# Patient Record
Sex: MALE | Race: BLACK OR AFRICAN AMERICAN
[De-identification: names, ages, dates, MRNs, and addresses within clinical notes are randomized per-mention and may not be internally consistent; named-entity substitution may affect disease eponyms.]

---

## 2017-02-27 ENCOUNTER — HOSPITAL ENCOUNTER (EMERGENCY)
Dept: HOSPITAL 72 - EMR | Age: 67
Discharge: LEFT BEFORE DISCHARGE | End: 2017-02-27
Payer: SELF-PAY

## 2017-02-27 DIAGNOSIS — Z53.21: Primary | ICD-10-CM

## 2020-05-12 ENCOUNTER — HOSPITAL ENCOUNTER (INPATIENT)
Dept: HOSPITAL 72 - EMR | Age: 70
LOS: 17 days | Discharge: HOME | DRG: 853 | End: 2020-05-29
Payer: MEDICAID

## 2020-05-12 VITALS — SYSTOLIC BLOOD PRESSURE: 99 MMHG | DIASTOLIC BLOOD PRESSURE: 56 MMHG

## 2020-05-12 VITALS — SYSTOLIC BLOOD PRESSURE: 112 MMHG | DIASTOLIC BLOOD PRESSURE: 67 MMHG

## 2020-05-12 VITALS — DIASTOLIC BLOOD PRESSURE: 63 MMHG | SYSTOLIC BLOOD PRESSURE: 112 MMHG

## 2020-05-12 VITALS — HEIGHT: 67 IN | BODY MASS INDEX: 31.08 KG/M2 | WEIGHT: 198 LBS

## 2020-05-12 VITALS — SYSTOLIC BLOOD PRESSURE: 112 MMHG | DIASTOLIC BLOOD PRESSURE: 50 MMHG

## 2020-05-12 VITALS — DIASTOLIC BLOOD PRESSURE: 72 MMHG | SYSTOLIC BLOOD PRESSURE: 108 MMHG

## 2020-05-12 VITALS — SYSTOLIC BLOOD PRESSURE: 113 MMHG | DIASTOLIC BLOOD PRESSURE: 68 MMHG

## 2020-05-12 DIAGNOSIS — E86.0: ICD-10-CM

## 2020-05-12 DIAGNOSIS — R55: ICD-10-CM

## 2020-05-12 DIAGNOSIS — N17.9: ICD-10-CM

## 2020-05-12 DIAGNOSIS — I95.9: ICD-10-CM

## 2020-05-12 DIAGNOSIS — R65.20: ICD-10-CM

## 2020-05-12 DIAGNOSIS — E87.6: ICD-10-CM

## 2020-05-12 DIAGNOSIS — I10: ICD-10-CM

## 2020-05-12 DIAGNOSIS — A41.59: Primary | ICD-10-CM

## 2020-05-12 DIAGNOSIS — E11.9: ICD-10-CM

## 2020-05-12 DIAGNOSIS — C18.5: ICD-10-CM

## 2020-05-12 DIAGNOSIS — D68.59: ICD-10-CM

## 2020-05-12 DIAGNOSIS — D50.0: ICD-10-CM

## 2020-05-12 DIAGNOSIS — K56.7: ICD-10-CM

## 2020-05-12 DIAGNOSIS — Z20.828: ICD-10-CM

## 2020-05-12 DIAGNOSIS — E46: ICD-10-CM

## 2020-05-12 DIAGNOSIS — R19.7: ICD-10-CM

## 2020-05-12 DIAGNOSIS — C78.89: ICD-10-CM

## 2020-05-12 DIAGNOSIS — K92.2: ICD-10-CM

## 2020-05-12 DIAGNOSIS — I26.99: ICD-10-CM

## 2020-05-12 DIAGNOSIS — D69.6: ICD-10-CM

## 2020-05-12 LAB
ADD MANUAL DIFF: YES
ALBUMIN SERPL-MCNC: 1.6 G/DL (ref 3.4–5)
ALBUMIN/GLOB SERPL: 0.4 {RATIO} (ref 1–2.7)
ALP SERPL-CCNC: 90 U/L (ref 46–116)
ALT SERPL-CCNC: 15 U/L (ref 12–78)
ANION GAP SERPL CALC-SCNC: 18 MMOL/L (ref 5–15)
APPEARANCE UR: (no result)
APTT BLD: 25 SEC (ref 23–33)
APTT PPP: YELLOW S
AST SERPL-CCNC: 29 U/L (ref 15–37)
BILIRUB SERPL-MCNC: 0.3 MG/DL (ref 0.2–1)
BUN SERPL-MCNC: 26 MG/DL (ref 7–18)
CALCIUM SERPL-MCNC: 7.3 MG/DL (ref 8.5–10.1)
CHLORIDE SERPL-SCNC: 104 MMOL/L (ref 98–107)
CK MB SERPL-MCNC: < 0.5 NG/ML (ref 0–3.6)
CK SERPL-CCNC: 65 U/L (ref 26–308)
CO2 SERPL-SCNC: 16 MMOL/L (ref 21–32)
CREAT SERPL-MCNC: 1.2 MG/DL (ref 0.55–1.3)
ERYTHROCYTE [DISTWIDTH] IN BLOOD BY AUTOMATED COUNT: 22 % (ref 11.6–14.8)
GLOBULIN SER-MCNC: 4.3 G/DL
GLUCOSE UR STRIP-MCNC: NEGATIVE MG/DL
HCT VFR BLD CALC: 12.9 % (ref 42–52)
HGB BLD-MCNC: 3.5 G/DL (ref 14.2–18)
INR PPP: 1.4 (ref 0.9–1.1)
KETONES UR QL STRIP: NEGATIVE
LEUKOCYTE ESTERASE UR QL STRIP: NEGATIVE
MCV RBC AUTO: 90 FL (ref 80–99)
NITRITE UR QL STRIP: NEGATIVE
PH UR STRIP: 5 [PH] (ref 4.5–8)
PHOSPHATE SERPL-MCNC: 4.5 MG/DL (ref 2.5–4.9)
PLATELET # BLD: 204 K/UL (ref 150–450)
POTASSIUM SERPL-SCNC: 3.8 MMOL/L (ref 3.5–5.1)
PROT UR QL STRIP: (no result)
RBC # BLD AUTO: 1.44 M/UL (ref 4.7–6.1)
SODIUM SERPL-SCNC: 138 MMOL/L (ref 136–145)
SP GR UR STRIP: 1.02 (ref 1–1.03)
UROBILINOGEN UR-MCNC: 4 MG/DL (ref 0–1)
WBC # BLD AUTO: 27.4 K/UL (ref 4.8–10.8)

## 2020-05-12 PROCEDURE — 96365 THER/PROPH/DIAG IV INF INIT: CPT

## 2020-05-12 PROCEDURE — 94150 VITAL CAPACITY TEST: CPT

## 2020-05-12 PROCEDURE — 87045 FECES CULTURE AEROBIC BACT: CPT

## 2020-05-12 PROCEDURE — 74018 RADEX ABDOMEN 1 VIEW: CPT

## 2020-05-12 PROCEDURE — 36430 TRANSFUSION BLD/BLD COMPNT: CPT

## 2020-05-12 PROCEDURE — 87086 URINE CULTURE/COLONY COUNT: CPT

## 2020-05-12 PROCEDURE — 96361 HYDRATE IV INFUSION ADD-ON: CPT

## 2020-05-12 PROCEDURE — 81003 URINALYSIS AUTO W/O SCOPE: CPT

## 2020-05-12 PROCEDURE — 83550 IRON BINDING TEST: CPT

## 2020-05-12 PROCEDURE — 80048 BASIC METABOLIC PNL TOTAL CA: CPT

## 2020-05-12 PROCEDURE — 83690 ASSAY OF LIPASE: CPT

## 2020-05-12 PROCEDURE — 94002 VENT MGMT INPAT INIT DAY: CPT

## 2020-05-12 PROCEDURE — 82378 CARCINOEMBRYONIC ANTIGEN: CPT

## 2020-05-12 PROCEDURE — 71045 X-RAY EXAM CHEST 1 VIEW: CPT

## 2020-05-12 PROCEDURE — 93005 ELECTROCARDIOGRAM TRACING: CPT

## 2020-05-12 PROCEDURE — 82550 ASSAY OF CK (CPK): CPT

## 2020-05-12 PROCEDURE — 86900 BLOOD TYPING SEROLOGIC ABO: CPT

## 2020-05-12 PROCEDURE — 94664 DEMO&/EVAL PT USE INHALER: CPT

## 2020-05-12 PROCEDURE — 96375 TX/PRO/DX INJ NEW DRUG ADDON: CPT

## 2020-05-12 PROCEDURE — 74177 CT ABD & PELVIS W/CONTRAST: CPT

## 2020-05-12 PROCEDURE — 85060 BLOOD SMEAR INTERPRETATION: CPT

## 2020-05-12 PROCEDURE — 83880 ASSAY OF NATRIURETIC PEPTIDE: CPT

## 2020-05-12 PROCEDURE — 87040 BLOOD CULTURE FOR BACTERIA: CPT

## 2020-05-12 PROCEDURE — 93306 TTE W/DOPPLER COMPLETE: CPT

## 2020-05-12 PROCEDURE — 82553 CREATINE MB FRACTION: CPT

## 2020-05-12 PROCEDURE — 83735 ASSAY OF MAGNESIUM: CPT

## 2020-05-12 PROCEDURE — 85651 RBC SED RATE NONAUTOMATED: CPT

## 2020-05-12 PROCEDURE — 76937 US GUIDE VASCULAR ACCESS: CPT

## 2020-05-12 PROCEDURE — 82746 ASSAY OF FOLIC ACID SERUM: CPT

## 2020-05-12 PROCEDURE — 85610 PROTHROMBIN TIME: CPT

## 2020-05-12 PROCEDURE — 86927 PLASMA FRESH FROZEN: CPT

## 2020-05-12 PROCEDURE — 85044 MANUAL RETICULOCYTE COUNT: CPT

## 2020-05-12 PROCEDURE — 36415 COLL VENOUS BLD VENIPUNCTURE: CPT

## 2020-05-12 PROCEDURE — 71275 CT ANGIOGRAPHY CHEST: CPT

## 2020-05-12 PROCEDURE — 80202 ASSAY OF VANCOMYCIN: CPT

## 2020-05-12 PROCEDURE — 83540 ASSAY OF IRON: CPT

## 2020-05-12 PROCEDURE — 86920 COMPATIBILITY TEST SPIN: CPT

## 2020-05-12 PROCEDURE — 85007 BL SMEAR W/DIFF WBC COUNT: CPT

## 2020-05-12 PROCEDURE — 83615 LACTATE (LD) (LDH) ENZYME: CPT

## 2020-05-12 PROCEDURE — 86850 RBC ANTIBODY SCREEN: CPT

## 2020-05-12 PROCEDURE — 96366 THER/PROPH/DIAG IV INF ADDON: CPT

## 2020-05-12 PROCEDURE — 86140 C-REACTIVE PROTEIN: CPT

## 2020-05-12 PROCEDURE — 84550 ASSAY OF BLOOD/URIC ACID: CPT

## 2020-05-12 PROCEDURE — 85025 COMPLETE CBC W/AUTO DIFF WBC: CPT

## 2020-05-12 PROCEDURE — 84443 ASSAY THYROID STIM HORMONE: CPT

## 2020-05-12 PROCEDURE — 87635 SARS-COV-2 COVID-19 AMP PRB: CPT

## 2020-05-12 PROCEDURE — 84484 ASSAY OF TROPONIN QUANT: CPT

## 2020-05-12 PROCEDURE — 96368 THER/DIAG CONCURRENT INF: CPT

## 2020-05-12 PROCEDURE — 80053 COMPREHEN METABOLIC PANEL: CPT

## 2020-05-12 PROCEDURE — 87181 SC STD AGAR DILUTION PER AGT: CPT

## 2020-05-12 PROCEDURE — 82150 ASSAY OF AMYLASE: CPT

## 2020-05-12 PROCEDURE — 86901 BLOOD TYPING SEROLOGIC RH(D): CPT

## 2020-05-12 PROCEDURE — 99291 CRITICAL CARE FIRST HOUR: CPT

## 2020-05-12 PROCEDURE — 83605 ASSAY OF LACTIC ACID: CPT

## 2020-05-12 PROCEDURE — 80076 HEPATIC FUNCTION PANEL: CPT

## 2020-05-12 PROCEDURE — 84100 ASSAY OF PHOSPHORUS: CPT

## 2020-05-12 PROCEDURE — 87324 CLOSTRIDIUM AG IA: CPT

## 2020-05-12 PROCEDURE — 82728 ASSAY OF FERRITIN: CPT

## 2020-05-12 PROCEDURE — 97803 MED NUTRITION INDIV SUBSEQ: CPT

## 2020-05-12 PROCEDURE — 70450 CT HEAD/BRAIN W/O DYE: CPT

## 2020-05-12 PROCEDURE — 82607 VITAMIN B-12: CPT

## 2020-05-12 PROCEDURE — 87081 CULTURE SCREEN ONLY: CPT

## 2020-05-12 PROCEDURE — 94003 VENT MGMT INPAT SUBQ DAY: CPT

## 2020-05-12 PROCEDURE — 93970 EXTREMITY STUDY: CPT

## 2020-05-12 PROCEDURE — 85730 THROMBOPLASTIN TIME PARTIAL: CPT

## 2020-05-12 NOTE — NUR
ED Nurse Note:

RECEIVED ORDER FOR HEPARIN DRIP. CONFRIMED WITH MARY GRACE ELLISON REGARDING 
PATIENT ACTIVELY BLEEDING AND LOW HGB 3.5. PER ERMD, PATIENT PRESENTS WITH PE 
AND HEPARIN DRIP TO BE INFUSED.

## 2020-05-12 NOTE — NUR
ED Nurse Note:

placed patient in cardiac monitor, IV started on right AC 20 gauge, patient 
presents with a 18 gauge IV on left AC prior to arrival, patient is alert and 
oriented x4 however states that he feels weak. patient reports of having a last 
known well time 2 weeks ago, reports of unknown amount of diarrhea. bed at 
lowest position. will continue to monitor

## 2020-05-12 NOTE — EMERGENCY ROOM REPORT
History of Present Illness


General


Chief Complaint:  Diarrhea


Source:  Patient





Present Illness


HPI


70-year-old male, past medical history of hypertension presents with multiple 

episodes of diarrhea per day up to 4, watery nature, patient today passed out, 

unwitnessed, patient was found down, patient presents for evaluation denies any 

preceding symptoms aggravated by dehydration alleviated with rehydration, 

severity was moderate, lasting few minutes, patient denies any chest pain or 

shortness of breath he feels weak no fevers no chills


Allergies:  


Coded Allergies:  


     No Known Allergies (Unverified , 5/12/20)





Patient History


Past Medical History:  see triage record


Reviewed Nursing Documentation:  PMH: Agreed; PSxH: Agreed





Review of Systems


All Other Systems:  negative except mentioned in HPI





Physical Exam


Sp02 EP Interpretation:  reviewed, normal


General Appearance:  alert, mild distress


Head:  normocephalic, atraumatic


Eyes:  bilateral eye PERRL, bilateral eye EOMI


ENT:  uvula midline, dry mucus membranes


Neck:  supple, thyroid normal, supple/symm/no masses


Respiratory:  lungs clear, no respiratory distress, no retraction, no accessory 

muscle use


Cardiovascular #1:  normal peripheral pulses, no edema, no gallop, no murmur, 

tachycardia


Gastrointestinal:  non tender, soft, no guarding, no rebound


Musculoskeletal:  normal inspection


Neurologic:  alert, oriented x3


Psychiatric:  mood/affect normal


Skin:  no rash, warm/dry





Procedures


Critical Care Time


Critical Care Time


Given the critical condition in which the patient arrived, the patient was 

immediately assessed by myself and the nurse, and cardiac monitoring initiated 

due to the potential for rapid decompensation of the patient's clinical 

condition. During the course of the patient's stay, I spent a considerable 

amount of time at the bedside performing serial re-evaluations of the patient's 

hemodynamic and clinical status because of the recognized potential threat to 

life or limb in this condition. I then had a chance to review not only all of 

the available current laboratory and radiographic studies obtained today, but I 

also reviewed old records available to me at the time. Additionally, any 

ancillary information available including paramedic records were reviewed. 

Sequential vital signs were obtained. 





Critical Care time of 38 minutes was performed exclusive of billable procedures.





Medical Decision Making


Diagnostic Impression:  


 Primary Impression:  


 Gastric mass


 Additional Impressions:  


 GI bleed


 Qualified Codes:  K92.2 - Gastrointestinal hemorrhage, unspecified


 Pulmonary embolism


 Qualified Codes:  I26.99 - Other pulmonary embolism without acute cor pulmonale


 Syncope


 Qualified Codes:  R55 - Syncope and collapse


ER Course


70-year-old male presents with syncope prior to arrival differential diagnosis 

includes ACS, anemia, dehydration


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 

findings of pulmonary embolism with pulmonary infarcts.


[facility] General surgery was consulted. 


Patient was resuscitated with fluids, blood transfusion


Patient was also started on antibiotics and Protonix for possible GI bleed


Patient was also started on heparin for pulmonary embolism.  Simultaneously 

patient was also being transfused blood in order to treat his anemia.  Low 

suspicion for acute GI bleed at this time however Protonix was also started


Patient admitted to Dr. Pierce ICU





Laboratory Tests








Test


  5/12/20


19:00 5/12/20


20:00 5/12/20


21:00


 


White Blood Count


  27.4 K/UL


(4.8-10.8)  *H 


  


 


 


Red Blood Count


  1.44 M/UL


(4.70-6.10)  L 


  


 


 


Hemoglobin


  3.5 G/DL


(14.2-18.0)  *L 


  


 


 


Hematocrit


  12.9 %


(42.0-52.0)  L 


  


 


 


Mean Corpuscular Volume 90 FL (80-99)    


 


Mean Corpuscular Hemoglobin


  24.3 PG


(27.0-31.0)  L 


  


 


 


Mean Corpuscular Hemoglobin


Concent 27.1 G/DL


(32.0-36.0)  L 


  


 


 


Red Cell Distribution Width


  22.0 %


(11.6-14.8)  H 


  


 


 


Platelet Count


  204 K/UL


(150-450) 


  


 


 


Mean Platelet Volume


  6.0 FL


(6.5-10.1)  L 


  


 


 


Neutrophils (%) (Auto)


  % (45.0-75.0)


  


  


 


 


Lymphocytes (%) (Auto)


  % (20.0-45.0)


  


  


 


 


Monocytes (%) (Auto)  % (1.0-10.0)    


 


Eosinophils (%) (Auto)  % (0.0-3.0)    


 


Basophils (%) (Auto)  % (0.0-2.0)    


 


Differential Total Cells


Counted 100  


  


  


 


 


Neutrophils % (Manual) 91 % (45-75)  H  


 


Lymphocytes % (Manual) 5 % (20-45)  L  


 


Monocytes % (Manual) 3 % (1-10)    


 


Eosinophils % (Manual) 0 % (0-3)    


 


Basophils % (Manual) 0 % (0-2)    


 


Band Neutrophils 1 % (0-8)    


 


Nucleated Red Blood Cells 4 /100 WBC    


 


Platelet Estimate Adequate    


 


Platelet Morphology Normal    


 


Polychromasia 2+    


 


Hypochromasia 3+    


 


Anisocytosis 3+    


 


Sodium Level


  138 MMOL/L


(136-145) 


  


 


 


Potassium Level


  3.8 MMOL/L


(3.5-5.1) 


  


 


 


Chloride Level


  104 MMOL/L


() 


  


 


 


Carbon Dioxide Level


  16 MMOL/L


(21-32)  L 


  


 


 


Anion Gap


  18 mmol/L


(5-15)  H 


  


 


 


Blood Urea Nitrogen


  26 mg/dL


(7-18)  H 


  


 


 


Creatinine


  1.2 MG/DL


(0.55-1.30) 


  


 


 


Estimated Glomerular


Filtration Rate > 60 mL/min


(>60) 


  


 


 


Glucose Level


  180 MG/DL


()  H 


  


 


 


Lactic Acid Level


  6.80 mmol/L


(0.4-2.0)  H 


  7.70 mmol/L


(0.66-2.22)  H


 


Calcium Level


  7.3 MG/DL


(8.5-10.1)  L 


  


 


 


Phosphorus Level


  4.5 MG/DL


(2.5-4.9) 


  


 


 


Magnesium Level


  2.6 MG/DL


(1.8-2.4)  H 


  


 


 


Total Bilirubin


  0.3 MG/DL


(0.2-1.0) 


  


 


 


Aspartate Amino Transferase


(AST) 29 U/L (15-37)


  


  


 


 


Alanine Aminotransferase (ALT)


  15 U/L (12-78)


  


  


 


 


Alkaline Phosphatase


  90 U/L


() 


  


 


 


Total Creatine Kinase


  65 U/L


() 


  


 


 


Creatine Kinase MB


  < 0.5 NG/ML


(0.0-3.6) 


  


 


 


Creatine Kinase MB Relative


Index 0.7  


  


  


 


 


Troponin I


  0.010 ng/mL


(0.000-0.056) 


  


 


 


Pro-B-Type Natriuretic Peptide


  288 pg/mL


(0-125)  H 


  


 


 


Total Protein


  5.9 G/DL


(6.4-8.2)  L 


  


 


 


Albumin


  1.6 G/DL


(3.4-5.0)  L 


  


 


 


Globulin 4.3 g/dL    


 


Albumin/Globulin Ratio


  0.4 (1.0-2.7)


L 


  


 


 


Lipase


  209 U/L


() 


  


 


 


Urine Color  Yellow   


 


Urine Appearance


  


  Slightly


cloudy 


 


 


Urine pH  5 (4.5-8.0)   


 


Urine Specific Gravity


  


  1.020


(1.005-1.035) 


 


 


Urine Protein


  


  2+ (NEGATIVE)


H 


 


 


Urine Glucose (UA)


  


  Negative


(NEGATIVE) 


 


 


Urine Ketones


  


  Negative


(NEGATIVE) 


 


 


Urine Blood


  


  4+ (NEGATIVE)


H 


 


 


Urine Nitrite


  


  Negative


(NEGATIVE) 


 


 


Urine Bilirubin


  


  1+ (NEGATIVE)


H 


 


 


Urine Ictotest


  


  Negative


(NEGATIVE) 


 


 


Urine Urobilinogen


  


  4 MG/DL


(0.0-1.0)  H 


 


 


Urine Leukocyte Esterase


  


  Negative


(NEGATIVE) 


 


 


Urine RBC


  


  30-40 /HPF (0


- 0)  H 


 


 


Urine WBC


  


  0-2 /HPF (0 -


0) 


 


 


Urine Squamous Epithelial


Cells 


  Few /LPF


(NONE/OCC) 


 


 


Urine Bacteria


  


  Moderate /HPF


(NONE)  H 


 








EKG Diagnostic Results


EKG Time:  19:23


EP Interpretation:  Sinus tachycardia, rate 121, QTc 471, no acute ST 

elevations normal axis





Rhythm Strip Diag. Results


Rhythm Strip Time:  19:00


EP Interpretation:  yes


Rate:  120


Rhythm:  other - Sinus tachycardia





Chest X-Ray Diagnostic Results


Chest X-Ray Diagnostic Results :  


   Chest X-Ray Ordered:  Yes


   # of Views/Limited/Complete:  1 View


   Indication:  Chest Pain


   EP Interpretation:  Yes


   Interpretation:  no consolidation, no effusion, no pneumothorax, no acute 

cardiopulmonary disease


   Impression:  No acute disease


   Electronically Signed by:  Antwan Mckinney MD





CT/MRI/US Diagnostic Results


CT/MRI/US Diagnostic Results :  


   Impression


Procedure: CT Head no Contrast





EXAM:


  CT Head Without Intravenous Contrast


 


CLINICAL HISTORY:


  SYNCOPE


 


TECHNIQUE:


  Axial computed tomography images of the head/brain without intravenous 


contrast.  CTDI is 53.4 mGy and DLP is 1018.8 mGy-cm.  One or more of the 


following dose reduction techniques were used: automated exposure control,


 adjustment of the mA and/or kV according to patient size, use of 


iterative reconstruction technique.


 


COMPARISON:


  No relevant prior studies available.


 


FINDINGS:


  Brain:  No intracranial hemorrhage, mass-effect, or edema.  No acute 


cortical infarct.  Age-related volume loss.


  Bones/joints:  Unremarkable.  No acute fracture.


  Soft tissues:  Unremarkable.


  Sinuses:  Unremarkable as visualized.


  Mastoid air cells:  Unremarkable as visualized.  No mastoid effusion.


 


IMPRESSION:     


  No acute findings in the head/brain.


 














Dictated By: Haider Obrien M.D.   


Electronically Signed By: Haider Obrien M.D.   


Signed Date/Time 05/12/20 2019   








CC: Antwan Mckinney MD





Procedure: CT Abdomen Pelvis w/Contrast





EXAM:


  CT Abdomen and Pelvis With Intravenous Contrast


 


CLINICAL HISTORY:


  DIARRHEA


 


TECHNIQUE:


  Axial computed tomography images of the abdomen and pelvis with 


intravenous contrast.  CTDI is 4 mGy and DLP is 202 mGy-cm.  One or more 


of the following dose reduction techniques were used: automated exposure 


control, adjustment of the mA and/or kV according to patient size, use of 


iterative reconstruction technique.


 


COMPARISON:


  No relevant prior studies available.


 


FINDINGS:


  Lung bases:  Small focus of groundglass at the lung bases bilaterally.  


A few low-attenuation lesions within the liver the largest measuring 1.2 


cm within segment 4A.  These are statistically favored to represent cysts.


 


 ABDOMEN:


  Liver:  See above.


  Gallbladder and bile ducts:  Unremarkable.  No calcified stones.  No 


ductal dilation.


  Pancreas:  Unremarkable.  No mass.  No ductal dilation.


  Spleen:  Unremarkable.  No splenomegaly.


  Adrenals:  Unremarkable.  No mass.


  Kidneys and ureters:  Unremarkable.  No solid mass.  No hydronephrosis.


  Stomach and bowel:  Centrally necrotic mass arising from the gastric 


fundus measuring 12.6 x 11.1 x 14 cm.  Liquid stool within the rectum.


 


 PELVIS:


  Appendix:  No findings to suggest acute appendicitis.


  Bladder:  Unremarkable.  No mass.


  Reproductive:  Unremarkable as visualized.


 


 ABDOMEN and PELVIS:


  Intraperitoneal space:  Unremarkable.  No free air.  No significant 


fluid collection.


  Bones/joints:  No acute fracture.  No dislocation.


  Soft tissues:  Unremarkable.


  Vasculature:  Filling defects within pulmonary vessels at the lung 


bases with small peripheral airspace opacities concerning for pulmonary 


emboli and small pulmonary infarcts.  No abdominal aortic aneurysm.


  Lymph nodes:  Unremarkable.  No enlarged lymph nodes.


 


IMPRESSION:     


1.  Centrally necrotic mass arising from the gastric fundus measuring 12.


6 x 11.1 x 14 cm.  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  Gastric 


adenocarcinoma, lymphoma, and other neoplastic etiologies are also in the 


differential.


2.  Filling defects within pulmonary vessels at the lung bases with small 


peripheral airspace opacities concerning for pulmonary emboli and small 


pulmonary infarcts.


 


<MYCVCSECTION>


 


Communications:


 


05/12/20 20:30 Call Doctor Regarding Above results, called  Dr. Mckinney 


on 05/12 20:31 (-07:00)














Dictated By: Haider Obrien M.D.   


Electronically Signed By: Haider Obrien M.D.   


Signed Date/Time 05/12/20 2026   








CC: Antwan Mckinney MD


Disposition:  ADMITTED AS INPATIENT


Condition:  Critical


Scripts


No Active Prescriptions or Reported Meds











Antwan Mckinney MD May 12, 2020 19:05

## 2020-05-12 NOTE — NUR
ED Nurse Note:

VALENZUELA INSERTED PER ERMD ORDER. PLACED PATIENT ON NASAL CANNULA 2L SPOT 96%. 
PATIENT RESPONDING WELL TO IV FLUIDS; /63. PATIENT ALERT AWAKE AND 
ORIENTED X4, VERBALLY MAKES NEEDS KNOWN; PRESENTS WITH PURPOSEFUL MOVEMENTS 4/5 
SLIGHT WEAKNESS. NON PITTING EDEMA NOTED ON BILATERAL LOWER EXTREMITIES. 
PATIENT CONTINUES TO PRESENT  WITH BLOODY DIARRHEA.

## 2020-05-12 NOTE — DIAGNOSTIC IMAGING REPORT
EXAM:

  CT Abdomen and Pelvis With Intravenous Contrast

 

CLINICAL HISTORY:

  DIARRHEA

 

TECHNIQUE:

  Axial computed tomography images of the abdomen and pelvis with 

intravenous contrast.  CTDI is 4 mGy and DLP is 202 mGy-cm.  One or more 

of the following dose reduction techniques were used: automated exposure 

control, adjustment of the mA and/or kV according to patient size, use of 

iterative reconstruction technique.

 

COMPARISON:

  No relevant prior studies available.

 

FINDINGS:

  Lung bases:  Small focus of groundglass at the lung bases bilaterally.  

A few low-attenuation lesions within the liver the largest measuring 1.2 

cm within segment 4A.  These are statistically favored to represent cysts.

 

 ABDOMEN:

  Liver:  See above.

  Gallbladder and bile ducts:  Unremarkable.  No calcified stones.  No 

ductal dilation.

  Pancreas:  Unremarkable.  No mass.  No ductal dilation.

  Spleen:  Unremarkable.  No splenomegaly.

  Adrenals:  Unremarkable.  No mass.

  Kidneys and ureters:  Unremarkable.  No solid mass.  No hydronephrosis.

  Stomach and bowel:  Centrally necrotic mass arising from the gastric 

fundus measuring 12.6 x 11.1 x 14 cm.  Liquid stool within the rectum.

 

 PELVIS:

  Appendix:  No findings to suggest acute appendicitis.

  Bladder:  Unremarkable.  No mass.

  Reproductive:  Unremarkable as visualized.

 

 ABDOMEN and PELVIS:

  Intraperitoneal space:  Unremarkable.  No free air.  No significant 

fluid collection.

  Bones/joints:  No acute fracture.  No dislocation.

  Soft tissues:  Unremarkable.

  Vasculature:  Filling defects within pulmonary vessels at the lung 

bases with small peripheral airspace opacities concerning for pulmonary 

emboli and small pulmonary infarcts.  No abdominal aortic aneurysm.

  Lymph nodes:  Unremarkable.  No enlarged lymph nodes.

 

IMPRESSION:     

1.  Centrally necrotic mass arising from the gastric fundus measuring 12.

6 x 11.1 x 14 cm.  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  Gastric 

adenocarcinoma, lymphoma, and other neoplastic etiologies are also in the 

differential.

2.  Filling defects within pulmonary vessels at the lung bases with small 

peripheral airspace opacities concerning for pulmonary emboli and small 

pulmonary infarcts.

 

<MYCVCSECTION>

 

Communications:

 

05/12/20 20:30 Call Doctor Regarding Above results, called  Dr. Mckinney 

on 05/12 20:31 (-07:00)

## 2020-05-12 NOTE — NUR
ED Nurse Note:



pt brought in to ER from home by ambulance due to sudden weakness and diarrhea. 
pt had diarrhea x3 times today and 45 minutes prior to arrival he had diarrhea 
again in the bathroom and fainted. pt aao x4 and unable to ambulate due to 
weakness at this time. calm and cooperative but weakness noted. denied coughing 
or SOB. B/L lower molar implants/yes(specify)

## 2020-05-12 NOTE — CONSULTATION
History of Present Illness


General


Date patient seen:  May 12, 2020


Reason for Hospitalization:  Syncope





Present Illness


HPI


This is a very pleasant 70-year-old male with past medical history of 

hypertension who [place] [facility] [place] of multiple episodes of 

diarrhea per day up to 4, watery nature and eventually patient passed out today

, unwitnessed, patient was found down.  patient presents for evaluation denies 

any preceding symptoms aggravated by dehydration alleviated with rehydration, 

severity was moderate, lasting few minutes.  Patient states that he has been 

feeling unwell for approximately 1 to 2 weeks with fatigue tired weak and 

uncomfortable but over the course of the past few days has been having 

worsening diarrhea dehydration and anorexia.  States he is not hungry and has 

not eaten anything in approximately 3 days.  Seemingly short of breath in the 

emergency department.  CT scan performed identified a mass as well as labs with 

significant severe anemia.  Surgery was called urgently to evaluate and assist 

with care.  Patient denies any bloody emesis or bleeding or blood noted in his 

diarrhea.  He is unaware of any prior history of similar events and states that 

he is scared and feels that he may not live much longer.  Patient seen in 

emergency permit, patient evaluated, chart reviewed.  When asked patient denies 

any pain.  States that have abdominal discomfort. CT with PE.  states LE edema 

for 3 days


Allergies:  


Coded Allergies:  


     No Known Allergies (Unverified , 5/12/20)





COVID-19 Screening


Contact w/high risk pt:  No


Recent Travel to affected area:  No


Experienced COVID-19 symptoms?:  Yes


COVID-19 symptoms experienced:  Flu-Like Symptoms





Medication History


No Active Prescriptions or Reported Meds





Patient History


History Provided By:  Patient, Medical Record


Healthcare decision maker





Resuscitation status





Advanced Directive on File








Past Medical/Surgical History


Past Medical/Surgical History:  


(1) Gastric mass


(2) GI bleed





Review of Systems


Review of Symptoms


General ROS: no weight loss or fever


Psychological ROS: no depression or mood changes, no memory loss


Ophthalmic ROS: no visual changes or eye irritation


ENT ROS: no nasal congestion, hearing loss, dizziness


Allergy and Immunology ROS: no allergic symptoms or urticaria


Hematological and Lymphatic ROS: no swollen glands, unusual bleeding or bruising


Endocrine ROS: no polyuria, polydipsia, weight changes, temperature intolerance


Respiratory ROS: no cough, shortness of breath, or wheezing


Cardiovascular ROS: no chest pain or dyspnea on exertion


Gastrointestinal ROS: denies abdominal pain, bright red blood in stool.


Musculoskeletal ROS: no myalgias or arthralgias


Neurological ROS: no TIA or stroke symptoms


Dermatological ROS: no new or changing skin lesions, rashes or pruritis





Physical Exam


Physical Exam


General appearance:  alert, cooperative, ill appearing, in distress 


Head:  Normocephalic, without obvious abnormality, atraumatic


Eyes:  conjunctivae/corneas clear. PERRL, EOM's intact. Fundi benign


Throat:  Lips, mucosa, and tongue normal. Teeth and gums normal


Neck:  supple, symmetrical, trachea midline, no adenopathy, thyroid: not 

enlarged, symmetric, no tenderness/mass/nodules, no carotid bruit and no JVD


Lungs:  clear to auscultation bilaterally sob 


Heart:  tachy


Abdomen:  soft, non-tender. Bowel sounds decreased. ? masses,  no organomegaly


Extremities:  extremities normal, atraumatic, +++ edema


Pulses:  1 and symmetric


Skin:  Skin color, texture, turgor normal. No rashes or lesions


Neurologic:  Grossly normal





Last 24 Hour Vital Signs








  Date Time  Temp Pulse Resp B/P (MAP) Pulse Ox O2 Delivery O2 Flow Rate FiO2


 


5/12/20 20:32 97.7 118 14 112/67 95 Room Air  


 


5/12/20 19:50 97.7 118 14 108/72 95 Room Air  


 


5/12/20 19:10 97.7 120 14 113/68 95 Room Air  


 


5/12/20 19:05 97.7 122 14 113/68 (83) 95 Room Air  











Laboratory Tests








Test


  5/12/20


19:00 5/12/20


20:00


 


White Blood Count


  27.4 K/UL


(4.8-10.8)  *H 


 


 


Red Blood Count


  1.44 M/UL


(4.70-6.10)  L 


 


 


Hemoglobin


  3.5 G/DL


(14.2-18.0)  *L 


 


 


Hematocrit


  12.9 %


(42.0-52.0)  L 


 


 


Mean Corpuscular Volume 90 FL (80-99)   


 


Mean Corpuscular Hemoglobin


  24.3 PG


(27.0-31.0)  L 


 


 


Mean Corpuscular Hemoglobin


Concent 27.1 G/DL


(32.0-36.0)  L 


 


 


Red Cell Distribution Width


  22.0 %


(11.6-14.8)  H 


 


 


Platelet Count


  204 K/UL


(150-450) 


 


 


Mean Platelet Volume


  6.0 FL


(6.5-10.1)  L 


 


 


Neutrophils (%) (Auto)


  % (45.0-75.0)


  


 


 


Lymphocytes (%) (Auto)


  % (20.0-45.0)


  


 


 


Monocytes (%) (Auto)  % (1.0-10.0)   


 


Eosinophils (%) (Auto)  % (0.0-3.0)   


 


Basophils (%) (Auto)  % (0.0-2.0)   


 


Differential Total Cells


Counted 100  


  


 


 


Neutrophils % (Manual) 91 % (45-75)  H 


 


Lymphocytes % (Manual) 5 % (20-45)  L 


 


Monocytes % (Manual) 3 % (1-10)   


 


Eosinophils % (Manual) 0 % (0-3)   


 


Basophils % (Manual) 0 % (0-2)   


 


Band Neutrophils 1 % (0-8)   


 


Nucleated Red Blood Cells 4 /100 WBC   


 


Platelet Estimate Adequate   


 


Platelet Morphology Normal   


 


Polychromasia 2+   


 


Hypochromasia 3+   


 


Anisocytosis 3+   


 


Sodium Level


  138 MMOL/L


(136-145) 


 


 


Potassium Level


  3.8 MMOL/L


(3.5-5.1) 


 


 


Chloride Level


  104 MMOL/L


() 


 


 


Carbon Dioxide Level


  16 MMOL/L


(21-32)  L 


 


 


Anion Gap


  18 mmol/L


(5-15)  H 


 


 


Blood Urea Nitrogen


  26 mg/dL


(7-18)  H 


 


 


Creatinine


  1.2 MG/DL


(0.55-1.30) 


 


 


Estimat Glomerular Filtration


Rate > 60 mL/min


(>60) 


 


 


Glucose Level


  180 MG/DL


()  H 


 


 


Lactic Acid Level


  6.80 mmol/L


(0.4-2.0)  H 


 


 


Calcium Level


  7.3 MG/DL


(8.5-10.1)  L 


 


 


Phosphorus Level


  4.5 MG/DL


(2.5-4.9) 


 


 


Magnesium Level


  2.6 MG/DL


(1.8-2.4)  H 


 


 


Total Bilirubin


  0.3 MG/DL


(0.2-1.0) 


 


 


Aspartate Amino Transf


(AST/SGOT) 29 U/L (15-37)


  


 


 


Alanine Aminotransferase


(ALT/SGPT) 15 U/L (12-78)


  


 


 


Alkaline Phosphatase


  90 U/L


() 


 


 


Total Creatine Kinase


  65 U/L


() 


 


 


Creatine Kinase MB


  < 0.5 NG/ML


(0.0-3.6) 


 


 


Creatine Kinase MB Relative


Index 0.7  


  


 


 


Troponin I


  0.010 ng/mL


(0.000-0.056) 


 


 


Pro-B-Type Natriuretic Peptide


  288 pg/mL


(0-125)  H 


 


 


Total Protein


  5.9 G/DL


(6.4-8.2)  L 


 


 


Albumin


  1.6 G/DL


(3.4-5.0)  L 


 


 


Globulin 4.3 g/dL   


 


Albumin/Globulin Ratio


  0.4 (1.0-2.7)


L 


 


 


Lipase


  209 U/L


() 


 


 


Urine Color  Yellow  


 


Urine Appearance


  


  Slightly


cloudy


 


Urine pH  5 (4.5-8.0)  


 


Urine Specific Gravity


  


  1.020


(1.005-1.035)


 


Urine Protein


  


  2+ (NEGATIVE)


H


 


Urine Glucose (UA)


  


  Negative


(NEGATIVE)


 


Urine Ketones


  


  Negative


(NEGATIVE)


 


Urine Blood


  


  4+ (NEGATIVE)


H


 


Urine Nitrite


  


  Negative


(NEGATIVE)


 


Urine Bilirubin


  


  1+ (NEGATIVE)


H


 


Urine Ictotest


  


  Negative


(NEGATIVE)


 


Urine Urobilinogen


  


  4 MG/DL


(0.0-1.0)  H


 


Urine Leukocyte Esterase


  


  Negative


(NEGATIVE)


 


Urine RBC


  


  30-40 /HPF (0


- 0)  H


 


Urine WBC


  


  0-2 /HPF (0 -


0)


 


Urine Squamous Epithelial


Cells 


  Few /LPF


(NONE/OCC)


 


Urine Bacteria


  


  Moderate /HPF


(NONE)  H








Height (Feet):  5


Height (Inches):  9.00


Weight (Pounds):  150


Medications





Current Medications








 Medications


  (Trade)  Dose


 Ordered  Sig/Marisa


 Route


 PRN Reason  Start Time


 Stop Time Status Last Admin


Dose Admin


 


 Barium Sulfate


  (Readi-Cat 2)  450 ml  NOW  PRN


 ORAL


 Radiology Procedure  5/12/20 19:15


 5/14/20 19:03   


 


 


 Heparin Sodium/


 Dextrose  500 ml @ 


 24.494 mls/


 hr  ADJUST PER  PROTOCOL


 IV


   5/12/20 21:00


 6/11/20 20:59   


 


 


 Vancomycin HCl


 1.5 gm/Sodium


 Chloride  275 ml @ 


 137.5 mls/


 hr  ONCE  ONCE


 IVPB


   5/12/20 20:15


 5/12/20 22:14  5/12/20 20:16


 











Assessment/Plan


Problem List:  


(1) Lower extremity edema


Assessment & Plan:  Venous Duplex pending 


DVT r/o


ICD Codes:  R60.0 - Localized edema


SNOMED:  112361915


(2) Pulmonary embolism


Assessment & Plan:  Filling defects within pulmonary vessels at the lung bases 

with small 


peripheral airspace opacities concerning for pulmonary emboli and small 


pulmonary infarcts.


ICD Codes:  I26.99 - Other pulmonary embolism without acute cor pulmonale


SNOMED:  81860336


(3) GI bleed


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  Patient 

denies any knowledge of this mass.  Likely etiology of severe anemia hemoglobin 

3.5 being transfused emergently.  Patient states he feels very ill and 

potentially feels as if he is going to die.  High probability of GI bleed 

etiology is this large necrotic mass.


I had a long discussion with the patient in the emergency department regards 

above findings and care plan.  Discussed care plan with primary care physician 

GI and emergency permit physician.


Patient needs urgent blood transfusion which he is receiving in the ED as 

ordered.  Patient needs acute resuscitation followed by


Potential EGD.


Given severe anemia.  Gastric mass causing GI bleed.  Acute hemorrhagic GI 

bleed secondary to likely mass.  


 tumor needs biopsy and pathology evaluation


Despite above if continues to hemorrhage or does not respond appropriately will 

likely need acute life-saving emergency surgery for resection of acutely 

bleeding gastric tumor


If surgical intervention propose it would not be for curative intent but rather 

life-saving hemorrhage control


Discussed with patient in detail at bedside.


For now continue with resuscitation and will continue with further work-up





Npo


IV fluids


PRBC 


q6h h/h


trend labs


GI eval for EGD consideration


Heme Onc Eval for evaluation of large gastric tumor identification


surgery available and will follow with recs.


Thank you for allowing me to participate in patients care


ICD Codes:  K92.2 - Gastrointestinal hemorrhage, unspecified


SNOMED:  08704717


(4) Gastric mass


Assessment & Plan:   ABDOMEN:


  Liver:  See above.


  Gallbladder and bile ducts:  Unremarkable.  No calcified stones.  No 


ductal dilation.


  Pancreas:  Unremarkable.  No mass.  No ductal dilation.


  Spleen:  Unremarkable.  No splenomegaly.


  Adrenals:  Unremarkable.  No mass.


  Kidneys and ureters:  Unremarkable.  No solid mass.  No hydronephrosis.


  Stomach and bowel:  Centrally necrotic mass arising from the gastric 


fundus measuring 12.6 x 11.1 x 14 cm.  Liquid stool within the rectum.


 


 PELVIS:


  Appendix:  No findings to suggest acute appendicitis.


  Bladder:  Unremarkable.  No mass.


  Reproductive:  Unremarkable as visualized.


 


 ABDOMEN and PELVIS:


  Intraperitoneal space:  Unremarkable.  No free air.  No significant 


fluid collection.


  Bones/joints:  No acute fracture.  No dislocation.


  Soft tissues:  Unremarkable.


  Vasculature:  Filling defects within pulmonary vessels at the lung 


bases with small peripheral airspace opacities concerning for pulmonary 


emboli and small pulmonary infarcts.  No abdominal aortic aneurysm.


  Lymph nodes:  Unremarkable.  No enlarged lymph nodes.


 


IMPRESSION:     


1.  Centrally necrotic mass arising from the gastric fundus measuring 12.


6 x 11.1 x 14 cm.  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  Gastric 


adenocarcinoma, lymphoma, and other neoplastic etiologies are also in the 


differential.


2.  Filling defects within pulmonary vessels at the lung bases with small 


peripheral airspace opacities concerning for pulmonary emboli and small 


pulmonary infarcts.


ICD Codes:  K31.89 - Other diseases of stomach and duodenum


SNOMED:  747325185


(5) COVID-19 ruled out


Assessment & Plan:  COVID 19 r/o


sob


respiratory symptoms


pending testing 


Defer to ID


ICD Codes:  Z03.818 - Encounter for observation for suspected exposure to other 

biological agents ruled out


SNOMED:  410450643, [id number]











Erik Fischer May 12, 2020 21:32

## 2020-05-12 NOTE — NUR
TRANSFER TO FLOOR:

Patient transferred to ICU 246F as ordered, per [facility] REPORT GIVEN TO SHLOMO RAHMAN. 
ENDORSED REMAINING BLOOD TRANSFUSION; TOTAL OF 5 UNITS. PATIENT TRANSFERRED TO 
UNIT VIA GURNEY WITH RN AND LYNN. DROPLET TRANSPORT PRECAUTIONS OBSERVED. 
BELONGINGS AND ADMISSION PACKET SENT WITH PATIENT.

## 2020-05-12 NOTE — NUR
NURSE NOTES:

Second unit PRBC was transfused, monitor vital signs and any signs of untoward reactions. RT 
AC IV site atraumatic. Heparin drip been off per Dr barragan.

## 2020-05-12 NOTE — NUR
ED Nurse Note:

1ST UNIT OF BLOOD COMPLETED. PATIENT VOID OF TRANSFUSION REACTION. 2ND UNIT OF 
BLOOD INITIATED; MONITORED FOR 15 MINUTES. PATIENT FREE FROM SIGNS AND SYMPTOMS 
OF TRANSFUSION REACTION; VITALS STABLE TO BASELINE. SEE BLOOD TRANSFUSION FORM.

## 2020-05-12 NOTE — NUR
ED Nurse Note:

RECEIVED PATIENT FROM [facility]. PATIENT AO2 LETHARGIC. RESPIRATIONS LABORED AND 
TACHYPNEIC 30. CHANGED INTO GOWN; ATTACHED TO MONITOR. HYPOTENSIVE 70/43. IV 
ACCESS ESTABLISHED PTA LEFT AC AND NEW IV ESTABLISHED RIGHT AC 20 G. BLOOD, 
URINE, COVID MRSA VRE CRE SWAB COLLECTED; SENT DOWN TO LAB.

## 2020-05-12 NOTE — DIAGNOSTIC IMAGING REPORT
EXAM:

  CT Head Without Intravenous Contrast

 

CLINICAL HISTORY:

  SYNCOPE

 

TECHNIQUE:

  Axial computed tomography images of the head/brain without intravenous 

contrast.  CTDI is 53.4 mGy and DLP is 1018.8 mGy-cm.  One or more of the 

following dose reduction techniques were used: automated exposure control,

 adjustment of the mA and/or kV according to patient size, use of 

iterative reconstruction technique.

 

COMPARISON:

  No relevant prior studies available.

 

FINDINGS:

  Brain:  No intracranial hemorrhage, mass-effect, or edema.  No acute 

cortical infarct.  Age-related volume loss.

  Bones/joints:  Unremarkable.  No acute fracture.

  Soft tissues:  Unremarkable.

  Sinuses:  Unremarkable as visualized.

  Mastoid air cells:  Unremarkable as visualized.  No mastoid effusion.

 

IMPRESSION:     

  No acute findings in the head/brain.

## 2020-05-12 NOTE — NUR
ED Nurse Note:

1ST UNIT OF BLOOD INITIATED; MONITORED FOR 15 MINUTES. PATIENT FREE FROM SIGNS 
OF TRANSFUSION REACTION; VITALS STABLE TO BASELINE. SEE BLOOD TRANSFUSION FORM.

## 2020-05-13 VITALS — SYSTOLIC BLOOD PRESSURE: 100 MMHG | DIASTOLIC BLOOD PRESSURE: 65 MMHG

## 2020-05-13 VITALS — SYSTOLIC BLOOD PRESSURE: 107 MMHG | DIASTOLIC BLOOD PRESSURE: 58 MMHG

## 2020-05-13 VITALS — SYSTOLIC BLOOD PRESSURE: 101 MMHG | DIASTOLIC BLOOD PRESSURE: 61 MMHG

## 2020-05-13 VITALS — SYSTOLIC BLOOD PRESSURE: 105 MMHG | DIASTOLIC BLOOD PRESSURE: 58 MMHG

## 2020-05-13 VITALS — DIASTOLIC BLOOD PRESSURE: 62 MMHG | SYSTOLIC BLOOD PRESSURE: 103 MMHG

## 2020-05-13 VITALS — SYSTOLIC BLOOD PRESSURE: 98 MMHG | DIASTOLIC BLOOD PRESSURE: 60 MMHG

## 2020-05-13 VITALS — SYSTOLIC BLOOD PRESSURE: 106 MMHG | DIASTOLIC BLOOD PRESSURE: 68 MMHG

## 2020-05-13 VITALS — SYSTOLIC BLOOD PRESSURE: 115 MMHG | DIASTOLIC BLOOD PRESSURE: 66 MMHG

## 2020-05-13 VITALS — DIASTOLIC BLOOD PRESSURE: 52 MMHG | SYSTOLIC BLOOD PRESSURE: 102 MMHG

## 2020-05-13 VITALS — DIASTOLIC BLOOD PRESSURE: 62 MMHG | SYSTOLIC BLOOD PRESSURE: 99 MMHG

## 2020-05-13 VITALS — DIASTOLIC BLOOD PRESSURE: 59 MMHG | SYSTOLIC BLOOD PRESSURE: 100 MMHG

## 2020-05-13 VITALS — DIASTOLIC BLOOD PRESSURE: 60 MMHG | SYSTOLIC BLOOD PRESSURE: 98 MMHG

## 2020-05-13 VITALS — DIASTOLIC BLOOD PRESSURE: 61 MMHG | SYSTOLIC BLOOD PRESSURE: 97 MMHG

## 2020-05-13 VITALS — SYSTOLIC BLOOD PRESSURE: 103 MMHG | DIASTOLIC BLOOD PRESSURE: 60 MMHG

## 2020-05-13 VITALS — DIASTOLIC BLOOD PRESSURE: 63 MMHG | SYSTOLIC BLOOD PRESSURE: 103 MMHG

## 2020-05-13 VITALS — SYSTOLIC BLOOD PRESSURE: 101 MMHG | DIASTOLIC BLOOD PRESSURE: 58 MMHG

## 2020-05-13 VITALS — SYSTOLIC BLOOD PRESSURE: 104 MMHG | DIASTOLIC BLOOD PRESSURE: 68 MMHG

## 2020-05-13 VITALS — DIASTOLIC BLOOD PRESSURE: 61 MMHG | SYSTOLIC BLOOD PRESSURE: 100 MMHG

## 2020-05-13 VITALS — SYSTOLIC BLOOD PRESSURE: 100 MMHG | DIASTOLIC BLOOD PRESSURE: 58 MMHG

## 2020-05-13 VITALS — SYSTOLIC BLOOD PRESSURE: 108 MMHG | DIASTOLIC BLOOD PRESSURE: 64 MMHG

## 2020-05-13 VITALS — SYSTOLIC BLOOD PRESSURE: 107 MMHG | DIASTOLIC BLOOD PRESSURE: 64 MMHG

## 2020-05-13 VITALS — SYSTOLIC BLOOD PRESSURE: 101 MMHG | DIASTOLIC BLOOD PRESSURE: 60 MMHG

## 2020-05-13 VITALS — SYSTOLIC BLOOD PRESSURE: 100 MMHG | DIASTOLIC BLOOD PRESSURE: 63 MMHG

## 2020-05-13 VITALS — SYSTOLIC BLOOD PRESSURE: 99 MMHG | DIASTOLIC BLOOD PRESSURE: 60 MMHG

## 2020-05-13 LAB
% IRON SATURATION: 10 % (ref 15–50)
ADD MANUAL DIFF: YES
ALBUMIN SERPL-MCNC: 1.6 G/DL (ref 3.4–5)
ALBUMIN/GLOB SERPL: 0.4 {RATIO} (ref 1–2.7)
ALP SERPL-CCNC: 78 U/L (ref 46–116)
ALT SERPL-CCNC: 15 U/L (ref 12–78)
AMYLASE SERPL-CCNC: 68 U/L (ref 25–115)
ANION GAP SERPL CALC-SCNC: 13 MMOL/L (ref 5–15)
APTT BLD: 28 SEC (ref 23–33)
AST SERPL-CCNC: 39 U/L (ref 15–37)
BILIRUB SERPL-MCNC: 0.8 MG/DL (ref 0.2–1)
BUN SERPL-MCNC: 19 MG/DL (ref 7–18)
CALCIUM SERPL-MCNC: 6.8 MG/DL (ref 8.5–10.1)
CHLORIDE SERPL-SCNC: 110 MMOL/L (ref 98–107)
CO2 SERPL-SCNC: 19 MMOL/L (ref 21–32)
CREAT SERPL-MCNC: 0.9 MG/DL (ref 0.55–1.3)
ERYTHROCYTE [DISTWIDTH] IN BLOOD BY AUTOMATED COUNT: 14.2 % (ref 11.6–14.8)
FERRITIN SERPL-MCNC: 123 NG/ML (ref 8–388)
GLOBULIN SER-MCNC: 3.8 G/DL
HCT VFR BLD CALC: 24 % (ref 42–52)
HGB BLD-MCNC: 8.3 G/DL (ref 14.2–18)
INR PPP: 1.3 (ref 0.9–1.1)
IRON SERPL-MCNC: 20 UG/DL (ref 50–175)
LDH SERPL L TO P-CCNC: 358 U/L (ref 81–234)
MCV RBC AUTO: 84 FL (ref 80–99)
PLATELET # BLD: 127 K/UL (ref 150–450)
POTASSIUM SERPL-SCNC: 3.5 MMOL/L (ref 3.5–5.1)
RBC # BLD AUTO: 2.84 M/UL (ref 4.7–6.1)
SODIUM SERPL-SCNC: 142 MMOL/L (ref 136–145)
TIBC SERPL-MCNC: 202 UG/DL (ref 250–450)
UNSATURATED IRON BINDING: 182 UG/DL (ref 112–346)
WBC # BLD AUTO: 28.3 K/UL (ref 4.8–10.8)

## 2020-05-13 PROCEDURE — 06H03DZ INSERTION OF INTRALUMINAL DEVICE INTO INFERIOR VENA CAVA, PERCUTANEOUS APPROACH: ICD-10-PCS

## 2020-05-13 RX ADMIN — ZOLPIDEM TARTRATE PRN MG: 5 TABLET ORAL at 20:33

## 2020-05-13 RX ADMIN — PANTOPRAZOLE SODIUM SCH MLS/HR: 40 INJECTION, POWDER, FOR SOLUTION INTRAVENOUS at 08:48

## 2020-05-13 RX ADMIN — SODIUM CHLORIDE SCH MLS/HR: 9 INJECTION, SOLUTION INTRAVENOUS at 18:23

## 2020-05-13 RX ADMIN — DOCUSATE SODIUM SCH MG: 100 CAPSULE, LIQUID FILLED ORAL at 20:33

## 2020-05-13 RX ADMIN — PANTOPRAZOLE SODIUM SCH MLS/HR: 40 INJECTION, POWDER, FOR SOLUTION INTRAVENOUS at 18:23

## 2020-05-13 RX ADMIN — DEXTROSE MONOHYDRATE SCH MLS/HR: 50 INJECTION, SOLUTION INTRAVENOUS at 14:00

## 2020-05-13 RX ADMIN — DOCUSATE SODIUM SCH MG: 100 CAPSULE, LIQUID FILLED ORAL at 08:46

## 2020-05-13 RX ADMIN — DEXTROSE MONOHYDRATE SCH MLS/HR: 50 INJECTION, SOLUTION INTRAVENOUS at 20:32

## 2020-05-13 NOTE — NUR
NURSE NOTES:

Patient is sleeping at this time. Vitals remains stable, afebrile, NAD at this time. NC now 
at 2L.

## 2020-05-13 NOTE — NUR
NURSE NOTES:

5th unit pRBC transfusion completed per order. The patient hemoglobin elevated upto 8.3 now. 
Will start 1st unit of FFP transfusion per order. Will continue plan of care.

## 2020-05-13 NOTE — HISTORY AND PHYSICAL REPORT
DATE OF ADMISSION:  05/12/2020

DATE AND TIME SEEN:  On 05/13/2020 at approximately 9 a.m.



CONSULTANTS:

1. Phillip Torre MD.

2. Aaron Antunez MD.

3. Erik Fischer MD.

4. Marc Nielsen MD.

5. Dr. Ross.

6. Chago Cerna MD.



CHIEF COMPLAINT:  Syncope, dehydration, weakness.



BRIEF HISTORY:  This is a 70-year-old male who presented to [facility] last

night with the above-mentioned diagnoses.  He was found to be very weak,

hemoglobin severely low, and PE was noted and a gastric mass as well.  The

patient was admitted to ICU.  Currently, O2 NC, slightly anxious, weak in

bed, not talking much.



REVIEW OF SYSTEMS:  Unavailable.



PAST MEDICAL HISTORY:  Includes syncope, weakness, gastric cancer, and

anemia.



PAST SURGICAL HISTORY:  Unknown.



ALLERGIES:  Denies.



MEDICATIONS:  Include pantoprazole, albuterol, hydromorphone, Zofran,

lorazepam, zolpidem, Zosyn, and vancomycin.



SOCIAL HISTORY:  Unable to obtain.  The patient is very lethargic.



PHYSICAL EXAMINATION:

GENERAL:  Lethargic in bed, not talking much.

VITAL SIGNS:  Temperature is 97, pulse 101, respiration 23, blood pressure

102/52.

GENERAL:  Lethargic in bed.

HEENT:  Normocephalic and atraumatic.

NECK:  Trachea midline.

CARDIOVASCULAR:  No peripheral edema.

PULMONARY:  Slight shortness of breath, on O2 NC.

ABDOMEN:  Slightly distended.

EXTREMITIES:  No cyanosis or clubbing.



LABORATORY AND DIAGNOSTIC DATA:  Labs at this time show white count 27,

hemoglobin and hematocrit 3.5/12, platelet 204,000.  BMP shows CO2 of 16,

BUN 26, creatinine 1.2, glucose 180.  Troponin 0.01.  .  Albumin

1.6.  Urinalysis, 1+ bilirubin, 4+ ketone, 2+ protein.  INR is 1.4.



ASSESSMENT:  Syncope, dehydration, weakness, gastric cancer, severe anemia,

diabetes, malnutrition, and PE.



PLAN:  O2 and pulmonary treatment.  Antibiotics per Infectious Disease.

Blood pressure and blood sugar control.  Transfuse.  GI and Hematology to

follow.  We will continue to follow this patient.  CBC and BMP in the

morning.









  ______________________________________________

  Tom Pierce D.O.





DR:  Yola

D:  05/13/2020 09:25

T:  05/13/2020 18:51

JOB#:  [id number]

CC:

## 2020-05-13 NOTE — NUR
NURSE NOTES:

Per Dr. Pierce, following physicians were consulted: Dr. Ross for severe anemia and PE, 
Dr. Fischer for necrotic gastric mass and IVC filter for PE, Dr. Torre for pulmonary 
embolus (PE), Dr. Antunez, Dr. Nielsen for GI bleed and necrotic gastric mass, and Dr. Cerna for infection. 

Paged Dr. Fischer regarding change in code status. Dr. Fischer discussed with the 
patient and changed code status from Full code to DNR but okay with intubation, chem code, 
and surgery. Dr. Fischer ordered 2 units FFP. Dr. Fischer ordered to continue protonix 
drip. 

Per Dr. Torre, the patient is contraindicated for Heparin drip due to GI bleed and needs 
to do IVC filter. Notified Dr. Torre that the patient will have IVC filter as soon as 
clearance obtained from Dr. Cerna.

Paged Dr. Nielsen regarding new consult and hemoglobin level. Dr. Nielsen ordered the 
patient to be NPO and will have EGD as soon as cleared by Dr. Cerna. 

Paged Dr. Cerna regarding clearance of IVC filter due to elevated WBC and Lactic Acid 
level. Awaiting for response. Will closely monitor the patient. Will continue plan of care.

## 2020-05-13 NOTE — NUR
NURSE NOTES:

The patient completed IVC filter placement in a safe manner. The primary nurse with the 
patient before, during, and after the procedure. Will continue plan of care at the floor.

## 2020-05-13 NOTE — NUR
NURSE NOTES:

Paged Dr. Cerna and Dr. Gonzalez regarding critical lab of WBC 28.3 and clearance of IVC 
filter for PE. Awaiting for call back. Will continue plan of care.

## 2020-05-13 NOTE — NUR
NURSE NOTES:

Clarification made with Dr. Gonzalez and Dr. Fischer regarding IVC filter for PE. Per scarlet Akhtar to do IVC filter if the condition is life threatening but preferably wait 
until 24 hours from blood culture. Notified Dr. Fischer, the ordering physician for IVC 
filter. Per Dr. Fischer, since the condition is life threatening, stat IVC filter needs to 
be done for PE. Informed Interventional Radiology (IR) regarding the patient's condition and 
stat IVC filter order per Dr. Fischer and scarlet to do IVC filter if life threatening by Dr. Gonzalez. Also notified the charge nurse regarding Dr. Fischer and Dr. Gonzalez's order. 
Will wait for IR to call back. Will closely monitor the patient. Will continue plan of care.

## 2020-05-13 NOTE — NUR
NURSE NOTES:

Tolerating second unit PRBC , no reactions noted. Pt has sacral stage 2, picture taken was 
done. Initial TX was initiated. Will continue to monitor.

## 2020-05-13 NOTE — NUR
NURSE NOTES:

Notified Dr. Gonzalez regarding positive blood culture with gram variable jose g 1/4bottle. Dr. Gonzalez ordered vanco per pharmacy and blood culture x2. Will administer medication per 
order. Will closely monitor the patient. Will continue plan of care.

## 2020-05-13 NOTE — SURGERY PROGRESS NOTE
Surgery Progress Note


Subjective


Additional Comments


a little improved since transfused 


when saw this AM he remembered who I was


states he feels a bit better


does not want to diet


no n/v


transfused prbc


ffp ordered


IVC filter ordered and placed





Objective





Last 24 Hour Vital Signs








  Date Time  Temp Pulse Resp B/P (MAP) Pulse Ox O2 Delivery O2 Flow Rate FiO2


 


5/13/20 19:00  81 25 103/63 (76) 100   


 


5/13/20 18:07  85 24 99/60 (73) 100   


 


5/13/20 17:18  98 20    4.0 


 


5/13/20 17:00  98 20 101/60 (74) 100   


 


5/13/20 16:00      Nasal Cannula 2.0 


 


5/13/20 16:00 97.8 90 20 103/62 (76) 100   


 


5/13/20 16:00  89      


 


5/13/20 15:00  93 19 108/64 (79) 100   


 


5/13/20 14:00  93 19 107/64 (78) 100   


 


5/13/20 13:00  92 21 98/60 (73) 100   


 


5/13/20 12:00      Nasal Cannula 2.0 


 


5/13/20 12:00  94      


 


5/13/20 12:00 98.4 93 24 100/58 (72) 100   


 


5/13/20 11:00  103 17 107/58 (74) 100   


 


5/13/20 10:00  102 24 105/58 (74) 100   


 


5/13/20 09:50     98 Nasal Cannula 2.0 28


 


5/13/20 09:00  101 24 103/60 (74) 100   


 


5/13/20 08:00      Nasal Cannula 2.0 


 


5/13/20 08:00 98.5 99 21 100/63 (75) 100   


 


5/13/20 08:00  98      


 


5/13/20 07:00 98.3 100 22 97/61 (73) 100   


 


5/13/20 06:00  101 23 100/61 (74) 100   


 


5/13/20 05:00  102 21 102/52 (69) 100   


 


5/13/20 04:00      Nasal Cannula 2.0 


 


5/13/20 04:00 97.8 105 20 101/58 (72) 100   


 


5/13/20 04:00  104      


 


5/13/20 03:00  104 17 98/60 (73) 99   


 


5/13/20 02:00  108 21 104/68 (80) 98   


 


5/13/20 01:00  106 21 99/62 (74) 84   


 


5/13/20 00:00  117      


 


5/13/20 00:00 97.6 108 22 100/65 (77) 95   


 


5/13/20 00:00      Nasal Cannula 2.0 


 


5/13/20 00:00      Nasal Cannula 2.0 


 


5/12/20 23:40 98.0 110 20 99/56 100 Nasal Cannula 2.0 


 


5/12/20 23:00 98.0 110 20 99/56 100 Nasal Cannula 2.0 


 


5/12/20 22:00 97.9 116 20 112/50 96 Nasal Cannula 2.0 


 


5/12/20 21:00 97.7 120 25 112/63 91 Nasal Cannula 2.0 








I&O











Intake and Output  


 


 5/12/20 5/13/20





 19:00 07:00


 


Intake Total  930.715 ml


 


Output Total  850 ml


 


Balance  80.715 ml


 


  


 


Intake Oral  0 ml


 


IV Total  5.715 ml


 


Blood Product  925 ml


 


Output Urine Total  850 ml


 


# Bowel Movements  3








Cardiovascular:  RSR


Respiratory:  clear


Abdomen:  soft, non-tender, present bowel sounds


Extremities:  no edema, no tenderness, no cyanosis





Laboratory Tests








Test


  5/12/20


21:00 5/13/20


10:55 5/13/20


19:55


 


Lactic Acid Level


  7.70 mmol/L


(0.66-2.22)  H 1.20 mmol/L


(0.4-2.0) 


 


 


White Blood Count


  


  28.3 K/UL


(4.8-10.8)  *H 


 


 


Red Blood Count


  


  2.84 M/UL


(4.70-6.10)  L 


 


 


Hemoglobin


  


  8.3 G/DL


(14.2-18.0)  #L 


 


 


Hematocrit


  


  24.0 %


(42.0-52.0)  #L 


 


 


Mean Corpuscular Volume  84 FL (80-99)   


 


Mean Corpuscular Hemoglobin


  


  29.3 PG


(27.0-31.0) 


 


 


Mean Corpuscular Hemoglobin


Concent 


  34.7 G/DL


(32.0-36.0) 


 


 


Red Cell Distribution Width


  


  14.2 %


(11.6-14.8) 


 


 


Platelet Count


  


  127 K/UL


(150-450)  L 


 


 


Mean Platelet Volume


  


  5.1 FL


(6.5-10.1)  L 


 


 


Neutrophils (%) (Auto)


  


  % (45.0-75.0)


  


 


 


Lymphocytes (%) (Auto)


  


  % (20.0-45.0)


  


 


 


Monocytes (%) (Auto)   % (1.0-10.0)   


 


Eosinophils (%) (Auto)   % (0.0-3.0)   


 


Basophils (%) (Auto)   % (0.0-2.0)   


 


Differential Total Cells


Counted 


  100  


  


 


 


Neutrophils % (Manual)  94 % (45-75)  H 


 


Lymphocytes % (Manual)  4 % (20-45)  L 


 


Monocytes % (Manual)  2 % (1-10)   


 


Eosinophils % (Manual)  0 % (0-3)   


 


Basophils % (Manual)  0 % (0-2)   


 


Band Neutrophils  0 % (0-8)   


 


Nucleated Red Blood Cells  1 /100 WBC   


 


Platelet Estimate  Decreased  L 


 


Platelet Morphology  Normal   


 


Polychromasia  2+   


 


Hypochromasia  2+   


 


Anisocytosis  1+   


 


Erythrocyte Sedimentation Rate


  


  32 MM/HR


(0-20)  H 


 


 


Reticulocyte Count


  


  5.8 %


(0.5-2.0)  H 


 


 


Prothrombin Time


  


  14.0 SEC


(9.30-11.50)  H 


 


 


Prothromb Time International


Ratio 


  1.3 (0.9-1.1)


H 


 


 


Activated Partial


Thromboplast Time 


  28 SEC (23-33)


  


 


 


Sodium Level


  


  142 MMOL/L


(136-145) 


 


 


Potassium Level


  


  3.5 MMOL/L


(3.5-5.1) 


 


 


Chloride Level


  


  110 MMOL/L


()  H 


 


 


Carbon Dioxide Level


  


  19 MMOL/L


(21-32)  L 


 


 


Anion Gap


  


  13 mmol/L


(5-15) 


 


 


Blood Urea Nitrogen


  


  19 mg/dL


(7-18)  H 


 


 


Creatinine


  


  0.9 MG/DL


(0.55-1.30) 


 


 


Estimat Glomerular Filtration


Rate 


  > 60 mL/min


(>60) 


 


 


Glucose Level


  


  103 MG/DL


() 


 


 


Calcium Level


  


  6.8 MG/DL


(8.5-10.1)  L 


 


 


Total Bilirubin


  


  0.8 MG/DL


(0.2-1.0) 


 


 


Aspartate Amino Transf


(AST/SGOT) 


  39 U/L (15-37)


H 


 


 


Alanine Aminotransferase


(ALT/SGPT) 


  15 U/L (12-78)


  


 


 


Alkaline Phosphatase


  


  78 U/L


() 


 


 


C-Reactive Protein,


Quantitative 


  17.5 mg/dL


(0.00-0.90)  H 


 


 


Total Protein


  


  5.4 G/DL


(6.4-8.2)  L 


 


 


Albumin


  


  1.6 G/DL


(3.4-5.0)  L 


 


 


Globulin  3.8 g/dL   


 


Albumin/Globulin Ratio


  


  0.4 (1.0-2.7)


L 


 


 


Amylase Level


  


  68 U/L


() 


 


 


Lipase


  


  367 U/L


() 


 


 


Carcinoembryonic Antigen  Pending   


 


CA 19-9 Antigen  Pending   


 


Iron Level   Pending  


 


Unsaturated Iron Binding   Pending  


 


Ferritin   Pending  


 


Lactate Dehydrogenase   Pending  


 


Vitamin B12 Level   Pending  


 


Folate   Pending  











Plan


Problems:  


(1) Lower extremity edema


Assessment & Plan:  Venous Duplex pending 


DVT noted on CT 


IV filter


cannot anticoagulate 





(2) Pulmonary embolism


Assessment & Plan:  Filling defects within pulmonary vessels at the lung bases 

with small 


peripheral airspace opacities concerning for pulmonary emboli and small 


pulmonary infarcts.





(3) GI bleed


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  Patient 

denies any knowledge of this mass.  Likely etiology of severe anemia hemoglobin 

3.5 being transfused emergently.  Patient states he feels very ill and 

potentially feels as if he is going to die.  High probability of GI bleed 

etiology is this large necrotic mass.


I had a long discussion with the patient in the emergency department regards 

above findings and care plan.  Discussed care plan with primary care physician 

GI and emergency permit physician.


Patient needs urgent blood transfusion which he is receiving in the ED as 

ordered.  Patient needs acute resuscitation followed by


Potential EGD.


Given severe anemia.  Gastric mass causing GI bleed.  Acute hemorrhagic GI 

bleed secondary to likely mass.  


 tumor needs biopsy and pathology evaluation


Despite above if continues to hemorrhage or does not respond appropriately will 

likely need acute life-saving emergency surgery for resection of acutely 

bleeding gastric tumor


If surgical intervention propose it would not be for curative intent but rather 

life-saving hemorrhage control


Discussed with patient in detail at bedside.


For now continue with resuscitation and will continue with further work-up





Npo


IV fluids


PRBC 


q6h h/h


trend labs


GI eval for EGD consideration


Heme Onc Eval for evaluation of large gastric tumor identification


surgery available and will follow with recs.


Thank you for allowing me to participate in patients care 





(4) Gastric mass


Assessment & Plan:   ABDOMEN:


  Liver:  See above.


  Gallbladder and bile ducts:  Unremarkable.  No calcified stones.  No 


ductal dilation.


  Pancreas:  Unremarkable.  No mass.  No ductal dilation.


  Spleen:  Unremarkable.  No splenomegaly.


  Adrenals:  Unremarkable.  No mass.


  Kidneys and ureters:  Unremarkable.  No solid mass.  No hydronephrosis.


  Stomach and bowel:  Centrally necrotic mass arising from the gastric 


fundus measuring 12.6 x 11.1 x 14 cm.  Liquid stool within the rectum.


 


 PELVIS:


  Appendix:  No findings to suggest acute appendicitis.


  Bladder:  Unremarkable.  No mass.


  Reproductive:  Unremarkable as visualized.


 


 ABDOMEN and PELVIS:


  Intraperitoneal space:  Unremarkable.  No free air.  No significant 


fluid collection.


  Bones/joints:  No acute fracture.  No dislocation.


  Soft tissues:  Unremarkable.


  Vasculature:  Filling defects within pulmonary vessels at the lung 


bases with small peripheral airspace opacities concerning for pulmonary 


emboli and small pulmonary infarcts.  No abdominal aortic aneurysm.


  Lymph nodes:  Unremarkable.  No enlarged lymph nodes.


 


IMPRESSION:     


1.  Centrally necrotic mass arising from the gastric fundus measuring 12.


6 x 11.1 x 14 cm.  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  Gastric 


adenocarcinoma, lymphoma, and other neoplastic etiologies are also in the 


differential.


2.  Filling defects within pulmonary vessels at the lung bases with small 


peripheral airspace opacities concerning for pulmonary emboli and small 


pulmonary infarcts.





(5) COVID-19 ruled out


Assessment & Plan:  COVID 19 r/o


sob


respiratory symptoms


pending testing 


Defer to CEZAR BethkatarinaErik May 13, 2020 20:52

## 2020-05-13 NOTE — NUR
NURSE NOTES:

Started 5th pRBC transfusion per order. Vital signs stable. Will closely monitor the 
patient. Will continue plan of care. Awake

## 2020-05-13 NOTE — CONSULTATION
DATE OF CONSULTATION:

CHIEF COMPLAINT:  GI bleeding.



HISTORY OF PRESENT ILLNESS:  This is a 70-year-old male with past medical

history of hypertension, came to the hospital mainly with complaint of

significant diarrhea for 3 days and syncope.  The patient was found to

have a hemoglobin in 3 ranges on admission and also CT showed evidence of

gastric mass, possibly the source of bleeding and also pulmonary embolism.

 



PAST MEDICAL HISTORY:  Hypertension.



ALLERGIES:  No known allergies.



MEDICATIONS:  Please see medication reconciliation list.



FAMILY HISTORY:  Noncontributory.



SOCIAL HISTORY:  The patient denies any recent tobacco, alcohol, or drug

abuse.



REVIEW OF SYSTEMS:  Positive for syncope, weakness and diarrhea.



PHYSICAL EXAMINATION:

VITAL SIGNS:  Temperature is 97.8, pulse is 101, respirations 23, blood

pressure 100/61.

HEENT:  Normocephalic, atraumatic.  Pale conjunctivae.

NECK:  Supple.  No evidence of obvious lymphadenopathy.

CARDIOVASCULAR:  Tachy.  Regular rate.  Plus S1 and S2.

LUNGS:  Decreased breath sounds bilaterally based on the supine exam.

ABDOMEN:  Soft.  Bowel sounds are present.  Minimum tenderness to palpation

in the epigastric area.  No rebound.  No guarding.  No peritoneal sign.

EXTREMITIES:  No cyanosis.  No clubbing or edema.



LABORATORY DATA:  White count is 27,000, hemoglobin 3.5, hematocrit 12,

platelet count is 204.  Chem-7, sodium is 138, potassium _____, BUN is 26,

and creatinine is 1.2.



ASSESSMENT AND PLAN:  This is a 70-year-old male with significant diarrhea,

leukocytosis, GI bleeding, possibly gastric mass, possible PE.  The

patient is pending blood transfusions.  I order also fresh frozen plasma,

given INR 1.4 and GI bleeding.  We will keep the patient NPO.  We will

continue on Protonix drip.  The patient is pending possible IVC filter

placement.  The patient is on isolation for COVID.  We then order stool

for C. difficile given leukocytosis of 27,000 and diarrhea.  We are

considering EGD tomorrow if the patient is stable, otherwise, when the

patient is more stable.



I want to thank, Dr. Tom Pierce, for this kind referral.









  ______________________________________________

  Marc Nielsen M.D.





DR:  [place]

D:  05/13/2020 09:53

T:  05/13/2020 19:37

JOB#:  [id number]

CC:  Tom Pierce M.D.

## 2020-05-13 NOTE — CONSULTATION
DATE OF CONSULTATION:  05/13/2020

CARDIOLOGY CONSULTATION



CONSULTING PHYSICIAN:  Aaron Antunez MD.



REFERRING PHYSICIAN:  Tom Pierce DO.



REASON FOR CONSULTATION:  Syncope and hypotension.



HISTORY OF PRESENT ILLNESS:  Patient is a 70-year-old gentleman with

history of hypertension, presented to the emergency room on 05/12/2020

with multiple episodes of watery diarrhea as well as syncopal episodes,

not witnessed.  Patient was found down.  Patient was found to be

profoundly anemic with hemoglobin of 3.5.  The CT showed gastric mass and

pulmonary embolism.  Patient however denies any hematochezia, melena,

hematemesis, fever, or chills.  Patient was admitted to intensive care

unit and receiving his fifth unit of packed red blood cells.  Currently,

he is on nasal cannula 4 liters.



REVIEW OF SYSTEMS:  Negative other than what is mentioned in the history of

present illness.



PAST MEDICAL HISTORY:  As mentioned above.



FAMILY HISTORY:  Noncontributory.



SOCIAL HISTORY:  No history of IV drug use.



PHYSICAL EXAMINATION:

VITAL SIGNS:  Show blood pressure of 100/58, pulse is 93, respirations 18,

temperature 98.4.

HEAD AND NECK:  Showed no JVD.

LUNGS:  Decreased breath sounds.

CARDIOVASCULAR:  Regular S1 and S2.  Tachycardic.

ABDOMEN:  Soft.

EXTREMITIES:  No pitting edema.



LABORATORY AND DIAGNOSTIC DATA:  Labs show white count of 28.3, hemoglobin

of 3.5, but after 5 units is 8.3, hematocrit 24, and platelet count is

127.  Sodium 142, potassium 3.5, BUN of _______, creatinine of 0.9.

Lactic acid is 7.7.  Troponin is negative.



ASSESSMENT AND PLAN:

1. Hypotension due to profound anemia.  Patient is off pressors,

received 5 units of blood transfusion.  We will get an echocardiogram for

evaluation of ejection fraction and wall motion abnormality.

2. Profound anemia.  Hemoglobin of 3.5.  CT of abdomen and pelvis shows

central necrotic mass in the gastric fundus 12 x 14 x 11 cm.  Further

evaluation by GI.

3. Pulmonary embolus.  Patient will get an IVC filter.

4. Sepsis with elevated white count.  _________.



Thank you very much for allowing me to participate in the care of this

patient.  Please do not hesitate to contact me for any questions regarding

my evaluation.









  ______________________________________________

  Aaron Antunez M.D.





DR:  ROBERTA

D:  05/13/2020 12:48

T:  05/13/2020 21:32

JOB#:  [id number]

CC:

## 2020-05-13 NOTE — NUR
NURSE NOTES:

The patient is stable without acute distress or shortness of breath. Completed 1st unit of 
FFP per order. The patient is still on Zosyn and Protonix drip. Will continue plan of care.

## 2020-05-13 NOTE — CONSULTATION
History of Present Illness


General


Chief Complaint:  Syncope





Present Illness


Allergies:  


Coded Allergies:  


     No Known Allergies (Unverified , 5/12/20)





Medication History


No Active Prescriptions or Reported Meds





Patient History


Healthcare decision maker





Resuscitation status





Advanced Directive on File








Physical Exam





Last 24 Hour Vital Signs








  Date Time  Temp Pulse Resp B/P (MAP) Pulse Ox O2 Delivery O2 Flow Rate FiO2


 


5/13/20 12:00      Nasal Cannula 2.0 


 


5/13/20 12:00 98.4 93 24 100/58 (72) 100   


 


5/13/20 11:00  103 17 107/58 (74) 100   


 


5/13/20 10:00  102 24 105/58 (74) 100   


 


5/13/20 09:00  101 24 103/60 (74) 100   


 


5/13/20 08:00      Nasal Cannula 2.0 


 


5/13/20 08:00 98.5 99 21 100/63 (75) 100   


 


5/13/20 07:00 98.3 100 22 97/61 (73) 100   


 


5/13/20 06:00  101 23 100/61 (74) 100   


 


5/13/20 05:00  102 21 102/52 (69) 100   


 


5/13/20 04:00      Nasal Cannula 2.0 


 


5/13/20 04:00 97.8 105 20 101/58 (72) 100   


 


5/13/20 04:00  104      


 


5/13/20 03:00  104 17 98/60 (73) 99   


 


5/13/20 02:00  108 21 104/68 (80) 98   


 


5/13/20 01:00  106 21 99/62 (74) 84   


 


5/13/20 00:00  117      


 


5/13/20 00:00 97.6 108 22 100/65 (77) 95   


 


5/13/20 00:00      Nasal Cannula 2.0 


 


5/13/20 00:00      Nasal Cannula 2.0 


 


5/12/20 23:40 98.0 110 20 99/56 100 Nasal Cannula 2.0 


 


5/12/20 23:00 98.0 110 20 99/56 100 Nasal Cannula 2.0 


 


5/12/20 22:00 97.9 116 20 112/50 96 Nasal Cannula 2.0 


 


5/12/20 21:00 97.7 120 25 112/63 91 Nasal Cannula 2.0 


 


5/12/20 20:32 97.7 118 14 112/67 95 Room Air  


 


5/12/20 19:50 97.7 118 14 108/72 95 Room Air  


 


5/12/20 19:10 97.7 120 14 113/68 95 Room Air  


 


5/12/20 19:05 97.7 122 14 113/68 (83) 95 Room Air  

















Intake and Output  


 


 5/12/20 5/13/20





 19:00 07:00


 


Intake Total  930.715 ml


 


Output Total  850 ml


 


Balance  80.715 ml


 


  


 


Intake Oral  0 ml


 


IV Total  5.715 ml


 


Blood Product  925 ml


 


Output Urine Total  850 ml


 


# Bowel Movements  3











Laboratory Tests








Test


  5/12/20


19:00 5/12/20


19:10 5/12/20


20:00 5/12/20


21:00


 


White Blood Count


  27.4 K/UL


(4.8-10.8)  *H 


  


  


 


 


Red Blood Count


  1.44 M/UL


(4.70-6.10)  L 


  


  


 


 


Hemoglobin


  3.5 G/DL


(14.2-18.0)  *L 


  


  


 


 


Hematocrit


  12.9 %


(42.0-52.0)  L 


  


  


 


 


Mean Corpuscular Volume 90 FL (80-99)     


 


Mean Corpuscular Hemoglobin


  24.3 PG


(27.0-31.0)  L 


  


  


 


 


Mean Corpuscular Hemoglobin


Concent 27.1 G/DL


(32.0-36.0)  L 


  


  


 


 


Red Cell Distribution Width


  22.0 %


(11.6-14.8)  H 


  


  


 


 


Platelet Count


  204 K/UL


(150-450) 


  


  


 


 


Mean Platelet Volume


  6.0 FL


(6.5-10.1)  L 


  


  


 


 


Neutrophils (%) (Auto)


  % (45.0-75.0)


  


  


  


 


 


Lymphocytes (%) (Auto)


  % (20.0-45.0)


  


  


  


 


 


Monocytes (%) (Auto)  % (1.0-10.0)     


 


Eosinophils (%) (Auto)  % (0.0-3.0)     


 


Basophils (%) (Auto)  % (0.0-2.0)     


 


Differential Total Cells


Counted 100  


  


  


  


 


 


Neutrophils % (Manual) 91 % (45-75)  H   


 


Lymphocytes % (Manual) 5 % (20-45)  L   


 


Monocytes % (Manual) 3 % (1-10)     


 


Eosinophils % (Manual) 0 % (0-3)     


 


Basophils % (Manual) 0 % (0-2)     


 


Band Neutrophils 1 % (0-8)     


 


Nucleated Red Blood Cells 4 /100 WBC     


 


Platelet Estimate Adequate     


 


Platelet Morphology Normal     


 


Polychromasia 2+     


 


Hypochromasia 3+     


 


Anisocytosis 3+     


 


Sodium Level


  138 MMOL/L


(136-145) 


  


  


 


 


Potassium Level


  3.8 MMOL/L


(3.5-5.1) 


  


  


 


 


Chloride Level


  104 MMOL/L


() 


  


  


 


 


Carbon Dioxide Level


  16 MMOL/L


(21-32)  L 


  


  


 


 


Anion Gap


  18 mmol/L


(5-15)  H 


  


  


 


 


Blood Urea Nitrogen


  26 mg/dL


(7-18)  H 


  


  


 


 


Creatinine


  1.2 MG/DL


(0.55-1.30) 


  


  


 


 


Estimat Glomerular Filtration


Rate > 60 mL/min


(>60) 


  


  


 


 


Glucose Level


  180 MG/DL


()  H 


  


  


 


 


Lactic Acid Level


  6.80 mmol/L


(0.4-2.0)  H 


  


  7.70 mmol/L


(0.66-2.22)  H


 


Calcium Level


  7.3 MG/DL


(8.5-10.1)  L 


  


  


 


 


Phosphorus Level


  4.5 MG/DL


(2.5-4.9) 


  


  


 


 


Magnesium Level


  2.6 MG/DL


(1.8-2.4)  H 


  


  


 


 


Total Bilirubin


  0.3 MG/DL


(0.2-1.0) 


  


  


 


 


Aspartate Amino Transf


(AST/SGOT) 29 U/L (15-37)


  


  


  


 


 


Alanine Aminotransferase


(ALT/SGPT) 15 U/L (12-78)


  


  


  


 


 


Alkaline Phosphatase


  90 U/L


() 


  


  


 


 


Total Creatine Kinase


  65 U/L


() 


  


  


 


 


Creatine Kinase MB


  < 0.5 NG/ML


(0.0-3.6) 


  


  


 


 


Creatine Kinase MB Relative


Index 0.7  


  


  


  


 


 


Troponin I


  0.010 ng/mL


(0.000-0.056) 


  


  


 


 


Pro-B-Type Natriuretic Peptide


  288 pg/mL


(0-125)  H 


  


  


 


 


Total Protein


  5.9 G/DL


(6.4-8.2)  L 


  


  


 


 


Albumin


  1.6 G/DL


(3.4-5.0)  L 


  


  


 


 


Globulin 4.3 g/dL     


 


Albumin/Globulin Ratio


  0.4 (1.0-2.7)


L 


  


  


 


 


Lipase


  209 U/L


() 


  


  


 


 


Prothrombin Time


  


  14.9 SEC


(9.30-11.50)  H 


  


 


 


Prothromb Time International


Ratio 


  1.4 (0.9-1.1)


H 


  


 


 


Activated Partial


Thromboplast Time 


  25 SEC (23-33)


  


  


 


 


Urine Color   Yellow   


 


Urine Appearance


  


  


  Slightly


cloudy 


 


 


Urine pH   5 (4.5-8.0)   


 


Urine Specific Gravity


  


  


  1.020


(1.005-1.035) 


 


 


Urine Protein


  


  


  2+ (NEGATIVE)


H 


 


 


Urine Glucose (UA)


  


  


  Negative


(NEGATIVE) 


 


 


Urine Ketones


  


  


  Negative


(NEGATIVE) 


 


 


Urine Blood


  


  


  4+ (NEGATIVE)


H 


 


 


Urine Nitrite


  


  


  Negative


(NEGATIVE) 


 


 


Urine Bilirubin


  


  


  1+ (NEGATIVE)


H 


 


 


Urine Ictotest


  


  


  Negative


(NEGATIVE) 


 


 


Urine Urobilinogen


  


  


  4 MG/DL


(0.0-1.0)  H 


 


 


Urine Leukocyte Esterase


  


  


  Negative


(NEGATIVE) 


 


 


Urine RBC


  


  


  30-40 /HPF (0


- 0)  H 


 


 


Urine WBC


  


  


  0-2 /HPF (0 -


0) 


 


 


Urine Squamous Epithelial


Cells 


  


  Few /LPF


(NONE/OCC) 


 


 


Urine Bacteria


  


  


  Moderate /HPF


(NONE)  H 


 


 


Test


  5/13/20


10:55 


  


  


 


 


White Blood Count


  28.3 K/UL


(4.8-10.8)  *H 


  


  


 


 


Red Blood Count


  2.84 M/UL


(4.70-6.10)  L 


  


  


 


 


Hemoglobin


  8.3 G/DL


(14.2-18.0)  #L 


  


  


 


 


Hematocrit


  24.0 %


(42.0-52.0)  #L 


  


  


 


 


Mean Corpuscular Volume 84 FL (80-99)     


 


Mean Corpuscular Hemoglobin


  29.3 PG


(27.0-31.0) 


  


  


 


 


Mean Corpuscular Hemoglobin


Concent 34.7 G/DL


(32.0-36.0) 


  


  


 


 


Red Cell Distribution Width


  14.2 %


(11.6-14.8) 


  


  


 


 


Platelet Count


  127 K/UL


(150-450)  L 


  


  


 


 


Mean Platelet Volume


  5.1 FL


(6.5-10.1)  L 


  


  


 


 


Neutrophils (%) (Auto)


  % (45.0-75.0)


  


  


  


 


 


Lymphocytes (%) (Auto)


  % (20.0-45.0)


  


  


  


 


 


Monocytes (%) (Auto)  % (1.0-10.0)     


 


Eosinophils (%) (Auto)  % (0.0-3.0)     


 


Basophils (%) (Auto)  % (0.0-2.0)     


 


Neutrophils % (Manual) Pending     


 


Lymphocytes % (Manual) Pending     


 


Platelet Estimate Pending     


 


Platelet Morphology Pending     


 


Erythrocyte Sedimentation Rate


  32 MM/HR


(0-20)  H 


  


  


 


 


Prothrombin Time


  14.0 SEC


(9.30-11.50)  H 


  


  


 


 


Prothromb Time International


Ratio 1.3 (0.9-1.1)


H 


  


  


 


 


Activated Partial


Thromboplast Time 28 SEC (23-33)


  


  


  


 


 


Sodium Level


  142 MMOL/L


(136-145) 


  


  


 


 


Potassium Level


  3.5 MMOL/L


(3.5-5.1) 


  


  


 


 


Chloride Level


  110 MMOL/L


()  H 


  


  


 


 


Carbon Dioxide Level


  19 MMOL/L


(21-32)  L 


  


  


 


 


Anion Gap


  13 mmol/L


(5-15) 


  


  


 


 


Blood Urea Nitrogen


  19 mg/dL


(7-18)  H 


  


  


 


 


Creatinine


  0.9 MG/DL


(0.55-1.30) 


  


  


 


 


Estimat Glomerular Filtration


Rate > 60 mL/min


(>60) 


  


  


 


 


Glucose Level


  103 MG/DL


() 


  


  


 


 


Lactic Acid Level


  1.20 mmol/L


(0.4-2.0) 


  


  


 


 


Calcium Level


  6.8 MG/DL


(8.5-10.1)  L 


  


  


 


 


Total Bilirubin


  0.8 MG/DL


(0.2-1.0) 


  


  


 


 


Aspartate Amino Transf


(AST/SGOT) 39 U/L (15-37)


H 


  


  


 


 


Alanine Aminotransferase


(ALT/SGPT) 15 U/L (12-78)


  


  


  


 


 


Alkaline Phosphatase


  78 U/L


() 


  


  


 


 


C-Reactive Protein,


Quantitative 17.5 mg/dL


(0.00-0.90)  H 


  


  


 


 


Total Protein


  5.4 G/DL


(6.4-8.2)  L 


  


  


 


 


Albumin


  1.6 G/DL


(3.4-5.0)  L 


  


  


 


 


Globulin 3.8 g/dL     


 


Albumin/Globulin Ratio


  0.4 (1.0-2.7)


L 


  


  


 


 


Amylase Level


  68 U/L


() 


  


  


 


 


Lipase


  367 U/L


() 


  


  


 


 


Carcinoembryonic Antigen Pending     


 


CA 19-9 Antigen Pending     











Microbiology








 Date/Time


Source Procedure


Growth Status


 


 


 5/12/20 19:00


Blood Blood Culture - Preliminary Resulted


 


 5/12/20 20:00


Urine,Clean Catch Urine Culture - Preliminary


NO GROWTH Resulted








Height (Feet):  5


Height (Inches):  7.00


Weight (Pounds):  138


Medications





Current Medications








 Medications


  (Trade)  Dose


 Ordered  Sig/Marisa


 Route


 PRN Reason  Start Time


 Stop Time Status Last Admin


Dose Admin


 


 Acetaminophen


  (Tylenol)  650 mg  Q4H  PRN


 ORAL


 Temp >100.5  5/13/20 06:15


 6/12/20 06:14   


 


 


 Albuterol/


 Ipratropium


  (Albuterol/


 Ipratropium)  3 ml  Q4H  PRN


 HHN


 Shortness of Breath  5/13/20 06:15


 5/18/20 06:14   


 


 


 Barium Sulfate


  (Readi-Cat 2)  450 ml  NOW  PRN


 ORAL


 Radiology Procedure  5/12/20 19:15


 5/14/20 19:03   


 


 


 Dextrose


  (Dextrose 50%)  25 ml  Q30M  PRN


 IV


 Hypoglycemia  5/13/20 06:15


 8/11/20 06:14   


 


 


 Dextrose


  (Dextrose 50%)  50 ml  Q30M  PRN


 IV


 Hypoglycemia  5/13/20 06:15


 8/11/20 06:14   


 


 


 Docusate Sodium


  (Colace)  100 mg  EVERY 12  HOURS


 ORAL


   5/13/20 09:00


 6/12/20 08:59   


 


 


 Hydromorphone HCl


  (Dilaudid)  1 mg  Q6H  PRN


 IVP


 Moderate Pain (Pain Scale 4-6)  5/13/20 06:15


 5/20/20 06:14   


 


 


 Lidocaine HCl


  (Xylocaine 1%


 30ml)  30 ml  NOW  PRN


 INJ


 Radiology Procedure  5/12/20 21:45


 5/15/20 21:35   


 


 


 Lorazepam


  (Ativan)  1 mg  Q4H  PRN


 ORAL


 For Anxiety  5/13/20 06:15


 5/20/20 06:14   


 


 


 Ondansetron HCl


  (Zofran)  4 mg  Q6H  PRN


 IVP


 Nausea & Vomiting  5/13/20 06:15


 6/12/20 06:14   


 


 


 Pantoprazole 80


 mg/Sodium Chloride  250 ml @ 


 25 mls/hr  Q10H


 IV


   5/13/20 09:00


 6/12/20 08:59  5/13/20 08:48


 


 


 Piperacillin Sod/


 Tazobactam Sod


 3.375 gm/Sodium


 Chloride  110 ml @ 


 27.5 mls/hr  EVERY 8  HOURS


 IVPB


   5/13/20 14:00


 5/18/20 13:59   


 


 


 Zolpidem Tartrate


  (Ambien)  5 mg  HSPRN  PRN


 ORAL


 Insomnia  5/13/20 06:15


 5/20/20 06:14   


 











Assessment/Plan


Assessment/Plan:


Hematology Consultation 





REQ MD Tom Pierce


RFC: Gastric tumor and PE


DOS 5/13/2020





ID


71 y/o M with hx of HTN presented to ED on 5/12 with multiple episodes of 

watery diarrhea (up to 4 per day) and syncopal episode day of admission. 

Episode was unwitnessed, patient was found down. Endorses that has been feeling 

unwell for the the last 1-2 weeks feeling fatigue, tired and weak. +decreased 

PO. Upon admission, severe anemia with Hgb down to 3.5. CT showed gastric mass 

and PE. Have reviewed imaging as well as surgery and id recs currently, have 

ordered for ivcf have gotten gi on board, pending eval





Denied hematochezia, melena, hematemesis, fever/chills.


Allergies:  


Coded Allergies:  


     No Known Allergies (Unverified , 5/12/20)





Medication History


No Active Prescriptions or Reported Meds





Patient History


Healthcare decision maker





Resuscitation status





Advanced Directive on File





Pmhx: as above





SHx: reviewed





Fhx: non contributory





PE


General Appearance:  alert, mild distress


Head:  normocephalic, atraumatic


Eyes:  bilateral eye PERRL, bilateral eye EOMI


ENT:  uvula midline, dry mucus membranes


Neck:  supple, thyroid normal, supple/symm/no masses


Respiratory:  lungs clear, no respiratory distress, no retraction


Cardiovascular:  normal peripheral pulses, no edema


Gastrointestinal:  non tender, soft, no guarding, no rebound


Musculoskeletal:  normal inspection


Neurologic:  alert, oriented x3


Psychiatric:  mood/affect normal


Skin:  no rash, warm/dry





Last 24 Hour Vital Signs





Labs reviewed





Imaging Noted





Assessment and Recs


# Pulmonary embolism -- likely related to hypercoagulable disorder from 

malignancy


--> case dw pcp, surg, and pulm


--> is not a candidate for anticoagulation given severe anemia and low platelets


--> have ordered for ivc filter


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


--> CT shows    Centrally necrotic mass arising from the gastric fundus 

measuring 12. 6 x 11.1 x 14 cm.  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  

Gastric adenocarcinoma, lymphoma, and other neoplastic etiologies are also in 

the differential. Filling defects within pulmonary vessels at the lung bases 

with small peripheral airspace opacities concerning for pulmonary emboli and 

small pulmonary infarcts.


--> per gi and surgery to obtain a biopsy


--> ideally requires resection if possible with as much as possible negative 

margins


--> adjuvant chemotherapy v pill (imatinib) based on type of cancer


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


--> have ordered for repeat prbc transfusions


--> hgb goal is >7


--> have ordered for anemia panel


--> gi to access in re to biopsy/endoscopy


# Leukocytosis is likely reactive process from cancer


--> on abx as needed


--> smear is noted


--> meds are reviewed


--> vanc and zosyn


# Diarrhea


# PABLO, improving


# HTN





The timing of this note does not necessarily reflect the time of the patient 

was seen.





Greatly appreciate consultation.











Jersey Ross MD May 13, 2020 13:03

## 2020-05-13 NOTE — NUR
NURSE NOTES:

Third unit PRBC was transfused, monitor vital signs, watch for any untoward reactions.

## 2020-05-13 NOTE — PRE-PROCEDURE NOTE/ATTESTATION
Pre-Procedure Note/Attestation


Complete Prior to Procedure


Planned Procedure:  not applicable


Procedure Narrative:


IVC filter placement





Indications for Procedure


Pre-Operative Diagnosis:


DVT/PE inability to anticoagulate due to impending surgery and anemia





Attestation


I attest that I discussed the nature of the procedure; its benefits; risks and 

complications; and alternatives (and the risks and benefits of such alternatives

), prior to the procedure, with the patient (or the patient's legal 

representative).





I attest that I re-evaluated the patient just prior to the surgery and that 

there has been no change in the patient's H&P, except as documented below:











Brayan Victor M.D. May 13, 2020 17:23

## 2020-05-13 NOTE — NUR
NURSE NOTES:

Received report from JOSIAH Timmons. The patient is resting on the bed without acute distress or 
shortness of breath. The patient is alert and oriented x4 and able to makes needs known. The 
patient's bed in the lowest position, call light in reach, and fall and aspiration 
precaution reinforced. L AC 20G and R AC 20G peripheral IV sites are intact and patent and 
4th pRBC infusing per order. Will follow up orders. Will follow up with physicians. Will 
continue plan of care.

## 2020-05-13 NOTE — NUR
NOTE



Pt is currently PUI. This SW will meet w/ pt for initial assessment when pt is cleared from 
[facility]. Per chart review, pt is from home and discussed the code status w/ MD earlier today. 



Emergency contacts: Eriberto Corbett (nephew) [phone] and Jeovany Buckley (unknown 
relationship) [phone].

## 2020-05-13 NOTE — NUR
NURSE NOTES:

Paged Dr. Pierce regarding further order and physician consults. 

Paged Dr. Fischer regarding continuation of Protonix drip. Per Dr. Fischer, continue on 
Protonix drip as ordered. Will carry out the order as soon as possible. Will continue plan 
of care.

## 2020-05-13 NOTE — NUR
NURSE NOTES:

Patient received from Marie RAHMAN. Patient is AAOX4. HR is 86 NSR, BP is normotensive. S/P IVC 
filter today. Patient is calm and collective. Patient noted to have PIV lines. Protonic gtt 
at 25ml/hr infusing. Afebrile at this time. Patient able to make needs known. NC at 4L, SpO2 
is 99%. No respiratory distress at this time. Clarified code status with patient. Patient 
verbally discussed back DNR wishes. NAD at this time. Safety measures in place, call light 
within reach, bed in lowest position and locked, educated patient about using call light and 
about fall precautions. Patient verbally discussed back about fall precautions. Will 
continue to monitor.

## 2020-05-13 NOTE — NUR
NURSE NOTES:WOUND CARE NOTES: Pt presented on admission with irregular shaped maroon 
discoloration with surrounding non-blanching erythema at sacrum. No other skin concerns 
noted.



Tx.Plan:Apply Moisture Barrier Paste to Sacrum. Cover with Optifoam drsg. Change every 3 
days and prn.

           Apply Cavilon Skin Barrier to R and L trochanterics. Cover each area with 
Optifoam drsg. Change every 7 days and

           prn

           Apply Cavilon Skin Barrier to Both heels. Cover each heel with Optifoam drsg. 
Change every 7 days and prn.

           Reposition at least every 2 hours or as tolerated.

           Off-load heels with Pillow.

## 2020-05-13 NOTE — NUR
NURSE NOTES:

The patient went down for IVC filter placement with the primary nurse. Will closely monitor 
the patient during the procedure. Will continue plan of care.

## 2020-05-13 NOTE — NUR
NURSE NOTES:

Started 2nd FFP per order. The patient's vital signs are stable. Per charge nurse, IVC 
filter will be done today as soon as the technician comes in. Will follow up on the case. 
Will continue plan of care.

## 2020-05-13 NOTE — NUR
NURSE NOTES:

Blood was finishing up. Np reactions noted. Pt remained AO x3. Will continue to monitor.

## 2020-05-13 NOTE — CARDIAC ELECTROPHYSIOLOGY PN
Subjective


Subjective


[id number]





Objective





Last 24 Hour Vital Signs








  Date Time  Temp Pulse Resp B/P (MAP) Pulse Ox O2 Delivery O2 Flow Rate FiO2


 


5/13/20 12:00      Nasal Cannula 2.0 


 


5/13/20 12:00 98.4 93 24 100/58 (72) 100   


 


5/13/20 11:00  103 17 107/58 (74) 100   


 


5/13/20 10:00  102 24 105/58 (74) 100   


 


5/13/20 09:00  101 24 103/60 (74) 100   


 


5/13/20 08:00      Nasal Cannula 2.0 


 


5/13/20 08:00 98.5 99 21 100/63 (75) 100   


 


5/13/20 07:00 98.3 100 22 97/61 (73) 100   


 


5/13/20 06:00  101 23 100/61 (74) 100   


 


5/13/20 05:00  102 21 102/52 (69) 100   


 


5/13/20 04:00      Nasal Cannula 2.0 


 


5/13/20 04:00 97.8 105 20 101/58 (72) 100   


 


5/13/20 04:00  104      


 


5/13/20 03:00  104 17 98/60 (73) 99   


 


5/13/20 02:00  108 21 104/68 (80) 98   


 


5/13/20 01:00  106 21 99/62 (74) 84   


 


5/13/20 00:00  117      


 


5/13/20 00:00 97.6 108 22 100/65 (77) 95   


 


5/13/20 00:00      Nasal Cannula 2.0 


 


5/13/20 00:00      Nasal Cannula 2.0 


 


5/12/20 23:40 98.0 110 20 99/56 100 Nasal Cannula 2.0 


 


5/12/20 23:00 98.0 110 20 99/56 100 Nasal Cannula 2.0 


 


5/12/20 22:00 97.9 116 20 112/50 96 Nasal Cannula 2.0 


 


5/12/20 21:00 97.7 120 25 112/63 91 Nasal Cannula 2.0 


 


5/12/20 20:32 97.7 118 14 112/67 95 Room Air  


 


5/12/20 19:50 97.7 118 14 108/72 95 Room Air  


 


5/12/20 19:10 97.7 120 14 113/68 95 Room Air  


 


5/12/20 19:05 97.7 122 14 113/68 (83) 95 Room Air  

















Intake and Output  


 


 5/12/20 5/13/20





 19:00 07:00


 


Intake Total  930.715 ml


 


Output Total  850 ml


 


Balance  80.715 ml


 


  


 


Intake Oral  0 ml


 


IV Total  5.715 ml


 


Blood Product  925 ml


 


Output Urine Total  850 ml


 


# Bowel Movements  3











Laboratory Tests








Test


  5/12/20


19:00 5/12/20


19:10 5/12/20


20:00 5/12/20


21:00


 


White Blood Count


  27.4 K/UL


(4.8-10.8)  *H 


  


  


 


 


Red Blood Count


  1.44 M/UL


(4.70-6.10)  L 


  


  


 


 


Hemoglobin


  3.5 G/DL


(14.2-18.0)  *L 


  


  


 


 


Hematocrit


  12.9 %


(42.0-52.0)  L 


  


  


 


 


Mean Corpuscular Volume 90 FL (80-99)     


 


Mean Corpuscular Hemoglobin


  24.3 PG


(27.0-31.0)  L 


  


  


 


 


Mean Corpuscular Hemoglobin


Concent 27.1 G/DL


(32.0-36.0)  L 


  


  


 


 


Red Cell Distribution Width


  22.0 %


(11.6-14.8)  H 


  


  


 


 


Platelet Count


  204 K/UL


(150-450) 


  


  


 


 


Mean Platelet Volume


  6.0 FL


(6.5-10.1)  L 


  


  


 


 


Neutrophils (%) (Auto)


  % (45.0-75.0)


  


  


  


 


 


Lymphocytes (%) (Auto)


  % (20.0-45.0)


  


  


  


 


 


Monocytes (%) (Auto)  % (1.0-10.0)     


 


Eosinophils (%) (Auto)  % (0.0-3.0)     


 


Basophils (%) (Auto)  % (0.0-2.0)     


 


Differential Total Cells


Counted 100  


  


  


  


 


 


Neutrophils % (Manual) 91 % (45-75)  H   


 


Lymphocytes % (Manual) 5 % (20-45)  L   


 


Monocytes % (Manual) 3 % (1-10)     


 


Eosinophils % (Manual) 0 % (0-3)     


 


Basophils % (Manual) 0 % (0-2)     


 


Band Neutrophils 1 % (0-8)     


 


Nucleated Red Blood Cells 4 /100 WBC     


 


Platelet Estimate Adequate     


 


Platelet Morphology Normal     


 


Polychromasia 2+     


 


Hypochromasia 3+     


 


Anisocytosis 3+     


 


Sodium Level


  138 MMOL/L


(136-145) 


  


  


 


 


Potassium Level


  3.8 MMOL/L


(3.5-5.1) 


  


  


 


 


Chloride Level


  104 MMOL/L


() 


  


  


 


 


Carbon Dioxide Level


  16 MMOL/L


(21-32)  L 


  


  


 


 


Anion Gap


  18 mmol/L


(5-15)  H 


  


  


 


 


Blood Urea Nitrogen


  26 mg/dL


(7-18)  H 


  


  


 


 


Creatinine


  1.2 MG/DL


(0.55-1.30) 


  


  


 


 


Estimat Glomerular Filtration


Rate > 60 mL/min


(>60) 


  


  


 


 


Glucose Level


  180 MG/DL


()  H 


  


  


 


 


Lactic Acid Level


  6.80 mmol/L


(0.4-2.0)  H 


  


  7.70 mmol/L


(0.66-2.22)  H


 


Calcium Level


  7.3 MG/DL


(8.5-10.1)  L 


  


  


 


 


Phosphorus Level


  4.5 MG/DL


(2.5-4.9) 


  


  


 


 


Magnesium Level


  2.6 MG/DL


(1.8-2.4)  H 


  


  


 


 


Total Bilirubin


  0.3 MG/DL


(0.2-1.0) 


  


  


 


 


Aspartate Amino Transf


(AST/SGOT) 29 U/L (15-37)


  


  


  


 


 


Alanine Aminotransferase


(ALT/SGPT) 15 U/L (12-78)


  


  


  


 


 


Alkaline Phosphatase


  90 U/L


() 


  


  


 


 


Total Creatine Kinase


  65 U/L


() 


  


  


 


 


Creatine Kinase MB


  < 0.5 NG/ML


(0.0-3.6) 


  


  


 


 


Creatine Kinase MB Relative


Index 0.7  


  


  


  


 


 


Troponin I


  0.010 ng/mL


(0.000-0.056) 


  


  


 


 


Pro-B-Type Natriuretic Peptide


  288 pg/mL


(0-125)  H 


  


  


 


 


Total Protein


  5.9 G/DL


(6.4-8.2)  L 


  


  


 


 


Albumin


  1.6 G/DL


(3.4-5.0)  L 


  


  


 


 


Globulin 4.3 g/dL     


 


Albumin/Globulin Ratio


  0.4 (1.0-2.7)


L 


  


  


 


 


Lipase


  209 U/L


() 


  


  


 


 


Prothrombin Time


  


  14.9 SEC


(9.30-11.50)  H 


  


 


 


Prothromb Time International


Ratio 


  1.4 (0.9-1.1)


H 


  


 


 


Activated Partial


Thromboplast Time 


  25 SEC (23-33)


  


  


 


 


Urine Color   Yellow   


 


Urine Appearance


  


  


  Slightly


cloudy 


 


 


Urine pH   5 (4.5-8.0)   


 


Urine Specific Gravity


  


  


  1.020


(1.005-1.035) 


 


 


Urine Protein


  


  


  2+ (NEGATIVE)


H 


 


 


Urine Glucose (UA)


  


  


  Negative


(NEGATIVE) 


 


 


Urine Ketones


  


  


  Negative


(NEGATIVE) 


 


 


Urine Blood


  


  


  4+ (NEGATIVE)


H 


 


 


Urine Nitrite


  


  


  Negative


(NEGATIVE) 


 


 


Urine Bilirubin


  


  


  1+ (NEGATIVE)


H 


 


 


Urine Ictotest


  


  


  Negative


(NEGATIVE) 


 


 


Urine Urobilinogen


  


  


  4 MG/DL


(0.0-1.0)  H 


 


 


Urine Leukocyte Esterase


  


  


  Negative


(NEGATIVE) 


 


 


Urine RBC


  


  


  30-40 /HPF (0


- 0)  H 


 


 


Urine WBC


  


  


  0-2 /HPF (0 -


0) 


 


 


Urine Squamous Epithelial


Cells 


  


  Few /LPF


(NONE/OCC) 


 


 


Urine Bacteria


  


  


  Moderate /HPF


(NONE)  H 


 


 


Test


  5/13/20


10:55 


  


  


 


 


White Blood Count


  28.3 K/UL


(4.8-10.8)  *H 


  


  


 


 


Red Blood Count


  2.84 M/UL


(4.70-6.10)  L 


  


  


 


 


Hemoglobin


  8.3 G/DL


(14.2-18.0)  #L 


  


  


 


 


Hematocrit


  24.0 %


(42.0-52.0)  #L 


  


  


 


 


Mean Corpuscular Volume 84 FL (80-99)     


 


Mean Corpuscular Hemoglobin


  29.3 PG


(27.0-31.0) 


  


  


 


 


Mean Corpuscular Hemoglobin


Concent 34.7 G/DL


(32.0-36.0) 


  


  


 


 


Red Cell Distribution Width


  14.2 %


(11.6-14.8) 


  


  


 


 


Platelet Count


  127 K/UL


(150-450)  L 


  


  


 


 


Mean Platelet Volume


  5.1 FL


(6.5-10.1)  L 


  


  


 


 


Neutrophils (%) (Auto)


  % (45.0-75.0)


  


  


  


 


 


Lymphocytes (%) (Auto)


  % (20.0-45.0)


  


  


  


 


 


Monocytes (%) (Auto)  % (1.0-10.0)     


 


Eosinophils (%) (Auto)  % (0.0-3.0)     


 


Basophils (%) (Auto)  % (0.0-2.0)     


 


Neutrophils % (Manual) Pending     


 


Lymphocytes % (Manual) Pending     


 


Platelet Estimate Pending     


 


Platelet Morphology Pending     


 


Erythrocyte Sedimentation Rate


  32 MM/HR


(0-20)  H 


  


  


 


 


Prothrombin Time


  14.0 SEC


(9.30-11.50)  H 


  


  


 


 


Prothromb Time International


Ratio 1.3 (0.9-1.1)


H 


  


  


 


 


Activated Partial


Thromboplast Time 28 SEC (23-33)


  


  


  


 


 


Sodium Level


  142 MMOL/L


(136-145) 


  


  


 


 


Potassium Level


  3.5 MMOL/L


(3.5-5.1) 


  


  


 


 


Chloride Level


  110 MMOL/L


()  H 


  


  


 


 


Carbon Dioxide Level


  19 MMOL/L


(21-32)  L 


  


  


 


 


Anion Gap


  13 mmol/L


(5-15) 


  


  


 


 


Blood Urea Nitrogen


  19 mg/dL


(7-18)  H 


  


  


 


 


Creatinine


  0.9 MG/DL


(0.55-1.30) 


  


  


 


 


Estimat Glomerular Filtration


Rate > 60 mL/min


(>60) 


  


  


 


 


Glucose Level


  103 MG/DL


() 


  


  


 


 


Lactic Acid Level


  1.20 mmol/L


(0.4-2.0) 


  


  


 


 


Calcium Level


  6.8 MG/DL


(8.5-10.1)  L 


  


  


 


 


Total Bilirubin


  0.8 MG/DL


(0.2-1.0) 


  


  


 


 


Aspartate Amino Transf


(AST/SGOT) 39 U/L (15-37)


H 


  


  


 


 


Alanine Aminotransferase


(ALT/SGPT) 15 U/L (12-78)


  


  


  


 


 


Alkaline Phosphatase


  78 U/L


() 


  


  


 


 


C-Reactive Protein,


Quantitative 17.5 mg/dL


(0.00-0.90)  H 


  


  


 


 


Total Protein


  5.4 G/DL


(6.4-8.2)  L 


  


  


 


 


Albumin


  1.6 G/DL


(3.4-5.0)  L 


  


  


 


 


Globulin 3.8 g/dL     


 


Albumin/Globulin Ratio


  0.4 (1.0-2.7)


L 


  


  


 


 


Amylase Level


  68 U/L


() 


  


  


 


 


Lipase


  367 U/L


() 


  


  


 


 


Carcinoembryonic Antigen Pending     


 


CA 19-9 Antigen Pending     











Microbiology








 Date/Time


Source Procedure


Growth Status


 


 


 5/12/20 20:00


Urine,Clean Catch Urine Culture - Preliminary


NO GROWTH Resulted

















Aaron Antunez MD May 13, 2020 12:45

## 2020-05-13 NOTE — DIAGNOSTIC IMAGING REPORT
Indications: Deep venous thrombosis, contraindication to anticoagulation. DVT and PE.

Patient with large gastric mass and significant bleeding, unable to anticoagulate.

Case discussed with the ordering surgeon. Patient at very high risk of

life-threatening additional PE. It was made for IVC filter placement.

 

Consent: Chest discussed at length with the patient prior to the procedure including

risks, benefits and alternative. Risks discussed included, but not limited to,

bleeding, infection, caval thrombosis, filter migration, filter strut penetration.

Also discussed permanent versus retrievable filters. Retrievable filter was

recommended in this case. Patient understood, asking proper questions. He signed

informed consent.

 

Technique: Prior CT scan reviewed, and demonstrates no caval anomalies . Total

sterile technique, including sterile probe cover and sterile gel,  sterile gloves,

hand hygiene, hat, mask,, sterile gown, large sterile drape, and preparation with 2%

chlorhexidine utilized. Local anesthesia with 1% lidocaine. Ultrasound reveals patent

compressible Right internal jugular vein. Under real-time ultrasound guidance,

puncture right internal jugular vein, passage of a guidewire, into the inferior vena

cava, and inferior venacavogram performed, using machine injection of contrast. The

images were reviewed. The position of the renal veins was determined. The catheter

was then exchanged for the introducer assembly of the [employer] retrievable IVC filter.

The introducer assembly was advanced further into the inferior vena cava over a

guidewire, and the guidewire and dilator were removed. The filter was passed into the

into the sheath. It was then positioned into the appropriate position. The filter was

then deployed by unsheathing it. The filter introducer was removed, and a followup

inferior venacavogram was performed using hand injection of contrast through the

sheath. The filter position was deemed acceptable, below the level of the renal vein

inflow and above the level of the leg venous confluence. The sheath was removed.

Pressure held on the right neck until hemostasis was achieved. The patient tolerated

procedure well, without immediate complication. Patient monitored throughout the

procedure by dedicated nurse. Total procedure time 20 minutes. Total fluoroscopy time

101.6 seconds.  Total flouroscopy dose 13.91 mGy.

 

Findings: Patent compressible right internal jugular vein. Needle noted within this

renal ultrasound for venous access. Inferior venacavogram demonstrates normal caliber

inferior vena cava. Single renal veins. No intracaval thrombus. Completion images

demonstrate satisfactory position of the filter.

 

IMPRESSION: 

Successful placement of infrarenal inferior vena cava filter, as above.

 

Please note that this is a potentially retrievable filter. Patient should be referred

back to IR for filter removal when clinically indicated.

## 2020-05-13 NOTE — NUR
NURSE NOTES:

The patient is on Protonix drip, 1st unit of FFP, and Zosyn via three different peripheral 
line per order. Will closely monitor the patient. Will continue plan of care.

## 2020-05-13 NOTE — CONSULTATION
History of Present Illness


General


Date patient seen:  May 13, 2020


Chief Complaint:  Syncope





Present Illness


HPI








69 y/o M with hx of HTN presented to ED on 5/12 with multiple episodes of 

watery diarrhea (up to 4 per day) and syncopal episode day of admission. 

Episode was unwitnessed, patient was found down. Endorses that has been feeling 

unwell for the the last 1-2 weeks feeling fatigue, tired and weak. +decreased 

PO. Upon admission, severe anemia with Hgb down to 3.5. CT showed gastric mass 

and PE.





Denied hematochezia, melena, hematemesis, fever/chills.


Allergies:  


Coded Allergies:  


     No Known Allergies (Unverified , 5/12/20)





Medication History


No Active Prescriptions or Reported Meds





Patient History


Healthcare decision maker





Resuscitation status





Advanced Directive on File





Pmhx: as above





SHx: reviewed





Fhx: non contributory








Physical Exam


Physical Exam Narrative





General Appearance:  alert, mild distress


Head:  normocephalic, atraumatic


Eyes:  bilateral eye PERRL, bilateral eye EOMI


ENT:  uvula midline, dry mucus membranes


Neck:  supple, thyroid normal, supple/symm/no masses


Respiratory:  lungs clear, no respiratory distress, no retraction, no accessory 

muscle use


Cardiovascular #1:  normal peripheral pulses, no edema, no gallop, no murmur, 

tachycardia


Gastrointestinal:  non tender, soft, no guarding, no rebound


Musculoskeletal:  normal inspection


Neurologic:  alert, oriented x3


Psychiatric:  mood/affect normal


Skin:  no rash, warm/dry





Last 24 Hour Vital Signs








  Date Time  Temp Pulse Resp B/P (MAP) Pulse Ox O2 Delivery O2 Flow Rate FiO2


 


5/13/20 11:00  103 17 107/58 (74) 100   


 


5/13/20 10:00  102 24 105/58 (74) 100   


 


5/13/20 09:00  101 24 103/60 (74) 100   


 


5/13/20 08:00 98.5 99 21 100/63 (75) 100   


 


5/13/20 07:00 98.3 100 22 97/61 (73) 100   


 


5/13/20 06:00  101 23 100/61 (74) 100   


 


5/13/20 05:00  102 21 102/52 (69) 100   


 


5/13/20 04:00      Nasal Cannula 2.0 


 


5/13/20 04:00 97.8 105 20 101/58 (72) 100   


 


5/13/20 04:00  104      


 


5/13/20 03:00  104 17 98/60 (73) 99   


 


5/13/20 02:00  108 21 104/68 (80) 98   


 


5/13/20 01:00  106 21 99/62 (74) 84   


 


5/13/20 00:00  117      


 


5/13/20 00:00 97.6 108 22 100/65 (77) 95   


 


5/13/20 00:00      Nasal Cannula 2.0 


 


5/13/20 00:00      Nasal Cannula 2.0 


 


5/12/20 23:40 98.0 110 20 99/56 100 Nasal Cannula 2.0 


 


5/12/20 23:00 98.0 110 20 99/56 100 Nasal Cannula 2.0 


 


5/12/20 22:00 97.9 116 20 112/50 96 Nasal Cannula 2.0 


 


5/12/20 21:00 97.7 120 25 112/63 91 Nasal Cannula 2.0 


 


5/12/20 20:32 97.7 118 14 112/67 95 Room Air  


 


5/12/20 19:50 97.7 118 14 108/72 95 Room Air  


 


5/12/20 19:10 97.7 120 14 113/68 95 Room Air  


 


5/12/20 19:05 97.7 122 14 113/68 (83) 95 Room Air  

















Intake and Output  


 


 5/12/20 5/13/20





 19:00 07:00


 


Intake Total  930.715 ml


 


Output Total  850 ml


 


Balance  80.715 ml


 


  


 


Intake Oral  0 ml


 


IV Total  5.715 ml


 


Blood Product  925 ml


 


Output Urine Total  850 ml


 


# Bowel Movements  3











Laboratory Tests








Test


  5/12/20


19:00 5/12/20


19:10 5/12/20


20:00 5/12/20


21:00


 


White Blood Count


  27.4 K/UL


(4.8-10.8)  *H 


  


  


 


 


Red Blood Count


  1.44 M/UL


(4.70-6.10)  L 


  


  


 


 


Hemoglobin


  3.5 G/DL


(14.2-18.0)  *L 


  


  


 


 


Hematocrit


  12.9 %


(42.0-52.0)  L 


  


  


 


 


Mean Corpuscular Volume 90 FL (80-99)     


 


Mean Corpuscular Hemoglobin


  24.3 PG


(27.0-31.0)  L 


  


  


 


 


Mean Corpuscular Hemoglobin


Concent 27.1 G/DL


(32.0-36.0)  L 


  


  


 


 


Red Cell Distribution Width


  22.0 %


(11.6-14.8)  H 


  


  


 


 


Platelet Count


  204 K/UL


(150-450) 


  


  


 


 


Mean Platelet Volume


  6.0 FL


(6.5-10.1)  L 


  


  


 


 


Neutrophils (%) (Auto)


  % (45.0-75.0)


  


  


  


 


 


Lymphocytes (%) (Auto)


  % (20.0-45.0)


  


  


  


 


 


Monocytes (%) (Auto)  % (1.0-10.0)     


 


Eosinophils (%) (Auto)  % (0.0-3.0)     


 


Basophils (%) (Auto)  % (0.0-2.0)     


 


Differential Total Cells


Counted 100  


  


  


  


 


 


Neutrophils % (Manual) 91 % (45-75)  H   


 


Lymphocytes % (Manual) 5 % (20-45)  L   


 


Monocytes % (Manual) 3 % (1-10)     


 


Eosinophils % (Manual) 0 % (0-3)     


 


Basophils % (Manual) 0 % (0-2)     


 


Band Neutrophils 1 % (0-8)     


 


Nucleated Red Blood Cells 4 /100 WBC     


 


Platelet Estimate Adequate     


 


Platelet Morphology Normal     


 


Polychromasia 2+     


 


Hypochromasia 3+     


 


Anisocytosis 3+     


 


Sodium Level


  138 MMOL/L


(136-145) 


  


  


 


 


Potassium Level


  3.8 MMOL/L


(3.5-5.1) 


  


  


 


 


Chloride Level


  104 MMOL/L


() 


  


  


 


 


Carbon Dioxide Level


  16 MMOL/L


(21-32)  L 


  


  


 


 


Anion Gap


  18 mmol/L


(5-15)  H 


  


  


 


 


Blood Urea Nitrogen


  26 mg/dL


(7-18)  H 


  


  


 


 


Creatinine


  1.2 MG/DL


(0.55-1.30) 


  


  


 


 


Estimat Glomerular Filtration


Rate > 60 mL/min


(>60) 


  


  


 


 


Glucose Level


  180 MG/DL


()  H 


  


  


 


 


Lactic Acid Level


  6.80 mmol/L


(0.4-2.0)  H 


  


  7.70 mmol/L


(0.66-2.22)  H


 


Calcium Level


  7.3 MG/DL


(8.5-10.1)  L 


  


  


 


 


Phosphorus Level


  4.5 MG/DL


(2.5-4.9) 


  


  


 


 


Magnesium Level


  2.6 MG/DL


(1.8-2.4)  H 


  


  


 


 


Total Bilirubin


  0.3 MG/DL


(0.2-1.0) 


  


  


 


 


Aspartate Amino Transf


(AST/SGOT) 29 U/L (15-37)


  


  


  


 


 


Alanine Aminotransferase


(ALT/SGPT) 15 U/L (12-78)


  


  


  


 


 


Alkaline Phosphatase


  90 U/L


() 


  


  


 


 


Total Creatine Kinase


  65 U/L


() 


  


  


 


 


Creatine Kinase MB


  < 0.5 NG/ML


(0.0-3.6) 


  


  


 


 


Creatine Kinase MB Relative


Index 0.7  


  


  


  


 


 


Troponin I


  0.010 ng/mL


(0.000-0.056) 


  


  


 


 


Pro-B-Type Natriuretic Peptide


  288 pg/mL


(0-125)  H 


  


  


 


 


Total Protein


  5.9 G/DL


(6.4-8.2)  L 


  


  


 


 


Albumin


  1.6 G/DL


(3.4-5.0)  L 


  


  


 


 


Globulin 4.3 g/dL     


 


Albumin/Globulin Ratio


  0.4 (1.0-2.7)


L 


  


  


 


 


Lipase


  209 U/L


() 


  


  


 


 


Prothrombin Time


  


  14.9 SEC


(9.30-11.50)  H 


  


 


 


Prothromb Time International


Ratio 


  1.4 (0.9-1.1)


H 


  


 


 


Activated Partial


Thromboplast Time 


  25 SEC (23-33)


  


  


 


 


Urine Color   Yellow   


 


Urine Appearance


  


  


  Slightly


cloudy 


 


 


Urine pH   5 (4.5-8.0)   


 


Urine Specific Gravity


  


  


  1.020


(1.005-1.035) 


 


 


Urine Protein


  


  


  2+ (NEGATIVE)


H 


 


 


Urine Glucose (UA)


  


  


  Negative


(NEGATIVE) 


 


 


Urine Ketones


  


  


  Negative


(NEGATIVE) 


 


 


Urine Blood


  


  


  4+ (NEGATIVE)


H 


 


 


Urine Nitrite


  


  


  Negative


(NEGATIVE) 


 


 


Urine Bilirubin


  


  


  1+ (NEGATIVE)


H 


 


 


Urine Ictotest


  


  


  Negative


(NEGATIVE) 


 


 


Urine Urobilinogen


  


  


  4 MG/DL


(0.0-1.0)  H 


 


 


Urine Leukocyte Esterase


  


  


  Negative


(NEGATIVE) 


 


 


Urine RBC


  


  


  30-40 /HPF (0


- 0)  H 


 


 


Urine WBC


  


  


  0-2 /HPF (0 -


0) 


 


 


Urine Squamous Epithelial


Cells 


  


  Few /LPF


(NONE/OCC) 


 


 


Urine Bacteria


  


  


  Moderate /HPF


(NONE)  H 


 


 


Test


  5/13/20


10:55 


  


  


 


 


White Blood Count


  28.3 K/UL


(4.8-10.8)  *H 


  


  


 


 


Red Blood Count


  2.84 M/UL


(4.70-6.10)  L 


  


  


 


 


Hemoglobin


  8.3 G/DL


(14.2-18.0)  #L 


  


  


 


 


Hematocrit


  24.0 %


(42.0-52.0)  #L 


  


  


 


 


Mean Corpuscular Volume 84 FL (80-99)     


 


Mean Corpuscular Hemoglobin


  29.3 PG


(27.0-31.0) 


  


  


 


 


Mean Corpuscular Hemoglobin


Concent 34.7 G/DL


(32.0-36.0) 


  


  


 


 


Red Cell Distribution Width


  14.2 %


(11.6-14.8) 


  


  


 


 


Platelet Count


  127 K/UL


(150-450)  L 


  


  


 


 


Mean Platelet Volume


  5.1 FL


(6.5-10.1)  L 


  


  


 


 


Neutrophils (%) (Auto)


  % (45.0-75.0)


  


  


  


 


 


Lymphocytes (%) (Auto)


  % (20.0-45.0)


  


  


  


 


 


Monocytes (%) (Auto)  % (1.0-10.0)     


 


Eosinophils (%) (Auto)  % (0.0-3.0)     


 


Basophils (%) (Auto)  % (0.0-2.0)     


 


Neutrophils % (Manual) Pending     


 


Lymphocytes % (Manual) Pending     


 


Platelet Estimate Pending     


 


Platelet Morphology Pending     


 


Erythrocyte Sedimentation Rate Pending     


 


Prothrombin Time


  14.0 SEC


(9.30-11.50)  H 


  


  


 


 


Prothromb Time International


Ratio 1.3 (0.9-1.1)


H 


  


  


 


 


Activated Partial


Thromboplast Time 28 SEC (23-33)


  


  


  


 


 


Sodium Level


  142 MMOL/L


(136-145) 


  


  


 


 


Potassium Level


  3.5 MMOL/L


(3.5-5.1) 


  


  


 


 


Chloride Level


  110 MMOL/L


()  H 


  


  


 


 


Carbon Dioxide Level


  19 MMOL/L


(21-32)  L 


  


  


 


 


Anion Gap


  13 mmol/L


(5-15) 


  


  


 


 


Blood Urea Nitrogen


  19 mg/dL


(7-18)  H 


  


  


 


 


Creatinine


  0.9 MG/DL


(0.55-1.30) 


  


  


 


 


Estimat Glomerular Filtration


Rate > 60 mL/min


(>60) 


  


  


 


 


Glucose Level


  103 MG/DL


() 


  


  


 


 


Lactic Acid Level


  1.20 mmol/L


(0.4-2.0) 


  


  


 


 


Calcium Level


  6.8 MG/DL


(8.5-10.1)  L 


  


  


 


 


Total Bilirubin


  0.8 MG/DL


(0.2-1.0) 


  


  


 


 


Aspartate Amino Transf


(AST/SGOT) 39 U/L (15-37)


H 


  


  


 


 


Alanine Aminotransferase


(ALT/SGPT) 15 U/L (12-78)


  


  


  


 


 


Alkaline Phosphatase


  78 U/L


() 


  


  


 


 


C-Reactive Protein,


Quantitative 17.5 mg/dL


(0.00-0.90)  H 


  


  


 


 


Total Protein


  5.4 G/DL


(6.4-8.2)  L 


  


  


 


 


Albumin


  1.6 G/DL


(3.4-5.0)  L 


  


  


 


 


Globulin 3.8 g/dL     


 


Albumin/Globulin Ratio


  0.4 (1.0-2.7)


L 


  


  


 


 


Amylase Level


  68 U/L


() 


  


  


 


 


Lipase


  367 U/L


() 


  


  


 


 


Carcinoembryonic Antigen Pending     


 


CA 19-9 Antigen Pending     











Microbiology








 Date/Time


Source Procedure


Growth Status


 


 


 5/12/20 20:00


Urine,Clean Catch Urine Culture - Preliminary


NO GROWTH Resulted








Height (Feet):  5


Height (Inches):  7.00


Weight (Pounds):  138


Medications





Current Medications








 Medications


  (Trade)  Dose


 Ordered  Sig/Marisa


 Route


 PRN Reason  Start Time


 Stop Time Status Last Admin


Dose Admin


 


 Acetaminophen


  (Tylenol)  650 mg  Q4H  PRN


 ORAL


 Temp >100.5  5/13/20 06:15


 6/12/20 06:14   


 


 


 Albuterol/


 Ipratropium


  (Albuterol/


 Ipratropium)  3 ml  Q4H  PRN


 HHN


 Shortness of Breath  5/13/20 06:15


 5/18/20 06:14   


 


 


 Barium Sulfate


  (Readi-Cat 2)  450 ml  NOW  PRN


 ORAL


 Radiology Procedure  5/12/20 19:15


 5/14/20 19:03   


 


 


 Dextrose


  (Dextrose 50%)  25 ml  Q30M  PRN


 IV


 Hypoglycemia  5/13/20 06:15


 8/11/20 06:14   


 


 


 Dextrose


  (Dextrose 50%)  50 ml  Q30M  PRN


 IV


 Hypoglycemia  5/13/20 06:15


 8/11/20 06:14   


 


 


 Docusate Sodium


  (Colace)  100 mg  EVERY 12  HOURS


 ORAL


   5/13/20 09:00


 6/12/20 08:59   


 


 


 Hydromorphone HCl


  (Dilaudid)  1 mg  Q6H  PRN


 IVP


 Moderate Pain (Pain Scale 4-6)  5/13/20 06:15


 5/20/20 06:14   


 


 


 Lidocaine HCl


  (Xylocaine 1%


 30ml)  30 ml  NOW  PRN


 INJ


 Radiology Procedure  5/12/20 21:45


 5/15/20 21:35   


 


 


 Lorazepam


  (Ativan)  1 mg  Q4H  PRN


 ORAL


 For Anxiety  5/13/20 06:15


 5/20/20 06:14   


 


 


 Ondansetron HCl


  (Zofran)  4 mg  Q6H  PRN


 IVP


 Nausea & Vomiting  5/13/20 06:15


 6/12/20 06:14   


 


 


 Pantoprazole 80


 mg/Sodium Chloride  250 ml @ 


 25 mls/hr  Q10H


 IV


   5/13/20 09:00


 6/12/20 08:59  5/13/20 08:48


 


 


 Zolpidem Tartrate


  (Ambien)  5 mg  HSPRN  PRN


 ORAL


 Insomnia  5/13/20 06:15


 5/20/20 06:14   


 











Assessment/Plan


Assessment/Plan:


Abx:


IV Vancomycin x1 5/12


Zosyn x1 5/12





Assessment:


Severe Sepsis


R/o probable bacteremia


GGO on lung bases- r/o COVID19


  -5/12 Bcx p





Afebrile


Leukocytosis


Lactic acidosis, SP


   -5/12 u/a neg; ucx NTD





Diarrhea


Severe anemia (blood loss)/GIB likely 2ry to gastric mass


  -CT abd/p:   Centrally necrotic mass arising from the gastric fundus 

measuring 12. 6 x 11.1 x 14 cm.  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  

Gastric adenocarcinoma, lymphoma, and other neoplastic etiologies are also in 

the differential. Filling defects within pulmonary vessels at the lung bases 

with small peripheral airspace opacities concerning for pulmonary emboli and 

small pulmonary infarcts.





Syncopal episode- likely due to severe dehydration and anemia


  -CT head: No acute findings in the head/brain.





Probable acute PE -(per CT abd/p findings)





PABLO, improving





HTN





Plan:


-Continue empiric Zosyn #2


-f/u cx


-Monitor CBC/CMP, temperaturse


-ICU care/aspiration precautions


-COVID19 isolation and precautions


-If emergent IVC filter placement, proceed with lifesaving procedure. If 

procedure can wait, ideally would like to see blood cultures negative for at 

least 24 hours.


-f/u CXR





Thank you for consulting [facility]. Will continue to follow along wiht 

you.





Discussed with Alexa Cloud M.D. May 13, 2020 12:12

## 2020-05-13 NOTE — NUR
NURSE NOTES:

Completed 2nd unit FFP transfusion in a safe manner. Will closely monitor the patient. Will 
continue plan of care.

## 2020-05-13 NOTE — NUR
NURSE NOTES:

Per Pretty, the nursing supervisor, the family including multiple nephews would like to 
have family conference with Dr. Pierce for update of the patient's status. Notified Dr. Pierce 
regarding the family member's opinion. Social service consulted. Will continue plan of care.

## 2020-05-13 NOTE — NUR
CASE MANAGEMENT: REVIEW





70 YEAR OLD MALE PRESENTED TO ED FROM HOME 



CC: SYNCOPE . DIARRHEA X3





SI: DEHYDRATION . SYNCOPE . GI BLEED 

EGD w/ BIOPSY 

T 97.7  RR 14 BP 98/60 SAT 84% NC/2L

WBC 27.4 H/H 3.5/12.9 LACTIC ACID 7.70

PT/ INR 14.9/1.4 





IS: VANCOMYCIN IV X1

ZOSYN IV X1

PROTONIX IV X1 

HEPARIN IV X1

NS IVF BOLUS X1 

TRANSFUSION PRBC 5 UNITS 

TRANSFUSION FFP 2 UNITS 







***PATIENT ADMITTED TO ICU 05/12/2020***

DCP: PATIENT IS FROM HOME

## 2020-05-14 VITALS — DIASTOLIC BLOOD PRESSURE: 64 MMHG | SYSTOLIC BLOOD PRESSURE: 104 MMHG

## 2020-05-14 VITALS — DIASTOLIC BLOOD PRESSURE: 56 MMHG | SYSTOLIC BLOOD PRESSURE: 99 MMHG

## 2020-05-14 VITALS — SYSTOLIC BLOOD PRESSURE: 102 MMHG | DIASTOLIC BLOOD PRESSURE: 69 MMHG

## 2020-05-14 VITALS — SYSTOLIC BLOOD PRESSURE: 106 MMHG | DIASTOLIC BLOOD PRESSURE: 69 MMHG

## 2020-05-14 VITALS — DIASTOLIC BLOOD PRESSURE: 67 MMHG | SYSTOLIC BLOOD PRESSURE: 116 MMHG

## 2020-05-14 VITALS — DIASTOLIC BLOOD PRESSURE: 62 MMHG | SYSTOLIC BLOOD PRESSURE: 100 MMHG

## 2020-05-14 VITALS — DIASTOLIC BLOOD PRESSURE: 75 MMHG | SYSTOLIC BLOOD PRESSURE: 120 MMHG

## 2020-05-14 VITALS — SYSTOLIC BLOOD PRESSURE: 120 MMHG | DIASTOLIC BLOOD PRESSURE: 70 MMHG

## 2020-05-14 VITALS — DIASTOLIC BLOOD PRESSURE: 72 MMHG | SYSTOLIC BLOOD PRESSURE: 116 MMHG

## 2020-05-14 VITALS — SYSTOLIC BLOOD PRESSURE: 97 MMHG | DIASTOLIC BLOOD PRESSURE: 63 MMHG

## 2020-05-14 VITALS — DIASTOLIC BLOOD PRESSURE: 78 MMHG | SYSTOLIC BLOOD PRESSURE: 100 MMHG

## 2020-05-14 VITALS — DIASTOLIC BLOOD PRESSURE: 68 MMHG | SYSTOLIC BLOOD PRESSURE: 105 MMHG

## 2020-05-14 VITALS — DIASTOLIC BLOOD PRESSURE: 64 MMHG | SYSTOLIC BLOOD PRESSURE: 102 MMHG

## 2020-05-14 VITALS — SYSTOLIC BLOOD PRESSURE: 124 MMHG | DIASTOLIC BLOOD PRESSURE: 78 MMHG

## 2020-05-14 VITALS — DIASTOLIC BLOOD PRESSURE: 68 MMHG | SYSTOLIC BLOOD PRESSURE: 115 MMHG

## 2020-05-14 VITALS — SYSTOLIC BLOOD PRESSURE: 104 MMHG | DIASTOLIC BLOOD PRESSURE: 65 MMHG

## 2020-05-14 VITALS — DIASTOLIC BLOOD PRESSURE: 64 MMHG | SYSTOLIC BLOOD PRESSURE: 107 MMHG

## 2020-05-14 VITALS — DIASTOLIC BLOOD PRESSURE: 60 MMHG | SYSTOLIC BLOOD PRESSURE: 91 MMHG

## 2020-05-14 VITALS — DIASTOLIC BLOOD PRESSURE: 69 MMHG | SYSTOLIC BLOOD PRESSURE: 111 MMHG

## 2020-05-14 VITALS — SYSTOLIC BLOOD PRESSURE: 97 MMHG | DIASTOLIC BLOOD PRESSURE: 58 MMHG

## 2020-05-14 VITALS — SYSTOLIC BLOOD PRESSURE: 122 MMHG | DIASTOLIC BLOOD PRESSURE: 71 MMHG

## 2020-05-14 VITALS — DIASTOLIC BLOOD PRESSURE: 66 MMHG | SYSTOLIC BLOOD PRESSURE: 107 MMHG

## 2020-05-14 VITALS — SYSTOLIC BLOOD PRESSURE: 110 MMHG | DIASTOLIC BLOOD PRESSURE: 61 MMHG

## 2020-05-14 VITALS — SYSTOLIC BLOOD PRESSURE: 103 MMHG | DIASTOLIC BLOOD PRESSURE: 66 MMHG

## 2020-05-14 LAB
ADD MANUAL DIFF: YES
ANION GAP SERPL CALC-SCNC: 11 MMOL/L (ref 5–15)
BUN SERPL-MCNC: 14 MG/DL (ref 7–18)
CALCIUM SERPL-MCNC: 6.9 MG/DL (ref 8.5–10.1)
CHLORIDE SERPL-SCNC: 111 MMOL/L (ref 98–107)
CO2 SERPL-SCNC: 19 MMOL/L (ref 21–32)
CREAT SERPL-MCNC: 0.8 MG/DL (ref 0.55–1.3)
ERYTHROCYTE [DISTWIDTH] IN BLOOD BY AUTOMATED COUNT: 14.5 % (ref 11.6–14.8)
HCT VFR BLD CALC: 23.9 % (ref 42–52)
HGB BLD-MCNC: 8.5 G/DL (ref 14.2–18)
INR PPP: 1.4 (ref 0.9–1.1)
MCV RBC AUTO: 84 FL (ref 80–99)
PLATELET # BLD: 91 K/UL (ref 150–450)
POTASSIUM SERPL-SCNC: 3.3 MMOL/L (ref 3.5–5.1)
RBC # BLD AUTO: 2.86 M/UL (ref 4.7–6.1)
SODIUM SERPL-SCNC: 141 MMOL/L (ref 136–145)
WBC # BLD AUTO: 23.2 K/UL (ref 4.8–10.8)

## 2020-05-14 RX ADMIN — SODIUM CHLORIDE SCH MLS/HR: 9 INJECTION, SOLUTION INTRAVENOUS at 17:28

## 2020-05-14 RX ADMIN — DOCUSATE SODIUM SCH MG: 100 CAPSULE, LIQUID FILLED ORAL at 20:45

## 2020-05-14 RX ADMIN — SODIUM CHLORIDE SCH MLS/HR: 9 INJECTION, SOLUTION INTRAVENOUS at 06:09

## 2020-05-14 RX ADMIN — DOCUSATE SODIUM SCH MG: 100 CAPSULE, LIQUID FILLED ORAL at 09:34

## 2020-05-14 RX ADMIN — DEXTROSE MONOHYDRATE SCH MLS/HR: 50 INJECTION, SOLUTION INTRAVENOUS at 12:56

## 2020-05-14 RX ADMIN — ZOLPIDEM TARTRATE PRN MG: 5 TABLET ORAL at 20:45

## 2020-05-14 RX ADMIN — PANTOPRAZOLE SODIUM SCH MLS/HR: 40 INJECTION, POWDER, FOR SOLUTION INTRAVENOUS at 04:06

## 2020-05-14 RX ADMIN — PANTOPRAZOLE SODIUM SCH MLS/HR: 40 INJECTION, POWDER, FOR SOLUTION INTRAVENOUS at 12:56

## 2020-05-14 RX ADMIN — SODIUM CHLORIDE SCH MLS/HR: 0.9 INJECTION INTRAVENOUS at 20:45

## 2020-05-14 RX ADMIN — DEXTROSE MONOHYDRATE SCH MLS/HR: 50 INJECTION, SOLUTION INTRAVENOUS at 20:45

## 2020-05-14 RX ADMIN — DEXTROSE MONOHYDRATE SCH MLS/HR: 50 INJECTION, SOLUTION INTRAVENOUS at 04:06

## 2020-05-14 NOTE — NUR
NURSE NOTES:

Patient repositioned. Patient had soft/liquid BM dark brown in nature. Optifoam applied to 
sacral area. Vitals are stable at this time. PIV lines patent and in place. Afebrile at this 
time.

## 2020-05-14 NOTE — NUR
NURSE NOTES:



Dr. Antunez updated at the bedside on patient remaining stable with HR at 

remains able to tolerate diet while on clear liquid.

## 2020-05-14 NOTE — INFECTIOUS DISEASES PROG NOTE
Assessment/Plan


Assessment/Plan








Assessment:


Severe Sepsis


Gram positive and Gram neg bacteremia


GGO on lung bases- r/o COVID19


  -5/12 Bcx 1/4 GPC, 2/4 GNR; 5/13 Bcx


            CXR: no acute disease





Afebrile


Leukocytosis; improving


Lactic acidosis, SP


   -5/12 u/a neg; ucx 30-40K mixed urogenital contaminants





Diarrhea


Severe anemia (blood loss)/GIB likely 2ry to gastric mass


Thrombocytopenia


  -CT abd/p:   Centrally necrotic mass arising from the gastric fundus 

measuring 12. 6 x 11.1 x 14 cm.  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  

Gastric adenocarcinoma, lymphoma, and other neoplastic etiologies are also in 

the differential. Filling defects within pulmonary vessels at the lung bases 

with small peripheral airspace opacities concerning for pulmonary emboli and 

small pulmonary infarcts.





Syncopal episode- likely due to severe dehydration and anemia


  -CT head: No acute findings in the head/brain.





Acute PE/DVT -(per CT abd/p findings)


  -sp IVC filter placement 5/13 (retrievable- given presence of bacteremia)





PABLO, improving





HTN





Plan:


-Continue empiric Zosyn #3 and IV Vancomycin #3 for bacteremia pending ID


-f/u cx


-Monitor CBC/CMP, temperaturse


-ICU care/aspiration precautions


-COVID19 isolation and precautions


-f/u repeat Bcx


-Sx,pulm, Heme/onc, GI f/u





Thank you for consulting [facility]. Will continue to follow along wiht 

you.





Discussed with RN.





Subjective


Allergies:  


Coded Allergies:  


     No Known Allergies (Unverified , 5/12/20)


Subjective


afebrile


bacteremia


wbc improving


sp IVC filter yesterday





Objective


Vital Signs





Last 24 Hour Vital Signs








  Date Time  Temp Pulse Resp B/P (MAP) Pulse Ox O2 Delivery O2 Flow Rate FiO2


 


5/14/20 11:00  89 22 99/56 (70) 98   


 


5/14/20 10:00  90 24 105/68 (80) 100   


 


5/14/20 09:00  94 26 104/64 (77) 100   


 


5/14/20 08:00 98.1 89 20 100/62 (75) 100   


 


5/14/20 08:00  86      


 


5/14/20 08:00      Nasal Cannula 2.0 


 


5/14/20 07:00  90 21 97/63 (74) 100   


 


5/14/20 06:00  96 20 107/64 (78) 100   


 


5/14/20 05:00  98 16 110/61 (77) 100   


 


5/14/20 04:00 98.3 94 25 102/64 (77) 100   


 


5/14/20 04:00  98      


 


5/14/20 04:00      Nasal Cannula 2.0 


 


5/14/20 03:00  92 21 104/65 (78) 100   


 


5/14/20 02:00  87 18 91/60 (70) 100   


 


5/14/20 01:00  95 24 120/70 (87) 100   


 


5/14/20 00:00  89      


 


5/14/20 00:00 98.7 89 25 102/69 (80) 100   


 


5/14/20 00:00      Nasal Cannula 2.0 


 


5/13/20 23:00  84 16 100/59 (73) 100   


 


5/13/20 22:00  81 25 103/63 (76) 100   


 


5/13/20 21:30  87 26 106/68 (81) 99   


 


5/13/20 21:00  94 19 115/66 (82) 100   


 


5/13/20 20:02     100 Nasal Cannula 2.0 28


 


5/13/20 20:00 99.1 84 22 101/61 (74) 100   


 


5/13/20 20:00      Nasal Cannula 2.0 


 


5/13/20 20:00  85      


 


5/13/20 19:00  81 25 103/63 (76) 100   


 


5/13/20 18:07  85 24 99/60 (73) 100   


 


5/13/20 17:18  98 20    4.0 


 


5/13/20 17:00  98 20 101/60 (74) 100   


 


5/13/20 16:00      Nasal Cannula 2.0 


 


5/13/20 16:00 97.8 90 20 103/62 (76) 100   


 


5/13/20 16:00  89      


 


5/13/20 15:00  93 19 108/64 (79) 100   


 


5/13/20 14:00  93 19 107/64 (78) 100   


 


5/13/20 13:00  92 21 98/60 (73) 100   


 


5/13/20 12:00      Nasal Cannula 2.0 


 


5/13/20 12:00  94      


 


5/13/20 12:00 98.4 93 24 100/58 (72) 100   








Height (Feet):  5


Height (Inches):  7.00


Weight (Pounds):  137


Objective


not examined to limit COVID19 exposure





Microbiology








 Date/Time


Source Procedure


Growth Status


 


 


 5/12/20 19:00


Blood Blood Culture - Preliminary Resulted


 


 5/12/20 19:00


Blood Blood Culture - Preliminary


Gram Negative Bacillus 1


Gram Positive Cocci Resulted


 


 5/12/20 20:00


Urine,Clean Catch Urine Culture - Preliminary


Mixed Urogenital Contaminants Resulted











Laboratory Tests








Test


  5/13/20


19:55 5/14/20


04:25


 


Iron Level


  20 ug/dL


()  L 


 


 


Total Iron Binding Capacity


  202 ug/dL


(250-450)  L 


 


 


Percent Iron Saturation 10 % (15-50)  L 


 


Unsaturated Iron Binding


  182 ug/dL


(112-346) 


 


 


Ferritin


  123 NG/ML


(8-388) 


 


 


Lactate Dehydrogenase


  358 U/L


()  H 


 


 


Vitamin B12 Level


  369 PG/ML


(193-986) 


 


 


Folate


  3.9 NG/ML


(8.6-58.9)  L 


 


 


White Blood Count


  


  23.2 K/UL


(4.8-10.8)  *H


 


Red Blood Count


  


  2.86 M/UL


(4.70-6.10)  L


 


Hemoglobin


  


  8.5 G/DL


(14.2-18.0)  L


 


Hematocrit


  


  23.9 %


(42.0-52.0)  L


 


Mean Corpuscular Volume  84 FL (80-99)  


 


Mean Corpuscular Hemoglobin


  


  29.6 PG


(27.0-31.0)


 


Mean Corpuscular Hemoglobin


Concent 


  35.5 G/DL


(32.0-36.0)


 


Red Cell Distribution Width


  


  14.5 %


(11.6-14.8)


 


Platelet Count


  


  91 K/UL


(150-450)  L


 


Mean Platelet Volume


  


  4.7 FL


(6.5-10.1)  L


 


Neutrophils (%) (Auto)


  


  % (45.0-75.0)


 


 


Lymphocytes (%) (Auto)


  


  % (20.0-45.0)


 


 


Monocytes (%) (Auto)   % (1.0-10.0)  


 


Eosinophils (%) (Auto)   % (0.0-3.0)  


 


Basophils (%) (Auto)   % (0.0-2.0)  


 


Differential Total Cells


Counted 


  100  


 


 


Neutrophils % (Manual)  92 % (45-75)  H


 


Lymphocytes % (Manual)  4 % (20-45)  L


 


Monocytes % (Manual)  4 % (1-10)  


 


Eosinophils % (Manual)  0 % (0-3)  


 


Basophils % (Manual)  0 % (0-2)  


 


Band Neutrophils  0 % (0-8)  


 


Platelet Estimate  Decreased  L


 


Platelet Morphology  Normal  


 


Hypochromasia  2+  


 


Anisocytosis  1+  


 


Spherocytes  2+  


 


Prothrombin Time


  


  14.4 SEC


(9.30-11.50)  H


 


Prothromb Time International


Ratio 


  1.4 (0.9-1.1)


H


 


Sodium Level


  


  141 MMOL/L


(136-145)


 


Potassium Level


  


  3.3 MMOL/L


(3.5-5.1)  L


 


Chloride Level


  


  111 MMOL/L


()  H


 


Carbon Dioxide Level


  


  19 MMOL/L


(21-32)  L


 


Anion Gap


  


  11 mmol/L


(5-15)


 


Blood Urea Nitrogen


  


  14 mg/dL


(7-18)


 


Creatinine


  


  0.8 MG/DL


(0.55-1.30)


 


Estimat Glomerular Filtration


Rate 


  > 60 mL/min


(>60)


 


Glucose Level


  


  93 MG/DL


()


 


Calcium Level


  


  6.9 MG/DL


(8.5-10.1)  L


 


Troponin I


  


  0.000 ng/mL


(0.000-0.056)


 


Thyroid Stimulating Hormone


(TSH) 


  1.552 uiU/mL


(0.358-3.740)











Current Medications








 Medications


  (Trade)  Dose


 Ordered  Sig/Marisa


 Route


 PRN Reason  Start Time


 Stop Time Status Last Admin


Dose Admin


 


 Acetaminophen


  (Tylenol)  650 mg  Q4H  PRN


 ORAL


 Temp >100.5  5/13/20 06:15


 6/12/20 06:14   


 


 


 Albuterol/


 Ipratropium


  (Albuterol/


 Ipratropium)  3 ml  Q4H  PRN


 HHN


 Shortness of Breath  5/13/20 06:15


 5/18/20 06:14   


 


 


 Barium Sulfate


  (Readi-Cat 2)  450 ml  NOW  PRN


 ORAL


 Radiology Procedure  5/12/20 19:15


 5/14/20 19:03   


 


 


 Dextrose


  (Dextrose 50%)  25 ml  Q30M  PRN


 IV


 Hypoglycemia  5/13/20 06:15


 8/11/20 06:14   


 


 


 Dextrose


  (Dextrose 50%)  50 ml  Q30M  PRN


 IV


 Hypoglycemia  5/13/20 06:15


 8/11/20 06:14   


 


 


 Docusate Sodium


  (Colace)  100 mg  EVERY 12  HOURS


 ORAL


   5/13/20 09:00


 6/12/20 08:59  5/14/20 09:34


 


 


 Heparin Sodium/


 Sodium Chloride


  (Heparin 1000


 units/500ml


 Premix)  1,000 unit  ONCE  PRN


 INJ


 radiology procedure  5/13/20 16:30


 5/15/20 16:29   


 


 


 Hydromorphone HCl


  (Dilaudid)  1 mg  Q6H  PRN


 IVP


 Moderate Pain (Pain Scale 4-6)  5/13/20 06:15


 5/20/20 06:14  5/13/20 20:33


 


 


 Iohexol


  (OMNIPAQUE-300


 100ml)  100 ml  ONCE  PRN


 INJ


 radiology procedure  5/13/20 16:30


 5/15/20 16:29   


 


 


 Lidocaine HCl


  (Xylocaine 1%


 30ml)  30 ml  NOW  PRN


 INJ


 Radiology Procedure  5/12/20 21:45


 5/15/20 21:35   


 


 


 Lorazepam


  (Ativan)  1 mg  Q4H  PRN


 ORAL


 For Anxiety  5/13/20 06:15


 5/20/20 06:14   


 


 


 Ondansetron HCl


  (Zofran)  4 mg  Q6H  PRN


 IVP


 Nausea & Vomiting  5/13/20 06:15


 6/12/20 06:14   


 


 


 Pantoprazole 80


 mg/Sodium Chloride  250 ml @ 


 25 mls/hr  Q10H


 IV


   5/13/20 09:00


 6/12/20 08:59  5/14/20 04:06


 


 


 Phytonadione 1 mg/


 Dextrose  55.5 ml @ 


 222 mls/hr  ONCE


 IVPB


   5/14/20 12:00


 5/14/20 14:00   


 


 


 Piperacillin Sod/


 Tazobactam Sod


 3.375 gm/Sodium


 Chloride  110 ml @ 


 27.5 mls/hr  EVERY 8  HOURS


 IVPB


   5/13/20 14:00


 5/18/20 13:59  5/14/20 04:06


 


 


 Vancomycin HCl


  (Vanco rx to


 dose)  1 ea  DAILY  PRN


 MISC


 Per rx protocol  5/13/20 13:15


 6/12/20 13:14   


 


 


 Vancomycin HCl


 750 mg/Sodium


 Chloride  275 ml @ 


 183.333


 mls/hr  Q12H


 IVPB


   5/13/20 18:00


 5/18/20 17:59  5/14/20 06:09


 


 


 Zolpidem Tartrate


  (Ambien)  5 mg  HSPRN  PRN


 ORAL


 Insomnia  5/13/20 06:15


 5/20/20 06:14  5/13/20 20:33


 

















Alexa Gonzalez M.D. May 14, 2020 12:01

## 2020-05-14 NOTE — NUR
NURSE NOTES:



Bowel movement clean with stool consisting of brown liquid stool. 

color is brown with no melena noted. 

stool sample collected and sent for C-diff and culture stool.

patient is able to assist 1/5 and has poor activity level.

## 2020-05-14 NOTE — GENERAL PROGRESS NOTE
Assessment/Plan


Problem List:  


(1) Weak


ICD Codes:  R53.1 - Weakness


SNOMED:  29075766


(2) Malnutrition


ICD Codes:  E46 - Unspecified protein-calorie malnutrition


SNOMED:  87116384


(3) COVID-19 ruled out


ICD Codes:  Z03.818 - Encounter for observation for suspected exposure to other 

biological agents ruled out


SNOMED:  708867404, [id number]


(4) Pulmonary embolism


ICD Codes:  I26.99 - Other pulmonary embolism without acute cor pulmonale


SNOMED:  34181835


Qualifiers:  


   Qualified Codes:  I26.99 - Other pulmonary embolism without acute cor 

pulmonale


(5) GI bleed


ICD Codes:  K92.2 - Gastrointestinal hemorrhage, unspecified


SNOMED:  39766779


Qualifiers:  


   Qualified Codes:  K92.2 - Gastrointestinal hemorrhage, unspecified


(6) Gastric mass


ICD Codes:  K31.89 - Other diseases of stomach and duodenum


SNOMED:  363600683


(7) Syncope


ICD Codes:  R55 - Syncope and collapse


SNOMED:  433218649


Qualifiers:  


   Qualified Codes:  R55 - Syncope and collapse


Status:  unchanged


Assessment/Plan:


o2 pulm tx transfuse prn cbc bmp am





Subjective


Constitutional:  Reports: weakness


Allergies:  


Coded Allergies:  


     No Known Allergies (Unverified , 5/12/20)


All Systems:  reviewed and negative except above


Subjective


calm in bed in icu





Objective





Last 24 Hour Vital Signs








  Date Time  Temp Pulse Resp B/P (MAP) Pulse Ox O2 Delivery O2 Flow Rate FiO2


 


5/14/20 12:00 98.7 100 25 97/58 (71) 100   


 


5/14/20 12:00      Nasal Cannula 2.0 


 


5/14/20 11:00  89 22 99/56 (70) 98   


 


5/14/20 10:00  90 24 105/68 (80) 100   


 


5/14/20 09:00  94 26 104/64 (77) 100   


 


5/14/20 08:00 98.1 89 20 100/62 (75) 100   


 


5/14/20 08:00  86      


 


5/14/20 08:00      Nasal Cannula 2.0 


 


5/14/20 07:00  90 21 97/63 (74) 100   


 


5/14/20 06:00  96 20 107/64 (78) 100   


 


5/14/20 05:00  98 16 110/61 (77) 100   


 


5/14/20 04:00 98.3 94 25 102/64 (77) 100   


 


5/14/20 04:00  98      


 


5/14/20 04:00      Nasal Cannula 2.0 


 


5/14/20 03:00  92 21 104/65 (78) 100   


 


5/14/20 02:00  87 18 91/60 (70) 100   


 


5/14/20 01:00  95 24 120/70 (87) 100   


 


5/14/20 00:00  89      


 


5/14/20 00:00 98.7 89 25 102/69 (80) 100   


 


5/14/20 00:00      Nasal Cannula 2.0 


 


5/13/20 23:00  84 16 100/59 (73) 100   


 


5/13/20 22:00  81 25 103/63 (76) 100   


 


5/13/20 21:30  87 26 106/68 (81) 99   


 


5/13/20 21:00  94 19 115/66 (82) 100   


 


5/13/20 20:02     100 Nasal Cannula 2.0 28


 


5/13/20 20:00 99.1 84 22 101/61 (74) 100   


 


5/13/20 20:00      Nasal Cannula 2.0 


 


5/13/20 20:00  85      


 


5/13/20 19:00  81 25 103/63 (76) 100   


 


5/13/20 18:07  85 24 99/60 (73) 100   


 


5/13/20 17:18  98 20    4.0 


 


5/13/20 17:00  98 20 101/60 (74) 100   


 


5/13/20 16:00      Nasal Cannula 2.0 


 


5/13/20 16:00 97.8 90 20 103/62 (76) 100   


 


5/13/20 16:00  89      


 


5/13/20 15:00  93 19 108/64 (79) 100   


 


5/13/20 14:00  93 19 107/64 (78) 100   

















Intake and Output  


 


 5/13/20 5/14/20





 19:00 07:00


 


Intake Total 335.0 ml 975.833 ml


 


Output Total 370 ml 660 ml


 


Balance -35.0 ml 315.833 ml


 


  


 


IV Total 335.0 ml 975.833 ml


 


Output Urine Total 370 ml 660 ml


 


# Bowel Movements  1








Laboratory Tests


5/13/20 19:55: 


Iron Level 20L, Total Iron Binding Capacity 202L, Percent Iron Saturation 10L, 

Unsaturated Iron Binding 182, Ferritin 123, Lactate Dehydrogenase 358H, Vitamin 

B12 Level 369, Folate 3.9L


5/14/20 04:25: 


White Blood Count 23.2*H, Red Blood Count 2.86L, Hemoglobin 8.5L, Hematocrit 

23.9L, Mean Corpuscular Volume 84, Mean Corpuscular Hemoglobin 29.6, Mean 

Corpuscular Hemoglobin Concent 35.5, Red Cell Distribution Width 14.5, Platelet 

Count 91L, Mean Platelet Volume 4.7L, Neutrophils (%) (Auto) , Lymphocytes (%) (

Auto) , Monocytes (%) (Auto) , Eosinophils (%) (Auto) , Basophils (%) (Auto) , 

Differential Total Cells Counted 100, Neutrophils % (Manual) 92H, Lymphocytes % 

(Manual) 4L, Monocytes % (Manual) 4, Eosinophils % (Manual) 0, Basophils % (

Manual) 0, Band Neutrophils 0, Platelet Estimate DecreasedL, Platelet 

Morphology Normal, Hypochromasia 2+, Anisocytosis 1+, Spherocytes 2+, 

Prothrombin Time 14.4H, Prothromb Time International Ratio 1.4H, Sodium Level 

141, Potassium Level 3.3L, Chloride Level 111H, Carbon Dioxide Level 19L, Anion 

Gap 11, Blood Urea Nitrogen 14, Creatinine 0.8, Estimat Glomerular Filtration 

Rate > 60, Glucose Level 93, Calcium Level 6.9L, Troponin I 0.000, Thyroid 

Stimulating Hormone (TSH) 1.552


Height (Feet):  5


Height (Inches):  7.00


Weight (Pounds):  137


General Appearance:  lethargic


EENT:  normal ENT inspection


Neck:  normal alignment


Cardiovascular:  normal rate, regular rhythm


Respiratory/Chest:  no respiratory distress, no accessory muscle use


Extremities:  normal inspection


Skin:  normal pigmentation











Tom Pierce DO May 14, 2020 13:26

## 2020-05-14 NOTE — NUR
NURSE NOTES:

Patient received from Laura RAHMAN. Patient is AAOX4. HR is 76 NSR, BP is normotensive. S/P 
IVC filter yesterday 5/14. Patient is calm and collective. Patient noted to have PIV lines. 
Protonix gtt at 25ml/hr infusing. Afebrile at this time. Patient able to make needs known. 
NC at 2L, SpO2 is 100%. No respiratory distress at this time. Clarified code status with 
patient. Patient verbally discussed back DNR wishes. NAD at this time. Safety measures in 
place, call light within reach, bed in lowest position and locked, educated patient about 
using call light and about fall precautions. Patient verbally discussed back about fall 
precautions. Will continue to monitor.

## 2020-05-14 NOTE — GENERAL PROGRESS NOTE
Assessment/Plan


Problem List:  


(1) Syncope


ICD Codes:  R55 - Syncope and collapse


SNOMED:  267207420


Qualifiers:  


   Qualified Codes:  R55 - Syncope and collapse


(2) Gastric mass


ICD Codes:  K31.89 - Other diseases of stomach and duodenum


SNOMED:  333739317


(3) GI bleed


ICD Codes:  K92.2 - Gastrointestinal hemorrhage, unspecified


SNOMED:  87902542


Qualifiers:  


   Qualified Codes:  K92.2 - Gastrointestinal hemorrhage, unspecified


(4) Pulmonary embolism


ICD Codes:  I26.99 - Other pulmonary embolism without acute cor pulmonale


SNOMED:  11463299


Qualifiers:  


   Qualified Codes:  I26.99 - Other pulmonary embolism without acute cor 

pulmonale


Assessment/Plan:


s/p 5 units prbc


2 units FFP


stable H&H now


fu stool c.diff


add vit k


pending EGD when off isolation


clears


fu stool for c.diff





Subjective


ROS Limited/Unobtainable:  Yes


Allergies:  


Coded Allergies:  


     No Known Allergies (Unverified , 5/12/20)





Objective





Last 24 Hour Vital Signs








  Date Time  Temp Pulse Resp B/P (MAP) Pulse Ox O2 Delivery O2 Flow Rate FiO2


 


5/14/20 07:00  90 21 97/63 (74) 100   


 


5/14/20 06:00  96 20 107/64 (78) 100   


 


5/14/20 05:00  98 16 110/61 (77) 100   


 


5/14/20 04:00 98.3 94 25 102/64 (77) 100   


 


5/14/20 04:00  98      


 


5/14/20 04:00      Nasal Cannula 2.0 


 


5/14/20 03:00  92 21 104/65 (78) 100   


 


5/14/20 02:00  87 18 91/60 (70) 100   


 


5/14/20 01:00  95 24 120/70 (87) 100   


 


5/14/20 00:00  89      


 


5/14/20 00:00 98.7 89 25 102/69 (80) 100   


 


5/14/20 00:00      Nasal Cannula 2.0 


 


5/13/20 23:00  84 16 100/59 (73) 100   


 


5/13/20 22:00  81 25 103/63 (76) 100   


 


5/13/20 21:30  87 26 106/68 (81) 99   


 


5/13/20 21:00  94 19 115/66 (82) 100   


 


5/13/20 20:02     100 Nasal Cannula 2.0 28


 


5/13/20 20:00 99.1 84 22 101/61 (74) 100   


 


5/13/20 20:00      Nasal Cannula 2.0 


 


5/13/20 20:00  85      


 


5/13/20 19:00  81 25 103/63 (76) 100   


 


5/13/20 18:07  85 24 99/60 (73) 100   


 


5/13/20 17:18  98 20    4.0 


 


5/13/20 17:00  98 20 101/60 (74) 100   


 


5/13/20 16:00      Nasal Cannula 2.0 


 


5/13/20 16:00 97.8 90 20 103/62 (76) 100   


 


5/13/20 16:00  89      


 


5/13/20 15:00  93 19 108/64 (79) 100   


 


5/13/20 14:00  93 19 107/64 (78) 100   


 


5/13/20 13:00  92 21 98/60 (73) 100   


 


5/13/20 12:00      Nasal Cannula 2.0 


 


5/13/20 12:00  94      


 


5/13/20 12:00 98.4 93 24 100/58 (72) 100   


 


5/13/20 11:00  103 17 107/58 (74) 100   

















Intake and Output  


 


 5/13/20 5/14/20





 19:00 07:00


 


Intake Total 335.0 ml 975.833 ml


 


Output Total 370 ml 660 ml


 


Balance -35.0 ml 315.833 ml


 


  


 


IV Total 335.0 ml 975.833 ml


 


Output Urine Total 370 ml 660 ml


 


# Bowel Movements  1








Laboratory Tests


5/13/20 10:55: 


White Blood Count 28.3*H, Red Blood Count 2.84L, Hemoglobin 8.3#L, Hematocrit 

24.0#L, Mean Corpuscular Volume 84, Mean Corpuscular Hemoglobin 29.3, Mean 

Corpuscular Hemoglobin Concent 34.7, Red Cell Distribution Width 14.2, Platelet 

Count 127L, Mean Platelet Volume 5.1L, Neutrophils (%) (Auto) , Lymphocytes (%) 

(Auto) , Monocytes (%) (Auto) , Eosinophils (%) (Auto) , Basophils (%) (Auto) , 

Differential Total Cells Counted 100, Neutrophils % (Manual) 94H, Lymphocytes % 

(Manual) 4L, Monocytes % (Manual) 2, Eosinophils % (Manual) 0, Basophils % (

Manual) 0, Band Neutrophils 0, Nucleated Red Blood Cells 1, Platelet Estimate 

DecreasedL, Platelet Morphology Normal, Polychromasia 2+, Hypochromasia 2+, 

Anisocytosis 1+, Erythrocyte Sedimentation Rate 32H, Reticulocyte Count 5.8H, 

Prothrombin Time 14.0H, Prothromb Time International Ratio 1.3H, Activated 

Partial Thromboplast Time 28, Sodium Level 142, Potassium Level 3.5, Chloride 

Level 110H, Carbon Dioxide Level 19L, Anion Gap 13, Blood Urea Nitrogen 19H, 

Creatinine 0.9, Estimat Glomerular Filtration Rate > 60, Glucose Level 103, 

Lactic Acid Level 1.20, Calcium Level 6.8L, Total Bilirubin 0.8, Aspartate 

Amino Transf (AST/SGOT) 39H, Alanine Aminotransferase (ALT/SGPT) 15, Alkaline 

Phosphatase 78, C-Reactive Protein, Quantitative 17.5H, Total Protein 5.4L, 

Albumin 1.6L, Globulin 3.8, Albumin/Globulin Ratio 0.4L, Amylase Level 68, 

Lipase 367, Carcinoembryonic Antigen 67.1H, CA 19-9 Antigen [Pending]


5/13/20 19:55: 


Iron Level 20L, Total Iron Binding Capacity 202L, Percent Iron Saturation 10L, 

Unsaturated Iron Binding 182, Ferritin 123, Lactate Dehydrogenase 358H, Vitamin 

B12 Level 369, Folate 3.9L


5/14/20 04:25: 


White Blood Count 23.2*H, Red Blood Count 2.86L, Hemoglobin 8.5L, Hematocrit 

23.9L, Mean Corpuscular Volume 84, Mean Corpuscular Hemoglobin 29.6, Mean 

Corpuscular Hemoglobin Concent 35.5, Red Cell Distribution Width 14.5, Platelet 

Count 91L, Mean Platelet Volume 4.7L, Neutrophils (%) (Auto) , Lymphocytes (%) (

Auto) , Monocytes (%) (Auto) , Eosinophils (%) (Auto) , Basophils (%) (Auto) , 

Differential Total Cells Counted 100, Neutrophils % (Manual) 92H, Lymphocytes % 

(Manual) 4L, Monocytes % (Manual) 4, Eosinophils % (Manual) 0, Basophils % (

Manual) 0, Band Neutrophils 0, Platelet Estimate DecreasedL, Platelet 

Morphology Normal, Hypochromasia 2+, Anisocytosis 1+, Prothrombin Time 14.4H, 

Prothromb Time International Ratio 1.4H, Sodium Level 141, Potassium Level 3.3L

, Chloride Level 111H, Carbon Dioxide Level 19L, Anion Gap 11, Blood Urea 

Nitrogen 14, Creatinine 0.8, Estimat Glomerular Filtration Rate > 60, Glucose 

Level 93, Calcium Level 6.9L, Spherocytes 2+, Troponin I 0.000, Thyroid 

Stimulating Hormone (TSH) 1.552


Height (Feet):  5


Height (Inches):  7.00


Weight (Pounds):  137


General Appearance:  no apparent distress


EENT:  normal ENT inspection


Neck:  supple


Cardiovascular:  normal rate


Respiratory/Chest:  decreased breath sounds


Abdomen:  normal bowel sounds, non tender, soft


Extremities:  non-tender











Marc Nielsen MD May 14, 2020 10:33

## 2020-05-14 NOTE — CARDIAC ELECTROPHYSIOLOGY PN
Assessment/Plan


Assessment/Plan


1. Hypotension due to profound anemia.  Patient is off pressors,


received 5 units of blood transfusion. Echocardiogram pending





2. Profound anemia.  Hemoglobin of 3.5.  CT of abdomen and pelvis shows


central necrotic mass in the gastric fundus 12 x 14 x 11 cm.  Further


evaluation by GI.


3. Pulmonary embolus.  Patient will get an IVC filter.


4. Sepsis with elevated white count.





DW RN





Subjective


Subjective


Off pressors and off vent alert and eating.





Objective





Last 24 Hour Vital Signs








  Date Time  Temp Pulse Resp B/P (MAP) Pulse Ox O2 Delivery O2 Flow Rate FiO2


 


5/14/20 13:00  97 24 100/78 (85) 98   


 


5/14/20 12:00  97      


 


5/14/20 12:00 98.7 100 25 97/58 (71) 100   


 


5/14/20 12:00      Nasal Cannula 2.0 


 


5/14/20 11:00  89 22 99/56 (70) 98   


 


5/14/20 10:00  90 24 105/68 (80) 100   


 


5/14/20 09:12     100 Nasal Cannula 2.0 28


 


5/14/20 09:00  94 26 104/64 (77) 100   


 


5/14/20 08:00 98.1 89 20 100/62 (75) 100   


 


5/14/20 08:00  86      


 


5/14/20 08:00      Nasal Cannula 2.0 


 


5/14/20 07:00  90 21 97/63 (74) 100   


 


5/14/20 06:00  96 20 107/64 (78) 100   


 


5/14/20 05:00  98 16 110/61 (77) 100   


 


5/14/20 04:00 98.3 94 25 102/64 (77) 100   


 


5/14/20 04:00  98      


 


5/14/20 04:00      Nasal Cannula 2.0 


 


5/14/20 03:00  92 21 104/65 (78) 100   


 


5/14/20 02:00  87 18 91/60 (70) 100   


 


5/14/20 01:00  95 24 120/70 (87) 100   


 


5/14/20 00:00  89      


 


5/14/20 00:00 98.7 89 25 102/69 (80) 100   


 


5/14/20 00:00      Nasal Cannula 2.0 


 


5/13/20 23:00  84 16 100/59 (73) 100   


 


5/13/20 22:00  81 25 103/63 (76) 100   


 


5/13/20 21:30  87 26 106/68 (81) 99   


 


5/13/20 21:00  94 19 115/66 (82) 100   


 


5/13/20 20:02     100 Nasal Cannula 2.0 28


 


5/13/20 20:00 99.1 84 22 101/61 (74) 100   


 


5/13/20 20:00      Nasal Cannula 2.0 


 


5/13/20 20:00  85      


 


5/13/20 19:00  81 25 103/63 (76) 100   


 


5/13/20 18:07  85 24 99/60 (73) 100   


 


5/13/20 17:18  98 20    4.0 


 


5/13/20 17:00  98 20 101/60 (74) 100   


 


5/13/20 16:00      Nasal Cannula 2.0 


 


5/13/20 16:00 97.8 90 20 103/62 (76) 100   


 


5/13/20 16:00  89      


 


5/13/20 15:00  93 19 108/64 (79) 100   

















Intake and Output  


 


 5/13/20 5/14/20





 19:00 07:00


 


Intake Total 335.0 ml 975.833 ml


 


Output Total 370 ml 660 ml


 


Balance -35.0 ml 315.833 ml


 


  


 


IV Total 335.0 ml 975.833 ml


 


Output Urine Total 370 ml 660 ml


 


# Bowel Movements  1











Laboratory Tests








Test


  5/13/20


19:55 5/14/20


04:25


 


Iron Level


  20 ug/dL


()  L 


 


 


Total Iron Binding Capacity


  202 ug/dL


(250-450)  L 


 


 


Percent Iron Saturation 10 % (15-50)  L 


 


Unsaturated Iron Binding


  182 ug/dL


(112-346) 


 


 


Ferritin


  123 NG/ML


(8-388) 


 


 


Lactate Dehydrogenase


  358 U/L


()  H 


 


 


Vitamin B12 Level


  369 PG/ML


(193-986) 


 


 


Folate


  3.9 NG/ML


(8.6-58.9)  L 


 


 


White Blood Count


  


  23.2 K/UL


(4.8-10.8)  *H


 


Red Blood Count


  


  2.86 M/UL


(4.70-6.10)  L


 


Hemoglobin


  


  8.5 G/DL


(14.2-18.0)  L


 


Hematocrit


  


  23.9 %


(42.0-52.0)  L


 


Mean Corpuscular Volume  84 FL (80-99)  


 


Mean Corpuscular Hemoglobin


  


  29.6 PG


(27.0-31.0)


 


Mean Corpuscular Hemoglobin


Concent 


  35.5 G/DL


(32.0-36.0)


 


Red Cell Distribution Width


  


  14.5 %


(11.6-14.8)


 


Platelet Count


  


  91 K/UL


(150-450)  L


 


Mean Platelet Volume


  


  4.7 FL


(6.5-10.1)  L


 


Neutrophils (%) (Auto)


  


  % (45.0-75.0)


 


 


Lymphocytes (%) (Auto)


  


  % (20.0-45.0)


 


 


Monocytes (%) (Auto)   % (1.0-10.0)  


 


Eosinophils (%) (Auto)   % (0.0-3.0)  


 


Basophils (%) (Auto)   % (0.0-2.0)  


 


Differential Total Cells


Counted 


  100  


 


 


Neutrophils % (Manual)  92 % (45-75)  H


 


Lymphocytes % (Manual)  4 % (20-45)  L


 


Monocytes % (Manual)  4 % (1-10)  


 


Eosinophils % (Manual)  0 % (0-3)  


 


Basophils % (Manual)  0 % (0-2)  


 


Band Neutrophils  0 % (0-8)  


 


Platelet Estimate  Decreased  L


 


Platelet Morphology  Normal  


 


Hypochromasia  2+  


 


Anisocytosis  1+  


 


Spherocytes  2+  


 


Prothrombin Time


  


  14.4 SEC


(9.30-11.50)  H


 


Prothromb Time International


Ratio 


  1.4 (0.9-1.1)


H


 


Sodium Level


  


  141 MMOL/L


(136-145)


 


Potassium Level


  


  3.3 MMOL/L


(3.5-5.1)  L


 


Chloride Level


  


  111 MMOL/L


()  H


 


Carbon Dioxide Level


  


  19 MMOL/L


(21-32)  L


 


Anion Gap


  


  11 mmol/L


(5-15)


 


Blood Urea Nitrogen


  


  14 mg/dL


(7-18)


 


Creatinine


  


  0.8 MG/DL


(0.55-1.30)


 


Estimat Glomerular Filtration


Rate 


  > 60 mL/min


(>60)


 


Glucose Level


  


  93 MG/DL


()


 


Calcium Level


  


  6.9 MG/DL


(8.5-10.1)  L


 


Troponin I


  


  0.000 ng/mL


(0.000-0.056)


 


Thyroid Stimulating Hormone


(TSH) 


  1.552 uiU/mL


(0.358-3.740)











Microbiology








 Date/Time


Source Procedure


Growth Status


 


 


 5/12/20 19:00


Blood Blood Culture - Preliminary Resulted


 


 5/12/20 19:00


Blood Blood Culture - Preliminary


Gram Negative Bacillus 1


Gram Positive Cocci Resulted


 


 5/12/20 20:00


Urine,Clean Catch Urine Culture - Preliminary


Mixed Urogenital Contaminants Resulted








Objective


HEAD AND NECK:  No JVD.


LUNGS:  Decreased breath sounds.


CARDIOVASCULAR:  Regular S1 and S2.  Tachycardic.


ABDOMEN:  Soft.


EXTREMITIES:  No pitting edema.











Aaron Antunez MD May 14, 2020 14:25

## 2020-05-14 NOTE — SURGERY PROGRESS NOTE
Surgery Progress Note


Subjective


Symptoms:  improved, pain absent, passing flatus, BM





Objective





Last 24 Hour Vital Signs








  Date Time  Temp Pulse Resp B/P (MAP) Pulse Ox O2 Delivery O2 Flow Rate FiO2


 


5/14/20 19:00  100 26 124/78 (93) 100   


 


5/14/20 18:00  93 26 111/69 (83) 100   


 


5/14/20 17:00  96 24 115/68 (84) 100   


 


5/14/20 16:00  98      


 


5/14/20 16:00  97 27 116/67 (83) 100   


 


5/14/20 16:00      Nasal Cannula 2.0 


 


5/14/20 16:00 98.3 97 27 116/67 (83) 100   


 


5/14/20 15:00  92 23 106/69 (81) 100   


 


5/14/20 14:00  94 25 103/66 (78) 100   


 


5/14/20 13:00  97 24 100/78 (85) 98   


 


5/14/20 12:00  97      


 


5/14/20 12:00 98.7 100 25 97/58 (71) 100   


 


5/14/20 12:00      Nasal Cannula 2.0 


 


5/14/20 11:00  89 22 99/56 (70) 98   


 


5/14/20 10:00  90 24 105/68 (80) 100   


 


5/14/20 09:12     100 Nasal Cannula 2.0 28


 


5/14/20 09:00  94 26 104/64 (77) 100   


 


5/14/20 08:00 98.1 89 20 100/62 (75) 100   


 


5/14/20 08:00  86      


 


5/14/20 08:00      Nasal Cannula 2.0 


 


5/14/20 07:00  90 21 97/63 (74) 100   


 


5/14/20 06:00  96 20 107/64 (78) 100   


 


5/14/20 05:00  98 16 110/61 (77) 100   


 


5/14/20 04:00 98.3 94 25 102/64 (77) 100   


 


5/14/20 04:00  98      


 


5/14/20 04:00      Nasal Cannula 2.0 


 


5/14/20 03:00  92 21 104/65 (78) 100   


 


5/14/20 02:00  87 18 91/60 (70) 100   


 


5/14/20 01:00  95 24 120/70 (87) 100   


 


5/14/20 00:00  89      


 


5/14/20 00:00 98.7 89 25 102/69 (80) 100   


 


5/14/20 00:00      Nasal Cannula 2.0 


 


5/13/20 23:00  84 16 100/59 (73) 100   


 


5/13/20 22:00  81 25 103/63 (76) 100   


 


5/13/20 21:30  87 26 106/68 (81) 99   


 


5/13/20 21:00  94 19 115/66 (82) 100   


 


5/13/20 20:02     100 Nasal Cannula 2.0 28


 


5/13/20 20:00 99.1 84 22 101/61 (74) 100   


 


5/13/20 20:00      Nasal Cannula 2.0 


 


5/13/20 20:00  85      








I&O











Intake and Output  


 


 5/13/20 5/14/20





 19:00 07:00


 


Intake Total 335.0 ml 975.833 ml


 


Output Total 370 ml 660 ml


 


Balance -35.0 ml 315.833 ml


 


  


 


IV Total 335.0 ml 975.833 ml


 


Output Urine Total 370 ml 660 ml


 


# Bowel Movements  1








Cardiovascular:  RSR


Respiratory:  clear, decreased breath sounds


Abdomen:  soft, present bowel sounds


Extremities:  no cyanosis





Laboratory Tests








Test


  5/13/20


19:55 5/14/20


04:25


 


Iron Level


  20 ug/dL


()  L 


 


 


Total Iron Binding Capacity


  202 ug/dL


(250-450)  L 


 


 


Percent Iron Saturation 10 % (15-50)  L 


 


Unsaturated Iron Binding


  182 ug/dL


(112-346) 


 


 


Ferritin


  123 NG/ML


(8-388) 


 


 


Lactate Dehydrogenase


  358 U/L


()  H 


 


 


Vitamin B12 Level


  369 PG/ML


(193-986) 


 


 


Folate


  3.9 NG/ML


(8.6-58.9)  L 


 


 


White Blood Count


  


  23.2 K/UL


(4.8-10.8)  *H


 


Red Blood Count


  


  2.86 M/UL


(4.70-6.10)  L


 


Hemoglobin


  


  8.5 G/DL


(14.2-18.0)  L


 


Hematocrit


  


  23.9 %


(42.0-52.0)  L


 


Mean Corpuscular Volume  84 FL (80-99)  


 


Mean Corpuscular Hemoglobin


  


  29.6 PG


(27.0-31.0)


 


Mean Corpuscular Hemoglobin


Concent 


  35.5 G/DL


(32.0-36.0)


 


Red Cell Distribution Width


  


  14.5 %


(11.6-14.8)


 


Platelet Count


  


  91 K/UL


(150-450)  L


 


Mean Platelet Volume


  


  4.7 FL


(6.5-10.1)  L


 


Neutrophils (%) (Auto)


  


  % (45.0-75.0)


 


 


Lymphocytes (%) (Auto)


  


  % (20.0-45.0)


 


 


Monocytes (%) (Auto)   % (1.0-10.0)  


 


Eosinophils (%) (Auto)   % (0.0-3.0)  


 


Basophils (%) (Auto)   % (0.0-2.0)  


 


Differential Total Cells


Counted 


  100  


 


 


Neutrophils % (Manual)  92 % (45-75)  H


 


Lymphocytes % (Manual)  4 % (20-45)  L


 


Monocytes % (Manual)  4 % (1-10)  


 


Eosinophils % (Manual)  0 % (0-3)  


 


Basophils % (Manual)  0 % (0-2)  


 


Band Neutrophils  0 % (0-8)  


 


Platelet Estimate  Decreased  L


 


Platelet Morphology  Normal  


 


Hypochromasia  2+  


 


Anisocytosis  1+  


 


Spherocytes  2+  


 


Prothrombin Time


  


  14.4 SEC


(9.30-11.50)  H


 


Prothromb Time International


Ratio 


  1.4 (0.9-1.1)


H


 


Sodium Level


  


  141 MMOL/L


(136-145)


 


Potassium Level


  


  3.3 MMOL/L


(3.5-5.1)  L


 


Chloride Level


  


  111 MMOL/L


()  H


 


Carbon Dioxide Level


  


  19 MMOL/L


(21-32)  L


 


Anion Gap


  


  11 mmol/L


(5-15)


 


Blood Urea Nitrogen


  


  14 mg/dL


(7-18)


 


Creatinine


  


  0.8 MG/DL


(0.55-1.30)


 


Estimat Glomerular Filtration


Rate 


  > 60 mL/min


(>60)


 


Glucose Level


  


  93 MG/DL


()


 


Calcium Level


  


  6.9 MG/DL


(8.5-10.1)  L


 


Troponin I


  


  0.000 ng/mL


(0.000-0.056)


 


Thyroid Stimulating Hormone


(TSH) 


  1.552 uiU/mL


(0.358-3.740)











Plan


Problems:  


(1) Lower extremity edema


Assessment & Plan:  Venous Duplex pending 


DVT noted on CT 


IV filter


cannot anticoagulate 





(2) Pulmonary embolism


Assessment & Plan:  Filling defects within pulmonary vessels at the lung bases 

with small 


peripheral airspace opacities concerning for pulmonary emboli and small 


pulmonary infarcts.





(3) GI bleed


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  Patient 

denies any knowledge of this mass.  Likely etiology of severe anemia hemoglobin 

3.5 being transfused emergently.  Patient states he feels very ill and 

potentially feels as if he is going to die.  High probability of GI bleed 

etiology is this large necrotic mass.


I had a long discussion with the patient in the emergency department regards 

above findings and care plan.  Discussed care plan with primary care physician 

GI and emergency permit physician.


Patient needs urgent blood transfusion which he is receiving in the ED as 

ordered.  Patient needs acute resuscitation followed by


Potential EGD.


Given severe anemia.  Gastric mass causing GI bleed.  Acute hemorrhagic GI 

bleed secondary to likely mass.  


 tumor needs biopsy and pathology evaluation


Despite above if continues to hemorrhage or does not respond appropriately will 

likely need acute life-saving emergency surgery for resection of acutely 

bleeding gastric tumor


If surgical intervention propose it would not be for curative intent but rather 

life-saving hemorrhage control


Discussed with patient in detail at bedside.


For now continue with resuscitation and will continue with further work-up





Npo


IV fluids


PRBC 


q6h h/h


trend labs


GI eval for EGD consideration


Heme Onc Eval for evaluation of large gastric tumor identification


surgery available and will follow with recs.


Thank you for allowing me to participate in patients care 





(4) Gastric mass


Assessment & Plan:   ABDOMEN:


  Liver:  See above.


  Gallbladder and bile ducts:  Unremarkable.  No calcified stones.  No 


ductal dilation.


  Pancreas:  Unremarkable.  No mass.  No ductal dilation.


  Spleen:  Unremarkable.  No splenomegaly.


  Adrenals:  Unremarkable.  No mass.


  Kidneys and ureters:  Unremarkable.  No solid mass.  No hydronephrosis.


  Stomach and bowel:  Centrally necrotic mass arising from the gastric 


fundus measuring 12.6 x 11.1 x 14 cm.  Liquid stool within the rectum.


 


 PELVIS:


  Appendix:  No findings to suggest acute appendicitis.


  Bladder:  Unremarkable.  No mass.


  Reproductive:  Unremarkable as visualized.


 


 ABDOMEN and PELVIS:


  Intraperitoneal space:  Unremarkable.  No free air.  No significant 


fluid collection.


  Bones/joints:  No acute fracture.  No dislocation.


  Soft tissues:  Unremarkable.


  Vasculature:  Filling defects within pulmonary vessels at the lung 


bases with small peripheral airspace opacities concerning for pulmonary 


emboli and small pulmonary infarcts.  No abdominal aortic aneurysm.


  Lymph nodes:  Unremarkable.  No enlarged lymph nodes.


 


IMPRESSION:     


1.  Centrally necrotic mass arising from the gastric fundus measuring 12.


6 x 11.1 x 14 cm.  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  Gastric 


adenocarcinoma, lymphoma, and other neoplastic etiologies are also in the 


differential.


2.  Filling defects within pulmonary vessels at the lung bases with small 


peripheral airspace opacities concerning for pulmonary emboli and small 


pulmonary infarcts.





(5) COVID-19 ruled out


Assessment & Plan:  COVID 19 r/o


sob


respiratory symptoms


pending testing 


Defer to Erik Kelly May 14, 2020 19:36

## 2020-05-14 NOTE — NUR
NURSE NOTES:



Dr. Nielsen updated that there has been dark tarry stools with no active bleeding noted. 

patient remains on Protonix drip at 25ml/hr. 

no verbal orders given at this time.

## 2020-05-14 NOTE — NUR
NOTE



SW received a note that pt's family wants the meeting. ALBERTINA spoke w/ pt's nephew, Eriberto Corbett [phone] stating that he wants in-person meeting w/ MD to discuss blood clot, 
cancer prognosis and dc date. ALBERTINA explained non-emergency meeting will not occur per hospital 
protocol at this time. Mr. Corbett verbalized understanding but informed this SW that he will 
still attempt to come to the hospital. 



This ALBERTINA left a message to Dr. Pierce that the family is requesting to speak to him. 


-------------------------------------------------------------------------------

Addendum: 05/14/20 at 1058 by SCHUYLER ENG

-------------------------------------------------------------------------------

ALBERTINA met w/ pt's nephew Eriberto Corbett in person. ALBERTINA informed Mr. Corbett that this ALBERTINA left a 
message to MD and encouraged the family members to be patient with MD's F/U.

## 2020-05-14 NOTE — NUR
NURSE NOTES:



Dr. Ferris updated at the bedside of no pupil response and spoke with coral, family 
member, regarding patient status. 

no verbal orders given at this time.  

-------------------------------------------------------------------------------

Addendum: 05/14/20 at 1923 by Donato Vasquez RN

-------------------------------------------------------------------------------

disregard noted above, placed on incorrect chart.

## 2020-05-14 NOTE — NUR
NURSE NOTES:



Patient remains on cooling/warming blanket with temperature ranging from 97.7-98.5. 

remains tolerating tube feeding at of Glucerna 1.5 at 50ml/hr. 

remains running dopamine at 18mcg/kg/min.

## 2020-05-14 NOTE — PULMONOLOGY PROGRESS NOTE
Subjective


ROS Limited/Unobtainable:  Yes


Interval Events:  S/p IVC filter


Constitutional:  Reports: no symptoms


HEENT:  Repors: no symptoms


Respiratory:  Reports: no symptoms


Cardiovascular:  Reports: no symptoms


Gastrointestinal/Abdominal:  Reports: no symptoms


Genitourinary:  Reports: no symptoms


Neurologic:  Reports: no symptoms


Allergies:  


Coded Allergies:  


     No Known Allergies (Unverified , 5/12/20)





Objective





Last 24 Hour Vital Signs








  Date Time  Temp Pulse Resp B/P (MAP) Pulse Ox O2 Delivery O2 Flow Rate FiO2


 


5/14/20 08:00  86      


 


5/14/20 07:00  90 21 97/63 (74) 100   


 


5/14/20 06:00  96 20 107/64 (78) 100   


 


5/14/20 05:00  98 16 110/61 (77) 100   


 


5/14/20 04:00 98.3 94 25 102/64 (77) 100   


 


5/14/20 04:00  98      


 


5/14/20 04:00      Nasal Cannula 2.0 


 


5/14/20 03:00  92 21 104/65 (78) 100   


 


5/14/20 02:00  87 18 91/60 (70) 100   


 


5/14/20 01:00  95 24 120/70 (87) 100   


 


5/14/20 00:00  89      


 


5/14/20 00:00 98.7 89 25 102/69 (80) 100   


 


5/14/20 00:00      Nasal Cannula 2.0 


 


5/13/20 23:00  84 16 100/59 (73) 100   


 


5/13/20 22:00  81 25 103/63 (76) 100   


 


5/13/20 21:30  87 26 106/68 (81) 99   


 


5/13/20 21:00  94 19 115/66 (82) 100   


 


5/13/20 20:02     100 Nasal Cannula 2.0 28


 


5/13/20 20:00 99.1 84 22 101/61 (74) 100   


 


5/13/20 20:00      Nasal Cannula 2.0 


 


5/13/20 20:00  85      


 


5/13/20 19:00  81 25 103/63 (76) 100   


 


5/13/20 18:07  85 24 99/60 (73) 100   


 


5/13/20 17:18  98 20    4.0 


 


5/13/20 17:00  98 20 101/60 (74) 100   


 


5/13/20 16:00      Nasal Cannula 2.0 


 


5/13/20 16:00 97.8 90 20 103/62 (76) 100   


 


5/13/20 16:00  89      


 


5/13/20 15:00  93 19 108/64 (79) 100   


 


5/13/20 14:00  93 19 107/64 (78) 100   


 


5/13/20 13:00  92 21 98/60 (73) 100   


 


5/13/20 12:00      Nasal Cannula 2.0 


 


5/13/20 12:00  94      


 


5/13/20 12:00 98.4 93 24 100/58 (72) 100   


 


5/13/20 11:00  103 17 107/58 (74) 100   

















Intake and Output  


 


 5/13/20 5/14/20





 19:00 07:00


 


Intake Total 335.0 ml 975.833 ml


 


Output Total 370 ml 660 ml


 


Balance -35.0 ml 315.833 ml


 


  


 


IV Total 335.0 ml 975.833 ml


 


Output Urine Total 370 ml 660 ml


 


# Bowel Movements  1








General Appearance:  no acute distress


HEENT:  normocephalic


Respiratory/Chest:  chest wall non-tender


Cardiovascular:  normal peripheral pulses


Abdomen:  distended


Extremities:  no cyanosis


Skin:  no rash





Microbiology








 Date/Time


Source Procedure


Growth Status


 


 


 5/12/20 19:00


Blood Blood Culture - Preliminary


NO GROWTH AFTER 24 HOURS Resulted


 


 5/12/20 19:00


Blood Blood Culture - Preliminary


Gram Negative Bacillus 1


Gram Positive Cocci Resulted


 


 5/12/20 20:00


Urine,Clean Catch Urine Culture - Preliminary


Mixed Urogenital Contaminants Resulted








Laboratory Tests


5/13/20 19:55: 


Iron Level 20L, Total Iron Binding Capacity 202L, Percent Iron Saturation 10L, 

Unsaturated Iron Binding 182, Ferritin 123, Lactate Dehydrogenase 358H, Vitamin 

B12 Level 369, Folate 3.9L


5/14/20 04:25: 


White Blood Count 23.2*H, Red Blood Count 2.86L, Hemoglobin 8.5L, Hematocrit 

23.9L, Mean Corpuscular Volume 84, Mean Corpuscular Hemoglobin 29.6, Mean 

Corpuscular Hemoglobin Concent 35.5, Red Cell Distribution Width 14.5, Platelet 

Count 91L, Mean Platelet Volume 4.7L, Neutrophils (%) (Auto) , Lymphocytes (%) (

Auto) , Monocytes (%) (Auto) , Eosinophils (%) (Auto) , Basophils (%) (Auto) , 

Differential Total Cells Counted 100, Neutrophils % (Manual) 92H, Lymphocytes % 

(Manual) 4L, Monocytes % (Manual) 4, Eosinophils % (Manual) 0, Basophils % (

Manual) 0, Band Neutrophils 0, Platelet Estimate DecreasedL, Platelet 

Morphology Normal, Hypochromasia 2+, Anisocytosis 1+, Spherocytes 2+, 

Prothrombin Time 14.4H, Prothromb Time International Ratio 1.4H, Sodium Level 

141, Potassium Level 3.3L, Chloride Level 111H, Carbon Dioxide Level 19L, Anion 

Gap 11, Blood Urea Nitrogen 14, Creatinine 0.8, Estimat Glomerular Filtration 

Rate > 60, Glucose Level 93, Calcium Level 6.9L, Troponin I 0.000, Thyroid 

Stimulating Hormone (TSH) 1.552





Current Medications








 Medications


  (Trade)  Dose


 Ordered  Sig/Marisa


 Route


 PRN Reason  Start Time


 Stop Time Status Last Admin


Dose Admin


 


 Acetaminophen


  (Tylenol)  650 mg  Q4H  PRN


 ORAL


 Temp >100.5  5/13/20 06:15


 6/12/20 06:14   


 


 


 Albuterol/


 Ipratropium


  (Albuterol/


 Ipratropium)  3 ml  Q4H  PRN


 HHN


 Shortness of Breath  5/13/20 06:15


 5/18/20 06:14   


 


 


 Barium Sulfate


  (Readi-Cat 2)  450 ml  NOW  PRN


 ORAL


 Radiology Procedure  5/12/20 19:15


 5/14/20 19:03   


 


 


 Dextrose


  (Dextrose 50%)  25 ml  Q30M  PRN


 IV


 Hypoglycemia  5/13/20 06:15


 8/11/20 06:14   


 


 


 Dextrose


  (Dextrose 50%)  50 ml  Q30M  PRN


 IV


 Hypoglycemia  5/13/20 06:15


 8/11/20 06:14   


 


 


 Docusate Sodium


  (Colace)  100 mg  EVERY 12  HOURS


 ORAL


   5/13/20 09:00


 6/12/20 08:59  5/14/20 09:34


 


 


 Heparin Sodium/


 Sodium Chloride


  (Heparin 1000


 units/500ml


 Premix)  1,000 unit  ONCE  PRN


 INJ


 radiology procedure  5/13/20 16:30


 5/15/20 16:29   


 


 


 Hydromorphone HCl


  (Dilaudid)  1 mg  Q6H  PRN


 IVP


 Moderate Pain (Pain Scale 4-6)  5/13/20 06:15


 5/20/20 06:14  5/13/20 20:33


 


 


 Iohexol


  (OMNIPAQUE-300


 100ml)  100 ml  ONCE  PRN


 INJ


 radiology procedure  5/13/20 16:30


 5/15/20 16:29   


 


 


 Lidocaine HCl


  (Xylocaine 1%


 30ml)  30 ml  NOW  PRN


 INJ


 Radiology Procedure  5/12/20 21:45


 5/15/20 21:35   


 


 


 Lorazepam


  (Ativan)  1 mg  Q4H  PRN


 ORAL


 For Anxiety  5/13/20 06:15


 5/20/20 06:14   


 


 


 Ondansetron HCl


  (Zofran)  4 mg  Q6H  PRN


 IVP


 Nausea & Vomiting  5/13/20 06:15


 6/12/20 06:14   


 


 


 Pantoprazole 80


 mg/Sodium Chloride  250 ml @ 


 25 mls/hr  Q10H


 IV


   5/13/20 09:00


 6/12/20 08:59  5/14/20 04:06


 


 


 Phytonadione 1 mg/


 Dextrose  55.5 ml @ 


 222 mls/hr  ONCE


 IVPB


   5/14/20 12:00


 5/14/20 14:00   


 


 


 Piperacillin Sod/


 Tazobactam Sod


 3.375 gm/Sodium


 Chloride  110 ml @ 


 27.5 mls/hr  EVERY 8  HOURS


 IVPB


   5/13/20 14:00


 5/18/20 13:59  5/14/20 04:06


 


 


 Vancomycin HCl


  (Vanco rx to


 dose)  1 ea  DAILY  PRN


 MISC


 Per rx protocol  5/13/20 13:15


 6/12/20 13:14   


 


 


 Vancomycin HCl


 750 mg/Sodium


 Chloride  275 ml @ 


 183.333


 mls/hr  Q12H


 IVPB


   5/13/20 18:00


 5/18/20 17:59  5/14/20 06:09


 


 


 Zolpidem Tartrate


  (Ambien)  5 mg  HSPRN  PRN


 ORAL


 Insomnia  5/13/20 06:15


 5/20/20 06:14  5/13/20 20:33


 











Assessment/Plan


Assessment/Plan


IMPRESSION:


1. Gastric mass.


2. Severe anemia.


3. Pulmonary embolism.





DISCUSSION:  Admit to the hospital.  Patient has undergone IVC filter.  S/p


blood transfusion, IV fluids.  He will need surgical intervention with


endoscopy and gastric resection.  I will follow carefully.














  ______________________________________________


  MARIANELA Downs Omar Syed MD May 14, 2020 10:58

## 2020-05-14 NOTE — HEMATOLOGY/ONC PROGRESS NOTE
Assessment/Plan


Assessment/Plan








Assessment and Recs


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


--> CT shows    Centrally necrotic mass arising from the gastric fundus 

measuring 12. 6 x 11.1 x 14 cm.  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  

Gastric adenocarcinoma, lymphoma, and other neoplastic etiologies are also in 

the differential. Filling defects within pulmonary vessels at the lung bases 

with small peripheral airspace opacities concerning for pulmonary emboli and 

small pulmonary infarcts.


--> per gi and surgery to obtain a biopsy


--> ideally requires resection if possible with as much as possible negative 

margins


--> adjuvant chemotherapy v pill (imatinib) based on type of cancer


# Pulmonary embolism -- likely related to hypercoagulable disorder from 

malignancys/p ivc filter


--> case dw pcp, surg, and pulm


--> is not a candidate for anticoagulation given severe anemia and low platelets


--> 5/13 s/p ivc filter placement


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


--> have ordered for repeat prbc transfusions


--> hgb goal is >7


--> have ordered for anemia panel


--> gi to access in re to biopsy/endoscopy


--> IV IRON STARTED


# Leukocytosis is likely reactive process from cancer


--> on abx as needed


--> smear is noted


--> meds are reviewed


--> vanc and zosyn


# Diarrhea


# PABLO, improving


# HTN





The timing of this note does not necessarily reflect the time of the patient 

was seen.





Greatly appreciate consultation.





Subjective


Constitutional:  Denies: no symptoms, chills, fever, malaise, weakness, other


HEENT:  Denies: no symptoms, eye pain, blurred vision, tearing, double vision, 

ear pain, ear discharge, nose pain, nose congestion, throat pain, throat 

swelling, mouth pain, mouth swelling, other


Cardiovascular:  Denies: no symptoms, chest pain, edema, irregular heart rate, 

lightheadedness, palpitations, syncope, other


Respiratory:  Denies: no symptoms, cough, shortness of breath, SOB with 

excertion, SOB at rest, sputum, wheezing, other


Neurologic/Psychiatric:  Denies: no symptoms, anxiety, depressed, emotional 

problems, headache, numbness, paresthesia, pre-existing deficit, seizure, 

tingling, tremors, weakness, other


Endocrine:  Denies: no symptoms, excessive sweating, flushing, intolerance to 

cold, intolerance to heat, increased hunger, increased thirst, increased urine, 

unexplained weight gain, unexplained weight loss, other


Hematologic/Lymphatic:  Denies: no symptoms, anemia, easy bleeding, easy 

bruising, adenopathy, other


Allergies:  


Coded Allergies:  


     No Known Allergies (Unverified , 5/12/20)


Subjective


5/14 labs are noted, no bleeding, meds reviewed, for egd when iso off





Objective


Objective





Current Medications








 Medications


  (Trade)  Dose


 Ordered  Sig/Marisa


 Route


 PRN Reason  Start Time


 Stop Time Status Last Admin


Dose Admin


 


 Acetaminophen


  (Tylenol)  650 mg  Q4H  PRN


 ORAL


 Temp >100.5  5/13/20 06:15


 6/12/20 06:14   


 


 


 Albuterol/


 Ipratropium


  (Albuterol/


 Ipratropium)  3 ml  Q4H  PRN


 HHN


 Shortness of Breath  5/13/20 06:15


 5/18/20 06:14   


 


 


 Barium Sulfate


  (Readi-Cat 2)  450 ml  NOW  PRN


 ORAL


 Radiology Procedure  5/12/20 19:15


 5/14/20 19:03   


 


 


 Dextrose


  (Dextrose 50%)  25 ml  Q30M  PRN


 IV


 Hypoglycemia  5/13/20 06:15


 8/11/20 06:14   


 


 


 Dextrose


  (Dextrose 50%)  50 ml  Q30M  PRN


 IV


 Hypoglycemia  5/13/20 06:15


 8/11/20 06:14   


 


 


 Docusate Sodium


  (Colace)  100 mg  EVERY 12  HOURS


 ORAL


   5/13/20 09:00


 6/12/20 08:59  5/14/20 09:34


 


 


 Heparin Sodium/


 Sodium Chloride


  (Heparin 1000


 units/500ml


 Premix)  1,000 unit  ONCE  PRN


 INJ


 radiology procedure  5/13/20 16:30


 5/15/20 16:29   


 


 


 Hydromorphone HCl


  (Dilaudid)  1 mg  Q6H  PRN


 IVP


 Moderate Pain (Pain Scale 4-6)  5/13/20 06:15


 5/20/20 06:14  5/13/20 20:33


 


 


 Iohexol


  (OMNIPAQUE-300


 100ml)  100 ml  ONCE  PRN


 INJ


 radiology procedure  5/13/20 16:30


 5/15/20 16:29   


 


 


 Lidocaine HCl


  (Xylocaine 1%


 30ml)  30 ml  NOW  PRN


 INJ


 Radiology Procedure  5/12/20 21:45


 5/15/20 21:35   


 


 


 Lorazepam


  (Ativan)  1 mg  Q4H  PRN


 ORAL


 For Anxiety  5/13/20 06:15


 5/20/20 06:14   


 


 


 Ondansetron HCl


  (Zofran)  4 mg  Q6H  PRN


 IVP


 Nausea & Vomiting  5/13/20 06:15


 6/12/20 06:14   


 


 


 Pantoprazole 80


 mg/Sodium Chloride  250 ml @ 


 25 mls/hr  Q10H


 IV


   5/13/20 09:00


 6/12/20 08:59  5/14/20 12:56


 


 


 Piperacillin Sod/


 Tazobactam Sod


 3.375 gm/Sodium


 Chloride  110 ml @ 


 27.5 mls/hr  EVERY 8  HOURS


 IVPB


   5/13/20 14:00


 5/18/20 13:59  5/14/20 12:56


 


 


 Vancomycin HCl


  (Vanco rx to


 dose)  1 ea  DAILY  PRN


 MISC


 Per rx protocol  5/13/20 13:15


 6/12/20 13:14   


 


 


 Vancomycin HCl


 750 mg/Sodium


 Chloride  275 ml @ 


 183.333


 mls/hr  Q12H


 IVPB


   5/13/20 18:00


 5/18/20 17:59  5/14/20 06:09


 


 


 Zolpidem Tartrate


  (Ambien)  5 mg  HSPRN  PRN


 ORAL


 Insomnia  5/13/20 06:15


 5/20/20 06:14  5/13/20 20:33


 











Last 24 Hour Vital Signs








  Date Time  Temp Pulse Resp B/P (MAP) Pulse Ox O2 Delivery O2 Flow Rate FiO2


 


5/14/20 14:00  94 25 103/66 (78) 100   


 


5/14/20 13:00  97 24 100/78 (85) 98   


 


5/14/20 12:00  97      


 


5/14/20 12:00 98.7 100 25 97/58 (71) 100   


 


5/14/20 12:00      Nasal Cannula 2.0 


 


5/14/20 11:00  89 22 99/56 (70) 98   


 


5/14/20 10:00  90 24 105/68 (80) 100   


 


5/14/20 09:12     100 Nasal Cannula 2.0 28


 


5/14/20 09:00  94 26 104/64 (77) 100   


 


5/14/20 08:00 98.1 89 20 100/62 (75) 100   


 


5/14/20 08:00  86      


 


5/14/20 08:00      Nasal Cannula 2.0 


 


5/14/20 07:00  90 21 97/63 (74) 100   


 


5/14/20 06:00  96 20 107/64 (78) 100   


 


5/14/20 05:00  98 16 110/61 (77) 100   


 


5/14/20 04:00 98.3 94 25 102/64 (77) 100   


 


5/14/20 04:00  98      


 


5/14/20 04:00      Nasal Cannula 2.0 


 


5/14/20 03:00  92 21 104/65 (78) 100   


 


5/14/20 02:00  87 18 91/60 (70) 100   


 


5/14/20 01:00  95 24 120/70 (87) 100   


 


5/14/20 00:00  89      


 


5/14/20 00:00 98.7 89 25 102/69 (80) 100   


 


5/14/20 00:00      Nasal Cannula 2.0 


 


5/13/20 23:00  84 16 100/59 (73) 100   


 


5/13/20 22:00  81 25 103/63 (76) 100   


 


5/13/20 21:30  87 26 106/68 (81) 99   


 


5/13/20 21:00  94 19 115/66 (82) 100   


 


5/13/20 20:02     100 Nasal Cannula 2.0 28


 


5/13/20 20:00 99.1 84 22 101/61 (74) 100   


 


5/13/20 20:00      Nasal Cannula 2.0 


 


5/13/20 20:00  85      


 


5/13/20 19:00  81 25 103/63 (76) 100   


 


5/13/20 18:07  85 24 99/60 (73) 100   


 


5/13/20 17:18  98 20    4.0 


 


5/13/20 17:00  98 20 101/60 (74) 100   


 


5/13/20 16:00      Nasal Cannula 2.0 


 


5/13/20 16:00 97.8 90 20 103/62 (76) 100   


 


5/13/20 16:00  89      


 


5/13/20 15:00  93 19 108/64 (79) 100   


 


5/13/20 14:00  93 19 107/64 (78) 100   


 


5/13/20 13:00  92 21 98/60 (73) 100   


 


5/13/20 12:00      Nasal Cannula 2.0 


 


5/13/20 12:00  94      


 


5/13/20 12:00 98.4 93 24 100/58 (72) 100   


 


5/13/20 11:00  103 17 107/58 (74) 100   


 


5/13/20 10:00  102 24 105/58 (74) 100   


 


5/13/20 09:50     98 Nasal Cannula 2.0 28


 


5/13/20 09:00  101 24 103/60 (74) 100   


 


5/13/20 08:00      Nasal Cannula 2.0 


 


5/13/20 08:00 98.5 99 21 100/63 (75) 100   


 


5/13/20 08:00  98      


 


5/13/20 07:00 98.3 100 22 97/61 (73) 100   


 


5/13/20 06:00  101 23 100/61 (74) 100   


 


5/13/20 05:00  102 21 102/52 (69) 100   


 


5/13/20 04:00      Nasal Cannula 2.0 


 


5/13/20 04:00 97.8 105 20 101/58 (72) 100   


 


5/13/20 04:00  104      


 


5/13/20 03:00  104 17 98/60 (73) 99   


 


5/13/20 02:00  108 21 104/68 (80) 98   


 


5/13/20 01:00  106 21 99/62 (74) 84   


 


5/13/20 00:00  117      


 


5/13/20 00:00 97.6 108 22 100/65 (77) 95   


 


5/13/20 00:00      Nasal Cannula 2.0 


 


5/13/20 00:00      Nasal Cannula 2.0 


 


5/12/20 23:40 98.0 110 20 99/56 100 Nasal Cannula 2.0 


 


5/12/20 23:00 98.0 110 20 99/56 100 Nasal Cannula 2.0 


 


5/12/20 22:00 97.9 116 20 112/50 96 Nasal Cannula 2.0 


 


5/12/20 21:00 97.7 120 25 112/63 91 Nasal Cannula 2.0 


 


5/12/20 20:32 97.7 118 14 112/67 95 Room Air  


 


5/12/20 19:50 97.7 118 14 108/72 95 Room Air  


 


5/12/20 19:10 97.7 120 14 113/68 95 Room Air  


 


5/12/20 19:05 97.7 122 14 113/68 (83) 95 Room Air  

















Intake and Output  


 


 5/13/20 5/14/20





 19:00 07:00


 


Intake Total 335.0 ml 975.833 ml


 


Output Total 370 ml 660 ml


 


Balance -35.0 ml 315.833 ml


 


  


 


IV Total 335.0 ml 975.833 ml


 


Output Urine Total 370 ml 660 ml


 


# Bowel Movements  1











Labs








Test


  5/12/20


19:00 5/12/20


19:10 5/12/20


20:00 5/12/20


21:00


 


White Blood Count


  27.4 K/UL


(4.8-10.8) 


  


  


 


 


Red Blood Count


  1.44 M/UL


(4.70-6.10) 


  


  


 


 


Hemoglobin


  3.5 G/DL


(14.2-18.0) 


  


  


 


 


Hematocrit


  12.9 %


(42.0-52.0) 


  


  


 


 


Mean Corpuscular Volume 90 FL (80-99)    


 


Mean Corpuscular Hemoglobin


  24.3 PG


(27.0-31.0) 


  


  


 


 


Mean Corpuscular Hemoglobin


Concent 27.1 G/DL


(32.0-36.0) 


  


  


 


 


Red Cell Distribution Width


  22.0 %


(11.6-14.8) 


  


  


 


 


Platelet Count


  204 K/UL


(150-450) 


  


  


 


 


Mean Platelet Volume


  6.0 FL


(6.5-10.1) 


  


  


 


 


Neutrophils (%) (Auto)  % (45.0-75.0)    


 


Lymphocytes (%) (Auto)  % (20.0-45.0)    


 


Monocytes (%) (Auto)  % (1.0-10.0)    


 


Eosinophils (%) (Auto)  % (0.0-3.0)    


 


Basophils (%) (Auto)  % (0.0-2.0)    


 


Differential Total Cells


Counted 100 


  


  


  


 


 


Neutrophils % (Manual) 91 % (45-75)    


 


Lymphocytes % (Manual) 5 % (20-45)    


 


Monocytes % (Manual) 3 % (1-10)    


 


Eosinophils % (Manual) 0 % (0-3)    


 


Basophils % (Manual) 0 % (0-2)    


 


Band Neutrophils 1 % (0-8)    


 


Nucleated Red Blood Cells 4 /100 WBC    


 


Other Cell Type See comments    


 


Platelet Estimate Adequate    


 


Platelet Morphology Normal    


 


Polychromasia 2+    


 


Hypochromasia 3+    


 


Anisocytosis 3+    


 


Sodium Level


  138 MMOL/L


(136-145) 


  


  


 


 


Potassium Level


  3.8 MMOL/L


(3.5-5.1) 


  


  


 


 


Chloride Level


  104 MMOL/L


() 


  


  


 


 


Carbon Dioxide Level


  16 MMOL/L


(21-32) 


  


  


 


 


Anion Gap


  18 mmol/L


(5-15) 


  


  


 


 


Blood Urea Nitrogen


  26 mg/dL


(7-18) 


  


  


 


 


Creatinine


  1.2 MG/DL


(0.55-1.30) 


  


  


 


 


Estimat Glomerular Filtration


Rate > 60 mL/min


(>60) 


  


  


 


 


Glucose Level


  180 MG/DL


() 


  


  


 


 


Lactic Acid Level


  6.80 mmol/L


(0.4-2.0) 


  


  7.70 mmol/L


(0.66-2.22)


 


Calcium Level


  7.3 MG/DL


(8.5-10.1) 


  


  


 


 


Phosphorus Level


  4.5 MG/DL


(2.5-4.9) 


  


  


 


 


Magnesium Level


  2.6 MG/DL


(1.8-2.4) 


  


  


 


 


Total Bilirubin


  0.3 MG/DL


(0.2-1.0) 


  


  


 


 


Aspartate Amino Transf


(AST/SGOT) 29 U/L (15-37) 


  


  


  


 


 


Alanine Aminotransferase


(ALT/SGPT) 15 U/L (12-78) 


  


  


  


 


 


Alkaline Phosphatase


  90 U/L


() 


  


  


 


 


Total Creatine Kinase


  65 U/L


() 


  


  


 


 


Creatine Kinase MB


  < 0.5 NG/ML


(0.0-3.6) 


  


  


 


 


Creatine Kinase MB Relative


Index 0.7 


  


  


  


 


 


Troponin I


  0.010 ng/mL


(0.000-0.056) 


  


  


 


 


Pro-B-Type Natriuretic Peptide


  288 pg/mL


(0-125) 


  


  


 


 


Total Protein


  5.9 G/DL


(6.4-8.2) 


  


  


 


 


Albumin


  1.6 G/DL


(3.4-5.0) 


  


  


 


 


Globulin 4.3 g/dL    


 


Albumin/Globulin Ratio 0.4 (1.0-2.7)    


 


Lipase


  209 U/L


() 


  


  


 


 


Prothrombin Time


  


  14.9 SEC


(9.30-11.50) 


  


 


 


Prothromb Time International


Ratio 


  1.4 (0.9-1.1) 


  


  


 


 


Activated Partial


Thromboplast Time 


  25 SEC (23-33) 


  


  


 


 


Urine Color   Yellow  


 


Urine Appearance


  


  


  Slightly


cloudy 


 


 


Urine pH   5 (4.5-8.0)  


 


Urine Specific Gravity


  


  


  1.020


(1.005-1.035) 


 


 


Urine Protein   2+ (NEGATIVE)  


 


Urine Glucose (UA)


  


  


  Negative


(NEGATIVE) 


 


 


Urine Ketones


  


  


  Negative


(NEGATIVE) 


 


 


Urine Blood   4+ (NEGATIVE)  


 


Urine Nitrite


  


  


  Negative


(NEGATIVE) 


 


 


Urine Bilirubin   1+ (NEGATIVE)  


 


Urine Ictotest


  


  


  Negative


(NEGATIVE) 


 


 


Urine Urobilinogen


  


  


  4 MG/DL


(0.0-1.0) 


 


 


Urine Leukocyte Esterase


  


  


  Negative


(NEGATIVE) 


 


 


Urine RBC


  


  


  30-40 /HPF (0


- 0) 


 


 


Urine WBC


  


  


  0-2 /HPF (0 -


0) 


 


 


Urine Squamous Epithelial


Cells 


  


  Few /LPF


(NONE/OCC) 


 


 


Urine Bacteria


  


  


  Moderate /HPF


(NONE) 


 


 


Test


  5/13/20


10:55 5/13/20


19:55 5/14/20


04:25 


 


 


White Blood Count


  28.3 K/UL


(4.8-10.8) 


  23.2 K/UL


(4.8-10.8) 


 


 


Red Blood Count


  2.84 M/UL


(4.70-6.10) 


  2.86 M/UL


(4.70-6.10) 


 


 


Hemoglobin


  8.3 G/DL


(14.2-18.0) 


  8.5 G/DL


(14.2-18.0) 


 


 


Hematocrit


  24.0 %


(42.0-52.0) 


  23.9 %


(42.0-52.0) 


 


 


Mean Corpuscular Volume 84 FL (80-99)   84 FL (80-99)  


 


Mean Corpuscular Hemoglobin


  29.3 PG


(27.0-31.0) 


  29.6 PG


(27.0-31.0) 


 


 


Mean Corpuscular Hemoglobin


Concent 34.7 G/DL


(32.0-36.0) 


  35.5 G/DL


(32.0-36.0) 


 


 


Red Cell Distribution Width


  14.2 %


(11.6-14.8) 


  14.5 %


(11.6-14.8) 


 


 


Platelet Count


  127 K/UL


(150-450) 


  91 K/UL


(150-450) 


 


 


Mean Platelet Volume


  5.1 FL


(6.5-10.1) 


  4.7 FL


(6.5-10.1) 


 


 


Neutrophils (%) (Auto)  % (45.0-75.0)    % (45.0-75.0)  


 


Lymphocytes (%) (Auto)  % (20.0-45.0)    % (20.0-45.0)  


 


Monocytes (%) (Auto)  % (1.0-10.0)    % (1.0-10.0)  


 


Eosinophils (%) (Auto)  % (0.0-3.0)    % (0.0-3.0)  


 


Basophils (%) (Auto)  % (0.0-2.0)    % (0.0-2.0)  


 


Differential Total Cells


Counted 100 


  


  100 


  


 


 


Neutrophils % (Manual) 94 % (45-75)   92 % (45-75)  


 


Lymphocytes % (Manual) 4 % (20-45)   4 % (20-45)  


 


Monocytes % (Manual) 2 % (1-10)   4 % (1-10)  


 


Eosinophils % (Manual) 0 % (0-3)   0 % (0-3)  


 


Basophils % (Manual) 0 % (0-2)   0 % (0-2)  


 


Band Neutrophils 0 % (0-8)   0 % (0-8)  


 


Nucleated Red Blood Cells 1 /100 WBC    


 


Other Cell Type See comments    


 


Platelet Estimate Decreased   Decreased  


 


Platelet Morphology Normal   Normal  


 


Polychromasia 2+    


 


Hypochromasia 2+   2+  


 


Anisocytosis 1+   1+  


 


Erythrocyte Sedimentation Rate


  32 MM/HR


(0-20) 


  


  


 


 


Reticulocyte Count


  5.8 %


(0.5-2.0) 


  


  


 


 


Prothrombin Time


  14.0 SEC


(9.30-11.50) 


  14.4 SEC


(9.30-11.50) 


 


 


Prothromb Time International


Ratio 1.3 (0.9-1.1) 


  


  1.4 (0.9-1.1) 


  


 


 


Activated Partial


Thromboplast Time 28 SEC (23-33) 


  


  


  


 


 


Sodium Level


  142 MMOL/L


(136-145) 


  141 MMOL/L


(136-145) 


 


 


Potassium Level


  3.5 MMOL/L


(3.5-5.1) 


  3.3 MMOL/L


(3.5-5.1) 


 


 


Chloride Level


  110 MMOL/L


() 


  111 MMOL/L


() 


 


 


Carbon Dioxide Level


  19 MMOL/L


(21-32) 


  19 MMOL/L


(21-32) 


 


 


Anion Gap


  13 mmol/L


(5-15) 


  11 mmol/L


(5-15) 


 


 


Blood Urea Nitrogen


  19 mg/dL


(7-18) 


  14 mg/dL


(7-18) 


 


 


Creatinine


  0.9 MG/DL


(0.55-1.30) 


  0.8 MG/DL


(0.55-1.30) 


 


 


Estimat Glomerular Filtration


Rate > 60 mL/min


(>60) 


  > 60 mL/min


(>60) 


 


 


Glucose Level


  103 MG/DL


() 


  93 MG/DL


() 


 


 


Lactic Acid Level


  1.20 mmol/L


(0.4-2.0) 


  


  


 


 


Calcium Level


  6.8 MG/DL


(8.5-10.1) 


  6.9 MG/DL


(8.5-10.1) 


 


 


Total Bilirubin


  0.8 MG/DL


(0.2-1.0) 


  


  


 


 


Aspartate Amino Transf


(AST/SGOT) 39 U/L (15-37) 


  


  


  


 


 


Alanine Aminotransferase


(ALT/SGPT) 15 U/L (12-78) 


  


  


  


 


 


Alkaline Phosphatase


  78 U/L


() 


  


  


 


 


C-Reactive Protein,


Quantitative 17.5 mg/dL


(0.00-0.90) 


  


  


 


 


Total Protein


  5.4 G/DL


(6.4-8.2) 


  


  


 


 


Albumin


  1.6 G/DL


(3.4-5.0) 


  


  


 


 


Globulin 3.8 g/dL    


 


Albumin/Globulin Ratio 0.4 (1.0-2.7)    


 


Amylase Level


  68 U/L


() 


  


  


 


 


Lipase


  367 U/L


() 


  


  


 


 


Carcinoembryonic Antigen


  67.1 ng/mL


(0.0-4.7) 


  


  


 


 


Iron Level


  


  20 ug/dL


() 


  


 


 


Total Iron Binding Capacity


  


  202 ug/dL


(250-450) 


  


 


 


Percent Iron Saturation  10 % (15-50)   


 


Unsaturated Iron Binding


  


  182 ug/dL


(112-346) 


  


 


 


Ferritin


  


  123 NG/ML


(8-388) 


  


 


 


Lactate Dehydrogenase


  


  358 U/L


() 


  


 


 


Vitamin B12 Level


  


  369 PG/ML


(193-986) 


  


 


 


Folate


  


  3.9 NG/ML


(8.6-58.9) 


  


 


 


Spherocytes   2+  


 


Troponin I


  


  


  0.000 ng/mL


(0.000-0.056) 


 


 


Thyroid Stimulating Hormone


(TSH) 


  


  1.552 uiU/mL


(0.358-3.740) 


 








Height (Feet):  5


Height (Inches):  7.00


Weight (Pounds):  137


Objective








PE


General Appearance:  alert, mild distress


Head:  normocephalic, atraumatic


Eyes:  bilateral eye PERRL, bilateral eye EOMI


ENT:  uvula midline, dry mucus membranes


Neck:  supple, thyroid normal, supple/symm/no masses


Respiratory:  lungs clear, no respiratory distress, no retraction


Cardiovascular:  normal peripheral pulses, no edema


Gastrointestinal:  non tender, soft, no guarding, no rebound


Musculoskeletal:  normal inspection


Neurologic:  alert, oriented x3


Psychiatric:  mood/affect normal


Skin:  no rash, warm/dry











Jersey Ross MD May 14, 2020 15:03

## 2020-05-14 NOTE — NUR
NURSE NOTES:

Patient remains asleep at this time. Chest rise and fall noted. Pulses present. IV lines 
remains intact. NAD. Call light within reach. Bed in lowest position.

## 2020-05-14 NOTE — NUR
NURSE NOTES:



Dopamine decreased to 18mcg/kg/min running with BP of 125/60. 

patient remains on tube feeding of Glucerna 1.2 at 50ml/hr.

-------------------------------------------------------------------------------

Addendum: 05/14/20 at 1923 by Donato Vasquez RN

-------------------------------------------------------------------------------

disregard noted above, placed on incorrect chart.

## 2020-05-14 NOTE — NUR
NURSE NOTES:



Dr. Gonzalez updated on patient WBC count and no fevers noted this morning and afternoon. 

made aware patient has gram positive cocci in clusters, no verbal orders given at this time.

## 2020-05-14 NOTE — NUR
NURSE NOTES:



spoke with patient regarding diagnosis and on plan of care.

Dr. Fischer updated on patient condition this morning, 

no verbal orders given.

## 2020-05-14 NOTE — NUR
NURSE NOTES:

Blood drawn, patient had 1 black/tarry stool. Vitals remains stable. NSR on the monitor. 
Patient talkative and remains AAOX4.

## 2020-05-14 NOTE — NUR
NURSE NOTES:

Patient sleeping at this time. NAD, vitals are stable. Afebrile at this time. Breathing 
pattern normal.

## 2020-05-15 VITALS — DIASTOLIC BLOOD PRESSURE: 64 MMHG | SYSTOLIC BLOOD PRESSURE: 93 MMHG

## 2020-05-15 VITALS — SYSTOLIC BLOOD PRESSURE: 96 MMHG | DIASTOLIC BLOOD PRESSURE: 75 MMHG

## 2020-05-15 VITALS — SYSTOLIC BLOOD PRESSURE: 104 MMHG | DIASTOLIC BLOOD PRESSURE: 68 MMHG

## 2020-05-15 VITALS — SYSTOLIC BLOOD PRESSURE: 107 MMHG | DIASTOLIC BLOOD PRESSURE: 67 MMHG

## 2020-05-15 VITALS — SYSTOLIC BLOOD PRESSURE: 100 MMHG | DIASTOLIC BLOOD PRESSURE: 72 MMHG

## 2020-05-15 VITALS — SYSTOLIC BLOOD PRESSURE: 99 MMHG | DIASTOLIC BLOOD PRESSURE: 67 MMHG

## 2020-05-15 VITALS — DIASTOLIC BLOOD PRESSURE: 66 MMHG | SYSTOLIC BLOOD PRESSURE: 108 MMHG

## 2020-05-15 VITALS — DIASTOLIC BLOOD PRESSURE: 62 MMHG | SYSTOLIC BLOOD PRESSURE: 96 MMHG

## 2020-05-15 VITALS — DIASTOLIC BLOOD PRESSURE: 59 MMHG | SYSTOLIC BLOOD PRESSURE: 104 MMHG

## 2020-05-15 VITALS — DIASTOLIC BLOOD PRESSURE: 72 MMHG | SYSTOLIC BLOOD PRESSURE: 101 MMHG

## 2020-05-15 VITALS — SYSTOLIC BLOOD PRESSURE: 106 MMHG | DIASTOLIC BLOOD PRESSURE: 68 MMHG

## 2020-05-15 VITALS — SYSTOLIC BLOOD PRESSURE: 106 MMHG | DIASTOLIC BLOOD PRESSURE: 61 MMHG

## 2020-05-15 VITALS — SYSTOLIC BLOOD PRESSURE: 104 MMHG | DIASTOLIC BLOOD PRESSURE: 67 MMHG

## 2020-05-15 VITALS — SYSTOLIC BLOOD PRESSURE: 102 MMHG | DIASTOLIC BLOOD PRESSURE: 72 MMHG

## 2020-05-15 VITALS — SYSTOLIC BLOOD PRESSURE: 105 MMHG | DIASTOLIC BLOOD PRESSURE: 68 MMHG

## 2020-05-15 VITALS — DIASTOLIC BLOOD PRESSURE: 50 MMHG | SYSTOLIC BLOOD PRESSURE: 97 MMHG

## 2020-05-15 VITALS — SYSTOLIC BLOOD PRESSURE: 107 MMHG | DIASTOLIC BLOOD PRESSURE: 74 MMHG

## 2020-05-15 VITALS — DIASTOLIC BLOOD PRESSURE: 60 MMHG | SYSTOLIC BLOOD PRESSURE: 109 MMHG

## 2020-05-15 VITALS — SYSTOLIC BLOOD PRESSURE: 107 MMHG | DIASTOLIC BLOOD PRESSURE: 61 MMHG

## 2020-05-15 VITALS — DIASTOLIC BLOOD PRESSURE: 59 MMHG | SYSTOLIC BLOOD PRESSURE: 98 MMHG

## 2020-05-15 VITALS — DIASTOLIC BLOOD PRESSURE: 65 MMHG | SYSTOLIC BLOOD PRESSURE: 104 MMHG

## 2020-05-15 VITALS — DIASTOLIC BLOOD PRESSURE: 64 MMHG | SYSTOLIC BLOOD PRESSURE: 112 MMHG

## 2020-05-15 VITALS — SYSTOLIC BLOOD PRESSURE: 103 MMHG | DIASTOLIC BLOOD PRESSURE: 70 MMHG

## 2020-05-15 LAB
ADD MANUAL DIFF: YES
ALBUMIN SERPL-MCNC: 1.4 G/DL (ref 3.4–5)
ALBUMIN/GLOB SERPL: 0.4 {RATIO} (ref 1–2.7)
ALP SERPL-CCNC: 84 U/L (ref 46–116)
ALT SERPL-CCNC: 11 U/L (ref 12–78)
ANION GAP SERPL CALC-SCNC: 12 MMOL/L (ref 5–15)
AST SERPL-CCNC: 41 U/L (ref 15–37)
BILIRUB SERPL-MCNC: 0.7 MG/DL (ref 0.2–1)
BUN SERPL-MCNC: 10 MG/DL (ref 7–18)
CALCIUM SERPL-MCNC: 7 MG/DL (ref 8.5–10.1)
CHLORIDE SERPL-SCNC: 110 MMOL/L (ref 98–107)
CO2 SERPL-SCNC: 20 MMOL/L (ref 21–32)
CREAT SERPL-MCNC: 0.7 MG/DL (ref 0.55–1.3)
ERYTHROCYTE [DISTWIDTH] IN BLOOD BY AUTOMATED COUNT: 15.1 % (ref 11.6–14.8)
GLOBULIN SER-MCNC: 3.7 G/DL
HCT VFR BLD CALC: 25.4 % (ref 42–52)
HGB BLD-MCNC: 8.6 G/DL (ref 14.2–18)
INR PPP: 1.4 (ref 0.9–1.1)
MCV RBC AUTO: 84 FL (ref 80–99)
PLATELET # BLD: 79 K/UL (ref 150–450)
POTASSIUM SERPL-SCNC: 3.4 MMOL/L (ref 3.5–5.1)
RBC # BLD AUTO: 3.01 M/UL (ref 4.7–6.1)
SODIUM SERPL-SCNC: 142 MMOL/L (ref 136–145)
WBC # BLD AUTO: 21.6 K/UL (ref 4.8–10.8)

## 2020-05-15 RX ADMIN — PANTOPRAZOLE SODIUM SCH MLS/HR: 40 INJECTION, POWDER, FOR SOLUTION INTRAVENOUS at 21:21

## 2020-05-15 RX ADMIN — SODIUM CHLORIDE SCH MLS/HR: 0.9 INJECTION INTRAVENOUS at 21:21

## 2020-05-15 RX ADMIN — DOCUSATE SODIUM SCH MG: 100 CAPSULE, LIQUID FILLED ORAL at 09:00

## 2020-05-15 RX ADMIN — PANTOPRAZOLE SODIUM SCH MLS/HR: 40 INJECTION, POWDER, FOR SOLUTION INTRAVENOUS at 01:07

## 2020-05-15 RX ADMIN — PANTOPRAZOLE SODIUM SCH MLS/HR: 40 INJECTION, POWDER, FOR SOLUTION INTRAVENOUS at 12:16

## 2020-05-15 RX ADMIN — SODIUM CHLORIDE SCH MLS/HR: 0.9 INJECTION INTRAVENOUS at 21:25

## 2020-05-15 RX ADMIN — ZOLPIDEM TARTRATE PRN MG: 5 TABLET ORAL at 21:34

## 2020-05-15 RX ADMIN — DEXTROSE MONOHYDRATE SCH MLS/HR: 50 INJECTION, SOLUTION INTRAVENOUS at 05:18

## 2020-05-15 RX ADMIN — SODIUM CHLORIDE SCH MLS/HR: 0.9 INJECTION INTRAVENOUS at 12:17

## 2020-05-15 RX ADMIN — DOCUSATE SODIUM SCH MG: 100 CAPSULE, LIQUID FILLED ORAL at 21:00

## 2020-05-15 NOTE — NUR
CASE MANAGEMENT: REVIEW



05/15/2020



SI: SEVERE ANEMIA  . PULMONARY EMBOLISM . GASTRIC MASS CANCER 

S/P IVC FILTER

S/P EGD w/ BIOPSY 

S/P MULTIPLE BLOOD TRANSFUSION 

98.2   94   24   96/75   100% NC/2L

WBC 21.6    H/H 8.6/25.4  PLT 79  K+ 3.4  CL-110  CO2 20   LACTIC ACID 7.0  ALBUMIN 1.4   

PT/ INR 15.1/1.4  





IS: IV FLAGYL TID

IV CEFEPIME TID 

IV VENOFER QHS

IV PROTONIX 

CTA



\**: INTENSIVE CARE UNIT 

DCP: PATIENT IS FROM HOME 

PLAN: 

OB STOOL 

START ON FULL LIQ DIET

## 2020-05-15 NOTE — CARDIAC ELECTROPHYSIOLOGY PN
Assessment/Plan


Assessment/Plan


1. Hypotension due to profound anemia due to gastric mass.  Patient is off 

pressors,


received 5 units of blood transfusion. Echocardiogram pending





2. Profound anemia.  Hemoglobin of 3.5.  CT of abdomen and pelvis shows


central necrotic mass in the gastric fundus 12 x 14 x 11 cm.  Further 

evaluation by GI and Dr Fischer


3. Pulmonary embolus.  S/P IVC filter.


4. Sepsis with elevated white count.





QUINCY RN and Dr Fischer





Subjective


Subjective


Off pressors and off vent alert and eating. Continues with black tarry 

stool.Now is Covid negative





Objective





Last 24 Hour Vital Signs








  Date Time  Temp Pulse Resp B/P (MAP) Pulse Ox O2 Delivery O2 Flow Rate FiO2


 


5/15/20 12:00 98.0 98 23 105/68 (80) 100   


 


5/15/20 12:00      Nasal Cannula 2.0 


 


5/15/20 11:00  92 19 101/72 (82) 100   


 


5/15/20 10:00  87 18 96/62 (73) 100   


 


5/15/20 09:00  87 17 102/72 (82) 100   


 


5/15/20 08:00  94      


 


5/15/20 08:00 98.2 94 24 96/75 (82) 100   


 


5/15/20 08:00      Nasal Cannula 2.0 


 


5/15/20 07:40     100 Nasal Cannula 2.0 28


 


5/15/20 07:00  86 17 104/59 (74) 100   


 


5/15/20 06:00  92 18 106/61 (76) 100   


 


5/15/20 05:00  91 17 109/60 (76) 100   


 


5/15/20 04:00  89      


 


5/15/20 04:00      Nasal Cannula 2.0 


 


5/15/20 04:00 98.5 88 18 104/59 (74) 100   


 


5/15/20 03:00  89 18 112/64 (80) 100   


 


5/15/20 02:00  95 19 104/67 (79) 100   


 


5/15/20 01:00  91 17 104/65 (78) 100   


 


5/15/20 00:00  94      


 


5/15/20 00:00 99.3 92 16 108/66 (80) 100   


 


5/15/20 00:00      Nasal Cannula 2.0 


 


5/14/20 23:00  93 18 107/66 (80) 100   


 


5/14/20 22:00  100 26 122/71 (88) 96   


 


5/14/20 21:00  98 25 120/75 (90) 100   


 


5/14/20 20:00      Nasal Cannula 2.0 


 


5/14/20 20:00 98.9 100 27 116/72 (87) 100   


 


5/14/20 19:43     100 Nasal Cannula 2.0 28


 


5/14/20 19:00  100 26 124/78 (93) 100   


 


5/14/20 18:00  93 26 111/69 (83) 100   


 


5/14/20 17:00  96 24 115/68 (84) 100   


 


5/14/20 16:00  98      


 


5/14/20 16:00  97 27 116/67 (83) 100   


 


5/14/20 16:00      Nasal Cannula 2.0 


 


5/14/20 16:00 98.3 97 27 116/67 (83) 100   


 


5/14/20 15:00  92 23 106/69 (81) 100   


 


5/14/20 14:00  94 25 103/66 (78) 100   

















Intake and Output  


 


 5/14/20 5/15/20





 19:00 07:00


 


Intake Total 1026.22770 ml 1256.875 ml


 


Output Total 765 ml 750 ml


 


Balance 261.53079 ml 506.875 ml


 


  


 


Intake Oral  500 ml


 


IV Total 1026.88376 ml 756.875 ml


 


Output Urine Total 765 ml 750 ml


 


# Bowel Movements 1 2











Laboratory Tests








Test


  5/15/20


04:00


 


White Blood Count


  21.6 K/UL


(4.8-10.8)  H


 


Red Blood Count


  3.01 M/UL


(4.70-6.10)  L


 


Hemoglobin


  8.6 G/DL


(14.2-18.0)  L


 


Hematocrit


  25.4 %


(42.0-52.0)  L


 


Mean Corpuscular Volume 84 FL (80-99)  


 


Mean Corpuscular Hemoglobin


  28.8 PG


(27.0-31.0)


 


Mean Corpuscular Hemoglobin


Concent 34.1 G/DL


(32.0-36.0)


 


Red Cell Distribution Width


  15.1 %


(11.6-14.8)  H


 


Platelet Count


  79 K/UL


(150-450)  L


 


Mean Platelet Volume


  5.2 FL


(6.5-10.1)  L


 


Neutrophils (%) (Auto)


  % (45.0-75.0)


 


 


Lymphocytes (%) (Auto)


  % (20.0-45.0)


 


 


Monocytes (%) (Auto)  % (1.0-10.0)  


 


Eosinophils (%) (Auto)  % (0.0-3.0)  


 


Basophils (%) (Auto)  % (0.0-2.0)  


 


Differential Total Cells


Counted 100  


 


 


Neutrophils % (Manual) 88 % (45-75)  H


 


Lymphocytes % (Manual) 9 % (20-45)  L


 


Monocytes % (Manual) 2 % (1-10)  


 


Eosinophils % (Manual) 1 % (0-3)  


 


Basophils % (Manual) 0 % (0-2)  


 


Band Neutrophils 0 % (0-8)  


 


Platelet Estimate Decreased  L


 


Platelet Morphology Normal  


 


Polychromasia 1+  


 


Hypochromasia 1+  


 


Anisocytosis 1+  


 


Prothrombin Time


  15.1 SEC


(9.30-11.50)  H


 


Prothromb Time International


Ratio 1.4 (0.9-1.1)


H


 


Sodium Level


  142 MMOL/L


(136-145)


 


Potassium Level


  3.4 MMOL/L


(3.5-5.1)  L


 


Chloride Level


  110 MMOL/L


()  H


 


Carbon Dioxide Level


  20 MMOL/L


(21-32)  L


 


Anion Gap


  12 mmol/L


(5-15)


 


Blood Urea Nitrogen


  10 mg/dL


(7-18)


 


Creatinine


  0.7 MG/DL


(0.55-1.30)


 


Estimat Glomerular Filtration


Rate > 60 mL/min


(>60)


 


Glucose Level


  84 MG/DL


()


 


Calcium Level


  7.0 MG/DL


(8.5-10.1)  L


 


Total Bilirubin


  0.7 MG/DL


(0.2-1.0)


 


Aspartate Amino Transf


(AST/SGOT) 41 U/L (15-37)


H


 


Alanine Aminotransferase


(ALT/SGPT) 11 U/L (12-78)


L


 


Alkaline Phosphatase


  84 U/L


()


 


Total Protein


  5.1 G/DL


(6.4-8.2)  L


 


Albumin


  1.4 G/DL


(3.4-5.0)  L


 


Globulin 3.7 g/dL  


 


Albumin/Globulin Ratio


  0.4 (1.0-2.7)


L


 


Vancomycin Level Trough


  8.0 ug/mL


(5.0-12.0)











Microbiology








 Date/Time


Source Procedure


Growth Status


 


 


 5/13/20 20:00


Blood Blood Culture - Preliminary


NO GROWTH AFTER 24 HOURS Resulted


 


 5/13/20 19:55


Blood Blood Culture - Preliminary


NO GROWTH AFTER 24 HOURS Resulted


 


 5/12/20 19:00


Blood Blood Culture - Preliminary Resulted


 


 5/12/20 19:00


Blood Blood Culture - Final


Enterobacter Aerogenes


Staphylococcus Epidermidis Complete


 


 5/12/20 21:00


Nasal Nares MRSA Culture - Final


NO METHICILLIN RESISTANT STAPH AUREUS... Complete


 


 5/12/20 19:10


Nasopharynx Coronavirus COVID-19 PCR (APARNA) - Final Complete


 


 5/14/20 16:59


Stool Clostridium difficile Toxin Assay - Final Complete


 


 5/12/20 20:00


Urine,Clean Catch Urine Culture - Final


Mixed Urogenital Contaminants Complete


 


 5/12/20 21:00


Rectum - Final


NO CARBAPENEM-RESISTANT ENTEROBACTERI... Complete


 


 5/12/20 21:00


Rectum VRE Culture - Final


NO VANCOMYCIN RESISTANT ENTEROCOCCUS ... Complete








Objective


HEAD AND NECK:  No JVD.


LUNGS:  Decreased breath sounds.


CARDIOVASCULAR:  Regular S1 and S2.  Tachycardic.


ABDOMEN:  Soft.


EXTREMITIES:  No pitting edema.











Aaron Antunez MD May 15, 2020 14:05

## 2020-05-15 NOTE — NUR
RD ASSESSMENT & RECOMMENDATIONS

SEE CARE ACTIVITY FOR COMPLETE ASSESSMENT



DAILY ESTIMATED NEEDS:

Needs based on Sepsis/ 61kg 

25-30  kcals/kg 

5742-3872  total kcals

1-2  g protein/kg

  g total protein 

25-30  mL/kg

0594-0969  total fluid mLs



NUTRITION DIAGNOSIS:

Altered nutrition related lab values R/T sepsis, GIB, clinical conditions

as evidenced by critically elev wbc (27.4-> 21.6), low hgb (3.5*-> 8.6),

low K (3.4).







CURRENT DIET:CLEAR LIQUID DIET     



 



PO DIET RECOMMENDATIONS:

ADVANCE DIET PER MD -> SOFT 



 





ADDITIONAL RECOMMENDATIONS:

* Calibrated bedscale wt for accurate CBW 

* Ensure Clear TID w/ clear liquid diet 

  -> monitor for diet advancement: NPO/CLD day 3 today.  

* Add folic acid supplement (folate: 3.9) 

* DC Colace BID: +diarrhea

## 2020-05-15 NOTE — SURGERY PROGRESS NOTE
Surgery Progress Note


Subjective


Additional Comments


improved


respiratory improved


no sob


filter okay


labs improved


lots os liquid black stool





Objective





Last 24 Hour Vital Signs








  Date Time  Temp Pulse Resp B/P (MAP) Pulse Ox O2 Delivery O2 Flow Rate FiO2


 


5/15/20 12:00 98.0 98 23 105/68 (80) 100   


 


5/15/20 12:00      Nasal Cannula 2.0 


 


5/15/20 11:00  92 19 101/72 (82) 100   


 


5/15/20 10:00  87 18 96/62 (73) 100   


 


5/15/20 09:00  87 17 102/72 (82) 100   


 


5/15/20 08:00  94      


 


5/15/20 08:00 98.2 94 24 96/75 (82) 100   


 


5/15/20 08:00      Nasal Cannula 2.0 


 


5/15/20 07:40     100 Nasal Cannula 2.0 28


 


5/15/20 07:00  86 17 104/59 (74) 100   


 


5/15/20 06:00  92 18 106/61 (76) 100   


 


5/15/20 05:00  91 17 109/60 (76) 100   


 


5/15/20 04:00  89      


 


5/15/20 04:00      Nasal Cannula 2.0 


 


5/15/20 04:00 98.5 88 18 104/59 (74) 100   


 


5/15/20 03:00  89 18 112/64 (80) 100   


 


5/15/20 02:00  95 19 104/67 (79) 100   


 


5/15/20 01:00  91 17 104/65 (78) 100   


 


5/15/20 00:00  94      


 


5/15/20 00:00 99.3 92 16 108/66 (80) 100   


 


5/15/20 00:00      Nasal Cannula 2.0 


 


5/14/20 23:00  93 18 107/66 (80) 100   


 


5/14/20 22:00  100 26 122/71 (88) 96   


 


5/14/20 21:00  98 25 120/75 (90) 100   


 


5/14/20 20:00      Nasal Cannula 2.0 


 


5/14/20 20:00 98.9 100 27 116/72 (87) 100   


 


5/14/20 19:43     100 Nasal Cannula 2.0 28


 


5/14/20 19:00  100 26 124/78 (93) 100   


 


5/14/20 18:00  93 26 111/69 (83) 100   


 


5/14/20 17:00  96 24 115/68 (84) 100   


 


5/14/20 16:00  98      


 


5/14/20 16:00  97 27 116/67 (83) 100   


 


5/14/20 16:00      Nasal Cannula 2.0 


 


5/14/20 16:00 98.3 97 27 116/67 (83) 100   


 


5/14/20 15:00  92 23 106/69 (81) 100   








I&O











Intake and Output  


 


 5/14/20 5/15/20





 19:00 07:00


 


Intake Total 1026.48267 ml 1256.875 ml


 


Output Total 765 ml 750 ml


 


Balance 261.16489 ml 506.875 ml


 


  


 


Intake Oral  500 ml


 


IV Total 1026.33246 ml 756.875 ml


 


Output Urine Total 765 ml 750 ml


 


# Bowel Movements 1 2








Cardiovascular:  RSR


Respiratory:  decreased breath sounds


Abdomen:  soft, non-tender, present bowel sounds


Extremities:  no cyanosis





Laboratory Tests








Test


  5/15/20


04:00


 


White Blood Count


  21.6 K/UL


(4.8-10.8)  H


 


Red Blood Count


  3.01 M/UL


(4.70-6.10)  L


 


Hemoglobin


  8.6 G/DL


(14.2-18.0)  L


 


Hematocrit


  25.4 %


(42.0-52.0)  L


 


Mean Corpuscular Volume 84 FL (80-99)  


 


Mean Corpuscular Hemoglobin


  28.8 PG


(27.0-31.0)


 


Mean Corpuscular Hemoglobin


Concent 34.1 G/DL


(32.0-36.0)


 


Red Cell Distribution Width


  15.1 %


(11.6-14.8)  H


 


Platelet Count


  79 K/UL


(150-450)  L


 


Mean Platelet Volume


  5.2 FL


(6.5-10.1)  L


 


Neutrophils (%) (Auto)


  % (45.0-75.0)


 


 


Lymphocytes (%) (Auto)


  % (20.0-45.0)


 


 


Monocytes (%) (Auto)  % (1.0-10.0)  


 


Eosinophils (%) (Auto)  % (0.0-3.0)  


 


Basophils (%) (Auto)  % (0.0-2.0)  


 


Differential Total Cells


Counted 100  


 


 


Neutrophils % (Manual) 88 % (45-75)  H


 


Lymphocytes % (Manual) 9 % (20-45)  L


 


Monocytes % (Manual) 2 % (1-10)  


 


Eosinophils % (Manual) 1 % (0-3)  


 


Basophils % (Manual) 0 % (0-2)  


 


Band Neutrophils 0 % (0-8)  


 


Platelet Estimate Decreased  L


 


Platelet Morphology Normal  


 


Polychromasia 1+  


 


Hypochromasia 1+  


 


Anisocytosis 1+  


 


Prothrombin Time


  15.1 SEC


(9.30-11.50)  H


 


Prothromb Time International


Ratio 1.4 (0.9-1.1)


H


 


Sodium Level


  142 MMOL/L


(136-145)


 


Potassium Level


  3.4 MMOL/L


(3.5-5.1)  L


 


Chloride Level


  110 MMOL/L


()  H


 


Carbon Dioxide Level


  20 MMOL/L


(21-32)  L


 


Anion Gap


  12 mmol/L


(5-15)


 


Blood Urea Nitrogen


  10 mg/dL


(7-18)


 


Creatinine


  0.7 MG/DL


(0.55-1.30)


 


Estimat Glomerular Filtration


Rate > 60 mL/min


(>60)


 


Glucose Level


  84 MG/DL


()


 


Calcium Level


  7.0 MG/DL


(8.5-10.1)  L


 


Total Bilirubin


  0.7 MG/DL


(0.2-1.0)


 


Aspartate Amino Transf


(AST/SGOT) 41 U/L (15-37)


H


 


Alanine Aminotransferase


(ALT/SGPT) 11 U/L (12-78)


L


 


Alkaline Phosphatase


  84 U/L


()


 


Total Protein


  5.1 G/DL


(6.4-8.2)  L


 


Albumin


  1.4 G/DL


(3.4-5.0)  L


 


Globulin 3.7 g/dL  


 


Albumin/Globulin Ratio


  0.4 (1.0-2.7)


L


 


Vancomycin Level Trough


  8.0 ug/mL


(5.0-12.0)











Plan


Problems:  


(1) Lower extremity edema


Assessment & Plan:  Venous Duplex pending 


DVT noted on CT 


IV filter


cannot anticoagulate 





(2) Pulmonary embolism


Assessment & Plan:  Filling defects within pulmonary vessels at the lung bases 

with small 


peripheral airspace opacities concerning for pulmonary emboli and small 


pulmonary infarcts.





(3) GI bleed


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  Patient 

denies any knowledge of this mass.  Likely etiology of severe anemia hemoglobin 

3.5 being transfused emergently.  Patient states he feels very ill and 

potentially feels as if he is going to die.  High probability of GI bleed 

etiology is this large necrotic mass.


I had a long discussion with the patient in the emergency department regards 

above findings and care plan.  Discussed care plan with primary care physician 

GI and emergency permit physician.


Patient needs urgent blood transfusion which he is receiving in the ED as 

ordered.  Patient needs acute resuscitation followed by


Potential EGD.


Given severe anemia.  Gastric mass causing GI bleed.  Acute hemorrhagic GI 

bleed secondary to likely mass.  


 tumor needs biopsy and pathology evaluation


Despite above if continues to hemorrhage or does not respond appropriately will 

likely need acute life-saving emergency surgery for resection of acutely 

bleeding gastric tumor


If surgical intervention propose it would not be for curative intent but rather 

life-saving hemorrhage control


Discussed with patient in detail at bedside.


For now continue with resuscitation and will continue with further work-up





Npo


IV fluids


PRBC 


q6h h/h


trend labs


GI eval for EGD consideration


Heme Onc Eval for evaluation of large gastric tumor identification


surgery available and will follow with recs.


Thank you for allowing me to participate in patients care 


improving


resuscitation going well and safely


will plan resection soon 





(4) Gastric mass


Assessment & Plan:   ABDOMEN:


  Liver:  See above.


  Gallbladder and bile ducts:  Unremarkable.  No calcified stones.  No 


ductal dilation.


  Pancreas:  Unremarkable.  No mass.  No ductal dilation.


  Spleen:  Unremarkable.  No splenomegaly.


  Adrenals:  Unremarkable.  No mass.


  Kidneys and ureters:  Unremarkable.  No solid mass.  No hydronephrosis.


  Stomach and bowel:  Centrally necrotic mass arising from the gastric 


fundus measuring 12.6 x 11.1 x 14 cm.  Liquid stool within the rectum.


 


 PELVIS:


  Appendix:  No findings to suggest acute appendicitis.


  Bladder:  Unremarkable.  No mass.


  Reproductive:  Unremarkable as visualized.


 


 ABDOMEN and PELVIS:


  Intraperitoneal space:  Unremarkable.  No free air.  No significant 


fluid collection.


  Bones/joints:  No acute fracture.  No dislocation.


  Soft tissues:  Unremarkable.


  Vasculature:  Filling defects within pulmonary vessels at the lung 


bases with small peripheral airspace opacities concerning for pulmonary 


emboli and small pulmonary infarcts.  No abdominal aortic aneurysm.


  Lymph nodes:  Unremarkable.  No enlarged lymph nodes.


 


IMPRESSION:     


1.  Centrally necrotic mass arising from the gastric fundus measuring 12.


6 x 11.1 x 14 cm.  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  Gastric 


adenocarcinoma, lymphoma, and other neoplastic etiologies are also in the 


differential.


2.  Filling defects within pulmonary vessels at the lung bases with small 


peripheral airspace opacities concerning for pulmonary emboli and small 


pulmonary infarcts.





(5) COVID-19 ruled out


Assessment & Plan:  COVID 19 r/o


sob


respiratory symptoms


pending testing 


Defer to ID


COVID neg 5/12














Erik Fischer May 15, 2020 14:08

## 2020-05-15 NOTE — GENERAL PROGRESS NOTE
Assessment/Plan


Problem List:  


(1) Weak


ICD Codes:  R53.1 - Weakness


SNOMED:  58598543


(2) Malnutrition


ICD Codes:  E46 - Unspecified protein-calorie malnutrition


SNOMED:  51090281


(3) COVID-19 ruled out


ICD Codes:  Z03.818 - Encounter for observation for suspected exposure to other 

biological agents ruled out


SNOMED:  192070329, 893871958


(4) Pulmonary embolism


ICD Codes:  I26.99 - Other pulmonary embolism without acute cor pulmonale


SNOMED:  13229043


Qualifiers:  


   Qualified Codes:  I26.99 - Other pulmonary embolism without acute cor 

pulmonale


(5) GI bleed


ICD Codes:  K92.2 - Gastrointestinal hemorrhage, unspecified


SNOMED:  78734741


Qualifiers:  


   Qualified Codes:  K92.2 - Gastrointestinal hemorrhage, unspecified


(6) Gastric mass


ICD Codes:  K31.89 - Other diseases of stomach and duodenum


SNOMED:  312349045


(7) Syncope


ICD Codes:  R55 - Syncope and collapse


SNOMED:  354329055


Qualifiers:  


   Qualified Codes:  R55 - Syncope and collapse


Status:  unchanged


Assessment/Plan:


o2 pulm tx transfuse prn cbc bmp am





Subjective


Constitutional:  Reports: weakness


Allergies:  


Coded Allergies:  


     No Known Allergies (Unverified , 5/12/20)


All Systems:  reviewed and negative except above


Subjective


o2nc sleepy in icu





Objective





Last 24 Hour Vital Signs








  Date Time  Temp Pulse Resp B/P (MAP) Pulse Ox O2 Delivery O2 Flow Rate FiO2


 


5/15/20 08:00  94      


 


5/15/20 08:00 98.2 94 24 96/75 (82) 100   


 


5/15/20 08:00      Nasal Cannula 2.0 


 


5/15/20 07:40     100 Nasal Cannula 2.0 28


 


5/15/20 07:00  86 17 104/59 (74) 100   


 


5/15/20 06:00  92 18 106/61 (76) 100   


 


5/15/20 05:00  91 17 109/60 (76) 100   


 


5/15/20 04:00  89      


 


5/15/20 04:00      Nasal Cannula 2.0 


 


5/15/20 04:00 98.5 88 18 104/59 (74) 100   


 


5/15/20 03:00  89 18 112/64 (80) 100   


 


5/15/20 02:00  95 19 104/67 (79) 100   


 


5/15/20 01:00  91 17 104/65 (78) 100   


 


5/15/20 00:00  94      


 


5/15/20 00:00 99.3 92 16 108/66 (80) 100   


 


5/15/20 00:00      Nasal Cannula 2.0 


 


5/14/20 23:00  93 18 107/66 (80) 100   


 


5/14/20 22:00  100 26 122/71 (88) 96   


 


5/14/20 21:00  98 25 120/75 (90) 100   


 


5/14/20 20:00      Nasal Cannula 2.0 


 


5/14/20 20:00 98.9 100 27 116/72 (87) 100   


 


5/14/20 19:43     100 Nasal Cannula 2.0 28


 


5/14/20 19:00  100 26 124/78 (93) 100   


 


5/14/20 18:00  93 26 111/69 (83) 100   


 


5/14/20 17:00  96 24 115/68 (84) 100   


 


5/14/20 16:00  98      


 


5/14/20 16:00  97 27 116/67 (83) 100   


 


5/14/20 16:00      Nasal Cannula 2.0 


 


5/14/20 16:00 98.3 97 27 116/67 (83) 100   


 


5/14/20 15:00  92 23 106/69 (81) 100   


 


5/14/20 14:00  94 25 103/66 (78) 100   


 


5/14/20 13:00  97 24 100/78 (85) 98   


 


5/14/20 12:00  97      


 


5/14/20 12:00 98.7 100 25 97/58 (71) 100   


 


5/14/20 12:00      Nasal Cannula 2.0 


 


5/14/20 11:00  89 22 99/56 (70) 98   


 


5/14/20 10:00  90 24 105/68 (80) 100   

















Intake and Output  


 


 5/14/20 5/15/20





 19:00 07:00


 


Intake Total 1026.65471 ml 1256.875 ml


 


Output Total 765 ml 750 ml


 


Balance 261.94457 ml 506.875 ml


 


  


 


Intake Oral  500 ml


 


IV Total 1026.52110 ml 756.875 ml


 


Output Urine Total 765 ml 750 ml


 


# Bowel Movements 1 2








Laboratory Tests


5/15/20 04:00: 


White Blood Count 21.6H, Red Blood Count 3.01L, Hemoglobin 8.6L, Hematocrit 

25.4L, Mean Corpuscular Volume 84, Mean Corpuscular Hemoglobin 28.8, Mean 

Corpuscular Hemoglobin Concent 34.1, Red Cell Distribution Width 15.1H, 

Platelet Count 79L, Mean Platelet Volume 5.2L, Neutrophils (%) (Auto) , 

Lymphocytes (%) (Auto) , Monocytes (%) (Auto) , Eosinophils (%) (Auto) , 

Basophils (%) (Auto) , Differential Total Cells Counted 100, Neutrophils % (

Manual) 88H, Lymphocytes % (Manual) 9L, Monocytes % (Manual) 2, Eosinophils % (

Manual) 1, Basophils % (Manual) 0, Band Neutrophils 0, Platelet Estimate 

DecreasedL, Platelet Morphology Normal, Polychromasia 1+, Hypochromasia 1+, 

Anisocytosis 1+, Prothrombin Time 15.1H, Prothromb Time International Ratio 1.4H

, Sodium Level 142, Potassium Level 3.4L, Chloride Level 110H, Carbon Dioxide 

Level 20L, Anion Gap 12, Blood Urea Nitrogen 10, Creatinine 0.7, Estimat 

Glomerular Filtration Rate > 60, Glucose Level 84, Calcium Level 7.0L, Total 

Bilirubin 0.7, Aspartate Amino Transf (AST/SGOT) 41H, Alanine Aminotransferase (

ALT/SGPT) 11L, Alkaline Phosphatase 84, Total Protein 5.1L, Albumin 1.4L, 

Globulin 3.7, Albumin/Globulin Ratio 0.4L, Vancomycin Level Trough 8.0


Height (Feet):  5


Height (Inches):  7.00


Weight (Pounds):  135


General Appearance:  lethargic


EENT:  normal ENT inspection


Neck:  normal alignment


Cardiovascular:  normal rate, regular rhythm


Respiratory/Chest:  no respiratory distress, no accessory muscle use


Extremities:  normal inspection


Skin:  normal pigmentation











Tom Pierce DO May 15, 2020 09:48

## 2020-05-15 NOTE — NUR
NURSE NOTES:

Repositioned patient, patient is asleep. Vitals are stable, NAD, no respiratory distress, 
breathing pattern normal, pulses present. Will continue to monitor.

## 2020-05-15 NOTE — NUR
NURSE NOTES: 

patient received from JOSIAH Sanchez. patient asleep arousable to name and orientedx4. patient 
on 2L NC with oxygen saturation 100%. BP 98/59 HR84 NSR on monitor and afebrile. left 
forearm gauge 20, right ac gauge 20, and left ac gauge 18 clean and asymptomatic running 
protonix at 25ml/hr. tarango catheter draining light josh urine. sacral stage II dressing 
clean and intact. will continue to monitor.

## 2020-05-15 NOTE — NUR
NURSE NOTES:



Patient assessed at the bedside, 

he remains awake and alert to name, time, place and situation, 

remains awake and able to use cellular phone and watch television, 

he is currently on nasal cannula at 2l/min saturating at 100%. 

patient remains tolerating clear liquid diet.

## 2020-05-15 NOTE — NUR
NURSE NOTES:



Patient remains tolerating full liquid diet, 

patient remains with diarrhea with brown colored stool.

## 2020-05-15 NOTE — NUR
NURSE NOTES:

Patient Requests sleeping pill at this time. Please see eMAR. Offered to reposition patient 
at this time but patient refused for now. Will continue to monitor.

## 2020-05-15 NOTE — NUR
NURSE NOTES:



Dr. Antunez updated that patient remains on Protonix drip at 25ml/hr, 

Heart rate remains in sinus rhythm with bp above 100 sbp. 

no verbal orders given at this time.

## 2020-05-15 NOTE — NUR
NOTE





SW met w/ pt and conducted initial assessment. Pt resides alone at [address] [place] [address]. Pt presents as A&O4x. Pt is single, never  and has no children. 
Emergency contacts are Eriberto Corbett (nephew) [phone] and Jeovany Buckley (nephew) 
[phone]. SW confirmed pt's wishes for DNR. Pt is the self decision maker. Pt was 
ambulatory and independent w/ ADLs prior to admission. However, pt reports he possibly needs 
DMEs pending on evaluation/his condition after he recovers. Pt does not receive caregiving 
service. PT denies substance abuse. Pt did not share any concerns/needs at this time. SW to 
F/U as needed.

## 2020-05-15 NOTE — GENERAL PROGRESS NOTE
Assessment/Plan


Problem List:  


(1) Syncope


ICD Codes:  R55 - Syncope and collapse


SNOMED:  138603772


Qualifiers:  


   Qualified Codes:  R55 - Syncope and collapse


(2) Gastric mass


ICD Codes:  K31.89 - Other diseases of stomach and duodenum


SNOMED:  299153052


(3) GI bleed


ICD Codes:  K92.2 - Gastrointestinal hemorrhage, unspecified


SNOMED:  03385656


Qualifiers:  


   Qualified Codes:  K92.2 - Gastrointestinal hemorrhage, unspecified


(4) Pulmonary embolism


ICD Codes:  I26.99 - Other pulmonary embolism without acute cor pulmonale


SNOMED:  13126019


Qualifiers:  


   Qualified Codes:  I26.99 - Other pulmonary embolism without acute cor 

pulmonale


Status:  unchanged


Assessment/Plan:


s/p 5 units prbc


2 units FFP


stable H&H now


fu stool c.diff>>>neg


s/p  vit k


pending EGD when off isolation (first COVID is neg)


advance to full liquid





Subjective


ROS Limited/Unobtainable:  Yes


Allergies:  


Coded Allergies:  


     No Known Allergies (Unverified , 5/12/20)





Objective





Last 24 Hour Vital Signs








  Date Time  Temp Pulse Resp B/P (MAP) Pulse Ox O2 Delivery O2 Flow Rate FiO2


 


5/15/20 08:00  94      


 


5/15/20 08:00 98.2 94 24 96/75 (82) 100   


 


5/15/20 08:00      Nasal Cannula 2.0 


 


5/15/20 07:40     100 Nasal Cannula 2.0 28


 


5/15/20 07:00  86 17 104/59 (74) 100   


 


5/15/20 06:00  92 18 106/61 (76) 100   


 


5/15/20 05:00  91 17 109/60 (76) 100   


 


5/15/20 04:00  89      


 


5/15/20 04:00      Nasal Cannula 2.0 


 


5/15/20 04:00 98.5 88 18 104/59 (74) 100   


 


5/15/20 03:00  89 18 112/64 (80) 100   


 


5/15/20 02:00  95 19 104/67 (79) 100   


 


5/15/20 01:00  91 17 104/65 (78) 100   


 


5/15/20 00:00  94      


 


5/15/20 00:00 99.3 92 16 108/66 (80) 100   


 


5/15/20 00:00      Nasal Cannula 2.0 


 


5/14/20 23:00  93 18 107/66 (80) 100   


 


5/14/20 22:00  100 26 122/71 (88) 96   


 


5/14/20 21:00  98 25 120/75 (90) 100   


 


5/14/20 20:00      Nasal Cannula 2.0 


 


5/14/20 20:00 98.9 100 27 116/72 (87) 100   


 


5/14/20 19:43     100 Nasal Cannula 2.0 28


 


5/14/20 19:00  100 26 124/78 (93) 100   


 


5/14/20 18:00  93 26 111/69 (83) 100   


 


5/14/20 17:00  96 24 115/68 (84) 100   


 


5/14/20 16:00  98      


 


5/14/20 16:00  97 27 116/67 (83) 100   


 


5/14/20 16:00      Nasal Cannula 2.0 


 


5/14/20 16:00 98.3 97 27 116/67 (83) 100   


 


5/14/20 15:00  92 23 106/69 (81) 100   


 


5/14/20 14:00  94 25 103/66 (78) 100   


 


5/14/20 13:00  97 24 100/78 (85) 98   


 


5/14/20 12:00  97      


 


5/14/20 12:00 98.7 100 25 97/58 (71) 100   


 


5/14/20 12:00      Nasal Cannula 2.0 


 


5/14/20 11:00  89 22 99/56 (70) 98   

















Intake and Output  


 


 5/14/20 5/15/20





 19:00 07:00


 


Intake Total 1026.62239 ml 1256.875 ml


 


Output Total 765 ml 750 ml


 


Balance 261.21591 ml 506.875 ml


 


  


 


Intake Oral  500 ml


 


IV Total 1026.85437 ml 756.875 ml


 


Output Urine Total 765 ml 750 ml


 


# Bowel Movements 1 2








Laboratory Tests


5/15/20 04:00: 


White Blood Count 21.6H, Red Blood Count 3.01L, Hemoglobin 8.6L, Hematocrit 

25.4L, Mean Corpuscular Volume 84, Mean Corpuscular Hemoglobin 28.8, Mean 

Corpuscular Hemoglobin Concent 34.1, Red Cell Distribution Width 15.1H, 

Platelet Count 79L, Mean Platelet Volume 5.2L, Neutrophils (%) (Auto) , 

Lymphocytes (%) (Auto) , Monocytes (%) (Auto) , Eosinophils (%) (Auto) , 

Basophils (%) (Auto) , Differential Total Cells Counted 100, Neutrophils % (

Manual) 88H, Lymphocytes % (Manual) 9L, Monocytes % (Manual) 2, Eosinophils % (

Manual) 1, Basophils % (Manual) 0, Band Neutrophils 0, Platelet Estimate 

DecreasedL, Platelet Morphology Normal, Polychromasia 1+, Hypochromasia 1+, 

Anisocytosis 1+, Prothrombin Time 15.1H, Prothromb Time International Ratio 1.4H

, Sodium Level 142, Potassium Level 3.4L, Chloride Level 110H, Carbon Dioxide 

Level 20L, Anion Gap 12, Blood Urea Nitrogen 10, Creatinine 0.7, Estimat 

Glomerular Filtration Rate > 60, Glucose Level 84, Calcium Level 7.0L, Total 

Bilirubin 0.7, Aspartate Amino Transf (AST/SGOT) 41H, Alanine Aminotransferase (

ALT/SGPT) 11L, Alkaline Phosphatase 84, Total Protein 5.1L, Albumin 1.4L, 

Globulin 3.7, Albumin/Globulin Ratio 0.4L, Vancomycin Level Trough 8.0


Height (Feet):  5


Height (Inches):  7.00


Weight (Pounds):  135


General Appearance:  no apparent distress


EENT:  normal ENT inspection


Neck:  supple


Cardiovascular:  normal rate


Respiratory/Chest:  decreased breath sounds


Abdomen:  normal bowel sounds, non tender, soft


Extremities:  non-tender











Marc Nielsen MD May 15, 2020 10:28

## 2020-05-15 NOTE — NUR
NURSE NOTES:

Received Patient from Donato RAHMAN. Patient in bed with eyes open, AOx4. Sinus rhythm on the 
monitor. On 2L NC sats 100%.  LFA, RAC, and LAC IV sites patent and intact. Protonix running 
@ 25 ml/hr. Mata cath draining by gravity. Call Light with in reach. Bed in lowest 
position, side rails upx3. No signs of distress noted. Will continue to monitor.

## 2020-05-15 NOTE — DIAGNOSTIC IMAGING REPORT
EXAM: XRAY Abdomen 1v

 

HISTORY: Reason For Exam: ABD PAIN

 

COMPARISON: None.

 

TECHNIQUE:  Frontal view of the abdomen obtained.

 

FINDINGS:

 

There is a nonspecific bowel gas pattern.  There is a confluent density in the left

upper quadrant containing some air lucencies not clearly bowel. It appears to exert

some mass effect on the stomach. The splenic shadow is not clearly defined. No

definite pathologic calcifications identified.  There is no sign of free air. No

acute abnormality noted of the visualized osseous structures.

 

IMPRESSION:

 

CONFLUENT SOFT TISSUE DENSITY LEFT UPPER QUADRANT CONTAINING SOME AIR LUCENCIES BUT

NOT DEFINITELY BOWEL AND SPLENIC SHADOW IS NOT CLEARLY DEFINED. RECOMMEND CT

FOLLOW-UP.

## 2020-05-15 NOTE — INFECTIOUS DISEASES PROG NOTE
Assessment/Plan


Assessment/Plan








Assessment:


Severe Sepsis


Enterobacter bacteremia- likely from GI source


S. epi bacteremia, contaminant


GGO on lung bases- COVID19 neg x1


  -5/12 Bcx 1/4 S.epi, 2/4 E. aerogenes (pan S); 5/13 Bcx NTD


            CXR: no acute disease


            SARS-COV2 PCR





Afebrile


Leukocytosis; improving





Mild AST elevation





Lactic acidosis, SP


   -5/12 u/a neg; ucx 30-40K mixed urogenital contaminants





Diarrhea


    -Cdiff neg





Severe anemia (blood loss)/GIB likely 2ry to gastric mass


Thrombocytopenia, worsening


  -CT abd/p:   Centrally necrotic mass arising from the gastric fundus 

measuring 12. 6 x 11.1 x 14 cm.  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  

Gastric adenocarcinoma, lymphoma, and other neoplastic etiologies are also in 

the differential. Filling defects within pulmonary vessels at the lung bases 

with small peripheral airspace opacities concerning for pulmonary emboli and 

small pulmonary infarcts.





Syncopal episode- likely due to severe dehydration and anemia


  -CT head: No acute findings in the head/brain.





Acute PE/DVT -(per CT abd/p findings)


  -sp IVC filter placement 5/13 (retrievable- given presence of bacteremia)





PABLO, SP





HTN





Plan:


-Switch empiric Zosyn #4 to Cefepime and Flagyl


-Dc empiric IV Vancomycin #4


-f/u cx


-Monitor CBC/CMP, temperaturse


-ICU care/aspiration precautions


-COVID19 isolation and precautions; send 2nd test


-f/u repeat Bcx


-Sx,pulm, Heme/onc, GI f/u


-CT chest w/ to evaluate for GGO and for possible mets





Thank you for consulting [facility]. Will continue to follow along wiht 

you.





Discussed with RN.





Subjective


Allergies:  


Coded Allergies:  


     No Known Allergies (Unverified , 5/12/20)


Subjective


afebrile


wbc improving


thrombocytopenia worsening





Objective


Vital Signs





Last 24 Hour Vital Signs








  Date Time  Temp Pulse Resp B/P (MAP) Pulse Ox O2 Delivery O2 Flow Rate FiO2


 


5/15/20 08:00  94      


 


5/15/20 08:00 98.2 94 24 96/75 (82) 100   


 


5/15/20 08:00      Nasal Cannula 2.0 


 


5/15/20 07:40     100 Nasal Cannula 2.0 28


 


5/15/20 07:00  86 17 104/59 (74) 100   


 


5/15/20 06:00  92 18 106/61 (76) 100   


 


5/15/20 05:00  91 17 109/60 (76) 100   


 


5/15/20 04:00  89      


 


5/15/20 04:00      Nasal Cannula 2.0 


 


5/15/20 04:00 98.5 88 18 104/59 (74) 100   


 


5/15/20 03:00  89 18 112/64 (80) 100   


 


5/15/20 02:00  95 19 104/67 (79) 100   


 


5/15/20 01:00  91 17 104/65 (78) 100   


 


5/15/20 00:00  94      


 


5/15/20 00:00 99.3 92 16 108/66 (80) 100   


 


5/15/20 00:00      Nasal Cannula 2.0 


 


5/14/20 23:00  93 18 107/66 (80) 100   


 


5/14/20 22:00  100 26 122/71 (88) 96   


 


5/14/20 21:00  98 25 120/75 (90) 100   


 


5/14/20 20:00      Nasal Cannula 2.0 


 


5/14/20 20:00 98.9 100 27 116/72 (87) 100   


 


5/14/20 19:43     100 Nasal Cannula 2.0 28


 


5/14/20 19:00  100 26 124/78 (93) 100   


 


5/14/20 18:00  93 26 111/69 (83) 100   


 


5/14/20 17:00  96 24 115/68 (84) 100   


 


5/14/20 16:00  98      


 


5/14/20 16:00  97 27 116/67 (83) 100   


 


5/14/20 16:00      Nasal Cannula 2.0 


 


5/14/20 16:00 98.3 97 27 116/67 (83) 100   


 


5/14/20 15:00  92 23 106/69 (81) 100   


 


5/14/20 14:00  94 25 103/66 (78) 100   


 


5/14/20 13:00  97 24 100/78 (85) 98   


 


5/14/20 12:00  97      


 


5/14/20 12:00 98.7 100 25 97/58 (71) 100   


 


5/14/20 12:00      Nasal Cannula 2.0 


 


5/14/20 11:00  89 22 99/56 (70) 98   








Height (Feet):  5


Height (Inches):  7.00


Weight (Pounds):  135


Objective


not examined to limit COVID19 exposure





Microbiology








 Date/Time


Source Procedure


Growth Status


 


 


 5/13/20 20:00


Blood Blood Culture - Preliminary


NO GROWTH AFTER 24 HOURS Resulted


 


 5/13/20 19:55


Blood Blood Culture - Preliminary


NO GROWTH AFTER 24 HOURS Resulted


 


 5/12/20 19:00


Blood Blood Culture - Preliminary Resulted


 


 5/12/20 19:00


Blood Blood Culture - Final


Enterobacter Aerogenes


Staphylococcus Epidermidis Complete


 


 5/12/20 21:00


Nasal Nares MRSA Culture - Final


NO METHICILLIN RESISTANT STAPH AUREUS... Complete


 


 5/12/20 19:10


Nasopharynx Coronavirus COVID-19 PCR (APARNA) - Final Complete


 


 5/14/20 16:59


Stool Clostridium difficile Toxin Assay - Final Complete


 


 5/12/20 20:00


Urine,Clean Catch Urine Culture - Final


Mixed Urogenital Contaminants Complete


 


 5/12/20 21:00


Rectum - Final


NO CARBAPENEM-RESISTANT ENTEROBACTERI... Complete


 


 5/12/20 21:00


Rectum VRE Culture - Final


NO VANCOMYCIN RESISTANT ENTEROCOCCUS ... Complete











Laboratory Tests








Test


  5/15/20


04:00


 


White Blood Count


  21.6 K/UL


(4.8-10.8)  H


 


Red Blood Count


  3.01 M/UL


(4.70-6.10)  L


 


Hemoglobin


  8.6 G/DL


(14.2-18.0)  L


 


Hematocrit


  25.4 %


(42.0-52.0)  L


 


Mean Corpuscular Volume 84 FL (80-99)  


 


Mean Corpuscular Hemoglobin


  28.8 PG


(27.0-31.0)


 


Mean Corpuscular Hemoglobin


Concent 34.1 G/DL


(32.0-36.0)


 


Red Cell Distribution Width


  15.1 %


(11.6-14.8)  H


 


Platelet Count


  79 K/UL


(150-450)  L


 


Mean Platelet Volume


  5.2 FL


(6.5-10.1)  L


 


Neutrophils (%) (Auto)


  % (45.0-75.0)


 


 


Lymphocytes (%) (Auto)


  % (20.0-45.0)


 


 


Monocytes (%) (Auto)  % (1.0-10.0)  


 


Eosinophils (%) (Auto)  % (0.0-3.0)  


 


Basophils (%) (Auto)  % (0.0-2.0)  


 


Differential Total Cells


Counted 100  


 


 


Neutrophils % (Manual) 88 % (45-75)  H


 


Lymphocytes % (Manual) 9 % (20-45)  L


 


Monocytes % (Manual) 2 % (1-10)  


 


Eosinophils % (Manual) 1 % (0-3)  


 


Basophils % (Manual) 0 % (0-2)  


 


Band Neutrophils 0 % (0-8)  


 


Platelet Estimate Decreased  L


 


Platelet Morphology Normal  


 


Polychromasia 1+  


 


Hypochromasia 1+  


 


Anisocytosis 1+  


 


Prothrombin Time


  15.1 SEC


(9.30-11.50)  H


 


Prothromb Time International


Ratio 1.4 (0.9-1.1)


H


 


Sodium Level


  142 MMOL/L


(136-145)


 


Potassium Level


  3.4 MMOL/L


(3.5-5.1)  L


 


Chloride Level


  110 MMOL/L


()  H


 


Carbon Dioxide Level


  20 MMOL/L


(21-32)  L


 


Anion Gap


  12 mmol/L


(5-15)


 


Blood Urea Nitrogen


  10 mg/dL


(7-18)


 


Creatinine


  0.7 MG/DL


(0.55-1.30)


 


Estimat Glomerular Filtration


Rate > 60 mL/min


(>60)


 


Glucose Level


  84 MG/DL


()


 


Calcium Level


  7.0 MG/DL


(8.5-10.1)  L


 


Total Bilirubin


  0.7 MG/DL


(0.2-1.0)


 


Aspartate Amino Transf


(AST/SGOT) 41 U/L (15-37)


H


 


Alanine Aminotransferase


(ALT/SGPT) 11 U/L (12-78)


L


 


Alkaline Phosphatase


  84 U/L


()


 


Total Protein


  5.1 G/DL


(6.4-8.2)  L


 


Albumin


  1.4 G/DL


(3.4-5.0)  L


 


Globulin 3.7 g/dL  


 


Albumin/Globulin Ratio


  0.4 (1.0-2.7)


L


 


Vancomycin Level Trough


  8.0 ug/mL


(5.0-12.0)











Current Medications








 Medications


  (Trade)  Dose


 Ordered  Sig/Marisa


 Route


 PRN Reason  Start Time


 Stop Time Status Last Admin


Dose Admin


 


 Acetaminophen


  (Tylenol)  650 mg  Q4H  PRN


 ORAL


 Temp >100.5  5/13/20 06:15


 6/12/20 06:14   


 


 


 Albuterol/


 Ipratropium


  (Albuterol/


 Ipratropium)  3 ml  Q4H  PRN


 HHN


 Shortness of Breath  5/13/20 06:15


 5/18/20 06:14   


 


 


 Dextrose


  (Dextrose 50%)  25 ml  Q30M  PRN


 IV


 Hypoglycemia  5/13/20 06:15


 8/11/20 06:14   


 


 


 Dextrose


  (Dextrose 50%)  50 ml  Q30M  PRN


 IV


 Hypoglycemia  5/13/20 06:15


 8/11/20 06:14   


 


 


 Docusate Sodium


  (Colace)  100 mg  EVERY 12  HOURS


 ORAL


   5/13/20 09:00


 6/12/20 08:59  5/14/20 20:45


 


 


 Heparin Sodium/


 Sodium Chloride


  (Heparin 1000


 units/500ml


 Premix)  1,000 unit  ONCE  PRN


 INJ


 radiology procedure  5/13/20 16:30


 5/15/20 16:29   


 


 


 Hydromorphone HCl


  (Dilaudid)  1 mg  Q6H  PRN


 IVP


 Moderate Pain (Pain Scale 4-6)  5/13/20 06:15


 5/20/20 06:14  5/13/20 20:33


 


 


 Iohexol


  (OMNIPAQUE-300


 100ml)  100 ml  ONCE  PRN


 INJ


 radiology procedure  5/13/20 16:30


 5/15/20 16:29   


 


 


 Iron Sucrose 100


 mg/Sodium Chloride  60 ml @ 


 240 mls/hr  BEDTIME


 IV


   5/14/20 21:00


 5/18/20 21:14  5/14/20 20:45


 


 


 Lidocaine HCl


  (Xylocaine 1%


 30ml)  30 ml  NOW  PRN


 INJ


 Radiology Procedure  5/12/20 21:45


 5/15/20 21:35   


 


 


 Lorazepam


  (Ativan)  1 mg  Q4H  PRN


 ORAL


 For Anxiety  5/13/20 06:15


 5/20/20 06:14   


 


 


 Ondansetron HCl


  (Zofran)  4 mg  Q6H  PRN


 IVP


 Nausea & Vomiting  5/13/20 06:15


 6/12/20 06:14   


 


 


 Pantoprazole 80


 mg/Sodium Chloride  250 ml @ 


 25 mls/hr  Q10H


 IV


   5/13/20 09:00


 6/12/20 08:59  5/15/20 01:07


 


 


 Piperacillin Sod/


 Tazobactam Sod


 3.375 gm/Sodium


 Chloride  110 ml @ 


 27.5 mls/hr  EVERY 8  HOURS


 IVPB


   5/13/20 14:00


 5/18/20 13:59  5/15/20 05:18


 


 


 Vancomycin HCl


  (Vanco rx to


 dose)  1 ea  DAILY  PRN


 MISC


 Per rx protocol  5/13/20 13:15


 6/12/20 13:14   


 


 


 Vancomycin HCl


 750 mg/Sodium


 Chloride  275 ml @ 


 183.333


 mls/hr  Q8H


 IVPB


   5/15/20 05:00


 5/20/20 04:59  5/15/20 05:18


 


 


 Zolpidem Tartrate


  (Ambien)  5 mg  HSPRN  PRN


 ORAL


 Insomnia  5/13/20 06:15


 5/20/20 06:14  5/14/20 20:45


 

















Alexa Gonzalez M.D. May 15, 2020 10:49

## 2020-05-15 NOTE — PULMONOLOGY PROGRESS NOTE
Subjective


ROS Limited/Unobtainable:  Yes


Interval Events:  S/p IVC filter; doing better


Constitutional:  Reports: no symptoms


HEENT:  Repors: no symptoms


Respiratory:  Reports: no symptoms


Cardiovascular:  Reports: no symptoms


Gastrointestinal/Abdominal:  Reports: no symptoms


Genitourinary:  Reports: no symptoms


Neurologic:  Reports: no symptoms


Allergies:  


Coded Allergies:  


     No Known Allergies (Unverified , 5/12/20)


All Systems:  reviewed and negative except above





Objective





Last 24 Hour Vital Signs








  Date Time  Temp Pulse Resp B/P (MAP) Pulse Ox O2 Delivery O2 Flow Rate FiO2


 


5/15/20 11:00  92 19 101/72 (82) 100   


 


5/15/20 10:00  87 18 96/62 (73) 100   


 


5/15/20 09:00  87 17 102/72 (82) 100   


 


5/15/20 08:00  94      


 


5/15/20 08:00 98.2 94 24 96/75 (82) 100   


 


5/15/20 08:00      Nasal Cannula 2.0 


 


5/15/20 07:40     100 Nasal Cannula 2.0 28


 


5/15/20 07:00  86 17 104/59 (74) 100   


 


5/15/20 06:00  92 18 106/61 (76) 100   


 


5/15/20 05:00  91 17 109/60 (76) 100   


 


5/15/20 04:00  89      


 


5/15/20 04:00      Nasal Cannula 2.0 


 


5/15/20 04:00 98.5 88 18 104/59 (74) 100   


 


5/15/20 03:00  89 18 112/64 (80) 100   


 


5/15/20 02:00  95 19 104/67 (79) 100   


 


5/15/20 01:00  91 17 104/65 (78) 100   


 


5/15/20 00:00  94      


 


5/15/20 00:00 99.3 92 16 108/66 (80) 100   


 


5/15/20 00:00      Nasal Cannula 2.0 


 


5/14/20 23:00  93 18 107/66 (80) 100   


 


5/14/20 22:00  100 26 122/71 (88) 96   


 


5/14/20 21:00  98 25 120/75 (90) 100   


 


5/14/20 20:00      Nasal Cannula 2.0 


 


5/14/20 20:00 98.9 100 27 116/72 (87) 100   


 


5/14/20 19:43     100 Nasal Cannula 2.0 28


 


5/14/20 19:00  100 26 124/78 (93) 100   


 


5/14/20 18:00  93 26 111/69 (83) 100   


 


5/14/20 17:00  96 24 115/68 (84) 100   


 


5/14/20 16:00  98      


 


5/14/20 16:00  97 27 116/67 (83) 100   


 


5/14/20 16:00      Nasal Cannula 2.0 


 


5/14/20 16:00 98.3 97 27 116/67 (83) 100   


 


5/14/20 15:00  92 23 106/69 (81) 100   


 


5/14/20 14:00  94 25 103/66 (78) 100   


 


5/14/20 13:00  97 24 100/78 (85) 98   


 


5/14/20 12:00  97      


 


5/14/20 12:00 98.7 100 25 97/58 (71) 100   


 


5/14/20 12:00      Nasal Cannula 2.0 

















Intake and Output  


 


 5/14/20 5/15/20





 19:00 07:00


 


Intake Total 1026.23305 ml 1256.875 ml


 


Output Total 765 ml 750 ml


 


Balance 261.08357 ml 506.875 ml


 


  


 


Intake Oral  500 ml


 


IV Total 1026.52427 ml 756.875 ml


 


Output Urine Total 765 ml 750 ml


 


# Bowel Movements 1 2








General Appearance:  no acute distress


HEENT:  normocephalic


Respiratory/Chest:  chest wall non-tender


Cardiovascular:  normal peripheral pulses


Abdomen:  distended


Extremities:  no cyanosis


Skin:  no rash





Microbiology








 Date/Time


Source Procedure


Growth Status


 


 


 5/13/20 20:00


Blood Blood Culture - Preliminary


NO GROWTH AFTER 24 HOURS Resulted


 


 5/13/20 19:55


Blood Blood Culture - Preliminary


NO GROWTH AFTER 24 HOURS Resulted


 


 5/12/20 19:00


Blood Blood Culture - Preliminary Resulted


 


 5/12/20 19:00


Blood Blood Culture - Final


Enterobacter Aerogenes


Staphylococcus Epidermidis Complete


 


 5/12/20 21:00


Nasal Nares MRSA Culture - Final


NO METHICILLIN RESISTANT STAPH AUREUS... Complete


 


 5/12/20 19:10


Nasopharynx Coronavirus COVID-19 PCR (APARNA) - Final Complete


 


 5/14/20 16:59


Stool Clostridium difficile Toxin Assay - Final Complete


 


 5/12/20 20:00


Urine,Clean Catch Urine Culture - Final


Mixed Urogenital Contaminants Complete


 


 5/12/20 21:00


Rectum - Final


NO CARBAPENEM-RESISTANT ENTEROBACTERI... Complete


 


 5/12/20 21:00


Rectum VRE Culture - Final


NO VANCOMYCIN RESISTANT ENTEROCOCCUS ... Complete








Laboratory Tests


5/15/20 04:00: 


White Blood Count 21.6H, Red Blood Count 3.01L, Hemoglobin 8.6L, Hematocrit 

25.4L, Mean Corpuscular Volume 84, Mean Corpuscular Hemoglobin 28.8, Mean 

Corpuscular Hemoglobin Concent 34.1, Red Cell Distribution Width 15.1H, 

Platelet Count 79L, Mean Platelet Volume 5.2L, Neutrophils (%) (Auto) , 

Lymphocytes (%) (Auto) , Monocytes (%) (Auto) , Eosinophils (%) (Auto) , 

Basophils (%) (Auto) , Differential Total Cells Counted 100, Neutrophils % (

Manual) 88H, Lymphocytes % (Manual) 9L, Monocytes % (Manual) 2, Eosinophils % (

Manual) 1, Basophils % (Manual) 0, Band Neutrophils 0, Platelet Estimate 

DecreasedL, Platelet Morphology Normal, Polychromasia 1+, Hypochromasia 1+, 

Anisocytosis 1+, Prothrombin Time 15.1H, Prothromb Time International Ratio 1.4H

, Sodium Level 142, Potassium Level 3.4L, Chloride Level 110H, Carbon Dioxide 

Level 20L, Anion Gap 12, Blood Urea Nitrogen 10, Creatinine 0.7, Estimat 

Glomerular Filtration Rate > 60, Glucose Level 84, Calcium Level 7.0L, Total 

Bilirubin 0.7, Aspartate Amino Transf (AST/SGOT) 41H, Alanine Aminotransferase (

ALT/SGPT) 11L, Alkaline Phosphatase 84, Total Protein 5.1L, Albumin 1.4L, 

Globulin 3.7, Albumin/Globulin Ratio 0.4L, Vancomycin Level Trough 8.0





Current Medications








 Medications


  (Trade)  Dose


 Ordered  Sig/Marisa


 Route


 PRN Reason  Start Time


 Stop Time Status Last Admin


Dose Admin


 


 Acetaminophen


  (Tylenol)  650 mg  Q4H  PRN


 ORAL


 Temp >100.5  5/13/20 06:15


 6/12/20 06:14   


 


 


 Albuterol/


 Ipratropium


  (Albuterol/


 Ipratropium)  3 ml  Q4H  PRN


 HHN


 Shortness of Breath  5/13/20 06:15


 5/18/20 06:14   


 


 


 Cefepime HCl 1 gm/


 Dextrose  55 ml @ 


 110 mls/hr  Q8H


 IVPB


   5/15/20 12:00


 5/22/20 11:59   


 


 


 Dextrose


  (Dextrose 50%)  25 ml  Q30M  PRN


 IV


 Hypoglycemia  5/13/20 06:15


 8/11/20 06:14   


 


 


 Dextrose


  (Dextrose 50%)  50 ml  Q30M  PRN


 IV


 Hypoglycemia  5/13/20 06:15


 8/11/20 06:14   


 


 


 Docusate Sodium


  (Colace)  100 mg  EVERY 12  HOURS


 ORAL


   5/13/20 09:00


 6/12/20 08:59  5/14/20 20:45


 


 


 Heparin Sodium/


 Sodium Chloride


  (Heparin 1000


 units/500ml


 Premix)  1,000 unit  ONCE  PRN


 INJ


 radiology procedure  5/13/20 16:30


 5/15/20 16:29   


 


 


 Hydromorphone HCl


  (Dilaudid)  1 mg  Q6H  PRN


 IVP


 Moderate Pain (Pain Scale 4-6)  5/13/20 06:15


 5/20/20 06:14  5/13/20 20:33


 


 


 Iohexol


  (OMNIPAQUE-300


 100ml)  100 ml  ONCE  PRN


 INJ


 radiology procedure  5/13/20 16:30


 5/15/20 16:29   


 


 


 Iohexol


  (Omnipaque 350


 100ml)  100 ml  NOW  PRN


 INJ


 Radiology Procedure  5/15/20 11:00


 5/17/20 10:46   


 


 


 Iron Sucrose 100


 mg/Sodium Chloride  60 ml @ 


 240 mls/hr  BEDTIME


 IV


   5/14/20 21:00


 5/18/20 21:14  5/14/20 20:45


 


 


 Lidocaine HCl


  (Xylocaine 1%


 30ml)  30 ml  NOW  PRN


 INJ


 Radiology Procedure  5/12/20 21:45


 5/15/20 21:35   


 


 


 Lorazepam


  (Ativan)  1 mg  Q4H  PRN


 ORAL


 For Anxiety  5/13/20 06:15


 5/20/20 06:14   


 


 


 Metronidazole  100 ml @ 


 100 mls/hr  Q8HR


 IVPB


   5/15/20 14:00


 5/22/20 13:59   


 


 


 Ondansetron HCl


  (Zofran)  4 mg  Q6H  PRN


 IVP


 Nausea & Vomiting  5/13/20 06:15


 6/12/20 06:14   


 


 


 Pantoprazole 80


 mg/Sodium Chloride  250 ml @ 


 25 mls/hr  Q10H


 IV


   5/13/20 09:00


 6/12/20 08:59  5/15/20 01:07


 


 


 Zolpidem Tartrate


  (Ambien)  5 mg  HSPRN  PRN


 ORAL


 Insomnia  5/13/20 06:15


 5/20/20 06:14  5/14/20 20:45


 











Assessment/Plan


Assessment/Plan


IMPRESSION:


1. Gastric mass.


2. Severe anemia.


3. Pulmonary embolism.





DISCUSSION:  





 Patient has undergone IVC filter.  S/p blood transfusion, IV fluids.  He will 

need surgical intervention with


endoscopy and gastric resection.  I will follow carefully.














  ______________________________________________


  MARIANELA Downs Omar Syed MD May 15, 2020 11:58

## 2020-05-15 NOTE — NUR
NURSE NOTES:



Dr. Viramontes spoke with patient at the bedside regarding patient care plan, 

no verbal orders given at this time, 



patient placed on full liquid diet per dr. Nielsen.  

he is eating lunch with no assistance,

## 2020-05-15 NOTE — NUR
NURSE NOTES:



Dr. Gonzalez made aware of covid swab resulting in negative. 

patient is calm and resting on bed with nasal cannula on 2L./min saturating at 100% with RR 
of 21. 

no orders given at this time.

## 2020-05-15 NOTE — NUR
NURSE NOTES:



Dr. Gonzalez informed patient second COVID swab has been collected and sent for analysis to 
the laboratory, 

agreed to have CTA chest scan placed on hold while the second COVID swab results are 
pending. 



also stool OB collected and sent for analysis, stool color remains brown and liquid.

## 2020-05-15 NOTE — HEMATOLOGY/ONC PROGRESS NOTE
Assessment/Plan


Assessment/Plan


Assessment and Recs


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


--> CT shows    Centrally necrotic mass arising from the gastric fundus 

measuring 12. 6 x 11.1 x 14 cm.  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  

Gastric adenocarcinoma, lymphoma, and other neoplastic etiologies are also in 

the differential. Filling defects within pulmonary vessels at the lung bases 

with small peripheral airspace opacities concerning for pulmonary emboli and 

small pulmonary infarcts.


--> per gi and surgery to obtain a biopsy


--> ideally requires resection if possible with as much as possible negative 

margins


--> adjuvant chemotherapy v pill (imatinib) based on type of cancer


# Pulmonary embolism -- likely related to hypercoagulable disorder from 

malignancys/p ivc filter


--> case dw pcp, surg, and pulm


--> is not a candidate for anticoagulation given severe anemia and low platelets


--> 5/13 s/p ivc filter placement


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


--> have ordered for repeat prbc transfusions


--> hgb goal is >7


--> have ordered for anemia panel


--> gi to access in re to biopsy/endoscopy


--> IV IRON STARTED


--> hgb trend: 8.6 


# Leukocytosis is likely reactive process from cancer


--> on abx as needed


--> smear is noted


--> meds are reviewed


--> vanc/zosyn


--> wbc trend: 21.6 


# Diarrhea


--> c diff negative 


# PABLO, improving


# HTN





The timing of this note does not necessarily reflect the time of the patient 

was seen.





Greatly appreciate consultation.





Subjective


Allergies:  


Coded Allergies:  


     No Known Allergies (Unverified , 5/12/20)


Subjective


5/14 labs are noted, no bleeding, meds reviewed, for egd when iso off


5/15 icu, no overnight events, alert, iv iron, c diff negative





Objective


Objective





Current Medications








 Medications


  (Trade)  Dose


 Ordered  Sig/Marisa


 Route


 PRN Reason  Start Time


 Stop Time Status Last Admin


Dose Admin


 


 Acetaminophen


  (Tylenol)  650 mg  Q4H  PRN


 ORAL


 Temp >100.5  5/13/20 06:15


 6/12/20 06:14   


 


 


 Albuterol/


 Ipratropium


  (Albuterol/


 Ipratropium)  3 ml  Q4H  PRN


 HHN


 Shortness of Breath  5/13/20 06:15


 5/18/20 06:14   


 


 


 Dextrose


  (Dextrose 50%)  25 ml  Q30M  PRN


 IV


 Hypoglycemia  5/13/20 06:15


 8/11/20 06:14   


 


 


 Dextrose


  (Dextrose 50%)  50 ml  Q30M  PRN


 IV


 Hypoglycemia  5/13/20 06:15


 8/11/20 06:14   


 


 


 Docusate Sodium


  (Colace)  100 mg  EVERY 12  HOURS


 ORAL


   5/13/20 09:00


 6/12/20 08:59  5/14/20 20:45


 


 


 Heparin Sodium/


 Sodium Chloride


  (Heparin 1000


 units/500ml


 Premix)  1,000 unit  ONCE  PRN


 INJ


 radiology procedure  5/13/20 16:30


 5/15/20 16:29   


 


 


 Hydromorphone HCl


  (Dilaudid)  1 mg  Q6H  PRN


 IVP


 Moderate Pain (Pain Scale 4-6)  5/13/20 06:15


 5/20/20 06:14  5/13/20 20:33


 


 


 Iohexol


  (OMNIPAQUE-300


 100ml)  100 ml  ONCE  PRN


 INJ


 radiology procedure  5/13/20 16:30


 5/15/20 16:29   


 


 


 Iron Sucrose 100


 mg/Sodium Chloride  60 ml @ 


 240 mls/hr  BEDTIME


 IV


   5/14/20 21:00


 5/18/20 21:14  5/14/20 20:45


 


 


 Lidocaine HCl


  (Xylocaine 1%


 30ml)  30 ml  NOW  PRN


 INJ


 Radiology Procedure  5/12/20 21:45


 5/15/20 21:35   


 


 


 Lorazepam


  (Ativan)  1 mg  Q4H  PRN


 ORAL


 For Anxiety  5/13/20 06:15


 5/20/20 06:14   


 


 


 Ondansetron HCl


  (Zofran)  4 mg  Q6H  PRN


 IVP


 Nausea & Vomiting  5/13/20 06:15


 6/12/20 06:14   


 


 


 Pantoprazole 80


 mg/Sodium Chloride  250 ml @ 


 25 mls/hr  Q10H


 IV


   5/13/20 09:00


 6/12/20 08:59  5/15/20 01:07


 


 


 Piperacillin Sod/


 Tazobactam Sod


 3.375 gm/Sodium


 Chloride  110 ml @ 


 27.5 mls/hr  EVERY 8  HOURS


 IVPB


   5/13/20 14:00


 5/18/20 13:59  5/15/20 05:18


 


 


 Vancomycin HCl


  (Vanco rx to


 dose)  1 ea  DAILY  PRN


 MISC


 Per rx protocol  5/13/20 13:15


 6/12/20 13:14   


 


 


 Vancomycin HCl


 750 mg/Sodium


 Chloride  275 ml @ 


 183.333


 mls/hr  Q8H


 IVPB


   5/15/20 05:00


 5/20/20 04:59  5/15/20 05:18


 


 


 Zolpidem Tartrate


  (Ambien)  5 mg  HSPRN  PRN


 ORAL


 Insomnia  5/13/20 06:15


 5/20/20 06:14  5/14/20 20:45


 











Last 24 Hour Vital Signs








  Date Time  Temp Pulse Resp B/P (MAP) Pulse Ox O2 Delivery O2 Flow Rate FiO2


 


5/15/20 08:00  94      


 


5/15/20 08:00 98.2 94 24 96/75 (82) 100   


 


5/15/20 08:00      Nasal Cannula 2.0 


 


5/15/20 07:40     100 Nasal Cannula 2.0 28


 


5/15/20 07:00  86 17 104/59 (74) 100   


 


5/15/20 06:00  92 18 106/61 (76) 100   


 


5/15/20 05:00  91 17 109/60 (76) 100   


 


5/15/20 04:00  89      


 


5/15/20 04:00      Nasal Cannula 2.0 


 


5/15/20 04:00 98.5 88 18 104/59 (74) 100   


 


5/15/20 03:00  89 18 112/64 (80) 100   


 


5/15/20 02:00  95 19 104/67 (79) 100   


 


5/15/20 01:00  91 17 104/65 (78) 100   


 


5/15/20 00:00  94      


 


5/15/20 00:00 99.3 92 16 108/66 (80) 100   


 


5/15/20 00:00      Nasal Cannula 2.0 


 


5/14/20 23:00  93 18 107/66 (80) 100   


 


5/14/20 22:00  100 26 122/71 (88) 96   


 


5/14/20 21:00  98 25 120/75 (90) 100   


 


5/14/20 20:00      Nasal Cannula 2.0 


 


5/14/20 20:00 98.9 100 27 116/72 (87) 100   


 


5/14/20 19:43     100 Nasal Cannula 2.0 28


 


5/14/20 19:00  100 26 124/78 (93) 100   


 


5/14/20 18:00  93 26 111/69 (83) 100   


 


5/14/20 17:00  96 24 115/68 (84) 100   


 


5/14/20 16:00  98      


 


5/14/20 16:00  97 27 116/67 (83) 100   


 


5/14/20 16:00      Nasal Cannula 2.0 


 


5/14/20 16:00 98.3 97 27 116/67 (83) 100   


 


5/14/20 15:00  92 23 106/69 (81) 100   


 


5/14/20 14:00  94 25 103/66 (78) 100   


 


5/14/20 13:00  97 24 100/78 (85) 98   


 


5/14/20 12:00  97      


 


5/14/20 12:00 98.7 100 25 97/58 (71) 100   


 


5/14/20 12:00      Nasal Cannula 2.0 


 


5/14/20 11:00  89 22 99/56 (70) 98   


 


5/14/20 10:00  90 24 105/68 (80) 100   


 


5/14/20 09:12     100 Nasal Cannula 2.0 28


 


5/14/20 09:00  94 26 104/64 (77) 100   


 


5/14/20 08:00 98.1 89 20 100/62 (75) 100   


 


5/14/20 08:00  86      


 


5/14/20 08:00      Nasal Cannula 2.0 


 


5/14/20 07:00  90 21 97/63 (74) 100   


 


5/14/20 06:00  96 20 107/64 (78) 100   


 


5/14/20 05:00  98 16 110/61 (77) 100   


 


5/14/20 04:00 98.3 94 25 102/64 (77) 100   


 


5/14/20 04:00  98      


 


5/14/20 04:00      Nasal Cannula 2.0 


 


5/14/20 03:00  92 21 104/65 (78) 100   


 


5/14/20 02:00  87 18 91/60 (70) 100   


 


5/14/20 01:00  95 24 120/70 (87) 100   


 


5/14/20 00:00  89      


 


5/14/20 00:00 98.7 89 25 102/69 (80) 100   


 


5/14/20 00:00      Nasal Cannula 2.0 


 


5/13/20 23:00  84 16 100/59 (73) 100   


 


5/13/20 22:00  81 25 103/63 (76) 100   


 


5/13/20 21:30  87 26 106/68 (81) 99   


 


5/13/20 21:00  94 19 115/66 (82) 100   


 


5/13/20 20:02     100 Nasal Cannula 2.0 28


 


5/13/20 20:00 99.1 84 22 101/61 (74) 100   


 


5/13/20 20:00      Nasal Cannula 2.0 


 


5/13/20 20:00  85      


 


5/13/20 19:00  81 25 103/63 (76) 100   


 


5/13/20 18:07  85 24 99/60 (73) 100   


 


5/13/20 17:18  98 20    4.0 


 


5/13/20 17:00  98 20 101/60 (74) 100   


 


5/13/20 16:00      Nasal Cannula 2.0 


 


5/13/20 16:00 97.8 90 20 103/62 (76) 100   


 


5/13/20 16:00  89      


 


5/13/20 15:00  93 19 108/64 (79) 100   


 


5/13/20 14:00  93 19 107/64 (78) 100   


 


5/13/20 13:00  92 21 98/60 (73) 100   


 


5/13/20 12:00      Nasal Cannula 2.0 


 


5/13/20 12:00  94      


 


5/13/20 12:00 98.4 93 24 100/58 (72) 100   


 


5/13/20 11:00  103 17 107/58 (74) 100   

















Intake and Output  


 


 5/14/20 5/15/20





 19:00 07:00


 


Intake Total 1026.60140 ml 1256.875 ml


 


Output Total 765 ml 750 ml


 


Balance 261.43774 ml 506.875 ml


 


  


 


Intake Oral  500 ml


 


IV Total 1026.16912 ml 756.875 ml


 


Output Urine Total 765 ml 750 ml


 


# Bowel Movements 1 2











Labs








Test


  5/12/20


19:00 5/12/20


19:10 5/12/20


20:00 5/12/20


21:00


 


White Blood Count


  27.4 K/UL


(4.8-10.8) 


  


  


 


 


Red Blood Count


  1.44 M/UL


(4.70-6.10) 


  


  


 


 


Hemoglobin


  3.5 G/DL


(14.2-18.0) 


  


  


 


 


Hematocrit


  12.9 %


(42.0-52.0) 


  


  


 


 


Mean Corpuscular Volume 90 FL (80-99)    


 


Mean Corpuscular Hemoglobin


  24.3 PG


(27.0-31.0) 


  


  


 


 


Mean Corpuscular Hemoglobin


Concent 27.1 G/DL


(32.0-36.0) 


  


  


 


 


Red Cell Distribution Width


  22.0 %


(11.6-14.8) 


  


  


 


 


Platelet Count


  204 K/UL


(150-450) 


  


  


 


 


Mean Platelet Volume


  6.0 FL


(6.5-10.1) 


  


  


 


 


Neutrophils (%) (Auto)  % (45.0-75.0)    


 


Lymphocytes (%) (Auto)  % (20.0-45.0)    


 


Monocytes (%) (Auto)  % (1.0-10.0)    


 


Eosinophils (%) (Auto)  % (0.0-3.0)    


 


Basophils (%) (Auto)  % (0.0-2.0)    


 


Differential Total Cells


Counted 100 


  


  


  


 


 


Neutrophils % (Manual) 91 % (45-75)    


 


Lymphocytes % (Manual) 5 % (20-45)    


 


Monocytes % (Manual) 3 % (1-10)    


 


Eosinophils % (Manual) 0 % (0-3)    


 


Basophils % (Manual) 0 % (0-2)    


 


Band Neutrophils 1 % (0-8)    


 


Nucleated Red Blood Cells 4 /100 WBC    


 


Other Cell Type See comments    


 


Platelet Estimate Adequate    


 


Platelet Morphology Normal    


 


Polychromasia 2+    


 


Hypochromasia 3+    


 


Anisocytosis 3+    


 


Sodium Level


  138 MMOL/L


(136-145) 


  


  


 


 


Potassium Level


  3.8 MMOL/L


(3.5-5.1) 


  


  


 


 


Chloride Level


  104 MMOL/L


() 


  


  


 


 


Carbon Dioxide Level


  16 MMOL/L


(21-32) 


  


  


 


 


Anion Gap


  18 mmol/L


(5-15) 


  


  


 


 


Blood Urea Nitrogen


  26 mg/dL


(7-18) 


  


  


 


 


Creatinine


  1.2 MG/DL


(0.55-1.30) 


  


  


 


 


Estimat Glomerular Filtration


Rate > 60 mL/min


(>60) 


  


  


 


 


Glucose Level


  180 MG/DL


() 


  


  


 


 


Lactic Acid Level


  6.80 mmol/L


(0.4-2.0) 


  


  7.70 mmol/L


(0.66-2.22)


 


Calcium Level


  7.3 MG/DL


(8.5-10.1) 


  


  


 


 


Phosphorus Level


  4.5 MG/DL


(2.5-4.9) 


  


  


 


 


Magnesium Level


  2.6 MG/DL


(1.8-2.4) 


  


  


 


 


Total Bilirubin


  0.3 MG/DL


(0.2-1.0) 


  


  


 


 


Aspartate Amino Transf


(AST/SGOT) 29 U/L (15-37) 


  


  


  


 


 


Alanine Aminotransferase


(ALT/SGPT) 15 U/L (12-78) 


  


  


  


 


 


Alkaline Phosphatase


  90 U/L


() 


  


  


 


 


Total Creatine Kinase


  65 U/L


() 


  


  


 


 


Creatine Kinase MB


  < 0.5 NG/ML


(0.0-3.6) 


  


  


 


 


Creatine Kinase MB Relative


Index 0.7 


  


  


  


 


 


Troponin I


  0.010 ng/mL


(0.000-0.056) 


  


  


 


 


Pro-B-Type Natriuretic Peptide


  288 pg/mL


(0-125) 


  


  


 


 


Total Protein


  5.9 G/DL


(6.4-8.2) 


  


  


 


 


Albumin


  1.6 G/DL


(3.4-5.0) 


  


  


 


 


Globulin 4.3 g/dL    


 


Albumin/Globulin Ratio 0.4 (1.0-2.7)    


 


Lipase


  209 U/L


() 


  


  


 


 


Prothrombin Time


  


  14.9 SEC


(9.30-11.50) 


  


 


 


Prothromb Time International


Ratio 


  1.4 (0.9-1.1) 


  


  


 


 


Activated Partial


Thromboplast Time 


  25 SEC (23-33) 


  


  


 


 


Urine Color   Yellow  


 


Urine Appearance


  


  


  Slightly


cloudy 


 


 


Urine pH   5 (4.5-8.0)  


 


Urine Specific Gravity


  


  


  1.020


(1.005-1.035) 


 


 


Urine Protein   2+ (NEGATIVE)  


 


Urine Glucose (UA)


  


  


  Negative


(NEGATIVE) 


 


 


Urine Ketones


  


  


  Negative


(NEGATIVE) 


 


 


Urine Blood   4+ (NEGATIVE)  


 


Urine Nitrite


  


  


  Negative


(NEGATIVE) 


 


 


Urine Bilirubin   1+ (NEGATIVE)  


 


Urine Ictotest


  


  


  Negative


(NEGATIVE) 


 


 


Urine Urobilinogen


  


  


  4 MG/DL


(0.0-1.0) 


 


 


Urine Leukocyte Esterase


  


  


  Negative


(NEGATIVE) 


 


 


Urine RBC


  


  


  30-40 /HPF (0


- 0) 


 


 


Urine WBC


  


  


  0-2 /HPF (0 -


0) 


 


 


Urine Squamous Epithelial


Cells 


  


  Few /LPF


(NONE/OCC) 


 


 


Urine Bacteria


  


  


  Moderate /HPF


(NONE) 


 


 


Test


  5/13/20


10:55 5/13/20


19:55 5/14/20


04:25 5/15/20


04:00


 


White Blood Count


  28.3 K/UL


(4.8-10.8) 


  23.2 K/UL


(4.8-10.8) 21.6 K/UL


(4.8-10.8)


 


Red Blood Count


  2.84 M/UL


(4.70-6.10) 


  2.86 M/UL


(4.70-6.10) 3.01 M/UL


(4.70-6.10)


 


Hemoglobin


  8.3 G/DL


(14.2-18.0) 


  8.5 G/DL


(14.2-18.0) 8.6 G/DL


(14.2-18.0)


 


Hematocrit


  24.0 %


(42.0-52.0) 


  23.9 %


(42.0-52.0) 25.4 %


(42.0-52.0)


 


Mean Corpuscular Volume 84 FL (80-99)   84 FL (80-99)  84 FL (80-99) 


 


Mean Corpuscular Hemoglobin


  29.3 PG


(27.0-31.0) 


  29.6 PG


(27.0-31.0) 28.8 PG


(27.0-31.0)


 


Mean Corpuscular Hemoglobin


Concent 34.7 G/DL


(32.0-36.0) 


  35.5 G/DL


(32.0-36.0) 34.1 G/DL


(32.0-36.0)


 


Red Cell Distribution Width


  14.2 %


(11.6-14.8) 


  14.5 %


(11.6-14.8) 15.1 %


(11.6-14.8)


 


Platelet Count


  127 K/UL


(150-450) 


  91 K/UL


(150-450) 79 K/UL


(150-450)


 


Mean Platelet Volume


  5.1 FL


(6.5-10.1) 


  4.7 FL


(6.5-10.1) 5.2 FL


(6.5-10.1)


 


Neutrophils (%) (Auto)  % (45.0-75.0)    % (45.0-75.0)   % (45.0-75.0) 


 


Lymphocytes (%) (Auto)  % (20.0-45.0)    % (20.0-45.0)   % (20.0-45.0) 


 


Monocytes (%) (Auto)  % (1.0-10.0)    % (1.0-10.0)   % (1.0-10.0) 


 


Eosinophils (%) (Auto)  % (0.0-3.0)    % (0.0-3.0)   % (0.0-3.0) 


 


Basophils (%) (Auto)  % (0.0-2.0)    % (0.0-2.0)   % (0.0-2.0) 


 


Differential Total Cells


Counted 100 


  


  100 


  100 


 


 


Neutrophils % (Manual) 94 % (45-75)   92 % (45-75)  88 % (45-75) 


 


Lymphocytes % (Manual) 4 % (20-45)   4 % (20-45)  9 % (20-45) 


 


Monocytes % (Manual) 2 % (1-10)   4 % (1-10)  2 % (1-10) 


 


Eosinophils % (Manual) 0 % (0-3)   0 % (0-3)  1 % (0-3) 


 


Basophils % (Manual) 0 % (0-2)   0 % (0-2)  0 % (0-2) 


 


Band Neutrophils 0 % (0-8)   0 % (0-8)  0 % (0-8) 


 


Nucleated Red Blood Cells 1 /100 WBC    


 


Other Cell Type See comments    


 


Platelet Estimate Decreased   Decreased  Decreased 


 


Platelet Morphology Normal   Normal  Normal 


 


Polychromasia 2+    1+ 


 


Hypochromasia 2+   2+  1+ 


 


Anisocytosis 1+   1+  1+ 


 


Erythrocyte Sedimentation Rate


  32 MM/HR


(0-20) 


  


  


 


 


Reticulocyte Count


  5.8 %


(0.5-2.0) 


  


  


 


 


Prothrombin Time


  14.0 SEC


(9.30-11.50) 


  14.4 SEC


(9.30-11.50) 15.1 SEC


(9.30-11.50)


 


Prothromb Time International


Ratio 1.3 (0.9-1.1) 


  


  1.4 (0.9-1.1) 


  1.4 (0.9-1.1) 


 


 


Activated Partial


Thromboplast Time 28 SEC (23-33) 


  


  


  


 


 


Sodium Level


  142 MMOL/L


(136-145) 


  141 MMOL/L


(136-145) 142 MMOL/L


(136-145)


 


Potassium Level


  3.5 MMOL/L


(3.5-5.1) 


  3.3 MMOL/L


(3.5-5.1) 3.4 MMOL/L


(3.5-5.1)


 


Chloride Level


  110 MMOL/L


() 


  111 MMOL/L


() 110 MMOL/L


()


 


Carbon Dioxide Level


  19 MMOL/L


(21-32) 


  19 MMOL/L


(21-32) 20 MMOL/L


(21-32)


 


Anion Gap


  13 mmol/L


(5-15) 


  11 mmol/L


(5-15) 12 mmol/L


(5-15)


 


Blood Urea Nitrogen


  19 mg/dL


(7-18) 


  14 mg/dL


(7-18) 10 mg/dL


(7-18)


 


Creatinine


  0.9 MG/DL


(0.55-1.30) 


  0.8 MG/DL


(0.55-1.30) 0.7 MG/DL


(0.55-1.30)


 


Estimat Glomerular Filtration


Rate > 60 mL/min


(>60) 


  > 60 mL/min


(>60) > 60 mL/min


(>60)


 


Glucose Level


  103 MG/DL


() 


  93 MG/DL


() 84 MG/DL


()


 


Lactic Acid Level


  1.20 mmol/L


(0.4-2.0) 


  


  


 


 


Calcium Level


  6.8 MG/DL


(8.5-10.1) 


  6.9 MG/DL


(8.5-10.1) 7.0 MG/DL


(8.5-10.1)


 


Total Bilirubin


  0.8 MG/DL


(0.2-1.0) 


  


  0.7 MG/DL


(0.2-1.0)


 


Aspartate Amino Transf


(AST/SGOT) 39 U/L (15-37) 


  


  


  41 U/L (15-37) 


 


 


Alanine Aminotransferase


(ALT/SGPT) 15 U/L (12-78) 


  


  


  11 U/L (12-78) 


 


 


Alkaline Phosphatase


  78 U/L


() 


  


  84 U/L


()


 


C-Reactive Protein,


Quantitative 17.5 mg/dL


(0.00-0.90) 


  


  


 


 


Total Protein


  5.4 G/DL


(6.4-8.2) 


  


  5.1 G/DL


(6.4-8.2)


 


Albumin


  1.6 G/DL


(3.4-5.0) 


  


  1.4 G/DL


(3.4-5.0)


 


Globulin 3.8 g/dL    3.7 g/dL 


 


Albumin/Globulin Ratio 0.4 (1.0-2.7)    0.4 (1.0-2.7) 


 


Amylase Level


  68 U/L


() 


  


  


 


 


Lipase


  367 U/L


() 


  


  


 


 


Carcinoembryonic Antigen


  67.1 ng/mL


(0.0-4.7) 


  


  


 


 


CA 19-9 Antigen 8 U/mL (0-35)    


 


Iron Level


  


  20 ug/dL


() 


  


 


 


Total Iron Binding Capacity


  


  202 ug/dL


(250-450) 


  


 


 


Percent Iron Saturation  10 % (15-50)   


 


Unsaturated Iron Binding


  


  182 ug/dL


(112-346) 


  


 


 


Ferritin


  


  123 NG/ML


(8-388) 


  


 


 


Lactate Dehydrogenase


  


  358 U/L


() 


  


 


 


Vitamin B12 Level


  


  369 PG/ML


(193-986) 


  


 


 


Folate


  


  3.9 NG/ML


(8.6-58.9) 


  


 


 


Spherocytes   2+  


 


Troponin I


  


  


  0.000 ng/mL


(0.000-0.056) 


 


 


Thyroid Stimulating Hormone


(TSH) 


  


  1.552 uiU/mL


(0.358-3.740) 


 


 


Vancomycin Level Trough


  


  


  


  8.0 ug/mL


(5.0-12.0)








Micro





Microbiology








 Date/Time


Source Procedure


Growth Status


 


 


 5/14/20 16:59


Stool Clostridium difficile Toxin Assay - Final Complete








Height (Feet):  5


Height (Inches):  7.00


Weight (Pounds):  135


Objective








PE


General Appearance:  alert, mild distress


Head:  normocephalic, atraumatic


Eyes:  bilateral eye PERRL, bilateral eye EOMI


ENT:  uvula midline, dry mucus membranes


Neck:  supple, thyroid normal, supple/symm/no masses


Respiratory:  lungs clear, no respiratory distress, no retraction


Cardiovascular:  normal peripheral pulses, no edema


Gastrointestinal:  non tender, soft, no guarding, no rebound


Musculoskeletal:  normal inspection


Neurologic:  alert, oriented x3


Psychiatric:  mood/affect normal


Skin:  no rash, warm/dry











Jersey Ross MD May 15, 2020 10:35

## 2020-05-15 NOTE — NUR
NURSE NOTES:



Dr. Nielsen called to inform of liquid dark stool this morning and held of AM medication 
docusate, 

informed patient has a C-diff result of negative, 

no verbal orders given at this time.

## 2020-05-15 NOTE — NUR
August 2, 2018      [facility] [place] [address] [facility]  [address]  [address] [facility]  [address]  [facility] [address]  Phone: [phone]  Fax: [phone]          Patient: Jian Leiva   MR Number: [id number]   YOB: 2001   Date of Visit: 8/2/2018       Dear [facility]:    Thank you for referring Jian Leiva to me for evaluation. Attached you will find relevant portions of my assessment and plan of care.    If you have questions, please do not hesitate to call me. I look forward to following Jian Leiva along with you.    Sincerely,    NEFTALI Pineda,PNP-C    Enclosure  CC:  No Recipients    If you would like to receive this communication electronically, please contact [email or web address] or [phone] to request more information on [employer] access.    For providers and/or their staff who would like to refer a patient to [facility], please contact us through our one-stop-shop provider referral line, [facility], at [phone].    If you feel you have received this communication in error or would no longer like to receive these types of communications, please e-mail [email or web address]          HAND-OFF: 

Report given to Cheryle RAHMAN using SBAR.

## 2020-05-15 NOTE — DIAGNOSTIC IMAGING REPORT
Procedure: XRAY Chest 1v

Reason for study: Cough.

 

Comparison films:  None.

 

FINDINGS:

 

Radiograph slightly rotated. Vascularity is normal. The lung fields are clear

bilaterally. Cardiac and mediastinal silhouette are within normal limits.  CP angles

are sharp.  Degenerative changes of both shoulders noted.

 

IMPRESSION:  

 

NO ACUTE CARDIOPULMONARY DISEASE.

## 2020-05-16 VITALS — SYSTOLIC BLOOD PRESSURE: 108 MMHG | DIASTOLIC BLOOD PRESSURE: 70 MMHG

## 2020-05-16 VITALS — SYSTOLIC BLOOD PRESSURE: 114 MMHG | DIASTOLIC BLOOD PRESSURE: 72 MMHG

## 2020-05-16 VITALS — SYSTOLIC BLOOD PRESSURE: 96 MMHG | DIASTOLIC BLOOD PRESSURE: 63 MMHG

## 2020-05-16 VITALS — DIASTOLIC BLOOD PRESSURE: 64 MMHG | SYSTOLIC BLOOD PRESSURE: 96 MMHG

## 2020-05-16 VITALS — SYSTOLIC BLOOD PRESSURE: 99 MMHG | DIASTOLIC BLOOD PRESSURE: 65 MMHG

## 2020-05-16 VITALS — SYSTOLIC BLOOD PRESSURE: 108 MMHG | DIASTOLIC BLOOD PRESSURE: 71 MMHG

## 2020-05-16 VITALS — DIASTOLIC BLOOD PRESSURE: 66 MMHG | SYSTOLIC BLOOD PRESSURE: 99 MMHG

## 2020-05-16 VITALS — DIASTOLIC BLOOD PRESSURE: 61 MMHG | SYSTOLIC BLOOD PRESSURE: 101 MMHG

## 2020-05-16 VITALS — DIASTOLIC BLOOD PRESSURE: 73 MMHG | SYSTOLIC BLOOD PRESSURE: 110 MMHG

## 2020-05-16 VITALS — SYSTOLIC BLOOD PRESSURE: 96 MMHG | DIASTOLIC BLOOD PRESSURE: 66 MMHG

## 2020-05-16 VITALS — SYSTOLIC BLOOD PRESSURE: 106 MMHG | DIASTOLIC BLOOD PRESSURE: 66 MMHG

## 2020-05-16 VITALS — DIASTOLIC BLOOD PRESSURE: 60 MMHG | SYSTOLIC BLOOD PRESSURE: 93 MMHG

## 2020-05-16 VITALS — SYSTOLIC BLOOD PRESSURE: 98 MMHG | DIASTOLIC BLOOD PRESSURE: 60 MMHG

## 2020-05-16 VITALS — SYSTOLIC BLOOD PRESSURE: 108 MMHG | DIASTOLIC BLOOD PRESSURE: 68 MMHG

## 2020-05-16 VITALS — SYSTOLIC BLOOD PRESSURE: 101 MMHG | DIASTOLIC BLOOD PRESSURE: 68 MMHG

## 2020-05-16 VITALS — SYSTOLIC BLOOD PRESSURE: 98 MMHG | DIASTOLIC BLOOD PRESSURE: 62 MMHG

## 2020-05-16 VITALS — SYSTOLIC BLOOD PRESSURE: 94 MMHG | DIASTOLIC BLOOD PRESSURE: 61 MMHG

## 2020-05-16 LAB
ADD MANUAL DIFF: YES
ANION GAP SERPL CALC-SCNC: 10 MMOL/L (ref 5–15)
BUN SERPL-MCNC: 9 MG/DL (ref 7–18)
CALCIUM SERPL-MCNC: 7 MG/DL (ref 8.5–10.1)
CHLORIDE SERPL-SCNC: 111 MMOL/L (ref 98–107)
CO2 SERPL-SCNC: 20 MMOL/L (ref 21–32)
CREAT SERPL-MCNC: 0.8 MG/DL (ref 0.55–1.3)
ERYTHROCYTE [DISTWIDTH] IN BLOOD BY AUTOMATED COUNT: 15.8 % (ref 11.6–14.8)
HCT VFR BLD CALC: 28.4 % (ref 42–52)
HGB BLD-MCNC: 9.7 G/DL (ref 14.2–18)
MCV RBC AUTO: 86 FL (ref 80–99)
PLATELET # BLD: 88 K/UL (ref 150–450)
POTASSIUM SERPL-SCNC: 3.5 MMOL/L (ref 3.5–5.1)
RBC # BLD AUTO: 3.29 M/UL (ref 4.7–6.1)
SODIUM SERPL-SCNC: 141 MMOL/L (ref 136–145)
WBC # BLD AUTO: 18.3 K/UL (ref 4.8–10.8)

## 2020-05-16 RX ADMIN — SODIUM CHLORIDE SCH MLS/HR: 0.9 INJECTION INTRAVENOUS at 20:18

## 2020-05-16 RX ADMIN — SODIUM CHLORIDE SCH MLS/HR: 0.9 INJECTION INTRAVENOUS at 11:13

## 2020-05-16 RX ADMIN — PANTOPRAZOLE SODIUM SCH MLS/HR: 40 INJECTION, POWDER, FOR SOLUTION INTRAVENOUS at 06:38

## 2020-05-16 RX ADMIN — PANTOPRAZOLE SODIUM SCH MG: 40 INJECTION, POWDER, FOR SOLUTION INTRAVENOUS at 20:16

## 2020-05-16 RX ADMIN — SODIUM CHLORIDE SCH MLS/HR: 0.9 INJECTION INTRAVENOUS at 03:43

## 2020-05-16 RX ADMIN — SODIUM CHLORIDE SCH MLS/HR: 0.9 INJECTION INTRAVENOUS at 22:22

## 2020-05-16 RX ADMIN — SODIUM CHLORIDE SCH MLS/HR: 0.9 INJECTION INTRAVENOUS at 20:16

## 2020-05-16 RX ADMIN — DOCUSATE SODIUM SCH MG: 100 CAPSULE, LIQUID FILLED ORAL at 09:00

## 2020-05-16 NOTE — NUR
NURSE NOTES:

Received report from JOSIAH Lobato. Patient asleep and responsive to verbal, AAO X4. On O2 
2L/min via NC. and O2 sat 100% on the monitor. Right AC 20G IV, Left AC 18G IV and Left 
forearm 20G IV intact, patent and clean. Protonix 8mg/hr running at this time. kept dry, 
clean, comfortable and HOB>30. Will continue plan of care.

## 2020-05-16 NOTE — SURGERY PROGRESS NOTE
Surgery Progress Note


Subjective


Additional Comments


OR monday scheduled





Objective





Last 24 Hour Vital Signs








  Date Time  Temp Pulse Resp B/P (MAP) Pulse Ox O2 Delivery O2 Flow Rate FiO2


 


5/16/20 14:00  98 20 108/70 (83) 100   


 


5/16/20 13:00  100 20 110/73 (85) 100   


 


5/16/20 12:00  92      


 


5/16/20 12:00      Nasal Cannula 2.0 


 


5/16/20 12:00 97.8 91 22 108/68 (81) 100   


 


5/16/20 11:00  99 23 96/63 (74) 100   


 


5/16/20 10:00  99 20 106/66 (79) 100   


 


5/16/20 09:00  81 17 101/61 (74) 100   


 


5/16/20 08:10     100 Nasal Cannula 2.0 28


 


5/16/20 08:00  93      


 


5/16/20 08:00      Nasal Cannula 2.0 


 


5/16/20 08:00 97.8 79 22 98/60 (73) 100   


 


5/16/20 07:00  78 13 94/61 (72) 100   


 


5/16/20 06:00  80 18 93/60 (71) 100   


 


5/16/20 05:00  80 16 101/68 (79) 100   


 


5/16/20 04:00  81      


 


5/16/20 04:00      Nasal Cannula 2.0 


 


5/16/20 04:00 97.9 81 18 98/62 (74) 100   


 


5/16/20 03:00  80 14 96/66 (76) 100   


 


5/16/20 02:00  84 20 99/66 (77) 100   


 


5/16/20 01:00  81 15 96/64 (75) 100   


 


5/16/20 00:00      Nasal Cannula 2.0 


 


5/16/20 00:00 98.4 80 19 99/65 (76) 100   


 


5/15/20 23:00  84 13 98/59 (72) 100   


 


5/15/20 22:00  86 16 93/64 (74) 100   


 


5/15/20 21:00  90 16 103/70 (81) 100   


 


5/15/20 20:02     100 Nasal Cannula 2.0 28


 


5/15/20 20:00  92      


 


5/15/20 20:00      Nasal Cannula 2.0 


 


5/15/20 20:00 98.1 85 18 107/61 (76) 100   


 


5/15/20 19:00  88 16 97/50 (66) 100   


 


5/15/20 18:00  97 21 99/67 (78) 100   


 


5/15/20 17:00  99 14 107/67 (80) 100   


 


5/15/20 16:00  85      


 


5/15/20 16:00 97.6 85 18 100/72 (81) 100   


 


5/15/20 16:00      Nasal Cannula 2.0 


 


5/15/20 15:00  86 22 106/68 (81) 100   








I&O











Intake and Output  


 


 5/15/20 5/16/20





 19:00 07:00


 


Intake Total 2210.00 ml 1425 ml


 


Output Total 810 ml 400 ml


 


Balance 1400.00 ml 1025 ml


 


  


 


Intake Oral 1700 ml 760 ml


 


IV Total 510.00 ml 665 ml


 


Output Urine Total 810 ml 400 ml


 


# Bowel Movements 3 1








Dressing:  other


Wound:  other


Drains:  other


Cardiovascular:  RSR


Respiratory:  decreased breath sounds


Abdomen:  soft, non-tender, present bowel sounds


Extremities:  no cyanosis, other





Laboratory Tests








Test


  5/16/20


04:00


 


White Blood Count


  18.3 K/UL


(4.8-10.8)  H


 


Red Blood Count


  3.29 M/UL


(4.70-6.10)  L


 


Hemoglobin


  9.7 G/DL


(14.2-18.0)  L


 


Hematocrit


  28.4 %


(42.0-52.0)  L


 


Mean Corpuscular Volume 86 FL (80-99)  


 


Mean Corpuscular Hemoglobin


  29.5 PG


(27.0-31.0)


 


Mean Corpuscular Hemoglobin


Concent 34.2 G/DL


(32.0-36.0)


 


Red Cell Distribution Width


  15.8 %


(11.6-14.8)  H


 


Platelet Count


  88 K/UL


(150-450)  L


 


Mean Platelet Volume


  5.7 FL


(6.5-10.1)  L


 


Neutrophils (%) (Auto)


  % (45.0-75.0)


 


 


Lymphocytes (%) (Auto)


  % (20.0-45.0)


 


 


Monocytes (%) (Auto)  % (1.0-10.0)  


 


Eosinophils (%) (Auto)  % (0.0-3.0)  


 


Basophils (%) (Auto)  % (0.0-2.0)  


 


Differential Total Cells


Counted 100  


 


 


Neutrophils % (Manual) 89 % (45-75)  H


 


Lymphocytes % (Manual) 8 % (20-45)  L


 


Monocytes % (Manual) 3 % (1-10)  


 


Eosinophils % (Manual) 0 % (0-3)  


 


Basophils % (Manual) 0 % (0-2)  


 


Band Neutrophils 0 % (0-8)  


 


Platelet Estimate Decreased  L


 


Platelet Morphology Normal  


 


Polychromasia 1+  


 


Hypochromasia 1+  


 


Anisocytosis 1+  


 


Sodium Level


  141 MMOL/L


(136-145)


 


Potassium Level


  3.5 MMOL/L


(3.5-5.1)


 


Chloride Level


  111 MMOL/L


()  H


 


Carbon Dioxide Level


  20 MMOL/L


(21-32)  L


 


Anion Gap


  10 mmol/L


(5-15)


 


Blood Urea Nitrogen


  9 mg/dL (7-18)


 


 


Creatinine


  0.8 MG/DL


(0.55-1.30)


 


Estimat Glomerular Filtration


Rate > 60 mL/min


(>60)


 


Glucose Level


  83 MG/DL


()


 


Calcium Level


  7.0 MG/DL


(8.5-10.1)  L











Plan


Problems:  


(1) Lower extremity edema


Assessment & Plan:  Venous Duplex pending 


DVT noted on CT 


IV filter


cannot anticoagulate 





(2) Pulmonary embolism


Assessment & Plan:  Filling defects within pulmonary vessels at the lung bases 

with small 


peripheral airspace opacities concerning for pulmonary emboli and small 


pulmonary infarcts.





(3) GI bleed


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  Patient 

denies any knowledge of this mass.  Likely etiology of severe anemia hemoglobin 

3.5 being transfused emergently.  Patient states he feels very ill and 

potentially feels as if he is going to die.  High probability of GI bleed 

etiology is this large necrotic mass.


I had a long discussion with the patient in the emergency department regards 

above findings and care plan.  Discussed care plan with primary care physician 

GI and emergency permit physician.


Patient needs urgent blood transfusion which he is receiving in the ED as 

ordered.  Patient needs acute resuscitation followed by


Potential EGD.


Given severe anemia.  Gastric mass causing GI bleed.  Acute hemorrhagic GI 

bleed secondary to likely mass.  


 tumor needs biopsy and pathology evaluation


Despite above if continues to hemorrhage or does not respond appropriately will 

likely need acute life-saving emergency surgery for resection of acutely 

bleeding gastric tumor


If surgical intervention propose it would not be for curative intent but rather 

life-saving hemorrhage control


Discussed with patient in detail at bedside.


For now continue with resuscitation and will continue with further work-up





Npo


IV fluids


PRBC 


q6h h/h


trend labs


GI eval for EGD consideration


Heme Onc Eval for evaluation of large gastric tumor identification


surgery available and will follow with recs.


Thank you for allowing me to participate in patients care 


improving


resuscitation going well and safely


will plan resection soon 





(4) Gastric mass


Assessment & Plan:   ABDOMEN:


  Liver:  See above.


  Gallbladder and bile ducts:  Unremarkable.  No calcified stones.  No 


ductal dilation.


  Pancreas:  Unremarkable.  No mass.  No ductal dilation.


  Spleen:  Unremarkable.  No splenomegaly.


  Adrenals:  Unremarkable.  No mass.


  Kidneys and ureters:  Unremarkable.  No solid mass.  No hydronephrosis.


  Stomach and bowel:  Centrally necrotic mass arising from the gastric 


fundus measuring 12.6 x 11.1 x 14 cm.  Liquid stool within the rectum.


 


 PELVIS:


  Appendix:  No findings to suggest acute appendicitis.


  Bladder:  Unremarkable.  No mass.


  Reproductive:  Unremarkable as visualized.


 


 ABDOMEN and PELVIS:


  Intraperitoneal space:  Unremarkable.  No free air.  No significant 


fluid collection.


  Bones/joints:  No acute fracture.  No dislocation.


  Soft tissues:  Unremarkable.


  Vasculature:  Filling defects within pulmonary vessels at the lung 


bases with small peripheral airspace opacities concerning for pulmonary 


emboli and small pulmonary infarcts.  No abdominal aortic aneurysm.


  Lymph nodes:  Unremarkable.  No enlarged lymph nodes.


 


IMPRESSION:     


1.  Centrally necrotic mass arising from the gastric fundus measuring 12.


6 x 11.1 x 14 cm.  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  Gastric 


adenocarcinoma, lymphoma, and other neoplastic etiologies are also in the 


differential.


2.  Filling defects within pulmonary vessels at the lung bases with small 


peripheral airspace opacities concerning for pulmonary emboli and small 


pulmonary infarcts.





(5) COVID-19 ruled out


Assessment & Plan:  COVID 19 r/o


sob


respiratory symptoms


pending testing 


Defer to ID


COVID neg 5/12














Erik Fischer May 16, 2020 14:36

## 2020-05-16 NOTE — NUR
NURSE NOTES:

Received report from JOSIAH Kaur. Patient is awake, alert and oriented x 4. On nasal cannula @ 
2Lpm with no shortness or difficulty of breathing reported, saturating 98%. On a regular 
diet, soft easy chew-instructed and amenable. Cardiac monitor is in placed, shows sinus 
rhythm with no chest pain at this time. Patient is bedrest, on fall precaution. Mata 
catheter is in placed, F-16, draining yellow color output. IV site is on left forearm g-20 
and right forearm AC g-20 saline lock that is patent and intact. Safety measures are in 
placed, bed in lowest and lock position. Call light and bedside table within reach, 
instructed to call for any assistance needed.

## 2020-05-16 NOTE — NUR
NURSE NOTES: 

patient asleep arousable to name on 2L NC with oxygen saturation 100%. BP 99/65 HR80 NSR on 
monitor. Left AC running protonix at 25ml/hr clean and asymptomatic. tarango catheter draining 
light josh urine. patient repositioned. bed locked lowest position call light within reach. 
will continue to monitor.

## 2020-05-16 NOTE — NUR
NURSE NOTES: 

patient asleep arousable to name on 2L NC with oxygen saturation 100%. BP 98/62 HR81 NSR on 
monitor. Left AC running protonix at 25ml/hr clean and asymptomatic. tarango catheter draining 
dark josh urine. patient had episode of small brown watery stool. patient bathed and 
repositioned. bed locked lowest position call light within reach. will continue to monitor.

## 2020-05-16 NOTE — CARDIAC ELECTROPHYSIOLOGY PN
Assessment/Plan


Assessment/Plan


1. Hypotension due to profound anemia due to gastric mass.  Patient is off 

pressors,


received 5 units of blood transfusion. Echocardiogram pending Covid negativity





2. Profound anemia.  Hemoglobin of 3.5.  CT of abdomen and pelvis shows


central necrotic mass in the gastric fundus 12 x 14 x 11 cm.  Further 

evaluation by GI and Dr Fischer


3. Pulmonary embolus.  S/P IVC filter.


4. Sepsis with elevated white count.





QUINCY RN and Dr Fischer





Subjective


Subjective


Off pressors and  vent alert and eating. Continues with black tarry stool. 

Covid negative





Objective





Last 24 Hour Vital Signs








  Date Time  Temp Pulse Resp B/P (MAP) Pulse Ox O2 Delivery O2 Flow Rate FiO2


 


5/16/20 12:00      Nasal Cannula 2.0 


 


5/16/20 12:00 97.8 91 22 108/68 (81) 100   


 


5/16/20 11:00  99 23 96/63 (74) 100   


 


5/16/20 10:00  99 20 106/66 (79) 100   


 


5/16/20 09:00  81 17 101/61 (74) 100   


 


5/16/20 08:10     100 Nasal Cannula 2.0 28


 


5/16/20 08:00      Nasal Cannula 2.0 


 


5/16/20 08:00 97.8 79 22 98/60 (73) 100   


 


5/16/20 07:00  78 13 94/61 (72) 100   


 


5/16/20 06:00  80 18 93/60 (71) 100   


 


5/16/20 05:00  80 16 101/68 (79) 100   


 


5/16/20 04:00  81      


 


5/16/20 04:00      Nasal Cannula 2.0 


 


5/16/20 04:00 97.9 81 18 98/62 (74) 100   


 


5/16/20 03:00  80 14 96/66 (76) 100   


 


5/16/20 02:00  84 20 99/66 (77) 100   


 


5/16/20 01:00  81 15 96/64 (75) 100   


 


5/16/20 00:00      Nasal Cannula 2.0 


 


5/16/20 00:00 98.4 80 19 99/65 (76) 100   


 


5/15/20 23:00  84 13 98/59 (72) 100   


 


5/15/20 22:00  86 16 93/64 (74) 100   


 


5/15/20 21:00  90 16 103/70 (81) 100   


 


5/15/20 20:02     100 Nasal Cannula 2.0 28


 


5/15/20 20:00  92      


 


5/15/20 20:00      Nasal Cannula 2.0 


 


5/15/20 20:00 98.1 85 18 107/61 (76) 100   


 


5/15/20 19:00  88 16 97/50 (66) 100   


 


5/15/20 18:00  97 21 99/67 (78) 100   


 


5/15/20 17:00  99 14 107/67 (80) 100   


 


5/15/20 16:00  85      


 


5/15/20 16:00 97.6 85 18 100/72 (81) 100   


 


5/15/20 16:00      Nasal Cannula 2.0 


 


5/15/20 15:00  86 22 106/68 (81) 100   


 


5/15/20 14:00  89 21 104/68 (80) 100   

















Intake and Output  


 


 5/15/20 5/16/20





 18:59 06:59


 


Intake Total 2512.50 ml 1325 ml


 


Output Total 815 ml 405 ml


 


Balance 1697.50 ml 920 ml


 


  


 


Intake Oral 1700 ml 760 ml


 


IV Total 812.50 ml 565 ml


 


Output Urine Total 815 ml 405 ml


 


# Bowel Movements 2 2











Laboratory Tests








Test


  5/16/20


04:00


 


White Blood Count


  18.3 K/UL


(4.8-10.8)  H


 


Red Blood Count


  3.29 M/UL


(4.70-6.10)  L


 


Hemoglobin


  9.7 G/DL


(14.2-18.0)  L


 


Hematocrit


  28.4 %


(42.0-52.0)  L


 


Mean Corpuscular Volume 86 FL (80-99)  


 


Mean Corpuscular Hemoglobin


  29.5 PG


(27.0-31.0)


 


Mean Corpuscular Hemoglobin


Concent 34.2 G/DL


(32.0-36.0)


 


Red Cell Distribution Width


  15.8 %


(11.6-14.8)  H


 


Platelet Count


  88 K/UL


(150-450)  L


 


Mean Platelet Volume


  5.7 FL


(6.5-10.1)  L


 


Neutrophils (%) (Auto)


  % (45.0-75.0)


 


 


Lymphocytes (%) (Auto)


  % (20.0-45.0)


 


 


Monocytes (%) (Auto)  % (1.0-10.0)  


 


Eosinophils (%) (Auto)  % (0.0-3.0)  


 


Basophils (%) (Auto)  % (0.0-2.0)  


 


Differential Total Cells


Counted 100  


 


 


Neutrophils % (Manual) 89 % (45-75)  H


 


Lymphocytes % (Manual) 8 % (20-45)  L


 


Monocytes % (Manual) 3 % (1-10)  


 


Eosinophils % (Manual) 0 % (0-3)  


 


Basophils % (Manual) 0 % (0-2)  


 


Band Neutrophils 0 % (0-8)  


 


Platelet Estimate Decreased  L


 


Platelet Morphology Normal  


 


Polychromasia 1+  


 


Hypochromasia 1+  


 


Anisocytosis 1+  


 


Sodium Level


  141 MMOL/L


(136-145)


 


Potassium Level


  3.5 MMOL/L


(3.5-5.1)


 


Chloride Level


  111 MMOL/L


()  H


 


Carbon Dioxide Level


  20 MMOL/L


(21-32)  L


 


Anion Gap


  10 mmol/L


(5-15)


 


Blood Urea Nitrogen


  9 mg/dL (7-18)


 


 


Creatinine


  0.8 MG/DL


(0.55-1.30)


 


Estimat Glomerular Filtration


Rate > 60 mL/min


(>60)


 


Glucose Level


  83 MG/DL


()


 


Calcium Level


  7.0 MG/DL


(8.5-10.1)  L











Microbiology








 Date/Time


Source Procedure


Growth Status


 


 


 5/13/20 20:00


Blood Blood Culture - Preliminary


NO GROWTH AFTER 48 HOURS Resulted


 


 5/13/20 19:55


Blood Blood Culture - Preliminary


NO GROWTH AFTER 48 HOURS Resulted


 


 5/14/20 16:59


Stool Stool Culture - Preliminary


NORMAL FECAL BROOK. Resulted


 


 5/14/20 16:59


Stool Clostridium difficile Toxin Assay - Final Complete








Objective


HEAD AND NECK:  No JVD.


LUNGS:  Decreased breath sounds.


CARDIOVASCULAR:  Regular S1 and S2.  Tachycardic.


ABDOMEN:  Soft.


EXTREMITIES:  No pitting edema.











Aaron Antunez MD May 16, 2020 13:02

## 2020-05-16 NOTE — NUR
TRANSFER TO FLOOR:

Patient transferred to [facility]/209-2. Report given to JOSIAH Kaur. Inventory check done. Endorsed 
plan of care.

## 2020-05-16 NOTE — PULMONOLOGY PROGRESS NOTE
Subjective


ROS Limited/Unobtainable:  Yes


Interval Events:  S/p IVC filter; doing better


Constitutional:  Reports: no symptoms


HEENT:  Repors: no symptoms


Respiratory:  Reports: no symptoms


Cardiovascular:  Reports: no symptoms


Gastrointestinal/Abdominal:  Reports: no symptoms


Genitourinary:  Reports: no symptoms


Neurologic:  Reports: no symptoms


Allergies:  


Coded Allergies:  


     No Known Allergies (Unverified , 5/12/20)


All Systems:  reviewed and negative except above





Objective





Last 24 Hour Vital Signs








  Date Time  Temp Pulse Resp B/P (MAP) Pulse Ox O2 Delivery O2 Flow Rate FiO2


 


5/16/20 12:00 97.8 91 22 108/68 (81) 100   


 


5/16/20 11:00  99 23 96/63 (74) 100   


 


5/16/20 10:00  99 20 106/66 (79) 100   


 


5/16/20 09:00  81 17 101/61 (74) 100   


 


5/16/20 08:10     100 Nasal Cannula 2.0 28


 


5/16/20 08:00      Nasal Cannula 2.0 


 


5/16/20 08:00 97.8 79 22 98/60 (73) 100   


 


5/16/20 07:00  78 13 94/61 (72) 100   


 


5/16/20 06:00  80 18 93/60 (71) 100   


 


5/16/20 05:00  80 16 101/68 (79) 100   


 


5/16/20 04:00  81      


 


5/16/20 04:00      Nasal Cannula 2.0 


 


5/16/20 04:00 97.9 81 18 98/62 (74) 100   


 


5/16/20 03:00  80 14 96/66 (76) 100   


 


5/16/20 02:00  84 20 99/66 (77) 100   


 


5/16/20 01:00  81 15 96/64 (75) 100   


 


5/16/20 00:00      Nasal Cannula 2.0 


 


5/16/20 00:00 98.4 80 19 99/65 (76) 100   


 


5/15/20 23:00  84 13 98/59 (72) 100   


 


5/15/20 22:00  86 16 93/64 (74) 100   


 


5/15/20 21:00  90 16 103/70 (81) 100   


 


5/15/20 20:02     100 Nasal Cannula 2.0 28


 


5/15/20 20:00  92      


 


5/15/20 20:00      Nasal Cannula 2.0 


 


5/15/20 20:00 98.1 85 18 107/61 (76) 100   


 


5/15/20 19:00  88 16 97/50 (66) 100   


 


5/15/20 18:00  97 21 99/67 (78) 100   


 


5/15/20 17:00  99 14 107/67 (80) 100   


 


5/15/20 16:00  85      


 


5/15/20 16:00 97.6 85 18 100/72 (81) 100   


 


5/15/20 16:00      Nasal Cannula 2.0 


 


5/15/20 15:00  86 22 106/68 (81) 100   


 


5/15/20 14:00  89 21 104/68 (80) 100   


 


5/15/20 13:00  93 25 107/74 (85) 100   

















Intake and Output  


 


 5/15/20 5/16/20





 19:00 07:00


 


Intake Total 2210.00 ml 1425 ml


 


Output Total 810 ml 360 ml


 


Balance 1400.00 ml 1065 ml


 


  


 


Intake Oral 1700 ml 760 ml


 


IV Total 510.00 ml 665 ml


 


Output Urine Total 810 ml 360 ml


 


# Bowel Movements 3 1








General Appearance:  no acute distress


HEENT:  normocephalic


Respiratory:  chest wall non-tender, lungs clear


Cardiovascular:  normal peripheral pulses


Extremities:  no cyanosis





Microbiology








 Date/Time


Source Procedure


Growth Status


 


 


 5/13/20 20:00


Blood Blood Culture - Preliminary


NO GROWTH AFTER 48 HOURS Resulted


 


 5/13/20 19:55


Blood Blood Culture - Preliminary


NO GROWTH AFTER 48 HOURS Resulted


 


 5/14/20 16:59


Stool Stool Culture - Preliminary


NORMAL FECAL BROOK. Resulted


 


 5/14/20 16:59


Stool Clostridium difficile Toxin Assay - Final Complete








Laboratory Tests


5/16/20 04:00: 


White Blood Count 18.3H, Red Blood Count 3.29L, Hemoglobin 9.7L, Hematocrit 

28.4L, Mean Corpuscular Volume 86, Mean Corpuscular Hemoglobin 29.5, Mean 

Corpuscular Hemoglobin Concent 34.2, Red Cell Distribution Width 15.8H, 

Platelet Count 88L, Mean Platelet Volume 5.7L, Neutrophils (%) (Auto) , 

Lymphocytes (%) (Auto) , Monocytes (%) (Auto) , Eosinophils (%) (Auto) , 

Basophils (%) (Auto) , Differential Total Cells Counted 100, Neutrophils % (

Manual) 89H, Lymphocytes % (Manual) 8L, Monocytes % (Manual) 3, Eosinophils % (

Manual) 0, Basophils % (Manual) 0, Band Neutrophils 0, Platelet Estimate 

DecreasedL, Platelet Morphology Normal, Polychromasia 1+, Hypochromasia 1+, 

Anisocytosis 1+, Sodium Level 141, Potassium Level 3.5, Chloride Level 111H, 

Carbon Dioxide Level 20L, Anion Gap 10, Blood Urea Nitrogen 9, Creatinine 0.8, 

Estimat Glomerular Filtration Rate > 60, Glucose Level 83, Calcium Level 7.0L





Current Medications








 Medications


  (Trade)  Dose


 Ordered  Sig/Marisa


 Route


 PRN Reason  Start Time


 Stop Time Status Last Admin


Dose Admin


 


 Acetaminophen


  (Tylenol)  650 mg  Q4H  PRN


 ORAL


 Temp >100.5  5/13/20 06:15


 6/12/20 06:14   


 


 


 Albuterol/


 Ipratropium


  (Albuterol/


 Ipratropium)  3 ml  Q4H  PRN


 HHN


 Shortness of Breath  5/13/20 06:15


 5/18/20 06:14   


 


 


 Cefepime HCl 1 gm/


 Dextrose  55 ml @ 


 110 mls/hr  Q8H


 IVPB


   5/15/20 12:00


 5/22/20 11:59  5/16/20 11:13


 


 


 Dextrose


  (Dextrose 50%)  25 ml  Q30M  PRN


 IV


 Hypoglycemia  5/13/20 06:15


 8/11/20 06:14   


 


 


 Dextrose


  (Dextrose 50%)  50 ml  Q30M  PRN


 IV


 Hypoglycemia  5/13/20 06:15


 8/11/20 06:14   


 


 


 Docusate Sodium


  (Colace)  100 mg  EVERY 12  HOURS


 ORAL


   5/13/20 09:00


 6/12/20 08:59  5/14/20 20:45


 


 


 Hydromorphone HCl


  (Dilaudid)  1 mg  Q6H  PRN


 IVP


 Moderate Pain (Pain Scale 4-6)  5/13/20 06:15


 5/20/20 06:14  5/13/20 20:33


 


 


 Iohexol


  (Omnipaque 350


 100ml)  100 ml  NOW  PRN


 INJ


 Radiology Procedure  5/15/20 11:00


 5/17/20 10:46   


 


 


 Iron Sucrose 100


 mg/Sodium Chloride  60 ml @ 


 240 mls/hr  BEDTIME


 IV


   5/14/20 21:00


 5/18/20 21:14  5/15/20 21:21


 


 


 Lorazepam


  (Ativan)  1 mg  Q4H  PRN


 ORAL


 For Anxiety  5/13/20 06:15


 5/20/20 06:14   


 


 


 Metronidazole  100 ml @ 


 100 mls/hr  Q8HR


 IVPB


   5/15/20 14:00


 5/22/20 13:59  5/16/20 06:05


 


 


 Ondansetron HCl


  (Zofran)  4 mg  Q6H  PRN


 IVP


 Nausea & Vomiting  5/13/20 06:15


 6/12/20 06:14   


 


 


 Pantoprazole


  (Protonix)  40 mg  EVERY 12  HOURS


 IV


   5/16/20 09:00


 6/15/20 08:59  5/16/20 09:37


 


 


 Zolpidem Tartrate


  (Ambien)  5 mg  HSPRN  PRN


 ORAL


 Insomnia  5/13/20 06:15


 5/20/20 06:14  5/15/20 21:34


 











Assessment/Plan


Assessment/Plan


IMPRESSION:


1. Gastric mass.


2. Severe anemia.


3. Pulmonary embolism.





DISCUSSION:  





 Patient has undergone IVC filter.  S/p blood transfusion, IV fluids.  He will 

need surgical intervention with


endoscopy and gastric resection.  I will follow carefully.














  ______________________________________________


  MARIANELA Downs Omar Syed MD May 16, 2020 12:23

## 2020-05-16 NOTE — NUR
NURSE NOTES: 

patient asleep arousable to name oriented x4 on 2L NC with oxygen saturation 100%. BP 99/66 
HR84 NSR on monitor. Left AC running protonix at 25ml/hr clean and asymptomatic. tarango 
catheter draining dark josh urine. large brown liquid stool noted with no active bleeding. 
patient repositioned, bed locked lowest position call light within reach. will continue to 
monitor.

## 2020-05-16 NOTE — NUR
HAND-OFF: 

Report given to JOSIAH Eisenberg.

-------------------------------------------------------------------------------

Addendum: 05/16/20 at 0708 by Cheryle Floyd RN

-------------------------------------------------------------------------------

Leela above note. Hand-off given to JOSIAH Dai.

## 2020-05-16 NOTE — GENERAL PROGRESS NOTE
Assessment/Plan


Problem List:  


(1) Weak


ICD Codes:  R53.1 - Weakness


SNOMED:  51706384


(2) Malnutrition


ICD Codes:  E46 - Unspecified protein-calorie malnutrition


SNOMED:  32899511


(3) COVID-19 ruled out


ICD Codes:  Z03.818 - Encounter for observation for suspected exposure to other 

biological agents ruled out


SNOMED:  549118564, 658431378


(4) Pulmonary embolism


ICD Codes:  I26.99 - Other pulmonary embolism without acute cor pulmonale


SNOMED:  42516630


Qualifiers:  


   Qualified Codes:  I26.99 - Other pulmonary embolism without acute cor 

pulmonale


(5) GI bleed


ICD Codes:  K92.2 - Gastrointestinal hemorrhage, unspecified


SNOMED:  76227560


Qualifiers:  


   Qualified Codes:  K92.2 - Gastrointestinal hemorrhage, unspecified


(6) Gastric mass


ICD Codes:  K31.89 - Other diseases of stomach and duodenum


SNOMED:  545696220


(7) Syncope


ICD Codes:  R55 - Syncope and collapse


SNOMED:  446431805


Qualifiers:  


   Qualified Codes:  R55 - Syncope and collapse


Status:  unchanged


Assessment/Plan:


o2 pulm tx transfuse prn cbc bmp am





Subjective


Constitutional:  Reports: weakness


Allergies:  


Coded Allergies:  


     No Known Allergies (Unverified , 5/12/20)


All Systems:  reviewed and negative except above


Subjective


o2nc sleepy in icu





Objective





Last 24 Hour Vital Signs








  Date Time  Temp Pulse Resp B/P (MAP) Pulse Ox O2 Delivery O2 Flow Rate FiO2


 


5/16/20 07:00  78 13 94/61 (72) 100   


 


5/16/20 06:00  80 18 93/60 (71) 100   


 


5/16/20 05:00  80 16 101/68 (79) 100   


 


5/16/20 04:00  81      


 


5/16/20 04:00      Nasal Cannula 2.0 


 


5/16/20 04:00 97.9 81 18 98/62 (74) 100   


 


5/16/20 03:00  80 14 96/66 (76) 100   


 


5/16/20 02:00  84 20 99/66 (77) 100   


 


5/16/20 01:00  81 15 96/64 (75) 100   


 


5/16/20 00:00      Nasal Cannula 2.0 


 


5/16/20 00:00 98.4 80 19 99/65 (76) 100   


 


5/15/20 23:00  84 13 98/59 (72) 100   


 


5/15/20 22:00  86 16 93/64 (74) 100   


 


5/15/20 21:00  90 16 103/70 (81) 100   


 


5/15/20 20:02     100 Nasal Cannula 2.0 28


 


5/15/20 20:00  92      


 


5/15/20 20:00      Nasal Cannula 2.0 


 


5/15/20 20:00 98.1 85 18 107/61 (76) 100   


 


5/15/20 19:00  88 16 97/50 (66) 100   


 


5/15/20 18:00  97 21 99/67 (78) 100   


 


5/15/20 17:00  99 14 107/67 (80) 100   


 


5/15/20 16:00  85      


 


5/15/20 16:00 97.6 85 18 100/72 (81) 100   


 


5/15/20 16:00      Nasal Cannula 2.0 


 


5/15/20 15:00  86 22 106/68 (81) 100   


 


5/15/20 14:00  89 21 104/68 (80) 100   


 


5/15/20 13:00  93 25 107/74 (85) 100   


 


5/15/20 12:00 98.0 98 23 105/68 (80) 100   


 


5/15/20 12:00  93      


 


5/15/20 12:00      Nasal Cannula 2.0 


 


5/15/20 11:00  92 19 101/72 (82) 100   


 


5/15/20 10:00  87 18 96/62 (73) 100   

















Intake and Output  


 


 5/15/20 5/16/20





 19:00 07:00


 


Intake Total 2210.00 ml 1425 ml


 


Output Total 810 ml 360 ml


 


Balance 1400.00 ml 1065 ml


 


  


 


Intake Oral 1700 ml 760 ml


 


IV Total 510.00 ml 665 ml


 


Output Urine Total 810 ml 360 ml


 


# Bowel Movements 3 1








Laboratory Tests


5/16/20 04:00: 


White Blood Count 18.3H, Red Blood Count 3.29L, Hemoglobin 9.7L, Hematocrit 

28.4L, Mean Corpuscular Volume 86, Mean Corpuscular Hemoglobin 29.5, Mean 

Corpuscular Hemoglobin Concent 34.2, Red Cell Distribution Width 15.8H, 

Platelet Count 88L, Mean Platelet Volume 5.7L, Neutrophils (%) (Auto) , 

Lymphocytes (%) (Auto) , Monocytes (%) (Auto) , Eosinophils (%) (Auto) , 

Basophils (%) (Auto) , Differential Total Cells Counted 100, Neutrophils % (

Manual) 89H, Lymphocytes % (Manual) 8L, Monocytes % (Manual) 3, Eosinophils % (

Manual) 0, Basophils % (Manual) 0, Band Neutrophils 0, Platelet Estimate 

DecreasedL, Platelet Morphology Normal, Polychromasia 1+, Hypochromasia 1+, 

Anisocytosis 1+, Sodium Level 141, Potassium Level 3.5, Chloride Level 111H, 

Carbon Dioxide Level 20L, Anion Gap 10, Blood Urea Nitrogen 9, Creatinine 0.8, 

Estimat Glomerular Filtration Rate > 60, Glucose Level 83, Calcium Level 7.0L


Height (Feet):  5


Height (Inches):  7.00


Weight (Pounds):  134


General Appearance:  lethargic


EENT:  normal ENT inspection


Neck:  normal alignment


Cardiovascular:  normal rate, regular rhythm


Respiratory/Chest:  no respiratory distress, no accessory muscle use


Extremities:  normal inspection


Skin:  normal pigmentation











Tom Pierce DO May 16, 2020 09:54

## 2020-05-16 NOTE — INFECTIOUS DISEASES PROG NOTE
Assessment/Plan


Assessment/Plan








Assessment:


Severe Sepsis


Enterobacter bacteremia- likely from GI source


S. epi bacteremia, contaminant


GGO on lung bases- COVID19 neg x1


  -5/12 Bcx 1/4 S.epi, 2/4 E. aerogenes (pan S); 5/13 Bcx NTD


            CXR: no acute disease


            SARS-COV2 PCR





Afebrile


Leukocytosis; improving





Mild AST elevation





Lactic acidosis, SP


   -5/12 u/a neg; ucx 30-40K mixed urogenital contaminants





Diarrhea


    -Cdiff neg





Severe anemia (blood loss)/GIB likely 2ry to gastric mass


Thrombocytopenia, worsening


  -CT abd/p:   Centrally necrotic mass arising from the gastric fundus 

measuring 12. 6 x 11.1 x 14 cm.  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  

Gastric adenocarcinoma, lymphoma, and other neoplastic etiologies are also in 

the differential. Filling defects within pulmonary vessels at the lung bases 

with small peripheral airspace opacities concerning for pulmonary emboli and 

small pulmonary infarcts.





Syncopal episode- likely due to severe dehydration and anemia


  -CT head: No acute findings in the head/brain.





Acute PE/DVT -(per CT abd/p findings)


  -sp IVC filter placement 5/13 (retrievable- given presence of bacteremia)





PABLO, SP





HTN





Plan:


  


- Continue Cefepime #2 and Flagyl #2





      - 5/15/20 SP Zosyn #4 and Vancomycin #4


-f/u cx


-Monitor CBC/CMP, temperaturse


-ICU care/aspiration precautions


-COVID19 isolation and precautions; send 2nd test


-f/u repeat Bcx


-Sx,pulm, Heme/onc, GI f/u


-CT chest w/ to evaluate for GGO and for possible mets





Thank you for consulting [facility]. Will continue to follow along wiht 

you.





Discussed with RN.





Subjective


Allergies:  


Coded Allergies:  


     No Known Allergies (Unverified , 5/12/20)


Subjective


Afebrile


Leukcoytosis resolving


Satting well on 2L NC





Objective


Vital Signs





Last 24 Hour Vital Signs








  Date Time  Temp Pulse Resp B/P (MAP) Pulse Ox O2 Delivery O2 Flow Rate FiO2


 


5/16/20 07:00  78 13 94/61 (72) 100   


 


5/16/20 06:00  80 18 93/60 (71) 100   


 


5/16/20 05:00  80 16 101/68 (79) 100   


 


5/16/20 04:00  81      


 


5/16/20 04:00      Nasal Cannula 2.0 


 


5/16/20 04:00 97.9 81 18 98/62 (74) 100   


 


5/16/20 03:00  80 14 96/66 (76) 100   


 


5/16/20 02:00  84 20 99/66 (77) 100   


 


5/16/20 01:00  81 15 96/64 (75) 100   


 


5/16/20 00:00      Nasal Cannula 2.0 


 


5/16/20 00:00 98.4 80 19 99/65 (76) 100   


 


5/15/20 23:00  84 13 98/59 (72) 100   


 


5/15/20 22:00  86 16 93/64 (74) 100   


 


5/15/20 21:00  90 16 103/70 (81) 100   


 


5/15/20 20:02     100 Nasal Cannula 2.0 28


 


5/15/20 20:00  92      


 


5/15/20 20:00      Nasal Cannula 2.0 


 


5/15/20 20:00 98.1 85 18 107/61 (76) 100   


 


5/15/20 19:00  88 16 97/50 (66) 100   


 


5/15/20 18:00  97 21 99/67 (78) 100   


 


5/15/20 17:00  99 14 107/67 (80) 100   


 


5/15/20 16:00  85      


 


5/15/20 16:00 97.6 85 18 100/72 (81) 100   


 


5/15/20 16:00      Nasal Cannula 2.0 


 


5/15/20 15:00  86 22 106/68 (81) 100   


 


5/15/20 14:00  89 21 104/68 (80) 100   


 


5/15/20 13:00  93 25 107/74 (85) 100   


 


5/15/20 12:00 98.0 98 23 105/68 (80) 100   


 


5/15/20 12:00  93      


 


5/15/20 12:00      Nasal Cannula 2.0 


 


5/15/20 11:00  92 19 101/72 (82) 100   








Height (Feet):  5


Height (Inches):  7.00


Weight (Pounds):  134


Objective


Patient not examined to day in order to conserve PPE





Patient clinical status discussed with nurse and other physician notes and 

exams reviewed





Microbiology








 Date/Time


Source Procedure


Growth Status


 


 


 5/13/20 20:00


Blood Blood Culture - Preliminary


NO GROWTH AFTER 48 HOURS Resulted


 


 5/13/20 19:55


Blood Blood Culture - Preliminary


NO GROWTH AFTER 48 HOURS Resulted


 


 5/14/20 16:59


Stool Stool Culture - Preliminary


NORMAL FECAL BROOK. Resulted


 


 5/14/20 16:59


Stool Clostridium difficile Toxin Assay - Final Complete











Laboratory Tests








Test


  5/16/20


04:00


 


White Blood Count


  18.3 K/UL


(4.8-10.8)  H


 


Red Blood Count


  3.29 M/UL


(4.70-6.10)  L


 


Hemoglobin


  9.7 G/DL


(14.2-18.0)  L


 


Hematocrit


  28.4 %


(42.0-52.0)  L


 


Mean Corpuscular Volume 86 FL (80-99)  


 


Mean Corpuscular Hemoglobin


  29.5 PG


(27.0-31.0)


 


Mean Corpuscular Hemoglobin


Concent 34.2 G/DL


(32.0-36.0)


 


Red Cell Distribution Width


  15.8 %


(11.6-14.8)  H


 


Platelet Count


  88 K/UL


(150-450)  L


 


Mean Platelet Volume


  5.7 FL


(6.5-10.1)  L


 


Neutrophils (%) (Auto)


  % (45.0-75.0)


 


 


Lymphocytes (%) (Auto)


  % (20.0-45.0)


 


 


Monocytes (%) (Auto)  % (1.0-10.0)  


 


Eosinophils (%) (Auto)  % (0.0-3.0)  


 


Basophils (%) (Auto)  % (0.0-2.0)  


 


Differential Total Cells


Counted 100  


 


 


Neutrophils % (Manual) 89 % (45-75)  H


 


Lymphocytes % (Manual) 8 % (20-45)  L


 


Monocytes % (Manual) 3 % (1-10)  


 


Eosinophils % (Manual) 0 % (0-3)  


 


Basophils % (Manual) 0 % (0-2)  


 


Band Neutrophils 0 % (0-8)  


 


Platelet Estimate Decreased  L


 


Platelet Morphology Normal  


 


Polychromasia 1+  


 


Hypochromasia 1+  


 


Anisocytosis 1+  


 


Sodium Level


  141 MMOL/L


(136-145)


 


Potassium Level


  3.5 MMOL/L


(3.5-5.1)


 


Chloride Level


  111 MMOL/L


()  H


 


Carbon Dioxide Level


  20 MMOL/L


(21-32)  L


 


Anion Gap


  10 mmol/L


(5-15)


 


Blood Urea Nitrogen


  9 mg/dL (7-18)


 


 


Creatinine


  0.8 MG/DL


(0.55-1.30)


 


Estimat Glomerular Filtration


Rate > 60 mL/min


(>60)


 


Glucose Level


  83 MG/DL


()


 


Calcium Level


  7.0 MG/DL


(8.5-10.1)  L











Current Medications








 Medications


  (Trade)  Dose


 Ordered  Sig/Marisa


 Route


 PRN Reason  Start Time


 Stop Time Status Last Admin


Dose Admin


 


 Acetaminophen


  (Tylenol)  650 mg  Q4H  PRN


 ORAL


 Temp >100.5  5/13/20 06:15


 6/12/20 06:14   


 


 


 Albuterol/


 Ipratropium


  (Albuterol/


 Ipratropium)  3 ml  Q4H  PRN


 HHN


 Shortness of Breath  5/13/20 06:15


 5/18/20 06:14   


 


 


 Cefepime HCl 1 gm/


 Dextrose  55 ml @ 


 110 mls/hr  Q8H


 IVPB


   5/15/20 12:00


 5/22/20 11:59  5/16/20 03:43


 


 


 Dextrose


  (Dextrose 50%)  25 ml  Q30M  PRN


 IV


 Hypoglycemia  5/13/20 06:15


 8/11/20 06:14   


 


 


 Dextrose


  (Dextrose 50%)  50 ml  Q30M  PRN


 IV


 Hypoglycemia  5/13/20 06:15


 8/11/20 06:14   


 


 


 Docusate Sodium


  (Colace)  100 mg  EVERY 12  HOURS


 ORAL


   5/13/20 09:00


 6/12/20 08:59  5/14/20 20:45


 


 


 Hydromorphone HCl


  (Dilaudid)  1 mg  Q6H  PRN


 IVP


 Moderate Pain (Pain Scale 4-6)  5/13/20 06:15


 5/20/20 06:14  5/13/20 20:33


 


 


 Iohexol


  (Omnipaque 350


 100ml)  100 ml  NOW  PRN


 INJ


 Radiology Procedure  5/15/20 11:00


 5/17/20 10:46   


 


 


 Iron Sucrose 100


 mg/Sodium Chloride  60 ml @ 


 240 mls/hr  BEDTIME


 IV


   5/14/20 21:00


 5/18/20 21:14  5/15/20 21:21


 


 


 Lorazepam


  (Ativan)  1 mg  Q4H  PRN


 ORAL


 For Anxiety  5/13/20 06:15


 5/20/20 06:14   


 


 


 Metronidazole  100 ml @ 


 100 mls/hr  Q8HR


 IVPB


   5/15/20 14:00


 5/22/20 13:59  5/16/20 06:05


 


 


 Ondansetron HCl


  (Zofran)  4 mg  Q6H  PRN


 IVP


 Nausea & Vomiting  5/13/20 06:15


 6/12/20 06:14   


 


 


 Pantoprazole


  (Protonix)  40 mg  EVERY 12  HOURS


 IV


   5/16/20 09:00


 6/15/20 08:59  5/16/20 09:37


 


 


 Zolpidem Tartrate


  (Ambien)  5 mg  HSPRN  PRN


 ORAL


 Insomnia  5/13/20 06:15


 5/20/20 06:14  5/15/20 21:34


 

















Donato Ibarra MD May 16, 2020 10:28

## 2020-05-16 NOTE — NUR
NURSE NOTES: 

patient asleep arousable to name on 2L NC with oxygen saturation 100%. BP 99/66 HR84 NSR on 
monitor. Left AC running protonix at 25ml/hr clean and asymptomatic. tarango catheter draining 
dark josh urine. patient repositioned, bed locked lowest position call light within reach. 
will continue to monitor.

## 2020-05-16 NOTE — ANETHESIA PREOPERATIVE EVAL
Anesthesia Pre-op PMH/ROS


General


Date of Evaluation:  May 16, 2020


Time of Evaluation:  13:19


ASA Score:  ASA 4


Mallampati Score


Class I : Soft palate, uvula, fauces, pillars visible


Class II: Soft palate, uvula, fauces visible


Class III: Soft palate, base of uvula visible


Class IV: Only hard plate visible


Mallampati Classification:  Class II


Allergies:  


Coded Allergies:  


     No Known Allergies (Unverified , 5/12/20)


Patient NPO?:  Yes





Anesthesia Pre-op Phys. Exam


Physician Exam





Last Vital Signs








  Date Time  Temp Pulse Resp B/P (MAP) Pulse Ox O2 Delivery O2 Flow Rate FiO2


 


5/16/20 13:00  100 20 110/73 (85) 100   


 


5/16/20 12:00      Nasal Cannula 2.0 


 


5/16/20 12:00 97.8       


 


5/16/20 08:10        28











Airway Exam


Mallampati Score:  Class I





Anesthesia Pre-op A/P


Labs





Hematology








Test


  5/16/20


04:00


 


White Blood Count


  18.3 K/UL


(4.8-10.8)  H


 


Red Blood Count


  3.29 M/UL


(4.70-6.10)  L


 


Hemoglobin


  9.7 G/DL


(14.2-18.0)  L


 


Hematocrit


  28.4 %


(42.0-52.0)  L


 


Mean Corpuscular Volume 86 FL (80-99)  


 


Mean Corpuscular Hemoglobin


  29.5 PG


(27.0-31.0)


 


Mean Corpuscular Hemoglobin


Concent 34.2 G/DL


(32.0-36.0)


 


Red Cell Distribution Width


  15.8 %


(11.6-14.8)  H


 


Platelet Count


  88 K/UL


(150-450)  L


 


Mean Platelet Volume


  5.7 FL


(6.5-10.1)  L


 


Neutrophils (%) (Auto)


  % (45.0-75.0)


 


 


Lymphocytes (%) (Auto)


  % (20.0-45.0)


 


 


Monocytes (%) (Auto)  % (1.0-10.0)  


 


Eosinophils (%) (Auto)  % (0.0-3.0)  


 


Basophils (%) (Auto)  % (0.0-2.0)  


 


Differential Total Cells


Counted 100  


 


 


Neutrophils % (Manual) 89 % (45-75)  H


 


Lymphocytes % (Manual) 8 % (20-45)  L


 


Monocytes % (Manual) 3 % (1-10)  


 


Eosinophils % (Manual) 0 % (0-3)  


 


Basophils % (Manual) 0 % (0-2)  


 


Band Neutrophils 0 % (0-8)  


 


Platelet Estimate Decreased  L


 


Platelet Morphology Normal  


 


Polychromasia 1+  


 


Hypochromasia 1+  


 


Anisocytosis 1+  








Chemistry








Test


  5/16/20


04:00


 


Sodium Level


  141 MMOL/L


(136-145)


 


Potassium Level


  3.5 MMOL/L


(3.5-5.1)


 


Chloride Level


  111 MMOL/L


()  H


 


Carbon Dioxide Level


  20 MMOL/L


(21-32)  L


 


Anion Gap


  10 mmol/L


(5-15)


 


Blood Urea Nitrogen


  9 mg/dL (7-18)


 


 


Creatinine


  0.8 MG/DL


(0.55-1.30)


 


Estimat Glomerular Filtration


Rate > 60 mL/min


(>60)


 


Glucose Level


  83 MG/DL


()


 


Calcium Level


  7.0 MG/DL


(8.5-10.1)  L

















Demarcus Mayer MD May 16, 2020 13:20

## 2020-05-16 NOTE — GENERAL PROGRESS NOTE
Assessment/Plan


Problem List:  


(1) Syncope


ICD Codes:  R55 - Syncope and collapse


SNOMED:  894641617


Qualifiers:  


   Qualified Codes:  R55 - Syncope and collapse


(2) Gastric mass


ICD Codes:  K31.89 - Other diseases of stomach and duodenum


SNOMED:  275231237


(3) GI bleed


ICD Codes:  K92.2 - Gastrointestinal hemorrhage, unspecified


SNOMED:  24465134


Qualifiers:  


   Qualified Codes:  K92.2 - Gastrointestinal hemorrhage, unspecified


(4) Pulmonary embolism


ICD Codes:  I26.99 - Other pulmonary embolism without acute cor pulmonale


SNOMED:  85093913


Qualifiers:  


   Qualified Codes:  I26.99 - Other pulmonary embolism without acute cor 

pulmonale


Status:  unchanged


Assessment/Plan:


s/p 5 units prbc


2 units FFP


stable H&H now


fu stool c.diff>>>neg


s/p  vit k


pending EGD when off isolation (first COVID is neg)


advance to GI soft


change Protonix to Q12





Subjective


ROS Limited/Unobtainable:  Yes


Allergies:  


Coded Allergies:  


     No Known Allergies (Unverified , 5/12/20)





Objective





Last 24 Hour Vital Signs








  Date Time  Temp Pulse Resp B/P (MAP) Pulse Ox O2 Delivery O2 Flow Rate FiO2


 


5/16/20 06:00  80 18 93/60 (71) 100   


 


5/16/20 05:00  80 16 101/68 (79) 100   


 


5/16/20 04:00  81      


 


5/16/20 04:00      Nasal Cannula 2.0 


 


5/16/20 04:00 97.9 81 18 98/62 (74) 100   


 


5/16/20 03:00  80 14 96/66 (76) 100   


 


5/16/20 02:00  84 20 99/66 (77) 100   


 


5/16/20 01:00  81 15 96/64 (75) 100   


 


5/16/20 00:00      Nasal Cannula 2.0 


 


5/16/20 00:00 98.4 80 19 99/65 (76) 100   


 


5/15/20 23:00  84 13 98/59 (72) 100   


 


5/15/20 22:00  86 16 93/64 (74) 100   


 


5/15/20 21:00  90 16 103/70 (81) 100   


 


5/15/20 20:02     100 Nasal Cannula 2.0 28


 


5/15/20 20:00  92      


 


5/15/20 20:00      Nasal Cannula 2.0 


 


5/15/20 20:00 98.1 85 18 107/61 (76) 100   


 


5/15/20 19:00  88 16 97/50 (66) 100   


 


5/15/20 18:00  97 21 99/67 (78) 100   


 


5/15/20 17:00  99 14 107/67 (80) 100   


 


5/15/20 16:00  85      


 


5/15/20 16:00 97.6 85 18 100/72 (81) 100   


 


5/15/20 16:00      Nasal Cannula 2.0 


 


5/15/20 15:00  86 22 106/68 (81) 100   


 


5/15/20 14:00  89 21 104/68 (80) 100   


 


5/15/20 13:00  93 25 107/74 (85) 100   


 


5/15/20 12:00 98.0 98 23 105/68 (80) 100   


 


5/15/20 12:00  93      


 


5/15/20 12:00      Nasal Cannula 2.0 


 


5/15/20 11:00  92 19 101/72 (82) 100   


 


5/15/20 10:00  87 18 96/62 (73) 100   


 


5/15/20 09:00  87 17 102/72 (82) 100   


 


5/15/20 08:00  94      


 


5/15/20 08:00 98.2 94 24 96/75 (82) 100   


 


5/15/20 08:00      Nasal Cannula 2.0 


 


5/15/20 07:40     100 Nasal Cannula 2.0 28

















Intake and Output  


 


 5/15/20 5/16/20





 19:00 07:00


 


Intake Total 2210.00 ml 1425 ml


 


Output Total 810 ml 360 ml


 


Balance 1400.00 ml 1065 ml


 


  


 


Intake Oral 1700 ml 760 ml


 


IV Total 510.00 ml 665 ml


 


Output Urine Total 810 ml 360 ml


 


# Bowel Movements 3 1








Laboratory Tests


5/16/20 04:00: 


White Blood Count 18.3H, Red Blood Count 3.29L, Hemoglobin 9.7L, Hematocrit 

28.4L, Mean Corpuscular Volume 86, Mean Corpuscular Hemoglobin 29.5, Mean 

Corpuscular Hemoglobin Concent 34.2, Red Cell Distribution Width 15.8H, 

Platelet Count 88L, Mean Platelet Volume 5.7L, Neutrophils (%) (Auto) , 

Lymphocytes (%) (Auto) , Monocytes (%) (Auto) , Eosinophils (%) (Auto) , 

Basophils (%) (Auto) , Neutrophils % (Manual) [Pending], Lymphocytes % (Manual) 

[Pending], Platelet Estimate [Pending], Platelet Morphology [Pending], Sodium 

Level 141, Potassium Level 3.5, Chloride Level 111H, Carbon Dioxide Level 20L, 

Anion Gap 10, Blood Urea Nitrogen 9, Creatinine 0.8, Estimat Glomerular 

Filtration Rate > 60, Glucose Level 83, Calcium Level 7.0L


Height (Feet):  5


Height (Inches):  7.00


Weight (Pounds):  134


General Appearance:  no apparent distress


EENT:  normal ENT inspection


Neck:  supple


Cardiovascular:  normal rate


Respiratory/Chest:  decreased breath sounds


Abdomen:  normal bowel sounds, non tender, soft


Extremities:  non-tender











Marc Nielsen MD May 16, 2020 07:20

## 2020-05-16 NOTE — NUR
NURSE NOTES:handoff received from АНДРЕЙ. Patient received awake and alert and able to make 
needs known. No acute signs of distress noted. Patient IV site is running prescribed 
antibiotic. Patient oriented to room and call light. Bed in the low and locked position. 
will continue to monitor patient.

## 2020-05-17 VITALS — DIASTOLIC BLOOD PRESSURE: 75 MMHG | SYSTOLIC BLOOD PRESSURE: 104 MMHG

## 2020-05-17 VITALS — SYSTOLIC BLOOD PRESSURE: 111 MMHG | DIASTOLIC BLOOD PRESSURE: 68 MMHG

## 2020-05-17 VITALS — DIASTOLIC BLOOD PRESSURE: 63 MMHG | SYSTOLIC BLOOD PRESSURE: 101 MMHG

## 2020-05-17 VITALS — SYSTOLIC BLOOD PRESSURE: 110 MMHG | DIASTOLIC BLOOD PRESSURE: 70 MMHG

## 2020-05-17 VITALS — DIASTOLIC BLOOD PRESSURE: 61 MMHG | SYSTOLIC BLOOD PRESSURE: 121 MMHG

## 2020-05-17 VITALS — SYSTOLIC BLOOD PRESSURE: 104 MMHG | DIASTOLIC BLOOD PRESSURE: 64 MMHG

## 2020-05-17 LAB
ADD MANUAL DIFF: YES
ANION GAP SERPL CALC-SCNC: 10 MMOL/L (ref 5–15)
BUN SERPL-MCNC: 8 MG/DL (ref 7–18)
CALCIUM SERPL-MCNC: 7.2 MG/DL (ref 8.5–10.1)
CHLORIDE SERPL-SCNC: 111 MMOL/L (ref 98–107)
CO2 SERPL-SCNC: 20 MMOL/L (ref 21–32)
CREAT SERPL-MCNC: 0.8 MG/DL (ref 0.55–1.3)
ERYTHROCYTE [DISTWIDTH] IN BLOOD BY AUTOMATED COUNT: 15.8 % (ref 11.6–14.8)
HCT VFR BLD CALC: 28.5 % (ref 42–52)
HGB BLD-MCNC: 9.4 G/DL (ref 14.2–18)
MCV RBC AUTO: 86 FL (ref 80–99)
PLATELET # BLD: 96 K/UL (ref 150–450)
POTASSIUM SERPL-SCNC: 3.6 MMOL/L (ref 3.5–5.1)
RBC # BLD AUTO: 3.31 M/UL (ref 4.7–6.1)
SODIUM SERPL-SCNC: 141 MMOL/L (ref 136–145)
WBC # BLD AUTO: 18.9 K/UL (ref 4.8–10.8)

## 2020-05-17 RX ADMIN — SODIUM CHLORIDE SCH MLS/HR: 0.9 INJECTION INTRAVENOUS at 06:20

## 2020-05-17 RX ADMIN — SODIUM CHLORIDE SCH MLS/HR: 0.9 INJECTION INTRAVENOUS at 20:53

## 2020-05-17 RX ADMIN — SODIUM CHLORIDE SCH MLS/HR: 0.9 INJECTION INTRAVENOUS at 22:09

## 2020-05-17 RX ADMIN — PANTOPRAZOLE SODIUM SCH MG: 40 INJECTION, POWDER, FOR SOLUTION INTRAVENOUS at 20:53

## 2020-05-17 RX ADMIN — PANTOPRAZOLE SODIUM SCH MG: 40 INJECTION, POWDER, FOR SOLUTION INTRAVENOUS at 08:42

## 2020-05-17 RX ADMIN — SODIUM CHLORIDE SCH MLS/HR: 0.9 INJECTION INTRAVENOUS at 14:48

## 2020-05-17 NOTE — NUR
NURSE NOTES:

Dr. Fischer made aware patient would like to speak with MD and sign consent with MD 
present.

## 2020-05-17 NOTE — NUR
HAND-OFF: 

Report given to JOSIAH Christianson. Patient is in stable condition, no complaints made at this time. 
Plan of care endorsed.

## 2020-05-17 NOTE — NUR
NURSE NOTES:

Dr. Nielsen made aware patient loose stool last night, Per MD d/c [place]. Order noted, 
entered, carried out.

## 2020-05-17 NOTE — NUR
NURSE NOTES:

Received report from JOSIAH Gilman. Patient in bed resting, no active s/s cardiac, respiratory 
distress noticed at this time. Patient AOx3, on 2L oxygen via NC, Mata Catheter draining 
well to gravity at this time. IV on left FA 20G, right AC 20G, asymptomatic, patent, intact. 
Bed in lowest position, side rails upx3, call light within reach, bed alarm on. Will 
continue to monitor.

## 2020-05-17 NOTE — NUR
NURSE NOTES:

Paged Dr. Fischer for clarification for PRBC order. No new order received at this time. 
Will continue to follow up.

## 2020-05-17 NOTE — PULMONOLOGY PROGRESS NOTE
Subjective


ROS Limited/Unobtainable:  Yes


Interval Events:  S/p IVC filter; doing better


Constitutional:  Reports: no symptoms


HEENT:  Repors: no symptoms


Respiratory:  Reports: no symptoms


Cardiovascular:  Reports: no symptoms


Gastrointestinal/Abdominal:  Reports: no symptoms


Genitourinary:  Reports: no symptoms


Neurologic:  Reports: no symptoms


Allergies:  


Coded Allergies:  


     No Known Allergies (Unverified , 5/12/20)


All Systems:  reviewed and negative except above





Objective





Last 24 Hour Vital Signs








  Date Time  Temp Pulse Resp B/P (MAP) Pulse Ox O2 Delivery O2 Flow Rate FiO2


 


5/17/20 08:00 97.0 85 20 104/75 (85) 96   


 


5/17/20 04:00  95      


 


5/17/20 04:00 98.6 96 20 104/64 (77) 99   


 


5/17/20 00:00  98      


 


5/17/20 00:00 99.1 98 21 101/63 (76) 100   


 


5/16/20 21:00      Nasal Cannula 2.0 


 


5/16/20 20:23     98 Nasal Cannula 2.0 28


 


5/16/20 20:23  81 20  98 Nasal Cannula 2.0 28


 


5/16/20 20:00  98      


 


5/16/20 20:00 98.1 81 24 108/71 (83) 100   


 


5/16/20 16:00  89      


 


5/16/20 16:00 97.6 104 22 114/72 (86) 100   


 


5/16/20 14:00  98 20 108/70 (83) 100   


 


5/16/20 13:00  100 20 110/73 (85) 100   


 


5/16/20 12:00  92      


 


5/16/20 12:00      Nasal Cannula 2.0 


 


5/16/20 12:00 97.8 91 22 108/68 (81) 100   


 


5/16/20 11:00  99 23 96/63 (74) 100   


 


5/16/20 10:00  99 20 106/66 (79) 100   


 


5/16/20 09:00  81 17 101/61 (74) 100   

















Intake and Output  


 


 5/16/20 5/17/20





 19:00 07:00


 


Intake Total 62.5 ml 


 


Output Total 280 ml 


 


Balance -217.5 ml 


 


  


 


IV Total 62.5 ml 


 


Output Urine Total 280 ml 


 


# Bowel Movements 3 








General Appearance:  no acute distress


HEENT:  normocephalic


Respiratory:  chest wall non-tender, lungs clear


Cardiovascular:  normal peripheral pulses


Extremities:  no cyanosis





Microbiology








 Date/Time


Source Procedure


Growth Status


 


 


 5/14/20 16:59


Stool Stool Culture - Preliminary


NORMAL FECAL BROOK. Resulted


 


 5/14/20 16:59


Stool Clostridium difficile Toxin Assay - Final Complete











Current Medications








 Medications


  (Trade)  Dose


 Ordered  Sig/Marisa


 Route


 PRN Reason  Start Time


 Stop Time Status Last Admin


Dose Admin


 


 Acetaminophen


  (Tylenol)  650 mg  Q4H  PRN


 ORAL


 Temp >100.5  5/16/20 14:14


 6/15/20 14:13   


 


 


 Albuterol/


 Ipratropium


  (Albuterol/


 Ipratropium)  3 ml  Q4H  PRN


 HHN


 Shortness of Breath  5/16/20 14:15


 5/21/20 14:14   


 


 


 Cefepime HCl 1 gm/


 Dextrose  55 ml @ 


 110 mls/hr  Q8HR


 IVPB


   5/16/20 22:00


 5/23/20 21:59  5/17/20 06:20


 


 


 Dextrose


  (Dextrose 50%)  25 ml  Q30M  PRN


 IV


 Hypoglycemia  5/16/20 14:15


 8/11/20 06:14   


 


 


 Dextrose


  (Dextrose 50%)  50 ml  Q30M  PRN


 IV


 Hypoglycemia  5/16/20 14:15


 8/11/20 06:14   


 


 


 Hydromorphone HCl


  (Dilaudid)  1 mg  Q6H  PRN


 IVP


 Moderate Pain (Pain Scale 4-6)  5/16/20 14:15


 5/23/20 14:14   


 


 


 Iohexol


  (Omnipaque 350


 100ml)  100 ml  NOW  PRN


 INJ


 Radiology Procedure  5/17/20 11:00


 5/17/20 20:00   


 


 


 Iron Sucrose 100


 mg/Sodium Chloride  60 ml @ 


 240 mls/hr  BEDTIME


 IV


   5/16/20 21:00


 5/18/20 21:14  5/16/20 20:18


 


 


 Lorazepam


  (Ativan)  1 mg  Q4H  PRN


 ORAL


 For Anxiety  5/16/20 14:15


 5/23/20 14:14   


 


 


 Metronidazole  100 ml @ 


 100 mls/hr  Q8HR


 IVPB


   5/16/20 22:00


 5/22/20 13:59  5/17/20 06:20


 


 


 Ondansetron HCl


  (Zofran)  4 mg  Q6H  PRN


 IVP


 Nausea & Vomiting  5/16/20 14:15


 6/15/20 14:14   


 


 


 Pantoprazole


  (Protonix)  40 mg  EVERY 12  HOURS


 IV


   5/16/20 21:00


 6/15/20 08:59  5/16/20 20:16


 


 


 Zolpidem Tartrate


  (Ambien)  5 mg  HSPRN  PRN


 ORAL


 Insomnia  5/16/20 14:15


 5/23/20 14:14  5/16/20 22:27


 











Assessment/Plan


Assessment/Plan


IMPRESSION:


1. Gastric mass.


2. Severe anemia.


3. Pulmonary embolism.





DISCUSSION:  





S/p IVC filter.  S/p blood transfusion, IV fluids.  He will need surgical 

intervention with


endoscopy and gastric resection.  I will follow carefully.














  ______________________________________________


  Phillip Torre M.D.











Phillip Torre MD May 17, 2020 08:38

## 2020-05-17 NOTE — GENERAL PROGRESS NOTE
Assessment/Plan


Problem List:  


(1) Weak


ICD Codes:  R53.1 - Weakness


SNOMED:  49487966


(2) Malnutrition


ICD Codes:  E46 - Unspecified protein-calorie malnutrition


SNOMED:  38836082


(3) COVID-19 ruled out


ICD Codes:  Z03.818 - Encounter for observation for suspected exposure to other 

biological agents ruled out


SNOMED:  380640009, 502322431


(4) Pulmonary embolism


ICD Codes:  I26.99 - Other pulmonary embolism without acute cor pulmonale


SNOMED:  88402844


Qualifiers:  


   Qualified Codes:  I26.99 - Other pulmonary embolism without acute cor 

pulmonale


(5) GI bleed


ICD Codes:  K92.2 - Gastrointestinal hemorrhage, unspecified


SNOMED:  62031567


Qualifiers:  


   Qualified Codes:  K92.2 - Gastrointestinal hemorrhage, unspecified


(6) Gastric mass


ICD Codes:  K31.89 - Other diseases of stomach and duodenum


SNOMED:  750998452


(7) Syncope


ICD Codes:  R55 - Syncope and collapse


SNOMED:  621743271


Qualifiers:  


   Qualified Codes:  R55 - Syncope and collapse


Status:  stable, progressing


Assessment/Plan:


o2 pulm tx transfuse prn cbc bmp am





Subjective


Constitutional:  Reports: weakness


Allergies:  


Coded Allergies:  


     No Known Allergies (Unverified , 5/12/20)


All Systems:  reviewed and negative except above


Subjective


o2nc sleepy





Objective





Last 24 Hour Vital Signs








  Date Time  Temp Pulse Resp B/P (MAP) Pulse Ox O2 Delivery O2 Flow Rate FiO2


 


5/17/20 09:00      Nasal Cannula 2.0 


 


5/17/20 08:00 97.0 85 20 104/75 (85) 96   


 


5/17/20 08:00  96      


 


5/17/20 04:00  95      


 


5/17/20 04:00 98.6 96 20 104/64 (77) 99   


 


5/17/20 00:00  98      


 


5/17/20 00:00 99.1 98 21 101/63 (76) 100   


 


5/16/20 21:00      Nasal Cannula 2.0 


 


5/16/20 20:23     98 Nasal Cannula 2.0 28


 


5/16/20 20:23  81 20  98 Nasal Cannula 2.0 28


 


5/16/20 20:00  98      


 


5/16/20 20:00 98.1 81 24 108/71 (83) 100   


 


5/16/20 16:00  89      


 


5/16/20 16:00 97.6 104 22 114/72 (86) 100   


 


5/16/20 14:00  98 20 108/70 (83) 100   


 


5/16/20 13:00  100 20 110/73 (85) 100   


 


5/16/20 12:00  92      


 


5/16/20 12:00      Nasal Cannula 2.0 


 


5/16/20 12:00 97.8 91 22 108/68 (81) 100   


 


5/16/20 11:00  99 23 96/63 (74) 100   


 


5/16/20 10:00  99 20 106/66 (79) 100   

















Intake and Output  


 


 5/16/20 5/17/20





 19:00 07:00


 


Intake Total 62.5 ml 


 


Output Total 280 ml 


 


Balance -217.5 ml 


 


  


 


IV Total 62.5 ml 


 


Output Urine Total 280 ml 


 


# Bowel Movements 3 








Laboratory Tests


5/17/20 08:30: 


White Blood Count 18.9H, Red Blood Count 3.31L, Hemoglobin 9.4L, Hematocrit 

28.5L, Mean Corpuscular Volume 86, Mean Corpuscular Hemoglobin 28.2, Mean 

Corpuscular Hemoglobin Concent 32.8, Red Cell Distribution Width 15.8H, 

Platelet Count 96L, Mean Platelet Volume 5.1L, Neutrophils (%) (Auto) , 

Lymphocytes (%) (Auto) , Monocytes (%) (Auto) , Eosinophils (%) (Auto) , 

Basophils (%) (Auto) , Differential Total Cells Counted 100, Neutrophils % (

Manual) 88H, Lymphocytes % (Manual) 7L, Monocytes % (Manual) 5, Eosinophils % (

Manual) 0, Basophils % (Manual) 0, Band Neutrophils 0, Platelet Estimate 

DecreasedL, Platelet Morphology Normal, Hypochromasia 1+, Anisocytosis 1+, 

Sodium Level 141, Potassium Level 3.6, Chloride Level 111H, Carbon Dioxide 

Level 20L, Anion Gap 10, Blood Urea Nitrogen 8, Creatinine 0.8, Estimat 

Glomerular Filtration Rate > 60, Glucose Level 126H, Calcium Level 7.2L


Height (Feet):  5


Height (Inches):  7.00


Weight (Pounds):  138


General Appearance:  lethargic


EENT:  normal ENT inspection


Neck:  normal alignment


Cardiovascular:  normal rate, regular rhythm


Respiratory/Chest:  no respiratory distress, no accessory muscle use


Extremities:  normal inspection


Skin:  normal pigmentation











Tom Pierce DO May 17, 2020 09:57

## 2020-05-17 NOTE — PRE-PROCEDURE NOTE/ATTESTATION
Pre-Procedure Note/Attestation


Complete Prior to Procedure


Planned Procedure:  not applicable


Procedure Narrative:


exploratory laparotomy, gastric resection, bowel resection, line and tube 

placement





Indications for Procedure


Pre-Operative Diagnosis:


hemorrhagic large gastric mass / tumor





Attestation


I attest that I discussed the nature of the procedure; its benefits; risks and 

complications; and alternatives (and the risks and benefits of such alternatives

), prior to the procedure, with the patient (or the patient's legal 

representative).





I attest that, if there was a reasonable possibility of needing a blood 

transfusion, the patient (or the patient's legal representative) was given the 

[facility] standardized written summary, pursuant 

to the [place] Blood Safety Act ([place] Health and Safety Code # 1645, as 

amended).





I attest that I re-evaluated the patient just prior to the surgery and that 

there has been no change in the patient's H&P, except as documented below:











Erik Fischer May 17, 2020 13:27

## 2020-05-17 NOTE — GENERAL PROGRESS NOTE
Assessment/Plan


Problem List:  


(1) Syncope


ICD Codes:  R55 - Syncope and collapse


SNOMED:  066559867


Qualifiers:  


   Qualified Codes:  R55 - Syncope and collapse


(2) Gastric mass


ICD Codes:  K31.89 - Other diseases of stomach and duodenum


SNOMED:  613579372


(3) GI bleed


ICD Codes:  K92.2 - Gastrointestinal hemorrhage, unspecified


SNOMED:  74592331


Qualifiers:  


   Qualified Codes:  K92.2 - Gastrointestinal hemorrhage, unspecified


(4) Pulmonary embolism


ICD Codes:  I26.99 - Other pulmonary embolism without acute cor pulmonale


SNOMED:  31836629


Qualifiers:  


   Qualified Codes:  I26.99 - Other pulmonary embolism without acute cor 

pulmonale


Status:  unchanged


Assessment/Plan:


s/p 5 units prbc


2 units FFP


stable H&H now


fu stool c.diff>>>neg


s/p  vit k


pending EGD when off isolation (first COVID is neg). will make NPO PMN for EGd 

tomorrow if second covid is back


 GI soft


Protonix to Q12





Subjective


ROS Limited/Unobtainable:  Yes


Allergies:  


Coded Allergies:  


     No Known Allergies (Unverified , 5/12/20)





Objective





Last 24 Hour Vital Signs








  Date Time  Temp Pulse Resp B/P (MAP) Pulse Ox O2 Delivery O2 Flow Rate FiO2


 


5/17/20 04:00  95      


 


5/17/20 04:00 98.6 96 20 104/64 (77) 99   


 


5/17/20 00:00  98      


 


5/17/20 00:00 99.1 98 21 101/63 (76) 100   


 


5/16/20 21:00      Nasal Cannula 2.0 


 


5/16/20 20:23     98 Nasal Cannula 2.0 28


 


5/16/20 20:23  81 20  98 Nasal Cannula 2.0 28


 


5/16/20 20:00  98      


 


5/16/20 20:00 98.1 81 24 108/71 (83) 100   


 


5/16/20 16:00  89      


 


5/16/20 16:00 97.6 104 22 114/72 (86) 100   


 


5/16/20 14:00  98 20 108/70 (83) 100   


 


5/16/20 13:00  100 20 110/73 (85) 100   


 


5/16/20 12:00  92      


 


5/16/20 12:00      Nasal Cannula 2.0 


 


5/16/20 12:00 97.8 91 22 108/68 (81) 100   


 


5/16/20 11:00  99 23 96/63 (74) 100   


 


5/16/20 10:00  99 20 106/66 (79) 100   


 


5/16/20 09:00  81 17 101/61 (74) 100   


 


5/16/20 08:10     100 Nasal Cannula 2.0 28

















Intake and Output  


 


 5/16/20 5/17/20





 19:00 07:00


 


Intake Total 62.5 ml 


 


Output Total 280 ml 


 


Balance -217.5 ml 


 


  


 


IV Total 62.5 ml 


 


Output Urine Total 280 ml 


 


# Bowel Movements 3 








Height (Feet):  5


Height (Inches):  7.00


Weight (Pounds):  138


General Appearance:  no apparent distress


EENT:  PERRL/EOMI


Neck:  supple


Cardiovascular:  normal rate


Respiratory/Chest:  decreased breath sounds


Abdomen:  normal bowel sounds, non tender, soft


Extremities:  non-tender











Marc Nielsen MD May 17, 2020 08:10

## 2020-05-17 NOTE — SURGERY PROGRESS NOTE
Surgery Progress Note


Subjective


Additional Comments


OR tomorrow


Had a long discussion with the patient the bedside today.  We discussed his 

findings in the emergency department his hospital course and plan of care.  I 

discussed patient his potentially infected large hemorrhagic gastric tumor 

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  I discussed with him the goals of care in detail at 

the bedside.  We discussed with the potential findings could be reviewed and as 

well as the plan.  We discussed potential for chemotherapy potential for 

nonoperative management and the considerations of operative management.  We 

discussed the goals of care if operative management is proceeded with including 

potential deterioration, death, worsening condition, surgical failure, aborting 

a surgical procedure.  I discussed with him the plan for resection but not for 

curative intent rather for lifesaving measures given active bleeding large 

potentially infected tumor.  After doing so we concluded patient would like 

surgical intervention and we will plan for surgery tomorrow morning.





Objective





Last 24 Hour Vital Signs








  Date Time  Temp Pulse Resp B/P (MAP) Pulse Ox O2 Delivery O2 Flow Rate FiO2


 


5/17/20 12:00 98.2 69 22 110/70 (83) 100   


 


5/17/20 09:00      Nasal Cannula 2.0 


 


5/17/20 08:00 97.0 85 20 104/75 (85) 96   


 


5/17/20 08:00  96      


 


5/17/20 04:00  95      


 


5/17/20 04:00 98.6 96 20 104/64 (77) 99   


 


5/17/20 00:00  98      


 


5/17/20 00:00 99.1 98 21 101/63 (76) 100   


 


5/16/20 21:00      Nasal Cannula 2.0 


 


5/16/20 20:23     98 Nasal Cannula 2.0 28


 


5/16/20 20:23  81 20  98 Nasal Cannula 2.0 28


 


5/16/20 20:00  98      


 


5/16/20 20:00 98.1 81 24 108/71 (83) 100   


 


5/16/20 16:00  89      


 


5/16/20 16:00 97.6 104 22 114/72 (86) 100   


 


5/16/20 14:00  98 20 108/70 (83) 100   


 


5/16/20 13:00  100 20 110/73 (85) 100   








I&O











Intake and Output  


 


 5/16/20 5/17/20





 19:00 07:00


 


Intake Total 62.5 ml 


 


Output Total 280 ml 


 


Balance -217.5 ml 


 


  


 


IV Total 62.5 ml 


 


Output Urine Total 280 ml 


 


# Bowel Movements 3 








Cardiovascular:  RSR


Respiratory:  clear


Abdomen:  soft, non-tender, present bowel sounds


Extremities:  no cyanosis





Laboratory Tests








Test


  5/17/20


08:30


 


White Blood Count


  18.9 K/UL


(4.8-10.8)  H


 


Red Blood Count


  3.31 M/UL


(4.70-6.10)  L


 


Hemoglobin


  9.4 G/DL


(14.2-18.0)  L


 


Hematocrit


  28.5 %


(42.0-52.0)  L


 


Mean Corpuscular Volume 86 FL (80-99)  


 


Mean Corpuscular Hemoglobin


  28.2 PG


(27.0-31.0)


 


Mean Corpuscular Hemoglobin


Concent 32.8 G/DL


(32.0-36.0)


 


Red Cell Distribution Width


  15.8 %


(11.6-14.8)  H


 


Platelet Count


  96 K/UL


(150-450)  L


 


Mean Platelet Volume


  5.1 FL


(6.5-10.1)  L


 


Neutrophils (%) (Auto)


  % (45.0-75.0)


 


 


Lymphocytes (%) (Auto)


  % (20.0-45.0)


 


 


Monocytes (%) (Auto)  % (1.0-10.0)  


 


Eosinophils (%) (Auto)  % (0.0-3.0)  


 


Basophils (%) (Auto)  % (0.0-2.0)  


 


Differential Total Cells


Counted 100  


 


 


Neutrophils % (Manual) 88 % (45-75)  H


 


Lymphocytes % (Manual) 7 % (20-45)  L


 


Monocytes % (Manual) 5 % (1-10)  


 


Eosinophils % (Manual) 0 % (0-3)  


 


Basophils % (Manual) 0 % (0-2)  


 


Band Neutrophils 0 % (0-8)  


 


Platelet Estimate Decreased  L


 


Platelet Morphology Normal  


 


Hypochromasia 1+  


 


Anisocytosis 1+  


 


Sodium Level


  141 MMOL/L


(136-145)


 


Potassium Level


  3.6 MMOL/L


(3.5-5.1)


 


Chloride Level


  111 MMOL/L


()  H


 


Carbon Dioxide Level


  20 MMOL/L


(21-32)  L


 


Anion Gap


  10 mmol/L


(5-15)


 


Blood Urea Nitrogen


  8 mg/dL (7-18)


 


 


Creatinine


  0.8 MG/DL


(0.55-1.30)


 


Estimat Glomerular Filtration


Rate > 60 mL/min


(>60)


 


Glucose Level


  126 MG/DL


()  H


 


Calcium Level


  7.2 MG/DL


(8.5-10.1)  L











Plan


Problems:  


(1) Lower extremity edema


Assessment & Plan:  Venous Duplex pending 


DVT noted on CT 


IV filter


cannot anticoagulate 





(2) Pulmonary embolism


Assessment & Plan:  Filling defects within pulmonary vessels at the lung bases 

with small 


peripheral airspace opacities concerning for pulmonary emboli and small 


pulmonary infarcts.





(3) GI bleed


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  Patient 

denies any knowledge of this mass.  Likely etiology of severe anemia hemoglobin 

3.5 being transfused emergently.  Patient states he feels very ill and 

potentially feels as if he is going to die.  High probability of GI bleed 

etiology is this large necrotic mass.


I had a long discussion with the patient in the emergency department regards 

above findings and care plan.  Discussed care plan with primary care physician 

GI and emergency permit physician.


Patient needs urgent blood transfusion which he is receiving in the ED as 

ordered.  Patient needs acute resuscitation followed by


Potential EGD.


Given severe anemia.  Gastric mass causing GI bleed.  Acute hemorrhagic GI 

bleed secondary to likely mass.  


 tumor needs biopsy and pathology evaluation


Despite above if continues to hemorrhage or does not respond appropriately will 

likely need acute life-saving emergency surgery for resection of acutely 

bleeding gastric tumor


If surgical intervention propose it would not be for curative intent but rather 

life-saving hemorrhage control


Discussed with patient in detail at bedside.


For now continue with resuscitation and will continue with further work-up





Npo


IV fluids


PRBC 


q6h h/h


trend labs


GI eval for EGD consideration


Heme Onc Eval for evaluation of large gastric tumor identification


surgery available and will follow with recs.


Thank you for allowing me to participate in patients care 


improving


resuscitation going well and safely


will plan resection soon 





(4) Gastric mass


Assessment & Plan:   ABDOMEN:


  Liver:  See above.


  Gallbladder and bile ducts:  Unremarkable.  No calcified stones.  No 


ductal dilation.


  Pancreas:  Unremarkable.  No mass.  No ductal dilation.


  Spleen:  Unremarkable.  No splenomegaly.


  Adrenals:  Unremarkable.  No mass.


  Kidneys and ureters:  Unremarkable.  No solid mass.  No hydronephrosis.


  Stomach and bowel:  Centrally necrotic mass arising from the gastric 


fundus measuring 12.6 x 11.1 x 14 cm.  Liquid stool within the rectum.


 


 PELVIS:


  Appendix:  No findings to suggest acute appendicitis.


  Bladder:  Unremarkable.  No mass.


  Reproductive:  Unremarkable as visualized.


 


 ABDOMEN and PELVIS:


  Intraperitoneal space:  Unremarkable.  No free air.  No significant 


fluid collection.


  Bones/joints:  No acute fracture.  No dislocation.


  Soft tissues:  Unremarkable.


  Vasculature:  Filling defects within pulmonary vessels at the lung 


bases with small peripheral airspace opacities concerning for pulmonary 


emboli and small pulmonary infarcts.  No abdominal aortic aneurysm.


  Lymph nodes:  Unremarkable.  No enlarged lymph nodes.


 


IMPRESSION:     


1.  Centrally necrotic mass arising from the gastric fundus measuring 12.


6 x 11.1 x 14 cm.  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  Gastric 


adenocarcinoma, lymphoma, and other neoplastic etiologies are also in the 


differential.


2.  Filling defects within pulmonary vessels at the lung bases with small 


peripheral airspace opacities concerning for pulmonary emboli and small 


pulmonary infarcts.





(5) COVID-19 ruled out


Assessment & Plan:  COVID 19 r/o


sob


respiratory symptoms


pending testing 


Defer to ID


COVID neg 5/12





Additional Comments


OR tomorrow AM


N{PO p mn


IV fluids


am labs











Erik Fischer May 17, 2020 12:40

## 2020-05-17 NOTE — CARDIAC ELECTROPHYSIOLOGY PN
Assessment/Plan


Assessment/Plan


1. Hypotension due to profound anemia due to gastric mass.  Patient is off 

pressors,


received 5 units of blood transfusion. Echocardiogram pending Covid negativity





2. Profound anemia.  Hemoglobin of 3.5.  CT of abdomen and pelvis shows


central necrotic mass in the gastric fundus 12 x 14 x 11 cm.  


 Further evaluation by GI and Dr Fischer. Likely will need surgery


 Clear for surgery at Moderate risk. No prior MI or CHF and needs procedure/ 

surgery urgently





3. Pulmonary embolus.  S/P IVC filter.


4. Sepsis with elevated white count.





QUINCY RN and Dr Fischer





Subjective


Subjective


Off pressors and vent alert and eating. Continues with black tarry stool. 

Scheduled for EGD and colonoscopy vs surgery in Am if Covid negative





Objective





Last 24 Hour Vital Signs








  Date Time  Temp Pulse Resp B/P (MAP) Pulse Ox O2 Delivery O2 Flow Rate FiO2


 


5/17/20 12:00  90      


 


5/17/20 12:00 98.2 69 22 110/70 (83) 100   


 


5/17/20 09:00      Nasal Cannula 2.0 


 


5/17/20 08:00 97.0 85 20 104/75 (85) 96   


 


5/17/20 08:00  96      


 


5/17/20 04:00  95      


 


5/17/20 04:00 98.6 96 20 104/64 (77) 99   


 


5/17/20 00:00  98      


 


5/17/20 00:00 99.1 98 21 101/63 (76) 100   


 


5/16/20 21:00      Nasal Cannula 2.0 


 


5/16/20 20:23     98 Nasal Cannula 2.0 28


 


5/16/20 20:23  81 20  98 Nasal Cannula 2.0 28


 


5/16/20 20:00  98      


 


5/16/20 20:00 98.1 81 24 108/71 (83) 100   


 


5/16/20 16:00  89      


 


5/16/20 16:00 97.6 104 22 114/72 (86) 100   


 


5/16/20 14:00  98 20 108/70 (83) 100   

















Intake and Output  


 


 5/16/20 5/17/20





 19:00 07:00


 


Intake Total 62.5 ml 


 


Output Total 280 ml 


 


Balance -217.5 ml 


 


  


 


IV Total 62.5 ml 


 


Output Urine Total 280 ml 


 


# Bowel Movements 3 











Laboratory Tests








Test


  5/17/20


08:30


 


White Blood Count


  18.9 K/UL


(4.8-10.8)  H


 


Red Blood Count


  3.31 M/UL


(4.70-6.10)  L


 


Hemoglobin


  9.4 G/DL


(14.2-18.0)  L


 


Hematocrit


  28.5 %


(42.0-52.0)  L


 


Mean Corpuscular Volume 86 FL (80-99)  


 


Mean Corpuscular Hemoglobin


  28.2 PG


(27.0-31.0)


 


Mean Corpuscular Hemoglobin


Concent 32.8 G/DL


(32.0-36.0)


 


Red Cell Distribution Width


  15.8 %


(11.6-14.8)  H


 


Platelet Count


  96 K/UL


(150-450)  L


 


Mean Platelet Volume


  5.1 FL


(6.5-10.1)  L


 


Neutrophils (%) (Auto)


  % (45.0-75.0)


 


 


Lymphocytes (%) (Auto)


  % (20.0-45.0)


 


 


Monocytes (%) (Auto)  % (1.0-10.0)  


 


Eosinophils (%) (Auto)  % (0.0-3.0)  


 


Basophils (%) (Auto)  % (0.0-2.0)  


 


Differential Total Cells


Counted 100  


 


 


Neutrophils % (Manual) 88 % (45-75)  H


 


Lymphocytes % (Manual) 7 % (20-45)  L


 


Monocytes % (Manual) 5 % (1-10)  


 


Eosinophils % (Manual) 0 % (0-3)  


 


Basophils % (Manual) 0 % (0-2)  


 


Band Neutrophils 0 % (0-8)  


 


Platelet Estimate Decreased  L


 


Platelet Morphology Normal  


 


Hypochromasia 1+  


 


Anisocytosis 1+  


 


Sodium Level


  141 MMOL/L


(136-145)


 


Potassium Level


  3.6 MMOL/L


(3.5-5.1)


 


Chloride Level


  111 MMOL/L


()  H


 


Carbon Dioxide Level


  20 MMOL/L


(21-32)  L


 


Anion Gap


  10 mmol/L


(5-15)


 


Blood Urea Nitrogen


  8 mg/dL (7-18)


 


 


Creatinine


  0.8 MG/DL


(0.55-1.30)


 


Estimat Glomerular Filtration


Rate > 60 mL/min


(>60)


 


Glucose Level


  126 MG/DL


()  H


 


Calcium Level


  7.2 MG/DL


(8.5-10.1)  L











Microbiology








 Date/Time


Source Procedure


Growth Status


 


 


 5/14/20 16:59


Stool Stool Culture - Preliminary


NORMAL FECAL BROOK. Resulted


 


 5/14/20 16:59


Stool Clostridium difficile Toxin Assay - Final Complete








Objective


HEAD AND NECK:  No JVD.


LUNGS:  Decreased breath sounds.


CARDIOVASCULAR:  Regular S1 and S2.  Tachycardic.


ABDOMEN:  Soft.


EXTREMITIES:  No pitting edema.











Aaron Antunez MD May 17, 2020 13:58

## 2020-05-17 NOTE — NUR
NURSE NOTES:

Received report from JOSIAH Christianson. Patient is awake, alert and oriented x 4. On nasal cannula @ 
2Lpm with no shortness or difficulty of breathing reported, saturating 96%. On a regular 
diet, soft easy chew-instructed and amenable. Cardiac monitor is in placed, shows sinus 
rhythm with no chest pain at this time. Patient is bedrest, on fall precaution. Mata 
catheter is in placed, F-16, draining dark yellow color output. IV site is on left forearm 
g-20 and right forearm AC g-20 saline lock that is asymptomatic, patent and intact. Patient 
still not signing the consent for Exlap procedure for tomorrow, MD aware. Safety measures 
are in placed, bed in lowest and lock position. Call light and bedside table within reach, 
instructed to call for any assistance needed.

## 2020-05-17 NOTE — HEMATOLOGY/ONC PROGRESS NOTE
Assessment/Plan


Assessment/Plan


Assessment and Recs


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


--> CT shows    Centrally necrotic mass arising from the gastric fundus 

measuring 12. 6 x 11.1 x 14 cm.  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  

Gastric adenocarcinoma, lymphoma, and other neoplastic etiologies are also in 

the differential. Filling defects within pulmonary vessels at the lung bases 

with small peripheral airspace opacities concerning for pulmonary emboli and 

small pulmonary infarcts.


--> per gi and surgery to obtain a biopsy


--> ideally requires resection if possible with as much as possible negative 

margins


--> adjuvant chemotherapy v pill (imatinib) based on type of cancer


# Pulmonary embolism -- likely related to hypercoagulable disorder from 

malignancys/p ivc filter


--> case dw pcp, surg, and pulm


--> is not a candidate for anticoagulation given severe anemia and low platelets


--> 5/13 s/p ivc filter placement


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


--> have ordered for repeat prbc transfusions


--> hgb goal is >7


--> have ordered for anemia panel


--> gi to access in re to biopsy/endoscopy


--> IV IRON completed 


--> hgb trend: 8.6-->9.7


# Leukocytosis is likely reactive process from cancer


--> on abx as needed


--> smear is noted


--> meds are reviewed


--> vanc/zosyn-->cefepime/flagyl


--> wbc trend: 21.6-->18 


# Diarrhea


--> c diff negative 


# PABLO, improving


# HTN





The timing of this note does not necessarily reflect the time of the patient 

was seen.





Greatly appreciate consultation.





Subjective


Allergies:  


Coded Allergies:  


     No Known Allergies (Unverified , 5/12/20)


Subjective


5/14 labs are noted, no bleeding, meds reviewed, for egd when iso off


5/15 icu, no overnight events, alert, iv iron, c diff negative 


5/17 on tele, wbc improving, nc, tarango, no distress





Objective


Objective





Current Medications








 Medications


  (Trade)  Dose


 Ordered  Sig/Marisa


 Route


 PRN Reason  Start Time


 Stop Time Status Last Admin


Dose Admin


 


 Acetaminophen


  (Tylenol)  650 mg  Q4H  PRN


 ORAL


 Temp >100.5  5/16/20 14:14


 6/15/20 14:13   


 


 


 Albuterol/


 Ipratropium


  (Albuterol/


 Ipratropium)  3 ml  Q4H  PRN


 HHN


 Shortness of Breath  5/16/20 14:15


 5/21/20 14:14   


 


 


 Cefepime HCl 1 gm/


 Dextrose  55 ml @ 


 110 mls/hr  Q8HR


 IVPB


   5/16/20 22:00


 5/23/20 21:59  5/17/20 06:20


 


 


 Dextrose


  (Dextrose 50%)  25 ml  Q30M  PRN


 IV


 Hypoglycemia  5/16/20 14:15


 8/11/20 06:14   


 


 


 Dextrose


  (Dextrose 50%)  50 ml  Q30M  PRN


 IV


 Hypoglycemia  5/16/20 14:15


 8/11/20 06:14   


 


 


 Docusate Sodium


  (Colace)  100 mg  EVERY 12  HOURS


 ORAL


   5/16/20 21:00


 6/12/20 08:59  5/16/20 20:19


 


 


 Hydromorphone HCl


  (Dilaudid)  1 mg  Q6H  PRN


 IVP


 Moderate Pain (Pain Scale 4-6)  5/16/20 14:15


 5/23/20 14:14   


 


 


 Iohexol


  (Omnipaque 350


 100ml)  100 ml  NOW  PRN


 INJ


 Radiology Procedure  5/17/20 11:00


 5/17/20 20:00   


 


 


 Iron Sucrose 100


 mg/Sodium Chloride  60 ml @ 


 240 mls/hr  BEDTIME


 IV


   5/16/20 21:00


 5/18/20 21:14  5/16/20 20:18


 


 


 Lorazepam


  (Ativan)  1 mg  Q4H  PRN


 ORAL


 For Anxiety  5/16/20 14:15


 5/23/20 14:14   


 


 


 Metronidazole  100 ml @ 


 100 mls/hr  Q8HR


 IVPB


   5/16/20 22:00


 5/22/20 13:59  5/17/20 06:20


 


 


 Ondansetron HCl


  (Zofran)  4 mg  Q6H  PRN


 IVP


 Nausea & Vomiting  5/16/20 14:15


 6/15/20 14:14   


 


 


 Pantoprazole


  (Protonix)  40 mg  EVERY 12  HOURS


 IV


   5/16/20 21:00


 6/15/20 08:59  5/16/20 20:16


 


 


 Zolpidem Tartrate


  (Ambien)  5 mg  HSPRN  PRN


 ORAL


 Insomnia  5/16/20 14:15


 5/23/20 14:14  5/16/20 22:27


 











Last 24 Hour Vital Signs








  Date Time  Temp Pulse Resp B/P (MAP) Pulse Ox O2 Delivery O2 Flow Rate FiO2


 


5/17/20 04:00  95      


 


5/17/20 04:00 98.6 96 20 104/64 (77) 99   


 


5/17/20 00:00  98      


 


5/17/20 00:00 99.1 98 21 101/63 (76) 100   


 


5/16/20 21:00      Nasal Cannula 2.0 


 


5/16/20 20:23     98 Nasal Cannula 2.0 28


 


5/16/20 20:23  81 20  98 Nasal Cannula 2.0 28


 


5/16/20 20:00  98      


 


5/16/20 20:00 98.1 81 24 108/71 (83) 100   


 


5/16/20 16:00  89      


 


5/16/20 16:00 97.6 104 22 114/72 (86) 100   


 


5/16/20 14:00  98 20 108/70 (83) 100   


 


5/16/20 13:00  100 20 110/73 (85) 100   


 


5/16/20 12:00  92      


 


5/16/20 12:00      Nasal Cannula 2.0 


 


5/16/20 12:00 97.8 91 22 108/68 (81) 100   


 


5/16/20 11:00  99 23 96/63 (74) 100   


 


5/16/20 10:00  99 20 106/66 (79) 100   


 


5/16/20 09:00  81 17 101/61 (74) 100   


 


5/16/20 08:10     100 Nasal Cannula 2.0 28


 


5/16/20 08:00  93      


 


5/16/20 08:00      Nasal Cannula 2.0 


 


5/16/20 08:00 97.8 79 22 98/60 (73) 100   


 


5/16/20 07:00  78 13 94/61 (72) 100   


 


5/16/20 06:00  80 18 93/60 (71) 100   


 


5/16/20 05:00  80 16 101/68 (79) 100   


 


5/16/20 04:00  81      


 


5/16/20 04:00      Nasal Cannula 2.0 


 


5/16/20 04:00 97.9 81 18 98/62 (74) 100   


 


5/16/20 03:00  80 14 96/66 (76) 100   


 


5/16/20 02:00  84 20 99/66 (77) 100   


 


5/16/20 01:00  81 15 96/64 (75) 100   


 


5/16/20 00:00      Nasal Cannula 2.0 


 


5/16/20 00:00 98.4 80 19 99/65 (76) 100   


 


5/15/20 23:00  84 13 98/59 (72) 100   


 


5/15/20 22:00  86 16 93/64 (74) 100   


 


5/15/20 21:00  90 16 103/70 (81) 100   


 


5/15/20 20:02     100 Nasal Cannula 2.0 28


 


5/15/20 20:00  92      


 


5/15/20 20:00      Nasal Cannula 2.0 


 


5/15/20 20:00 98.1 85 18 107/61 (76) 100   


 


5/15/20 19:00  88 16 97/50 (66) 100   


 


5/15/20 18:00  97 21 99/67 (78) 100   


 


5/15/20 17:00  99 14 107/67 (80) 100   


 


5/15/20 16:00  85      


 


5/15/20 16:00 97.6 85 18 100/72 (81) 100   


 


5/15/20 16:00      Nasal Cannula 2.0 


 


5/15/20 15:00  86 22 106/68 (81) 100   


 


5/15/20 14:00  89 21 104/68 (80) 100   


 


5/15/20 13:00  93 25 107/74 (85) 100   


 


5/15/20 12:00 98.0 98 23 105/68 (80) 100   


 


5/15/20 12:00  93      


 


5/15/20 12:00      Nasal Cannula 2.0 


 


5/15/20 11:00  92 19 101/72 (82) 100   


 


5/15/20 10:00  87 18 96/62 (73) 100   


 


5/15/20 09:00  87 17 102/72 (82) 100   


 


5/15/20 08:00  94      


 


5/15/20 08:00 98.2 94 24 96/75 (82) 100   


 


5/15/20 08:00      Nasal Cannula 2.0 


 


5/15/20 07:40     100 Nasal Cannula 2.0 28


 


5/15/20 07:00  86 17 104/59 (74) 100   

















Intake and Output  


 


 5/16/20 5/17/20





 19:00 07:00


 


Intake Total 62.5 ml 


 


Output Total 280 ml 


 


Balance -217.5 ml 


 


  


 


IV Total 62.5 ml 


 


Output Urine Total 280 ml 


 


# Bowel Movements 3 











Labs








Test


  5/15/20


04:00 5/16/20


04:00


 


White Blood Count


  21.6 K/UL


(4.8-10.8) 18.3 K/UL


(4.8-10.8)


 


Red Blood Count


  3.01 M/UL


(4.70-6.10) 3.29 M/UL


(4.70-6.10)


 


Hemoglobin


  8.6 G/DL


(14.2-18.0) 9.7 G/DL


(14.2-18.0)


 


Hematocrit


  25.4 %


(42.0-52.0) 28.4 %


(42.0-52.0)


 


Mean Corpuscular Volume 84 FL (80-99)  86 FL (80-99) 


 


Mean Corpuscular Hemoglobin


  28.8 PG


(27.0-31.0) 29.5 PG


(27.0-31.0)


 


Mean Corpuscular Hemoglobin


Concent 34.1 G/DL


(32.0-36.0) 34.2 G/DL


(32.0-36.0)


 


Red Cell Distribution Width


  15.1 %


(11.6-14.8) 15.8 %


(11.6-14.8)


 


Platelet Count


  79 K/UL


(150-450) 88 K/UL


(150-450)


 


Mean Platelet Volume


  5.2 FL


(6.5-10.1) 5.7 FL


(6.5-10.1)


 


Neutrophils (%) (Auto)  % (45.0-75.0)   % (45.0-75.0) 


 


Lymphocytes (%) (Auto)  % (20.0-45.0)   % (20.0-45.0) 


 


Monocytes (%) (Auto)  % (1.0-10.0)   % (1.0-10.0) 


 


Eosinophils (%) (Auto)  % (0.0-3.0)   % (0.0-3.0) 


 


Basophils (%) (Auto)  % (0.0-2.0)   % (0.0-2.0) 


 


Differential Total Cells


Counted 100 


  100 


 


 


Neutrophils % (Manual) 88 % (45-75)  89 % (45-75) 


 


Lymphocytes % (Manual) 9 % (20-45)  8 % (20-45) 


 


Monocytes % (Manual) 2 % (1-10)  3 % (1-10) 


 


Eosinophils % (Manual) 1 % (0-3)  0 % (0-3) 


 


Basophils % (Manual) 0 % (0-2)  0 % (0-2) 


 


Band Neutrophils 0 % (0-8)  0 % (0-8) 


 


Platelet Estimate Decreased  Decreased 


 


Platelet Morphology Normal  Normal 


 


Polychromasia 1+  1+ 


 


Hypochromasia 1+  1+ 


 


Anisocytosis 1+  1+ 


 


Prothrombin Time


  15.1 SEC


(9.30-11.50) 


 


 


Prothromb Time International


Ratio 1.4 (0.9-1.1) 


  


 


 


Sodium Level


  142 MMOL/L


(136-145) 141 MMOL/L


(136-145)


 


Potassium Level


  3.4 MMOL/L


(3.5-5.1) 3.5 MMOL/L


(3.5-5.1)


 


Chloride Level


  110 MMOL/L


() 111 MMOL/L


()


 


Carbon Dioxide Level


  20 MMOL/L


(21-32) 20 MMOL/L


(21-32)


 


Anion Gap


  12 mmol/L


(5-15) 10 mmol/L


(5-15)


 


Blood Urea Nitrogen


  10 mg/dL


(7-18) 9 mg/dL (7-18) 


 


 


Creatinine


  0.7 MG/DL


(0.55-1.30) 0.8 MG/DL


(0.55-1.30)


 


Estimat Glomerular Filtration


Rate > 60 mL/min


(>60) > 60 mL/min


(>60)


 


Glucose Level


  84 MG/DL


() 83 MG/DL


()


 


Calcium Level


  7.0 MG/DL


(8.5-10.1) 7.0 MG/DL


(8.5-10.1)


 


Total Bilirubin


  0.7 MG/DL


(0.2-1.0) 


 


 


Aspartate Amino Transf


(AST/SGOT) 41 U/L (15-37) 


  


 


 


Alanine Aminotransferase


(ALT/SGPT) 11 U/L (12-78) 


  


 


 


Alkaline Phosphatase


  84 U/L


() 


 


 


Total Protein


  5.1 G/DL


(6.4-8.2) 


 


 


Albumin


  1.4 G/DL


(3.4-5.0) 


 


 


Globulin 3.7 g/dL  


 


Albumin/Globulin Ratio 0.4 (1.0-2.7)  


 


Vancomycin Level Trough


  8.0 ug/mL


(5.0-12.0) 


 








Height (Feet):  5


Height (Inches):  7.00


Weight (Pounds):  134


Objective








PE


General Appearance:  alert, mild distress


Head:  normocephalic, atraumatic


Eyes:  bilateral eye PERRL, bilateral eye EOMI


ENT:  uvula midline, dry mucus membranes


Neck:  supple, thyroid normal, supple/symm/no masses


Respiratory:  lungs clear, no respiratory distress, no retraction


Cardiovascular:  normal peripheral pulses, no edema


Gastrointestinal:  non tender, soft, no guarding, no rebound


Musculoskeletal:  normal inspection


Neurologic:  alert, oriented x3


Psychiatric:  mood/affect normal


Skin:  no rash, warm/dry











Jersey Ross MD May 17, 2020 06:46

## 2020-05-18 VITALS — SYSTOLIC BLOOD PRESSURE: 126 MMHG | DIASTOLIC BLOOD PRESSURE: 78 MMHG

## 2020-05-18 VITALS — DIASTOLIC BLOOD PRESSURE: 71 MMHG | SYSTOLIC BLOOD PRESSURE: 112 MMHG

## 2020-05-18 VITALS — SYSTOLIC BLOOD PRESSURE: 131 MMHG | DIASTOLIC BLOOD PRESSURE: 84 MMHG

## 2020-05-18 VITALS — SYSTOLIC BLOOD PRESSURE: 133 MMHG | DIASTOLIC BLOOD PRESSURE: 91 MMHG

## 2020-05-18 VITALS — DIASTOLIC BLOOD PRESSURE: 80 MMHG | SYSTOLIC BLOOD PRESSURE: 124 MMHG

## 2020-05-18 VITALS — DIASTOLIC BLOOD PRESSURE: 70 MMHG | SYSTOLIC BLOOD PRESSURE: 108 MMHG

## 2020-05-18 VITALS — SYSTOLIC BLOOD PRESSURE: 121 MMHG | DIASTOLIC BLOOD PRESSURE: 80 MMHG

## 2020-05-18 VITALS — SYSTOLIC BLOOD PRESSURE: 128 MMHG | DIASTOLIC BLOOD PRESSURE: 91 MMHG

## 2020-05-18 VITALS — SYSTOLIC BLOOD PRESSURE: 105 MMHG | DIASTOLIC BLOOD PRESSURE: 71 MMHG

## 2020-05-18 VITALS — DIASTOLIC BLOOD PRESSURE: 82 MMHG | SYSTOLIC BLOOD PRESSURE: 117 MMHG

## 2020-05-18 VITALS — SYSTOLIC BLOOD PRESSURE: 130 MMHG | DIASTOLIC BLOOD PRESSURE: 94 MMHG

## 2020-05-18 VITALS — SYSTOLIC BLOOD PRESSURE: 132 MMHG | DIASTOLIC BLOOD PRESSURE: 89 MMHG

## 2020-05-18 VITALS — SYSTOLIC BLOOD PRESSURE: 125 MMHG | DIASTOLIC BLOOD PRESSURE: 84 MMHG

## 2020-05-18 VITALS — SYSTOLIC BLOOD PRESSURE: 122 MMHG | DIASTOLIC BLOOD PRESSURE: 81 MMHG

## 2020-05-18 VITALS — DIASTOLIC BLOOD PRESSURE: 85 MMHG | SYSTOLIC BLOOD PRESSURE: 109 MMHG

## 2020-05-18 VITALS — SYSTOLIC BLOOD PRESSURE: 112 MMHG | DIASTOLIC BLOOD PRESSURE: 85 MMHG

## 2020-05-18 VITALS — SYSTOLIC BLOOD PRESSURE: 135 MMHG | DIASTOLIC BLOOD PRESSURE: 92 MMHG

## 2020-05-18 LAB
ADD MANUAL DIFF: YES
ALBUMIN SERPL-MCNC: 1.2 G/DL (ref 3.4–5)
ALBUMIN/GLOB SERPL: 0.3 {RATIO} (ref 1–2.7)
ALP SERPL-CCNC: 66 U/L (ref 46–116)
ALT SERPL-CCNC: 9 U/L (ref 12–78)
AMYLASE SERPL-CCNC: 63 U/L (ref 25–115)
ANION GAP SERPL CALC-SCNC: 9 MMOL/L (ref 5–15)
APTT BLD: 35 SEC (ref 23–33)
AST SERPL-CCNC: 42 U/L (ref 15–37)
BILIRUB SERPL-MCNC: 0.2 MG/DL (ref 0.2–1)
BUN SERPL-MCNC: 5 MG/DL (ref 7–18)
CALCIUM SERPL-MCNC: 7.1 MG/DL (ref 8.5–10.1)
CHLORIDE SERPL-SCNC: 108 MMOL/L (ref 98–107)
CO2 SERPL-SCNC: 22 MMOL/L (ref 21–32)
CREAT SERPL-MCNC: 0.8 MG/DL (ref 0.55–1.3)
ERYTHROCYTE [DISTWIDTH] IN BLOOD BY AUTOMATED COUNT: 15.2 % (ref 11.6–14.8)
GLOBULIN SER-MCNC: 4.1 G/DL
HCT VFR BLD CALC: 29.7 % (ref 42–52)
HGB BLD-MCNC: 10 G/DL (ref 14.2–18)
INR PPP: 1.6 (ref 0.9–1.1)
MCV RBC AUTO: 84 FL (ref 80–99)
PLATELET # BLD: 119 K/UL (ref 150–450)
POTASSIUM SERPL-SCNC: 3.7 MMOL/L (ref 3.5–5.1)
RBC # BLD AUTO: 3.52 M/UL (ref 4.7–6.1)
SODIUM SERPL-SCNC: 139 MMOL/L (ref 136–145)
WBC # BLD AUTO: 20.1 K/UL (ref 4.8–10.8)

## 2020-05-18 PROCEDURE — 0DTG0ZZ RESECTION OF LEFT LARGE INTESTINE, OPEN APPROACH: ICD-10-PCS

## 2020-05-18 PROCEDURE — 07TP0ZZ RESECTION OF SPLEEN, OPEN APPROACH: ICD-10-PCS

## 2020-05-18 PROCEDURE — 0DB60ZZ EXCISION OF STOMACH, OPEN APPROACH: ICD-10-PCS

## 2020-05-18 PROCEDURE — 0FBG0ZZ EXCISION OF PANCREAS, OPEN APPROACH: ICD-10-PCS

## 2020-05-18 RX ADMIN — SODIUM CHLORIDE SCH MLS/HR: 0.9 INJECTION INTRAVENOUS at 21:05

## 2020-05-18 RX ADMIN — SODIUM CHLORIDE PRN MG: 9 INJECTION, SOLUTION INTRAVENOUS at 20:47

## 2020-05-18 RX ADMIN — PANTOPRAZOLE SODIUM SCH MG: 40 INJECTION, POWDER, FOR SOLUTION INTRAVENOUS at 20:44

## 2020-05-18 RX ADMIN — SODIUM CHLORIDE SCH MLS/HR: 0.9 INJECTION INTRAVENOUS at 14:00

## 2020-05-18 RX ADMIN — SODIUM CHLORIDE SCH MLS/HR: 0.9 INJECTION INTRAVENOUS at 05:42

## 2020-05-18 RX ADMIN — SODIUM CHLORIDE SCH MLS/HR: 0.9 INJECTION INTRAVENOUS at 23:00

## 2020-05-18 RX ADMIN — PANTOPRAZOLE SODIUM SCH MG: 40 INJECTION, POWDER, FOR SOLUTION INTRAVENOUS at 09:17

## 2020-05-18 NOTE — NUR
NURSE NOTES:



Received pt from JOSIAH Powell. pt is observed in bed, awake, AO X3, attempting to communicate 
needs by mouthing words; denies pain at this time. pt is intubated: ET tube: 7.5, 23 cm at 
lip, AC: 12, VT: 500, FiO2: 80%, PEEP: 5. no s/sx of respiratory distress noted, saturation 
at 100%. NGT noted in nare with low intermittent suction; pt is currently strict NPO. F/C is 
patent and intact, draining light josh urine to gravity. ALLISON drain noted on left quadrant. 
RAC 20 G and LFA 20 G IV sites are patent and intact, asymptomatic. bed in lowest position 
and locked, siderails up X3, all needs attended to. will continue to monitor.

## 2020-05-18 NOTE — HEMATOLOGY/ONC PROGRESS NOTE
Assessment/Plan


Assessment/Plan


Assessment and Recs


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


--> CT shows    Centrally necrotic mass arising from the gastric fundus 

measuring 12. 6 x 11.1 x 14 cm.  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  

Gastric adenocarcinoma, lymphoma, and other neoplastic etiologies are also in 

the differential. Filling defects within pulmonary vessels at the lung bases 

with small peripheral airspace opacities concerning for pulmonary emboli and 

small pulmonary infarcts.


--> per gi and surgery to obtain a biopsy


--> ideally requires resection if possible with as much as possible negative 

margins for 5/18/20


--> adjuvant chemotherapy v pill (imatinib) based on type of cancer


# Pulmonary embolism -- likely related to hypercoagulable disorder from 

malignancys/p ivc filter


--> case dw pcp, surg, and pulm


--> is not a candidate for anticoagulation given severe anemia and low platelets


--> 5/13 s/p ivc filter placement


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


--> have ordered for repeat prbc transfusions


--> hgb goal is >7


--> have ordered for anemia panel


--> gi to access in re to biopsy/endoscopy


--> IV IRON completed 


--> hgb trend: 8.6-->9.7


# Leukocytosis is likely reactive process from cancer


--> on abx as needed


--> smear is noted


--> meds are reviewed


--> vanc/zosyn-->cefepime/flagyl


--> wbc trend: 21.6-->18 


# Diarrhea


--> c diff negative 


# PABLO, improving


# HTN


# Dvt ppx scds





The timing of this note does not necessarily reflect the time of the patient 

was seen.





Greatly appreciate consultation.





Subjective


HEENT:  Denies: no symptoms, eye pain, blurred vision, tearing, double vision, 

ear pain, ear discharge, nose pain, nose congestion, throat pain, throat 

swelling, mouth pain, mouth swelling, other


Cardiovascular:  Denies: no symptoms, chest pain, edema, irregular heart rate, 

lightheadedness, palpitations, syncope, other


Gastrointestinal/Abdominal:  Denies: no symptoms, abdomen distended, abdominal 

pain, black stools, tarry stools, blood in stool, constipated, diarrhea, 

difficulty swallowing, nausea, poor appetite, poor fluid intake, rectal bleeding

, vomiting, other


Neurologic/Psychiatric:  Denies: no symptoms, anxiety, depressed, emotional 

problems, headache, numbness, paresthesia, pre-existing deficit, seizure, 

tingling, tremors, weakness, other


Endocrine:  Denies: no symptoms, excessive sweating, flushing, intolerance to 

cold, intolerance to heat, increased hunger, increased thirst, increased urine, 

unexplained weight gain, unexplained weight loss, other


Hematologic/Lymphatic:  Denies: no symptoms, anemia, easy bleeding, easy 

bruising, adenopathy, other


Allergies:  


Coded Allergies:  


     No Known Allergies (Unverified , 5/12/20)


Subjective


5/14 labs are noted, no bleeding, meds reviewed, for egd when iso off


5/15 icu, no overnight events, alert, iv iron, c diff negative 


5/17 on tele, wbc improving, nc, tarango, no distress 


5/18 for surgery this am, have ordered for ffp and vit k, seen by surg, 

anesthesia





Objective


Objective





Current Medications








 Medications


  (Trade)  Dose


 Ordered  Sig/Marisa


 Route


 PRN Reason  Start Time


 Stop Time Status Last Admin


Dose Admin


 


 Acetaminophen  100 ml @ 


 400 mls/hr  NOW  ONCE


 IV


   5/18/20 10:15


 5/18/20 10:29 UNV  


 


 


 Acetaminophen


  (Tylenol)  650 mg  Q4H  PRN


 ORAL


 Temp >100.5  5/16/20 14:14


 6/15/20 14:13   


 


 


 Acetaminophen/


 Hydrocodone Bitart


  (Norco 5/325)  1 tab  Q1H  PRN


 ORAL


 Mild Pain (Pain Scale 1-3)  5/18/20 10:15


   UNV  


 


 


 Acetaminophen/


 Hydrocodone Bitart


  (Norco 7.5/325)  1 tab  Q1H  PRN


 ORAL


 Moderate Pain (Pain Scale 4-6)  5/18/20 10:15


   UNV  


 


 


 Al Hydroxide/Mg


 Hydroxide


  (Mylanta)  15 ml  Q1H  PRN


 ORAL


 gi upset  5/18/20 10:15


   UNV  


 


 


 Albuterol/


 Ipratropium


  (Albuterol/


 Ipratropium)  3 ml  Q4H  PRN


 HHN


 Shortness of Breath  5/16/20 14:15


 5/21/20 14:14   


 


 


 Atropine Sulfate


  (Atropine 0.4mg/


 ml)  0.5 mg  Q5M  PRN


 IVP


 HR<40  5/18/20 10:15


   UNV  


 


 


 Cefepime HCl 1 gm/


 Dextrose  55 ml @ 


 110 mls/hr  Q8HR


 IVPB


   5/16/20 22:00


 5/23/20 21:59  5/18/20 05:42


 


 


 Dextrose


  (Dextrose 50%)  25 ml  Q30M  PRN


 IV


 Hypoglycemia  5/16/20 14:15


 8/11/20 06:14   


 


 


 Dextrose


  (Dextrose 50%)  50 ml  Q30M  PRN


 IV


 Hypoglycemia  5/16/20 14:15


 8/11/20 06:14   


 


 


 Diphenhydramine


 HCl


  (Benadryl)  25 mg  Q15M  PRN


 IVP


 Itching  5/18/20 10:15


   UNV  


 


 


 Fentanyl Citrate


  (Sublimaze 100


 mcg/2 mL)  25 mcg  Q10M  PRN


 IV


 Moderate Pain (Pain Scale 4-6)  5/18/20 10:15


   UNV  


 


 


 Hydralazine HCl


  (Apresoline)  5 mg  Q30M  PRN


 IV


 SBP>160 / DBP>90  5/18/20 10:15


   UNV  


 


 


 Hydromorphone HCl


  (Dilaudid)  0.5 mg  Q15M  PRN


 IVP


 Severe Pain (Pain Scale 7-10)  5/18/20 10:15


   UNV  


 


 


 Hydromorphone HCl


  (Dilaudid)  1 mg  Q6H  PRN


 IVP


 Moderate Pain (Pain Scale 4-6)  5/16/20 14:15


 5/23/20 14:14   


 


 


 Iron Sucrose 100


 mg/Sodium Chloride  60 ml @ 


 240 mls/hr  BEDTIME


 IV


   5/16/20 21:00


 5/18/20 21:14  5/17/20 20:53


 


 


 Ketorolac


 Tromethamine


  (Toradol 30mg)  15 mg  Q1H  PRN


 IV


 Moderate Breakthru Pain (5-7)  5/18/20 10:15


   UNV  


 


 


 Ketorolac


 Tromethamine


  (Toradol 30mg)  30 mg  Q1H  PRN


 IV


 Severe Breakthru Pain (>7)  5/18/20 10:15


   UNV  


 


 


 Labetalol HCl


  (Normodyne)  5 mg  Q10M  PRN


 IV


 SBP>160 / DBP>90  5/18/20 10:15


 5/19/20 10:14 UNV  


 


 


 Lactated Ringer's  1,000 ml @ 


 10 mls/hr  Q24H


 IVLG


   5/18/20 10:15


 5/18/20 12:14 UNV  


 


 


 Lorazepam


  (Ativan 2mg/ml


 1ml)  1 mg  Q15M  PRN


 IV


 For Anxiety  5/18/20 10:15


   UNV  


 


 


 Lorazepam


  (Ativan)  1 mg  Q4H  PRN


 ORAL


 For Anxiety  5/16/20 14:15


 5/23/20 14:14  5/17/20 22:38


 


 


 Meperidine HCl


  (Demerol)  25 mg  Q5M  PRN


 IV


 SHIVERING.MAY REPEAT X 1  5/18/20 10:15


   UNV  


 


 


 Metoclopramide HCl


  (Reglan)  10 mg  Q1H  PRN


 IVP


 Nausea & Vomiting  5/18/20 10:15


   UNV  


 


 


 Metronidazole  100 ml @ 


 100 mls/hr  Q8HR


 IVPB


   5/16/20 22:00


 5/22/20 13:59  5/18/20 05:46


 


 


 Midazolam HCl


  (Versed 2mg/2ml


 vial)  1 mg  Q15M  PRN


 IVP


 For Anxiety  5/18/20 10:15


   UNV  


 


 


 Ondansetron HCl


  (Zofran)  4 mg  Q1H  PRN


 IVP


 Nausea & Vomiting  5/18/20 10:15


   UNV  


 


 


 Ondansetron HCl


  (Zofran)  4 mg  Q6H  PRN


 IVP


 Nausea & Vomiting  5/16/20 14:15


 6/15/20 14:14   


 


 


 Oxycodone/


 Acetaminophen


  (Percocet 5-325)  1 tab  Q1H  PRN


 ORAL


 Severe Pain (Pain Scale 7-10)  5/18/20 10:15


   UNV  


 


 


 Pantoprazole


  (Protonix)  40 mg  EVERY 12  HOURS


 IV


   5/16/20 21:00


 6/15/20 08:59  5/18/20 09:17


 


 


 Phytonadione 10


 mg/Dextrose  56 ml @ 


 112 mls/hr  ONCE  ONCE


 IVPB


   5/18/20 10:45


 5/18/20 11:14 UNV  


 











Last 24 Hour Vital Signs








  Date Time  Temp Pulse Resp B/P (MAP) Pulse Ox O2 Delivery O2 Flow Rate FiO2


 


5/18/20 04:00  101      


 


5/18/20 04:00 98.5 100 19 108/70 (83) 100   


 


5/18/20 00:00  100      


 


5/18/20 00:00 98.9 103 21 112/71 (85) 99   


 


5/17/20 21:00      Nasal Cannula 2.0 


 


5/17/20 20:00  102 20  98 Nasal Cannula 2.0 28


 


5/17/20 20:00  103      


 


5/17/20 20:00 99.3 109 21 111/68 (82) 98   


 


5/17/20 20:00     98 Nasal Cannula 2.0 28


 


5/17/20 16:00 97.6 71 22 121/61 (81) 97   


 


5/17/20 16:00  92      


 


5/17/20 12:00  90      


 


5/17/20 12:00 98.2 69 22 110/70 (83) 100   


 


5/17/20 09:00      Nasal Cannula 2.0 


 


5/17/20 08:00 97.0 85 20 104/75 (85) 96   


 


5/17/20 08:00  96      


 


5/17/20 04:00  95      


 


5/17/20 04:00 98.6 96 20 104/64 (77) 99   


 


5/17/20 00:00  98      


 


5/17/20 00:00 99.1 98 21 101/63 (76) 100   


 


5/16/20 21:00      Nasal Cannula 2.0 


 


5/16/20 20:23     98 Nasal Cannula 2.0 28


 


5/16/20 20:23  81 20  98 Nasal Cannula 2.0 28


 


5/16/20 20:00  98      


 


5/16/20 20:00 98.1 81 24 108/71 (83) 100   


 


5/16/20 16:00  89      


 


5/16/20 16:00 97.6 104 22 114/72 (86) 100   


 


5/16/20 14:00  98 20 108/70 (83) 100   


 


5/16/20 13:00  100 20 110/73 (85) 100   


 


5/16/20 12:00  92      


 


5/16/20 12:00      Nasal Cannula 2.0 


 


5/16/20 12:00 97.8 91 22 108/68 (81) 100   


 


5/16/20 11:00  99 23 96/63 (74) 100   

















Intake and Output  


 


 5/17/20 5/18/20





 19:00 07:00


 


Intake Total 140 ml 


 


Output Total 1200 ml 1500 ml


 


Balance -1060 ml -1500 ml


 


  


 


Intake Oral 140 ml 


 


Output Urine Total 1200 ml 1500 ml


 


# Voids 3 


 


# Bowel Movements 2 1











Labs








Test


  5/16/20


04:00 5/17/20


08:30 5/18/20


06:07


 


White Blood Count


  18.3 K/UL


(4.8-10.8) 18.9 K/UL


(4.8-10.8) 20.1 K/UL


(4.8-10.8)


 


Red Blood Count


  3.29 M/UL


(4.70-6.10) 3.31 M/UL


(4.70-6.10) 3.52 M/UL


(4.70-6.10)


 


Hemoglobin


  9.7 G/DL


(14.2-18.0) 9.4 G/DL


(14.2-18.0) 10.0 G/DL


(14.2-18.0)


 


Hematocrit


  28.4 %


(42.0-52.0) 28.5 %


(42.0-52.0) 29.7 %


(42.0-52.0)


 


Mean Corpuscular Volume 86 FL (80-99)  86 FL (80-99)  84 FL (80-99) 


 


Mean Corpuscular Hemoglobin


  29.5 PG


(27.0-31.0) 28.2 PG


(27.0-31.0) 28.5 PG


(27.0-31.0)


 


Mean Corpuscular Hemoglobin


Concent 34.2 G/DL


(32.0-36.0) 32.8 G/DL


(32.0-36.0) 33.7 G/DL


(32.0-36.0)


 


Red Cell Distribution Width


  15.8 %


(11.6-14.8) 15.8 %


(11.6-14.8) 15.2 %


(11.6-14.8)


 


Platelet Count


  88 K/UL


(150-450) 96 K/UL


(150-450) 119 K/UL


(150-450)


 


Mean Platelet Volume


  5.7 FL


(6.5-10.1) 5.1 FL


(6.5-10.1) 5.7 FL


(6.5-10.1)


 


Neutrophils (%) (Auto)  % (45.0-75.0)   % (45.0-75.0)   % (45.0-75.0) 


 


Lymphocytes (%) (Auto)  % (20.0-45.0)   % (20.0-45.0)   % (20.0-45.0) 


 


Monocytes (%) (Auto)  % (1.0-10.0)   % (1.0-10.0)   % (1.0-10.0) 


 


Eosinophils (%) (Auto)  % (0.0-3.0)   % (0.0-3.0)   % (0.0-3.0) 


 


Basophils (%) (Auto)  % (0.0-2.0)   % (0.0-2.0)   % (0.0-2.0) 


 


Differential Total Cells


Counted 100 


  100 


  100 


 


 


Neutrophils % (Manual) 89 % (45-75)  88 % (45-75)  86 % (45-75) 


 


Lymphocytes % (Manual) 8 % (20-45)  7 % (20-45)  11 % (20-45) 


 


Monocytes % (Manual) 3 % (1-10)  5 % (1-10)  3 % (1-10) 


 


Eosinophils % (Manual) 0 % (0-3)  0 % (0-3)  0 % (0-3) 


 


Basophils % (Manual) 0 % (0-2)  0 % (0-2)  0 % (0-2) 


 


Band Neutrophils 0 % (0-8)  0 % (0-8)  0 % (0-8) 


 


Platelet Estimate Decreased  Decreased  Decreased 


 


Platelet Morphology Normal  Normal  Normal 


 


Polychromasia 1+   


 


Hypochromasia 1+  1+  1+ 


 


Anisocytosis 1+  1+  1+ 


 


Sodium Level


  141 MMOL/L


(136-145) 141 MMOL/L


(136-145) 139 MMOL/L


(136-145)


 


Potassium Level


  3.5 MMOL/L


(3.5-5.1) 3.6 MMOL/L


(3.5-5.1) 3.7 MMOL/L


(3.5-5.1)


 


Chloride Level


  111 MMOL/L


() 111 MMOL/L


() 108 MMOL/L


()


 


Carbon Dioxide Level


  20 MMOL/L


(21-32) 20 MMOL/L


(21-32) 22 MMOL/L


(21-32)


 


Anion Gap


  10 mmol/L


(5-15) 10 mmol/L


(5-15) 9 mmol/L


(5-15)


 


Blood Urea Nitrogen 9 mg/dL (7-18)  8 mg/dL (7-18)  5 mg/dL (7-18) 


 


Creatinine


  0.8 MG/DL


(0.55-1.30) 0.8 MG/DL


(0.55-1.30) 0.8 MG/DL


(0.55-1.30)


 


Estimat Glomerular Filtration


Rate > 60 mL/min


(>60) > 60 mL/min


(>60) > 60 mL/min


(>60)


 


Glucose Level


  83 MG/DL


() 126 MG/DL


() 95 MG/DL


()


 


Calcium Level


  7.0 MG/DL


(8.5-10.1) 7.2 MG/DL


(8.5-10.1) 7.1 MG/DL


(8.5-10.1)


 


Erythrocyte Sedimentation Rate


  


  


  29 MM/HR


(0-20)


 


Prothrombin Time


  


  


  16.8 SEC


(9.30-11.50)


 


Prothromb Time International


Ratio 


  


  1.6 (0.9-1.1) 


 


 


Activated Partial


Thromboplast Time 


  


  35 SEC (23-33) 


 


 


Lactic Acid Level


  


  


  1.60 mmol/L


(0.4-2.0)


 


Total Bilirubin


  


  


  0.2 MG/DL


(0.2-1.0)


 


Aspartate Amino Transf


(AST/SGOT) 


  


  42 U/L (15-37) 


 


 


Alanine Aminotransferase


(ALT/SGPT) 


  


  9 U/L (12-78) 


 


 


Alkaline Phosphatase


  


  


  66 U/L


()


 


C-Reactive Protein,


Quantitative 


  


  13.0 mg/dL


(0.00-0.90)


 


Total Protein


  


  


  5.3 G/DL


(6.4-8.2)


 


Albumin


  


  


  1.2 G/DL


(3.4-5.0)


 


Globulin   4.1 g/dL 


 


Albumin/Globulin Ratio   0.3 (1.0-2.7) 


 


Amylase Level


  


  


  63 U/L


()


 


Lipase


  


  


  218 U/L


()








Height (Feet):  5


Height (Inches):  7.00


Weight (Pounds):  186


Objective








PE


General Appearance:  alert, mild distress


Head:  normocephalic, atraumatic


Eyes:  bilateral eye PERRL, bilateral eye EOMI


ENT:  uvula midline, dry mucus membranes


Neck:  supple, thyroid normal, supple/symm/no masses


Respiratory:  lungs clear, no respiratory distress, no retraction


Cardiovascular:  normal peripheral pulses, no edema


Gastrointestinal:  non tender, soft, no guarding, no rebound


Musculoskeletal:  normal inspection


Neurologic:  alert, oriented x3


Psychiatric:  mood/affect normal


Skin:  no rash, warm/dry











Jersey Ross MD May 18, 2020 10:33

## 2020-05-18 NOTE — NUR
NURSE NOTES:



BSW noted; skin in surrounding areas intact, bilateral peripheral pulses present. performed 
passive ROM and repositioned pt. pt continues to mouth for removal of ETT. explained to pt 
unable to do at this time. pt complaining of pain in left quadrant area. will administer 
pain med as prescribed per MD. will continue to monitor.

## 2020-05-18 NOTE — PULMONOLOGY PROGRESS NOTE
Subjective


ROS Limited/Unobtainable:  Yes


Interval Events:  None new


Constitutional:  Reports: no symptoms


HEENT:  Repors: no symptoms


Respiratory:  Reports: no symptoms


Cardiovascular:  Reports: no symptoms


Gastrointestinal/Abdominal:  Reports: no symptoms


Genitourinary:  Reports: no symptoms


Neurologic:  Reports: no symptoms


Allergies:  


Coded Allergies:  


     No Known Allergies (Unverified , 5/12/20)


All Systems:  reviewed and negative except above





Objective





Last 24 Hour Vital Signs








  Date Time  Temp Pulse Resp B/P (MAP) Pulse Ox O2 Delivery O2 Flow Rate FiO2


 


5/18/20 04:00  101      


 


5/18/20 04:00 98.5 100 19 108/70 (83) 100   


 


5/18/20 00:00  100      


 


5/18/20 00:00 98.9 103 21 112/71 (85) 99   


 


5/17/20 21:00      Nasal Cannula 2.0 


 


5/17/20 20:00  102 20  98 Nasal Cannula 2.0 28


 


5/17/20 20:00  103      


 


5/17/20 20:00 99.3 109 21 111/68 (82) 98   


 


5/17/20 20:00     98 Nasal Cannula 2.0 28


 


5/17/20 16:00 97.6 71 22 121/61 (81) 97   


 


5/17/20 16:00  92      


 


5/17/20 12:00  90      


 


5/17/20 12:00 98.2 69 22 110/70 (83) 100   

















Intake and Output  


 


 5/17/20 5/18/20





 19:00 07:00


 


Intake Total 140 ml 


 


Output Total 1200 ml 1500 ml


 


Balance -1060 ml -1500 ml


 


  


 


Intake Oral 140 ml 


 


Output Urine Total 1200 ml 1500 ml


 


# Voids 3 


 


# Bowel Movements 2 1








General Appearance:  no acute distress


HEENT:  normocephalic


Respiratory:  chest wall non-tender, lungs clear


Cardiovascular:  normal peripheral pulses


Extremities:  no cyanosis





Microbiology








 Date/Time


Source Procedure


Growth Status


 


 


 5/15/20 14:30


Nasopharynx Coronavirus COVID-19 PCR ([facility]) - Final Complete








Laboratory Tests


5/18/20 06:07: 


White Blood Count 20.1H, Red Blood Count 3.52L, Hemoglobin 10.0L, Hematocrit 

29.7L, Mean Corpuscular Volume 84, Mean Corpuscular Hemoglobin 28.5, Mean 

Corpuscular Hemoglobin Concent 33.7, Red Cell Distribution Width 15.2H, 

Platelet Count 119L, Mean Platelet Volume 5.7L, Neutrophils (%) (Auto) , 

Lymphocytes (%) (Auto) , Monocytes (%) (Auto) , Eosinophils (%) (Auto) , 

Basophils (%) (Auto) , Differential Total Cells Counted 100, Neutrophils % (

Manual) 86H, Lymphocytes % (Manual) 11L, Monocytes % (Manual) 3, Eosinophils % (

Manual) 0, Basophils % (Manual) 0, Band Neutrophils 0, Platelet Estimate 

DecreasedL, Platelet Morphology Normal, Hypochromasia 1+, Anisocytosis 1+, 

Erythrocyte Sedimentation Rate 29H, Prothrombin Time 16.8H, Prothromb Time 

International Ratio 1.6H, Activated Partial Thromboplast Time 35H, Sodium Level 

139, Potassium Level 3.7, Chloride Level 108H, Carbon Dioxide Level 22, Anion 

Gap 9, Blood Urea Nitrogen 5L, Creatinine 0.8, Estimat Glomerular Filtration 

Rate > 60, Glucose Level 95, Lactic Acid Level 1.60, Calcium Level 7.1L, Total 

Bilirubin 0.2, Aspartate Amino Transf (AST/SGOT) 42H, Alanine Aminotransferase (

ALT/SGPT) 9L, Alkaline Phosphatase 66, C-Reactive Protein, Quantitative 13.0H, 

Total Protein 5.3L, Albumin 1.2L, Globulin 4.1, Albumin/Globulin Ratio 0.3L, 

Amylase Level 63, Lipase 218





Current Medications








 Medications


  (Trade)  Dose


 Ordered  Sig/Marisa


 Route


 PRN Reason  Start Time


 Stop Time Status Last Admin


Dose Admin


 


 Acetaminophen


  (Tylenol)  650 mg  Q4H  PRN


 ORAL


 Temp >100.5  5/16/20 14:14


 6/15/20 14:13   


 


 


 Albuterol/


 Ipratropium


  (Albuterol/


 Ipratropium)  3 ml  Q4H  PRN


 HHN


 Shortness of Breath  5/16/20 14:15


 5/21/20 14:14   


 


 


 Cefepime HCl 1 gm/


 Dextrose  55 ml @ 


 110 mls/hr  Q8HR


 IVPB


   5/16/20 22:00


 5/23/20 21:59  5/18/20 05:42


 


 


 Dextrose


  (Dextrose 50%)  25 ml  Q30M  PRN


 IV


 Hypoglycemia  5/16/20 14:15


 8/11/20 06:14   


 


 


 Dextrose


  (Dextrose 50%)  50 ml  Q30M  PRN


 IV


 Hypoglycemia  5/16/20 14:15


 8/11/20 06:14   


 


 


 Hydromorphone HCl


  (Dilaudid)  1 mg  Q6H  PRN


 IVP


 Moderate Pain (Pain Scale 4-6)  5/16/20 14:15


 5/23/20 14:14   


 


 


 Iron Sucrose 100


 mg/Sodium Chloride  60 ml @ 


 240 mls/hr  BEDTIME


 IV


   5/16/20 21:00


 5/18/20 21:14  5/17/20 20:53


 


 


 Lorazepam


  (Ativan)  1 mg  Q4H  PRN


 ORAL


 For Anxiety  5/16/20 14:15


 5/23/20 14:14  5/17/20 22:38


 


 


 Metronidazole  100 ml @ 


 100 mls/hr  Q8HR


 IVPB


   5/16/20 22:00


 5/22/20 13:59  5/18/20 05:46


 


 


 Ondansetron HCl


  (Zofran)  4 mg  Q6H  PRN


 IVP


 Nausea & Vomiting  5/16/20 14:15


 6/15/20 14:14   


 


 


 Pantoprazole


  (Protonix)  40 mg  EVERY 12  HOURS


 IV


   5/16/20 21:00


 6/15/20 08:59  5/18/20 09:17


 











Assessment/Plan


Assessment/Plan


IMPRESSION:


1. Gastric mass.


2. Severe anemia.


3. Pulmonary embolism.





DISCUSSION:  





S/p IVC filter.  S/p blood transfusion, IV fluids.  He will need surgical 

intervention with


endoscopy and gastric resection.  I will follow carefully.





Cleared by cardiology


COVID 19 pcr negative














  ______________________________________________


  MARIANELA Downs Omar Syed MD May 18, 2020 09:33

## 2020-05-18 NOTE — NUR
NURSE NOTES:

Received report from JOSIAH Gilman. Patient in bed resting, no active s/s cardiac, respiratory 
distress noticed at this time. Patient AOx3-4, on 2L oxygen via NC. Endorsed patient being 
NPO since midnight for surgery per Dr. Fischer. Endorsed consent for surgery not signed 
yet. Mata Catheter draining well to gravity. Will page Dr. Fischer. Bed in lowest 
position, side rails upx3, call light within reach, bed alarm on, Will continue to monitor.

## 2020-05-18 NOTE — INFECTIOUS DISEASES PROG NOTE
Assessment/Plan


Assessment/Plan








Assessment:


Severe Sepsis


Enterobacter bacteremia- likely from GI source


S. epi bacteremia, contaminant


GGO on lung bases- COVID19 neg x2


  -5/12 Bcx 1/4 S.epi, 2/4 E. aerogenes (pan S); 5/13 Bcx NTD


            CXR: no acute disease


            SARS-COV2 PCR





Afebrile


Leukocytosis; persistent





Mild AST elevation





Lactic acidosis, SP


   -5/12 u/a neg; ucx 30-40K mixed urogenital contaminants





Diarrhea


    -Cdiff neg





Severe anemia (blood loss)/GIB likely 2ry to gastric mass


Thrombocytopenia, worsening


  -CT abd/p:   Centrally necrotic mass arising from the gastric fundus 

measuring 12. 6 x 11.1 x 14 cm.  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  

Gastric adenocarcinoma, lymphoma, and other neoplastic etiologies are also in 

the differential. Filling defects within pulmonary vessels at the lung bases 

with small peripheral airspace opacities concerning for pulmonary emboli and 

small pulmonary infarcts.





Syncopal episode- likely due to severe dehydration and anemia


  -CT head: No acute findings in the head/brain.





Acute PE/DVT -(per CT abd/p findings)


  -sp IVC filter placement 5/13 (retrievable- given presence of bacteremia)





PABLO, SP





HTN





Plan:


  


- Continue Cefepime #4(abx d #7) and Flagyl #4 (abx d #7)


      - 5/15/20 SP Zosyn #4 and Vancomycin #4





-f/u cx


-Monitor CBC/CMP, temperaturse


-ICU care/aspiration precautions


-COVID19 neg x2


-f/u repeat Bcx


-Sx,pulm, Heme/onc, GI f/u


-CT chest w/ to evaluate for GGO and for possible mets


-On OR for exp lap





Thank you for consulting [facility]. Will continue to follow along wiht 

you.





Discussed with RN.





Subjective


Allergies:  


Coded Allergies:  


     No Known Allergies (Unverified , 5/12/20)


Subjective


afebrile


leukocytosis persistent


on OR getting exp lap


2nd COVID neg





Objective


Vital Signs





Last 24 Hour Vital Signs








  Date Time  Temp Pulse Resp B/P (MAP) Pulse Ox O2 Delivery O2 Flow Rate FiO2


 


5/18/20 14:44  99 12  100   


 


5/18/20 13:49  96 18 112/85 100 Mechanical Ventilator  100


 


5/18/20 13:44 97.5 97 15 109/85 100 Mechanical Ventilator  100


 


5/18/20 09:00      Nasal Cannula 2.0 


 


5/18/20 08:00 98.7 106 19 105/71 (82) 98   


 


5/18/20 08:00  106      


 


5/18/20 04:00  101      


 


5/18/20 04:00 98.5 100 19 108/70 (83) 100   


 


5/18/20 00:00  100      


 


5/18/20 00:00 98.9 103 21 112/71 (85) 99   


 


5/17/20 21:00      Nasal Cannula 2.0 


 


5/17/20 20:00  102 20  98 Nasal Cannula 2.0 28


 


5/17/20 20:00  103      


 


5/17/20 20:00 99.3 109 21 111/68 (82) 98   


 


5/17/20 20:00     98 Nasal Cannula 2.0 28


 


5/17/20 16:00 97.6 71 22 121/61 (81) 97   


 


5/17/20 16:00  92      








Height (Feet):  5


Height (Inches):  7.00


Weight (Pounds):  186


Objective


not examined as patient was on OR





Laboratory Tests








Test


  5/18/20


06:07


 


White Blood Count


  20.1 K/UL


(4.8-10.8)  H


 


Red Blood Count


  3.52 M/UL


(4.70-6.10)  L


 


Hemoglobin


  10.0 G/DL


(14.2-18.0)  L


 


Hematocrit


  29.7 %


(42.0-52.0)  L


 


Mean Corpuscular Volume 84 FL (80-99)  


 


Mean Corpuscular Hemoglobin


  28.5 PG


(27.0-31.0)


 


Mean Corpuscular Hemoglobin


Concent 33.7 G/DL


(32.0-36.0)


 


Red Cell Distribution Width


  15.2 %


(11.6-14.8)  H


 


Platelet Count


  119 K/UL


(150-450)  L


 


Mean Platelet Volume


  5.7 FL


(6.5-10.1)  L


 


Neutrophils (%) (Auto)


  % (45.0-75.0)


 


 


Lymphocytes (%) (Auto)


  % (20.0-45.0)


 


 


Monocytes (%) (Auto)  % (1.0-10.0)  


 


Eosinophils (%) (Auto)  % (0.0-3.0)  


 


Basophils (%) (Auto)  % (0.0-2.0)  


 


Differential Total Cells


Counted 100  


 


 


Neutrophils % (Manual) 86 % (45-75)  H


 


Lymphocytes % (Manual) 11 % (20-45)  L


 


Monocytes % (Manual) 3 % (1-10)  


 


Eosinophils % (Manual) 0 % (0-3)  


 


Basophils % (Manual) 0 % (0-2)  


 


Band Neutrophils 0 % (0-8)  


 


Platelet Estimate Decreased  L


 


Platelet Morphology Normal  


 


Hypochromasia 1+  


 


Anisocytosis 1+  


 


Erythrocyte Sedimentation Rate


  29 MM/HR


(0-20)  H


 


Prothrombin Time


  16.8 SEC


(9.30-11.50)  H


 


Prothromb Time International


Ratio 1.6 (0.9-1.1)


H


 


Activated Partial


Thromboplast Time 35 SEC (23-33)


H


 


Sodium Level


  139 MMOL/L


(136-145)


 


Potassium Level


  3.7 MMOL/L


(3.5-5.1)


 


Chloride Level


  108 MMOL/L


()  H


 


Carbon Dioxide Level


  22 MMOL/L


(21-32)


 


Anion Gap


  9 mmol/L


(5-15)


 


Blood Urea Nitrogen


  5 mg/dL (7-18)


L


 


Creatinine


  0.8 MG/DL


(0.55-1.30)


 


Estimat Glomerular Filtration


Rate > 60 mL/min


(>60)


 


Glucose Level


  95 MG/DL


()


 


Lactic Acid Level


  1.60 mmol/L


(0.4-2.0)


 


Calcium Level


  7.1 MG/DL


(8.5-10.1)  L


 


Total Bilirubin


  0.2 MG/DL


(0.2-1.0)


 


Aspartate Amino Transf


(AST/SGOT) 42 U/L (15-37)


H


 


Alanine Aminotransferase


(ALT/SGPT) 9 U/L (12-78)


L


 


Alkaline Phosphatase


  66 U/L


()


 


C-Reactive Protein,


Quantitative 13.0 mg/dL


(0.00-0.90)  H


 


Total Protein


  5.3 G/DL


(6.4-8.2)  L


 


Albumin


  1.2 G/DL


(3.4-5.0)  L


 


Globulin 4.1 g/dL  


 


Albumin/Globulin Ratio


  0.3 (1.0-2.7)


L


 


Amylase Level


  63 U/L


()


 


Lipase


  218 U/L


()











Current Medications








 Medications


  (Trade)  Dose


 Ordered  Sig/Marisa


 Route


 PRN Reason  Start Time


 Stop Time Status Last Admin


Dose Admin


 


 Acetaminophen


  (Tylenol)  650 mg  Q4H  PRN


 ORAL


 Temp >100.5  5/16/20 14:14


 6/15/20 14:13   


 


 


 Acetaminophen/


 Hydrocodone Bitart


  (Norco 5/325)  1 tab  Q1H  PRN


 ORAL


 Mild Pain (Pain Scale 1-3)  5/18/20 10:15


 5/18/20 18:00   


 


 


 Acetaminophen/


 Hydrocodone Bitart


  (Norco 7.5/325)  1 tab  Q1H  PRN


 ORAL


 Moderate Pain (Pain Scale 4-6)  5/18/20 10:15


 5/18/20 18:00   


 


 


 Al Hydroxide/Mg


 Hydroxide


  (Mylanta)  15 ml  Q1H  PRN


 ORAL


 gi upset  5/18/20 10:15


 5/18/20 18:00   


 


 


 Albuterol/


 Ipratropium


  (Albuterol/


 Ipratropium)  3 ml  Q4H  PRN


 HHN


 Shortness of Breath  5/16/20 14:15


 5/21/20 14:14   


 


 


 Atropine Sulfate


  (Atropine 0.4mg/


 ml)  0.5 mg  Q5M  PRN


 IVP


 HR<40 BPM  5/18/20 10:15


 5/18/20 18:00   


 


 


 Cefepime HCl 1 gm/


 Dextrose  55 ml @ 


 110 mls/hr  Q8HR


 IVPB


   5/16/20 22:00


 5/23/20 21:59  5/18/20 05:42


 


 


 Dextrose


  (Dextrose 50%)  25 ml  Q30M  PRN


 IV


 Hypoglycemia  5/16/20 14:15


 8/11/20 06:14   


 


 


 Dextrose


  (Dextrose 50%)  50 ml  Q30M  PRN


 IV


 Hypoglycemia  5/16/20 14:15


 8/11/20 06:14   


 


 


 Diphenhydramine


 HCl


  (Benadryl)  12.5 mg  Q6H  PRN


 IVP


 Itching/Pruritis  5/18/20 13:15


 6/17/20 13:14 UNV  


 


 


 Diphenhydramine


 HCl


  (Benadryl)  25 mg  Q15M  PRN


 IVP


 Itching  5/18/20 10:15


 5/18/20 18:00   


 


 


 Fentanyl Citrate


  (Sublimaze 100


 mcg/2 mL)  25 mcg  Q10M  PRN


 IV


 Moderate Pain (Pain Scale 4-6)  5/18/20 10:15


 5/18/20 18:00   


 


 


 Hydralazine HCl


  (Apresoline)  5 mg  Q30M  PRN


 IV


 SBP>160 / DBP>90  5/18/20 10:15


 5/18/20 18:00   


 


 


 Hydromorphone HCl


  (Dilaudid)  0.5 mg  Q15M  PRN


 IVP


 Severe Pain (Pain Scale 7-10)  5/18/20 10:15


 5/18/20 18:00   


 


 


 Hydromorphone HCl


  (Dilaudid)  0.5 mg  Q3H  PRN


 IVP


 Pain Score 1-3  5/18/20 13:15


 5/25/20 13:14 UNV  


 


 


 Hydromorphone HCl


  (Dilaudid)  1 mg  Q3H  PRN


 IVP


 pain score 4-6  5/18/20 13:15


 5/25/20 13:14 UNV  


 


 


 Hydromorphone HCl


  (Dilaudid)  2 mg  Q3H  PRN


 IVP


 pain score 7-10  5/18/20 13:15


 5/25/20 13:14 UNV  


 


 


 Iron Sucrose 100


 mg/Sodium Chloride  60 ml @ 


 240 mls/hr  BEDTIME


 IV


   5/16/20 21:00


 5/18/20 21:14  5/17/20 20:53


 


 


 Ketorolac


 Tromethamine


  (Toradol 30mg)  15 mg  Q1H  PRN


 IV


 Moderate Breakthru Pain (5-7)  5/18/20 10:15


 5/18/20 18:00   


 


 


 Ketorolac


 Tromethamine


  (Toradol 30mg)  15 mg  Q6H  PRN


 IV


 For Pain  5/18/20 13:15


 5/23/20 13:14 UNV  


 


 


 Ketorolac


 Tromethamine


  (Toradol 30mg)  30 mg  Q1H  PRN


 IV


 Severe Breakthru Pain (>7)  5/18/20 10:15


 5/18/20 18:00   


 


 


 Labetalol HCl


  (Normodyne)  5 mg  Q10M  PRN


 IV


 SBP>160 / DBP>90  5/18/20 10:15


 5/18/20 18:00   


 


 


 Lorazepam


  (Ativan 2mg/ml


 1ml)  1 mg  Q15M  PRN


 IV


 For Anxiety  5/18/20 10:15


 5/18/20 18:00   


 


 


 Lorazepam


  (Ativan)  1 mg  Q4H  PRN


 ORAL


 For Anxiety  5/16/20 14:15


 5/23/20 14:14  5/17/20 22:38


 


 


 Meperidine HCl


  (Demerol)  25 mg  Q5M  PRN


 IV


 SHIVERING.MAY REPEAT X 1  5/18/20 10:15


 5/18/20 18:00   


 


 


 Metoclopramide HCl


  (Reglan)  10 mg  Q1H  PRN


 IVP


 Nausea & Vomiting  5/18/20 10:15


 5/18/20 18:00   


 


 


 Metronidazole  100 ml @ 


 100 mls/hr  Q8HR


 IVPB


   5/16/20 22:00


 5/22/20 13:59  5/18/20 05:46


 


 


 Midazolam HCl


  (Versed 2mg/2ml


 vial)  1 mg  Q15M  PRN


 IVP


 For Anxiety  5/18/20 10:15


 5/18/20 18:00   


 


 


 Ondansetron HCl


  (Zofran)  4 mg  Q1H  PRN


 IVP


 Nausea & Vomiting  5/18/20 10:15


 5/18/20 18:00   


 


 


 Ondansetron HCl


  (Zofran)  4 mg  Q6H  PRN


 IVP


 Nausea & Vomiting  5/18/20 13:15


 6/17/20 13:14 UNV  


 


 


 Oxycodone/


 Acetaminophen


  (Percocet 5-325)  1 tab  Q1H  PRN


 ORAL


 Severe Pain (Pain Scale 7-10)  5/18/20 10:15


 5/18/20 18:00   


 


 


 Pantoprazole


  (Protonix)  40 mg  EVERY 12  HOURS


 IV


   5/16/20 21:00


 6/15/20 08:59  5/18/20 09:17


 

















Alexa Gonzalez M.D. May 18, 2020 15:02

## 2020-05-18 NOTE — GENERAL PROGRESS NOTE
Assessment/Plan


Problem List:  


(1) Weak


ICD Codes:  R53.1 - Weakness


SNOMED:  81897854


(2) Malnutrition


ICD Codes:  E46 - Unspecified protein-calorie malnutrition


SNOMED:  16861083


(3) COVID-19 ruled out


ICD Codes:  Z03.818 - Encounter for observation for suspected exposure to other 

biological agents ruled out


SNOMED:  896916163, 769132210


(4) Pulmonary embolism


ICD Codes:  I26.99 - Other pulmonary embolism without acute cor pulmonale


SNOMED:  37981860


Qualifiers:  


   Qualified Codes:  I26.99 - Other pulmonary embolism without acute cor 

pulmonale


(5) GI bleed


ICD Codes:  K92.2 - Gastrointestinal hemorrhage, unspecified


SNOMED:  01259365


Qualifiers:  


   Qualified Codes:  K92.2 - Gastrointestinal hemorrhage, unspecified


(6) Gastric mass


ICD Codes:  K31.89 - Other diseases of stomach and duodenum


SNOMED:  856235520


(7) Syncope


ICD Codes:  R55 - Syncope and collapse


SNOMED:  746066517


Qualifiers:  


   Qualified Codes:  R55 - Syncope and collapse


Status:  stable, progressing


Assessment/Plan:


o2 pulm tx transfuse prn cbc bmp am





Subjective


Constitutional:  Reports: weakness


Allergies:  


Coded Allergies:  


     No Known Allergies (Unverified , 5/12/20)


All Systems:  reviewed and negative except above


Subjective


calm pending sx





Objective





Last 24 Hour Vital Signs








  Date Time  Temp Pulse Resp B/P (MAP) Pulse Ox O2 Delivery O2 Flow Rate FiO2


 


5/18/20 04:00  101      


 


5/18/20 04:00 98.5 100 19 108/70 (83) 100   


 


5/18/20 00:00  100      


 


5/18/20 00:00 98.9 103 21 112/71 (85) 99   


 


5/17/20 21:00      Nasal Cannula 2.0 


 


5/17/20 20:00  102 20  98 Nasal Cannula 2.0 28


 


5/17/20 20:00  103      


 


5/17/20 20:00 99.3 109 21 111/68 (82) 98   


 


5/17/20 20:00     98 Nasal Cannula 2.0 28


 


5/17/20 16:00 97.6 71 22 121/61 (81) 97   


 


5/17/20 16:00  92      


 


5/17/20 12:00  90      


 


5/17/20 12:00 98.2 69 22 110/70 (83) 100   

















Intake and Output  


 


 5/17/20 5/18/20





 19:00 07:00


 


Intake Total 140 ml 


 


Output Total 1200 ml 1500 ml


 


Balance -1060 ml -1500 ml


 


  


 


Intake Oral 140 ml 


 


Output Urine Total 1200 ml 1500 ml


 


# Voids 3 


 


# Bowel Movements 2 1








Laboratory Tests


5/18/20 06:07: 


White Blood Count 20.1H, Red Blood Count 3.52L, Hemoglobin 10.0L, Hematocrit 

29.7L, Mean Corpuscular Volume 84, Mean Corpuscular Hemoglobin 28.5, Mean 

Corpuscular Hemoglobin Concent 33.7, Red Cell Distribution Width 15.2H, 

Platelet Count 119L, Mean Platelet Volume 5.7L, Neutrophils (%) (Auto) , 

Lymphocytes (%) (Auto) , Monocytes (%) (Auto) , Eosinophils (%) (Auto) , 

Basophils (%) (Auto) , Differential Total Cells Counted 100, Neutrophils % (

Manual) 86H, Lymphocytes % (Manual) 11L, Monocytes % (Manual) 3, Eosinophils % (

Manual) 0, Basophils % (Manual) 0, Band Neutrophils 0, Platelet Estimate 

DecreasedL, Platelet Morphology Normal, Hypochromasia 1+, Anisocytosis 1+, 

Erythrocyte Sedimentation Rate 29H, Prothrombin Time 16.8H, Prothromb Time 

International Ratio 1.6H, Activated Partial Thromboplast Time 35H, Sodium Level 

139, Potassium Level 3.7, Chloride Level 108H, Carbon Dioxide Level 22, Anion 

Gap 9, Blood Urea Nitrogen 5L, Creatinine 0.8, Estimat Glomerular Filtration 

Rate > 60, Glucose Level 95, Lactic Acid Level 1.60, Calcium Level 7.1L, Total 

Bilirubin 0.2, Aspartate Amino Transf (AST/SGOT) 42H, Alanine Aminotransferase (

ALT/SGPT) 9L, Alkaline Phosphatase 66, C-Reactive Protein, Quantitative 13.0H, 

Total Protein 5.3L, Albumin 1.2L, Globulin 4.1, Albumin/Globulin Ratio 0.3L, 

Amylase Level 63, Lipase 218


Height (Feet):  5


Height (Inches):  7.00


Weight (Pounds):  186


General Appearance:  lethargic


EENT:  normal ENT inspection


Neck:  normal alignment


Cardiovascular:  normal rate, regular rhythm


Respiratory/Chest:  no respiratory distress, no accessory muscle use


Extremities:  normal inspection


Skin:  normal pigmentation











Pierce,Tom Donnelly DO May 18, 2020 10:06

## 2020-05-18 NOTE — NUR
NURSE NOTES:

Patient spoke with Dr. Fischer, agree to sign consent. MD made aware. Surgery department 
made aware.

## 2020-05-18 NOTE — NUR
NURSE NOTES:



all due meds given. repositioned pt with pillow support. oral care provided, pt tolerated 
well. will continue to monitor.

## 2020-05-18 NOTE — NUR
NURSE NOTES:

Received phone report from JOSIAH Ojeda @ PACU. The patient is s/p exploratory laparatomy, 
partial gastrectomy, partial small bowel resection and will be transferred to ICU for 
further management. During procedure, 2 unit PRBC given, 1000mL Albumin, 1500mL of LR given. 
Dressing on surgical site as follows: Xerofoam, 4x4 gauze, ABD pad, silk tape and ALILSON grain 
on left gastric area. The patient is on ventilator on following setting: ETT 7.5, 23 
lipline, AC 12, , PEEP 5, and FiO2 100%. Per JOSIAH Ojeda, the estimated blood loss was 
200mL. Will continue plan of care as soon as the patient arrives to the unit. <<-----Click on this checkbox to enter Procedure Insertion of locking intramedullary jay jay into hip  09/20/2017    Active  [id number]

## 2020-05-18 NOTE — NUR
NURSE NOTES:

Clarification made with Dr. Ross regarding Phytonadione IVPB order. Dr. Ross 
initially ordered one time dose of Phytonadione on 1200 but was not able to be administered 
since the patient was at surgery. Dr. Ross changed the time of one time dose of 
Phytonadione IVPB from 1200 to 1800. Will administer as ordered. Will continue plan of care.

## 2020-05-18 NOTE — NUR
NURSE NOTES:

100mL output noted from ALLISON drain. The patient denies of acute distress or shortness of 
breath. Vital signs stable. Will continue plan of care.

## 2020-05-18 NOTE — GENERAL PROGRESS NOTE
Assessment/Plan


Problem List:  


(1) Syncope


ICD Codes:  R55 - Syncope and collapse


SNOMED:  726402905


Qualifiers:  


   Qualified Codes:  R55 - Syncope and collapse


(2) Gastric mass


ICD Codes:  K31.89 - Other diseases of stomach and duodenum


SNOMED:  247397533


(3) GI bleed


ICD Codes:  K92.2 - Gastrointestinal hemorrhage, unspecified


SNOMED:  41597120


Qualifiers:  


   Qualified Codes:  K92.2 - Gastrointestinal hemorrhage, unspecified


(4) Pulmonary embolism


ICD Codes:  I26.99 - Other pulmonary embolism without acute cor pulmonale


SNOMED:  15638104


Qualifiers:  


   Qualified Codes:  I26.99 - Other pulmonary embolism without acute cor 

pulmonale


Status:  stable, progressing


Assessment/Plan:


s/p 5 units prbc


2 units FFP


stable H&H now


fu stool c.diff>>>neg


s/p  vit k


going for surg today


will fu post op


Protonix to Q12





Subjective


ROS Limited/Unobtainable:  No


Allergies:  


Coded Allergies:  


     No Known Allergies (Unverified , 5/12/20)





Objective





Last 24 Hour Vital Signs








  Date Time  Temp Pulse Resp B/P (MAP) Pulse Ox O2 Delivery O2 Flow Rate FiO2


 


5/18/20 04:00  101      


 


5/18/20 04:00 98.5 100 19 108/70 (83) 100   


 


5/18/20 00:00  100      


 


5/18/20 00:00 98.9 103 21 112/71 (85) 99   


 


5/17/20 21:00      Nasal Cannula 2.0 


 


5/17/20 20:00  102 20  98 Nasal Cannula 2.0 28


 


5/17/20 20:00  103      


 


5/17/20 20:00 99.3 109 21 111/68 (82) 98   


 


5/17/20 20:00     98 Nasal Cannula 2.0 28


 


5/17/20 16:00 97.6 71 22 121/61 (81) 97   


 


5/17/20 16:00  92      


 


5/17/20 12:00  90      


 


5/17/20 12:00 98.2 69 22 110/70 (83) 100   

















Intake and Output  


 


 5/17/20 5/18/20





 19:00 07:00


 


Intake Total 140 ml 


 


Output Total 1200 ml 1500 ml


 


Balance -1060 ml -1500 ml


 


  


 


Intake Oral 140 ml 


 


Output Urine Total 1200 ml 1500 ml


 


# Voids 3 


 


# Bowel Movements 2 1








Laboratory Tests


5/18/20 06:07: 


White Blood Count 20.1H, Red Blood Count 3.52L, Hemoglobin 10.0L, Hematocrit 

29.7L, Mean Corpuscular Volume 84, Mean Corpuscular Hemoglobin 28.5, Mean 

Corpuscular Hemoglobin Concent 33.7, Red Cell Distribution Width 15.2H, 

Platelet Count 119L, Mean Platelet Volume 5.7L, Neutrophils (%) (Auto) , 

Lymphocytes (%) (Auto) , Monocytes (%) (Auto) , Eosinophils (%) (Auto) , 

Basophils (%) (Auto) , Differential Total Cells Counted 100, Neutrophils % (

Manual) 86H, Lymphocytes % (Manual) 11L, Monocytes % (Manual) 3, Eosinophils % (

Manual) 0, Basophils % (Manual) 0, Band Neutrophils 0, Platelet Estimate 

DecreasedL, Platelet Morphology Normal, Hypochromasia 1+, Anisocytosis 1+, 

Erythrocyte Sedimentation Rate 29H, Prothrombin Time 16.8H, Prothromb Time 

International Ratio 1.6H, Activated Partial Thromboplast Time 35H, Sodium Level 

139, Potassium Level 3.7, Chloride Level 108H, Carbon Dioxide Level 22, Anion 

Gap 9, Blood Urea Nitrogen 5L, Creatinine 0.8, Estimat Glomerular Filtration 

Rate > 60, Glucose Level 95, Lactic Acid Level 1.60, Calcium Level 7.1L, Total 

Bilirubin 0.2, Aspartate Amino Transf (AST/SGOT) 42H, Alanine Aminotransferase (

ALT/SGPT) 9L, Alkaline Phosphatase 66, C-Reactive Protein, Quantitative 13.0H, 

Total Protein 5.3L, Albumin 1.2L, Globulin 4.1, Albumin/Globulin Ratio 0.3L, 

Amylase Level 63, Lipase 218


Height (Feet):  5


Height (Inches):  7.00


Weight (Pounds):  186


General Appearance:  no apparent distress


EENT:  normal ENT inspection


Neck:  supple


Cardiovascular:  normal rate


Respiratory/Chest:  decreased breath sounds


Abdomen:  normal bowel sounds, non tender, soft


Extremities:  non-tender











Marc Nielsen MD May 18, 2020 10:25

## 2020-05-18 NOTE — IMMEDIATE POST-OP EVALUATION
Immediate Post-Op Evalulation


Immediate Post-Op Evalulation


Procedure:  Gastric Resection, Splenectomy


Date of Evaluation:  May 18, 2020


Time of Evaluation:  14:55


IV Fluids:  1500 LR/NS


Blood Products:  2 u PRBC, 1000 Albumin


Estimated Blood Loss:  200


Urinary Output:  250


Blood Pressure Systolic:  109


Blood Pressure Diastolic:  76


Pulse Rate:  99


Respiratory Rate:  12 - Mech Vent


O2 Sat by Pulse Oximetry:  100


Temperature (Fahrenheit):  07.5


Pain Score (1-10):  0


Nausea:  No


Vomiting:  No


Complications


0


Patient Status:  no response, patent, ventilated, none


Hydration Status:  adequate


Dru grams Ancef IV


Given Within 1 Hr of Incision:  Yes


Time Given:  10:21











Chris Persaud MD May 18, 2020 13:51

## 2020-05-18 NOTE — CARDIAC ELECTROPHYSIOLOGY PN
Assessment/Plan


Assessment/Plan


1. Hypotension due to profound anemia due to gastric mass.  


S/P5 units of blood transfusion. Echocardiogram today showed EF NL





2. Profound anemia.  Hemoglobin of 3.5.  CT of abdomen and pelvis shows


central necrotic mass in the gastric fundus 12 x 14 x 11 cm.  


 FU  by GI and Dr Fischer.  Echo Nl EF


 Clear for surgery at Moderate risk. No prior MI or CHF and needs surgery 

urgently





3. Pulmonary embolus.  S/P IVC filter.


4. Sepsis with elevated white count. Ruled out for Covid by 2 negative PCRs





QUINCY RN and Dr Fischer





Subjective


Subjective


Continues with black tarry stool. NPO for surgery today by Dr Fischer





Objective





Last 24 Hour Vital Signs








  Date Time  Temp Pulse Resp B/P (MAP) Pulse Ox O2 Delivery O2 Flow Rate FiO2


 


5/18/20 04:00  101      


 


5/18/20 04:00 98.5 100 19 108/70 (83) 100   


 


5/18/20 00:00  100      


 


5/18/20 00:00 98.9 103 21 112/71 (85) 99   


 


5/17/20 21:00      Nasal Cannula 2.0 


 


5/17/20 20:00  102 20  98 Nasal Cannula 2.0 28


 


5/17/20 20:00  103      


 


5/17/20 20:00 99.3 109 21 111/68 (82) 98   


 


5/17/20 20:00     98 Nasal Cannula 2.0 28


 


5/17/20 16:00 97.6 71 22 121/61 (81) 97   


 


5/17/20 16:00  92      


 


5/17/20 12:00  90      


 


5/17/20 12:00 98.2 69 22 110/70 (83) 100   

















Intake and Output  


 


 5/17/20 5/18/20





 19:00 07:00


 


Intake Total 140 ml 


 


Output Total 1200 ml 1500 ml


 


Balance -1060 ml -1500 ml


 


  


 


Intake Oral 140 ml 


 


Output Urine Total 1200 ml 1500 ml


 


# Voids 3 


 


# Bowel Movements 2 1











Laboratory Tests








Test


  5/18/20


06:07


 


White Blood Count


  20.1 K/UL


(4.8-10.8)  H


 


Red Blood Count


  3.52 M/UL


(4.70-6.10)  L


 


Hemoglobin


  10.0 G/DL


(14.2-18.0)  L


 


Hematocrit


  29.7 %


(42.0-52.0)  L


 


Mean Corpuscular Volume 84 FL (80-99)  


 


Mean Corpuscular Hemoglobin


  28.5 PG


(27.0-31.0)


 


Mean Corpuscular Hemoglobin


Concent 33.7 G/DL


(32.0-36.0)


 


Red Cell Distribution Width


  15.2 %


(11.6-14.8)  H


 


Platelet Count


  119 K/UL


(150-450)  L


 


Mean Platelet Volume


  5.7 FL


(6.5-10.1)  L


 


Neutrophils (%) (Auto)


  % (45.0-75.0)


 


 


Lymphocytes (%) (Auto)


  % (20.0-45.0)


 


 


Monocytes (%) (Auto)  % (1.0-10.0)  


 


Eosinophils (%) (Auto)  % (0.0-3.0)  


 


Basophils (%) (Auto)  % (0.0-2.0)  


 


Neutrophils % (Manual) Pending  


 


Lymphocytes % (Manual) Pending  


 


Platelet Estimate Pending  


 


Platelet Morphology Pending  


 


Erythrocyte Sedimentation Rate


  29 MM/HR


(0-20)  H


 


Prothrombin Time


  16.8 SEC


(9.30-11.50)  H


 


Prothromb Time International


Ratio 1.6 (0.9-1.1)


H


 


Activated Partial


Thromboplast Time 35 SEC (23-33)


H


 


Sodium Level


  139 MMOL/L


(136-145)


 


Potassium Level


  3.7 MMOL/L


(3.5-5.1)


 


Chloride Level


  108 MMOL/L


()  H


 


Carbon Dioxide Level


  22 MMOL/L


(21-32)


 


Anion Gap


  9 mmol/L


(5-15)


 


Blood Urea Nitrogen


  5 mg/dL (7-18)


L


 


Creatinine


  0.8 MG/DL


(0.55-1.30)


 


Estimat Glomerular Filtration


Rate > 60 mL/min


(>60)


 


Glucose Level


  95 MG/DL


()


 


Lactic Acid Level


  1.60 mmol/L


(0.4-2.0)


 


Calcium Level


  7.1 MG/DL


(8.5-10.1)  L


 


Total Bilirubin


  0.2 MG/DL


(0.2-1.0)


 


Aspartate Amino Transf


(AST/SGOT) 42 U/L (15-37)


H


 


Alanine Aminotransferase


(ALT/SGPT) 9 U/L (12-78)


L


 


Alkaline Phosphatase


  66 U/L


()


 


C-Reactive Protein,


Quantitative 13.0 mg/dL


(0.00-0.90)  H


 


Total Protein


  5.3 G/DL


(6.4-8.2)  L


 


Albumin


  1.2 G/DL


(3.4-5.0)  L


 


Globulin 4.1 g/dL  


 


Albumin/Globulin Ratio


  0.3 (1.0-2.7)


L


 


Amylase Level


  63 U/L


()


 


Lipase


  218 U/L


()











Microbiology








 Date/Time


Source Procedure


Growth Status


 


 


 5/15/20 14:30


Nasopharynx Coronavirus COVID-19 PCR ([facility]) - Final Complete








Objective


HEAD AND NECK:  No JVD.


LUNGS:  Decreased breath sounds.


CARDIOVASCULAR:  Regular S1 and S2.  Tachycardic.


ABDOMEN:  Soft.


EXTREMITIES:  No pitting edema.











Aaron Antunez MD May 18, 2020 09:16

## 2020-05-18 NOTE — NUR
CASE MANAGEMENT:REVIEW



5/18/20

SI: SEVERE ANEMIA. PULMONARY EMBOLISM. 

LARGE HEMORRHAGIC GASTRIC MASS....CANCER 

S/P IVC FILTER, EGD w/ BIOPSY,MULTIPLE BLOOD TRANSFUSION 

98.7   106  19  105/71  98% ON 2L/NC

WBC+20.1   H/H-10.0/29.7   PLT-119



IS: TRANSFUSE 1 UNIT PRBC

IV CEFEPIME Q8HRS

IV FLAGYL Q8HRS

IV VENOFER QHS

IV PROTONIX Q12

NPO

**: TELEMETRY UNIT

DCP: FROM HOME





PLAN:

TO SURGERY FOR:EXP LAP, LEFT COLECTOMY, PARTIAL GASTRECTOMY,SPLENECTOMY,

                        DISTAL PANCREATECTOMY,OMENTECTOMY,OPEN LYSIS OF ADHESIONS



TO ICU POST OP

PT EVAL

## 2020-05-18 NOTE — NUR
NURSE NOTES:

Dr. Gonzalez made aware patient COVID swab negative x2, per MD okay to discontinue droplet 
precaution. Order noted, entered, carried out, surgery department call to verify.

## 2020-05-18 NOTE — NUR
NURSE NOTES:

The patient is waking up from sedation and trying to pull out medical device. Dr. Pierce 
ordered bilateral soft wrist restraints for safety and pulling out device. Applied bilateral 
soft wrist restraints per order. Will closely monitor the patient. Will continue plan of 
care.

## 2020-05-18 NOTE — NUR
NURSE NOTES:

The patient is stable without acute distress or shortness of breath. Skin reassessed and no 
other skin issue noted. Surgical site dressing intact. Vital signs stable. Will closely 
monitor the patient. Will continue plan of care.

## 2020-05-18 NOTE — NUR
HAND-OFF: 

Report given to JOSIAH Christianson. Patient is awake, anxious regarding his surgery today. RN made 
aware that the patient haven't sign the consent yet. Plan of care endorsed.

## 2020-05-18 NOTE — NUR
HAND-OFF: 

Report given to JOSIAH Horton. The patient is resting on the bed without acute distress or 
shortness of breath. The patient's bed in the lowest position, call light in reach, and fall 
and aspiration precaution reinforced. Vent setting as follows: ETT 7.5, 23 @ lipline, AC 12, 
, PEEP 5, FiO2 100%. R nare NGT set up for low intermittent suction. Surgical dressing 
intact. ALLISON drain intact and draining well. Mata intact and draining by gravity. IV sites 
intact and patent. Endorsed plan of care.

## 2020-05-18 NOTE — BRIEF OPERATIVE NOTE
Immediate Post Operative Note


Operative Note


Pre-op Diagnosis:


hemorrhagic large gastric mass / tumor


Procedure:


ex lap


left colectomy


partial gastrectomy


spleenectomy


distal pancreatectomy


omentectomy 


open lysis of adhesions


Post-op Diagnosis:


large invading colon / gastric tumor


Surgeon:  yung


Additional Surgeons:  eloisa


Anesthesia:  general, local


Specimen:  yes


Complications:  none


Condition:  stable


Fluids:  see records


Estimated Blood Loss:  volume - 250


Drains:  ALLISON


Implant(s) used?:  No











Erik Fischer May 18, 2020 13:13

## 2020-05-18 NOTE — NUR
NURSE NOTES:

The patient got safely transferred from PACU to -J. The patient is resting on the 
bed, sedated, and non-verbal at this time. The patient is on mechanical ventilator with 
following setting: ETT 7.5, 23 @ lipline, AC 12, , PEEP 5, FiO2 100%. The patient's 
surgical site dressing is intact. R nare NGT set up for low intermittent suction per order. 
ALLISON drain on L gastric area intact and draining well. Mata intact and draining by gravity. 
IV site L FA 20G, R AC 20G intact and patent. Vital signs stable at this time. Will closely 
monitor the patient. Will continue plan of care.

## 2020-05-19 VITALS — SYSTOLIC BLOOD PRESSURE: 112 MMHG | DIASTOLIC BLOOD PRESSURE: 68 MMHG

## 2020-05-19 VITALS — DIASTOLIC BLOOD PRESSURE: 76 MMHG | SYSTOLIC BLOOD PRESSURE: 102 MMHG

## 2020-05-19 VITALS — DIASTOLIC BLOOD PRESSURE: 76 MMHG | SYSTOLIC BLOOD PRESSURE: 118 MMHG

## 2020-05-19 VITALS — DIASTOLIC BLOOD PRESSURE: 75 MMHG | SYSTOLIC BLOOD PRESSURE: 109 MMHG

## 2020-05-19 VITALS — SYSTOLIC BLOOD PRESSURE: 110 MMHG | DIASTOLIC BLOOD PRESSURE: 66 MMHG

## 2020-05-19 VITALS — DIASTOLIC BLOOD PRESSURE: 72 MMHG | SYSTOLIC BLOOD PRESSURE: 108 MMHG

## 2020-05-19 VITALS — SYSTOLIC BLOOD PRESSURE: 135 MMHG | DIASTOLIC BLOOD PRESSURE: 80 MMHG

## 2020-05-19 VITALS — SYSTOLIC BLOOD PRESSURE: 108 MMHG | DIASTOLIC BLOOD PRESSURE: 68 MMHG

## 2020-05-19 VITALS — SYSTOLIC BLOOD PRESSURE: 115 MMHG | DIASTOLIC BLOOD PRESSURE: 74 MMHG

## 2020-05-19 VITALS — SYSTOLIC BLOOD PRESSURE: 121 MMHG | DIASTOLIC BLOOD PRESSURE: 70 MMHG

## 2020-05-19 VITALS — DIASTOLIC BLOOD PRESSURE: 77 MMHG | SYSTOLIC BLOOD PRESSURE: 111 MMHG

## 2020-05-19 VITALS — DIASTOLIC BLOOD PRESSURE: 73 MMHG | SYSTOLIC BLOOD PRESSURE: 113 MMHG

## 2020-05-19 VITALS — SYSTOLIC BLOOD PRESSURE: 111 MMHG | DIASTOLIC BLOOD PRESSURE: 79 MMHG

## 2020-05-19 VITALS — DIASTOLIC BLOOD PRESSURE: 83 MMHG | SYSTOLIC BLOOD PRESSURE: 110 MMHG

## 2020-05-19 VITALS — SYSTOLIC BLOOD PRESSURE: 117 MMHG | DIASTOLIC BLOOD PRESSURE: 69 MMHG

## 2020-05-19 VITALS — SYSTOLIC BLOOD PRESSURE: 117 MMHG | DIASTOLIC BLOOD PRESSURE: 65 MMHG

## 2020-05-19 VITALS — DIASTOLIC BLOOD PRESSURE: 71 MMHG | SYSTOLIC BLOOD PRESSURE: 115 MMHG

## 2020-05-19 VITALS — SYSTOLIC BLOOD PRESSURE: 113 MMHG | DIASTOLIC BLOOD PRESSURE: 78 MMHG

## 2020-05-19 VITALS — DIASTOLIC BLOOD PRESSURE: 81 MMHG | SYSTOLIC BLOOD PRESSURE: 106 MMHG

## 2020-05-19 VITALS — DIASTOLIC BLOOD PRESSURE: 88 MMHG | SYSTOLIC BLOOD PRESSURE: 129 MMHG

## 2020-05-19 VITALS — SYSTOLIC BLOOD PRESSURE: 102 MMHG | DIASTOLIC BLOOD PRESSURE: 72 MMHG

## 2020-05-19 VITALS — DIASTOLIC BLOOD PRESSURE: 75 MMHG | SYSTOLIC BLOOD PRESSURE: 117 MMHG

## 2020-05-19 VITALS — SYSTOLIC BLOOD PRESSURE: 119 MMHG | DIASTOLIC BLOOD PRESSURE: 65 MMHG

## 2020-05-19 VITALS — SYSTOLIC BLOOD PRESSURE: 114 MMHG | DIASTOLIC BLOOD PRESSURE: 79 MMHG

## 2020-05-19 LAB
ADD MANUAL DIFF: YES
ALBUMIN SERPL-MCNC: 1.9 G/DL (ref 3.4–5)
ALBUMIN/GLOB SERPL: 0.6 {RATIO} (ref 1–2.7)
ALP SERPL-CCNC: 48 U/L (ref 46–116)
ALT SERPL-CCNC: 11 U/L (ref 12–78)
AMYLASE SERPL-CCNC: 57 U/L (ref 25–115)
ANION GAP SERPL CALC-SCNC: 9 MMOL/L (ref 5–15)
APTT BLD: 38 SEC (ref 23–33)
AST SERPL-CCNC: 31 U/L (ref 15–37)
BILIRUB SERPL-MCNC: 0.4 MG/DL (ref 0.2–1)
BUN SERPL-MCNC: 11 MG/DL (ref 7–18)
CALCIUM SERPL-MCNC: 7.1 MG/DL (ref 8.5–10.1)
CHLORIDE SERPL-SCNC: 109 MMOL/L (ref 98–107)
CO2 SERPL-SCNC: 22 MMOL/L (ref 21–32)
CREAT SERPL-MCNC: 0.9 MG/DL (ref 0.55–1.3)
ERYTHROCYTE [DISTWIDTH] IN BLOOD BY AUTOMATED COUNT: 14 % (ref 11.6–14.8)
GLOBULIN SER-MCNC: 3 G/DL
HCT VFR BLD CALC: 28.1 % (ref 42–52)
HGB BLD-MCNC: 9.7 G/DL (ref 14.2–18)
INR PPP: 1.6 (ref 0.9–1.1)
MCV RBC AUTO: 84 FL (ref 80–99)
PLATELET # BLD: 173 K/UL (ref 150–450)
POTASSIUM SERPL-SCNC: 4.3 MMOL/L (ref 3.5–5.1)
RBC # BLD AUTO: 3.37 M/UL (ref 4.7–6.1)
SODIUM SERPL-SCNC: 140 MMOL/L (ref 136–145)
WBC # BLD AUTO: 24.1 K/UL (ref 4.8–10.8)

## 2020-05-19 RX ADMIN — SODIUM CHLORIDE SCH MLS/HR: 0.9 INJECTION INTRAVENOUS at 21:57

## 2020-05-19 RX ADMIN — DEXTROSE MONOHYDRATE SCH MLS/HR: 50 INJECTION, SOLUTION INTRAVENOUS at 18:11

## 2020-05-19 RX ADMIN — DIPHENHYDRAMINE HYDROCHLORIDE PRN MG: 50 INJECTION INTRAMUSCULAR; INTRAVENOUS at 14:53

## 2020-05-19 RX ADMIN — PANTOPRAZOLE SODIUM SCH MG: 40 INJECTION, POWDER, FOR SOLUTION INTRAVENOUS at 20:56

## 2020-05-19 RX ADMIN — PANTOPRAZOLE SODIUM SCH MG: 40 INJECTION, POWDER, FOR SOLUTION INTRAVENOUS at 08:26

## 2020-05-19 RX ADMIN — DIPHENHYDRAMINE HYDROCHLORIDE PRN MG: 50 INJECTION INTRAMUSCULAR; INTRAVENOUS at 19:08

## 2020-05-19 RX ADMIN — SODIUM CHLORIDE PRN MG: 9 INJECTION, SOLUTION INTRAVENOUS at 03:19

## 2020-05-19 RX ADMIN — SODIUM CHLORIDE SCH MLS/HR: 0.9 INJECTION INTRAVENOUS at 14:04

## 2020-05-19 RX ADMIN — SODIUM CHLORIDE SCH MLS/HR: 0.9 INJECTION INTRAVENOUS at 05:25

## 2020-05-19 NOTE — NUR
NURSE NOTES:

Per Neda ([facility]), Dr Fischer was notified that pt has acute DVT in left lower 
extremity- SCD placed on right leg only at this time- Pt also had IVCF placed recently. Pt 
denies pain in left leg- no redness or swelling noted at this time.

## 2020-05-19 NOTE — INFECTIOUS DISEASES PROG NOTE
Assessment/Plan


Assessment/Plan








Assessment:


Severe Sepsis


Enterobacter bacteremia- likely from GI source


S. epi bacteremia, contaminant


GGO on lung bases- COVID19 neg x2


  -5/12 Bcx 1/4 S.epi, 2/4 E. aerogenes (pan S); 5/13 Bcx NTD


            CXR: no acute disease


            SARS-COV2 PCR





Afebrile


Leukocytosis; persistent- increased post-op








Severe anemia (blood loss)/GIB likely 2ry to gastric mass


Thrombocytopenia, worsening


  -5/18 SP ex lap, left colectomy, partial gastrectomy, splenectomy, distal 

pancreatectomy, omentectomy , open lysis of adhesions


          --OR findings: large invading colon / gastric tumor





  -CT abd/p:   Centrally necrotic mass arising from the gastric fundus 

measuring 12. 6 x 11.1 x 14 cm.  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  

Gastric adenocarcinoma, lymphoma, and other neoplastic etiologies are also in 

the differential. Filling defects within pulmonary vessels at the lung bases 

with small peripheral airspace opacities concerning for pulmonary emboli and 

small pulmonary infarcts.








Mild AST elevation





Lactic acidosis, SP


   -5/12 u/a neg; ucx 30-40K mixed urogenital contaminants





Diarrhea


    -Cdiff,stool cx neg





Syncopal episode- likely due to severe dehydration and anemia


  -CT head: No acute findings in the head/brain.





Acute PE/DVT -(per CT abd/p findings)


  -sp IVC filter placement 5/13 (retrievable- given presence of bacteremia)





PABLO, SP





HTN





Plan:


  


- Continue Cefepime #5(abx d #8) and Flagyl #5 (abx d #8)


-Add empiric Micafungin for intraabdominal coverage


      - 5/15/20 SP Zosyn #4 and Vancomycin #4





-f/u cx


-Monitor CBC/CMP, temperature


-ICU care/aspiration precautions


-COVID19 neg x2


-f/u repeat Bcx


-Sx,pulm, Heme/onc, GI f/u


-wound care per surgical team





Thank you for consulting [facility]. Will continue to follow along wiht 

you.





Discussed with RN.





Subjective


Allergies:  


Coded Allergies:  


     No Known Allergies (Unverified , 5/12/20)


Subjective


afebrile


sp exp lap yesterday


extubated today


on ICU


wbc increased





Objective


Vital Signs





Last 24 Hour Vital Signs








  Date Time  Temp Pulse Resp B/P (MAP) Pulse Ox O2 Delivery O2 Flow Rate FiO2


 


5/19/20 12:00      Mechanical Ventilator 4.0 





      Nasal Cannula  


 


5/19/20 12:00  104      


 


5/19/20 12:00 98.8 99 20 113/78 (90) 100   


 


5/19/20 11:00  98 21 117/75 (89) 100   


 


5/19/20 10:58       2.0 


 


5/19/20 10:00  105 17 115/74 (88) 100   


 


5/19/20 09:16  122 12  100   


 


5/19/20 09:00  111 19 111/79 (90) 100   


 


5/19/20 08:30      Nasal Cannula 4.0 36


 


5/19/20 08:30       4.0 


 


5/19/20 08:30     100  4.0 36


 


5/19/20 08:10  122 23     30





        30


 


5/19/20 08:00 98.8 117 21 129/88 (102) 89   


 


5/19/20 08:00        40


 


5/19/20 08:00  116      


 


5/19/20 08:00      Mechanical Ventilator  


 


5/19/20 07:00  115 20     40


 


5/19/20 07:00  113 22 118/76 (90) 100   


 


5/19/20 06:00  114 22 135/80 (98) 100   


 


5/19/20 05:00  107 15 111/79 (90) 100   


 


5/19/20 04:35  107 20     40


 


5/19/20 04:00  113      


 


5/19/20 04:00      Mechanical Ventilator  


 


5/19/20 04:00        40


 


5/19/20 04:00 98.5 109 14 106/81 (89) 100   


 


5/19/20 03:10  108 14     40


 


5/19/20 03:00  107 19 121/70 (87) 100   


 


5/19/20 02:00  108 19 110/83 (92) 100   


 


5/19/20 01:45  109 20 109/75 (86) 100   


 


5/19/20 01:00  109 19 102/76 (85) 100   


 


5/19/20 00:31  118 14     40


 


5/19/20 00:00  115      


 


5/19/20 00:00 98.9 112 20 115/71 (86) 100   


 


5/19/20 00:00        60


 


5/19/20 00:00      Mechanical Ventilator  


 


5/18/20 23:00  123 27 131/84 (100) 100   


 


5/18/20 22:41  117 24     60


 


5/18/20 22:00  119 23 128/91 (103) 100   


 


5/18/20 21:06  125 14     80


 


5/18/20 21:00  122 24 125/84 (98) 100   


 


5/18/20 20:00 99.2 131 30 133/91 (105) 100   


 


5/18/20 20:00      Mechanical Ventilator  


 


5/18/20 20:00        80


 


5/18/20 20:00  129      


 


5/18/20 19:59  129      


 


5/18/20 19:00  132 27 130/94 (106) 100   


 


5/18/20 18:55  130 15     100


 


5/18/20 18:00  130 30 135/92 (106) 100   


 


5/18/20 17:00  122 24 132/89 (103) 100   


 


5/18/20 16:20  105 16  100 Mechanical Ventilator  100


 


5/18/20 16:16  105 16     100


 


5/18/20 16:00  129      


 


5/18/20 16:00 97.5 106 20 126/78 (94) 100   


 


5/18/20 16:00        100


 


5/18/20 16:00      Mechanical Ventilator  


 


5/18/20 15:30        100


 


5/18/20 15:30 98.0 98 20 124/80 100 Mechanical Ventilator  100


 


5/18/20 15:14  98 22 122/81 100 Mechanical Ventilator  100


 


5/18/20 15:04  98 22 121/80 100 Mechanical Ventilator  100


 


5/18/20 14:54  98 20 117/82 100 Mechanical Ventilator  100


 


5/18/20 14:44        100


 


5/18/20 14:44  99 12  100   


 


5/18/20 13:49  96 18 112/85 100 Mechanical Ventilator  100


 


5/18/20 13:44 97.5 97 15 109/85 100 Mechanical Ventilator  100








Height (Feet):  5


Height (Inches):  7.00


Weight (Pounds):  140


Objective


not examined as patient was on OR





Laboratory Tests








Test


  5/19/20


03:25


 


White Blood Count


  24.1 K/UL


(4.8-10.8)  *H


 


Red Blood Count


  3.37 M/UL


(4.70-6.10)  L


 


Hemoglobin


  9.7 G/DL


(14.2-18.0)  L


 


Hematocrit


  28.1 %


(42.0-52.0)  L


 


Mean Corpuscular Volume 84 FL (80-99)  


 


Mean Corpuscular Hemoglobin


  28.8 PG


(27.0-31.0)


 


Mean Corpuscular Hemoglobin


Concent 34.4 G/DL


(32.0-36.0)


 


Red Cell Distribution Width


  14.0 %


(11.6-14.8)


 


Platelet Count


  173 K/UL


(150-450)


 


Mean Platelet Volume


  5.8 FL


(6.5-10.1)  L


 


Neutrophils (%) (Auto)


  % (45.0-75.0)


 


 


Lymphocytes (%) (Auto)


  % (20.0-45.0)


 


 


Monocytes (%) (Auto)  % (1.0-10.0)  


 


Eosinophils (%) (Auto)  % (0.0-3.0)  


 


Basophils (%) (Auto)  % (0.0-2.0)  


 


Differential Total Cells


Counted 100  


 


 


Neutrophils % (Manual) 88 % (45-75)  H


 


Lymphocytes % (Manual) 7 % (20-45)  L


 


Monocytes % (Manual) 5 % (1-10)  


 


Eosinophils % (Manual) 0 % (0-3)  


 


Basophils % (Manual) 0 % (0-2)  


 


Band Neutrophils 0 % (0-8)  


 


Platelet Estimate Adequate  


 


Platelet Morphology Normal  


 


Hypochromasia 2+  


 


Anisocytosis 1+  


 


Prothrombin Time


  17.0 SEC


(9.30-11.50)  H


 


Prothromb Time International


Ratio 1.6 (0.9-1.1)


H


 


Activated Partial


Thromboplast Time 38 SEC (23-33)


H


 


Sodium Level


  140 MMOL/L


(136-145)


 


Potassium Level


  4.3 MMOL/L


(3.5-5.1)


 


Chloride Level


  109 MMOL/L


()  H


 


Carbon Dioxide Level


  22 MMOL/L


(21-32)


 


Anion Gap


  9 mmol/L


(5-15)


 


Blood Urea Nitrogen


  11 mg/dL


(7-18)


 


Creatinine


  0.9 MG/DL


(0.55-1.30)


 


Estimat Glomerular Filtration


Rate > 60 mL/min


(>60)


 


Glucose Level


  123 MG/DL


()  H


 


Lactic Acid Level


  1.60 mmol/L


(0.4-2.0)


 


Calcium Level


  7.1 MG/DL


(8.5-10.1)  L


 


Total Bilirubin


  0.4 MG/DL


(0.2-1.0)


 


Aspartate Amino Transf


(AST/SGOT) 31 U/L (15-37)


 


 


Alanine Aminotransferase


(ALT/SGPT) 11 U/L (12-78)


L


 


Alkaline Phosphatase


  48 U/L


()


 


Total Protein


  4.9 G/DL


(6.4-8.2)  L


 


Albumin


  1.9 G/DL


(3.4-5.0)  L


 


Globulin 3.0 g/dL  


 


Albumin/Globulin Ratio


  0.6 (1.0-2.7)


L


 


Amylase Level


  57 U/L


()


 


Lipase


  141 U/L


()











Current Medications








 Medications


  (Trade)  Dose


 Ordered  Sig/Marisa


 Route


 PRN Reason  Start Time


 Stop Time Status Last Admin


Dose Admin


 


 Albuterol/


 Ipratropium


  (Albuterol/


 Ipratropium)  3 ml  Q4H  PRN


 HHN


 Shortness of Breath  5/16/20 14:15


 5/21/20 14:14   


 


 


 Cefepime HCl 1 gm/


 Dextrose  55 ml @ 


 110 mls/hr  Q8HR


 IVPB


   5/16/20 22:00


 5/23/20 21:59  5/19/20 05:25


 


 


 Dextrose


  (Dextrose 50%)  25 ml  Q30M  PRN


 IV


 Hypoglycemia  5/16/20 14:15


 8/11/20 06:14   


 


 


 Dextrose


  (Dextrose 50%)  50 ml  Q30M  PRN


 IV


 Hypoglycemia  5/16/20 14:15


 8/11/20 06:14   


 


 


 Diphenhydramine


 HCl


  (Benadryl)  12.5 mg  Q6H  PRN


 IVP


 Itching/Pruritis  5/18/20 17:00


 6/17/20 16:59  5/18/20 20:45


 


 


 Hydromorphone HCl


  (Dilaudid)  0.5 mg  Q3H  PRN


 IVP


 Pain Score 1-3  5/18/20 17:00


 5/25/20 16:59  5/19/20 03:19


 


 


 Hydromorphone HCl


  (Dilaudid)  1 mg  Q3H  PRN


 IVP


 pain score 4-6  5/18/20 17:00


 5/25/20 16:59   


 


 


 Hydromorphone HCl


  (Dilaudid)  2 mg  Q3H  PRN


 IVP


 pain score 7-10  5/18/20 17:00


 5/25/20 16:59  5/18/20 23:02


 


 


 Metronidazole  100 ml @ 


 100 mls/hr  Q8HR


 IVPB


   5/16/20 22:00


 5/22/20 13:59  5/19/20 05:25


 


 


 Ondansetron HCl


  (Zofran)  4 mg  Q6H  PRN


 IVP


 Nausea & Vomiting  5/18/20 17:00


 6/17/20 16:59   


 


 


 Pantoprazole


  (Protonix)  40 mg  EVERY 12  HOURS


 IV


   5/16/20 21:00


 6/15/20 08:59  5/19/20 08:26


 


 


 Sodium Chloride  1,000 ml @ 


 100 mls/hr  Q10H


 IV


   5/18/20 19:00


 6/17/20 18:59  5/19/20 05:26


 

















Alexa Gonzalez M.D. May 19, 2020 13:20

## 2020-05-19 NOTE — PULMONOLOGY PROGRESS NOTE
Subjective


ROS Limited/Unobtainable:  No


Interval Events:  s/p laparotomy yesterday; extubated today


Constitutional:  Reports: no symptoms


HEENT:  Repors: no symptoms


Respiratory:  Reports: no symptoms


Cardiovascular:  Reports: no symptoms


Gastrointestinal/Abdominal:  Reports: no symptoms


Genitourinary:  Reports: no symptoms


Neurologic:  Reports: no symptoms


Allergies:  


Coded Allergies:  


     No Known Allergies (Unverified , 5/12/20)


All Systems:  reviewed and negative except above





Objective





Last 24 Hour Vital Signs








  Date Time  Temp Pulse Resp B/P (MAP) Pulse Ox O2 Delivery O2 Flow Rate FiO2


 


5/19/20 14:00  99 24 114/79 (91) 100   


 


5/19/20 13:00  99 20 111/77 (88) 100   


 


5/19/20 12:00      Mechanical Ventilator 4.0 





      Nasal Cannula  


 


5/19/20 12:00  104      


 


5/19/20 12:00 98.8 99 20 113/78 (90) 100   


 


5/19/20 11:00  98 21 117/75 (89) 100   


 


5/19/20 10:58       2.0 


 


5/19/20 10:00  105 17 115/74 (88) 100   


 


5/19/20 09:16  122 12  100   


 


5/19/20 09:00  111 19 111/79 (90) 100   


 


5/19/20 08:30      Nasal Cannula 4.0 36


 


5/19/20 08:30       4.0 


 


5/19/20 08:30     100  4.0 36


 


5/19/20 08:10  122 23     30





        30


 


5/19/20 08:00 98.8 117 21 129/88 (102) 89   


 


5/19/20 08:00        40


 


5/19/20 08:00  116      


 


5/19/20 08:00      Mechanical Ventilator  


 


5/19/20 07:00  115 20     40


 


5/19/20 07:00  113 22 118/76 (90) 100   


 


5/19/20 06:00  114 22 135/80 (98) 100   


 


5/19/20 05:00  107 15 111/79 (90) 100   


 


5/19/20 04:35  107 20     40


 


5/19/20 04:00  113      


 


5/19/20 04:00      Mechanical Ventilator  


 


5/19/20 04:00        40


 


5/19/20 04:00 98.5 109 14 106/81 (89) 100   


 


5/19/20 03:10  108 14     40


 


5/19/20 03:00  107 19 121/70 (87) 100   


 


5/19/20 02:00  108 19 110/83 (92) 100   


 


5/19/20 01:45  109 20 109/75 (86) 100   


 


5/19/20 01:00  109 19 102/76 (85) 100   


 


5/19/20 00:31  118 14     40


 


5/19/20 00:00  115      


 


5/19/20 00:00 98.9 112 20 115/71 (86) 100   


 


5/19/20 00:00        60


 


5/19/20 00:00      Mechanical Ventilator  


 


5/18/20 23:00  123 27 131/84 (100) 100   


 


5/18/20 22:41  117 24     60


 


5/18/20 22:00  119 23 128/91 (103) 100   


 


5/18/20 21:06  125 14     80


 


5/18/20 21:00  122 24 125/84 (98) 100   


 


5/18/20 20:00 99.2 131 30 133/91 (105) 100   


 


5/18/20 20:00      Mechanical Ventilator  


 


5/18/20 20:00        80


 


5/18/20 20:00  129      


 


5/18/20 19:59  129      


 


5/18/20 19:00  132 27 130/94 (106) 100   


 


5/18/20 18:55  130 15     100


 


5/18/20 18:00  130 30 135/92 (106) 100   


 


5/18/20 17:00  122 24 132/89 (103) 100   


 


5/18/20 16:20  105 16  100 Mechanical Ventilator  100


 


5/18/20 16:16  105 16     100


 


5/18/20 16:00  129      


 


5/18/20 16:00 97.5 106 20 126/78 (94) 100   


 


5/18/20 16:00        100


 


5/18/20 16:00      Mechanical Ventilator  

















Intake and Output  


 


 5/18/20 5/19/20





 19:00 07:00


 


Intake Total 1700 ml 1331.667 ml


 


Output Total 910 ml 950 ml


 


Balance 790 ml 381.667 ml


 


  


 


IV Total 1700 ml 1331.667 ml


 


Output Urine Total 400 ml 650 ml


 


Gastric Drainage Total 20 ml 


 


Drainage Total 290 ml 300 ml


 


Estimated Blood Loss 200 ml 


 


# Bowel Movements 1 








General Appearance:  no acute distress


HEENT:  normocephalic


Respiratory:  chest wall non-tender, lungs clear


Cardiovascular:  normal peripheral pulses


Abdomen:  non distended


Extremities:  no cyanosis


Laboratory Tests


5/19/20 03:25: 


White Blood Count 24.1*H, Red Blood Count 3.37L, Hemoglobin 9.7L, Hematocrit 

28.1L, Mean Corpuscular Volume 84, Mean Corpuscular Hemoglobin 28.8, Mean 

Corpuscular Hemoglobin Concent 34.4, Red Cell Distribution Width 14.0, Platelet 

Count 173, Mean Platelet Volume 5.8L, Neutrophils (%) (Auto) , Lymphocytes (%) (

Auto) , Monocytes (%) (Auto) , Eosinophils (%) (Auto) , Basophils (%) (Auto) , 

Differential Total Cells Counted 100, Neutrophils % (Manual) 88H, Lymphocytes % 

(Manual) 7L, Monocytes % (Manual) 5, Eosinophils % (Manual) 0, Basophils % (

Manual) 0, Band Neutrophils 0, Platelet Estimate Adequate, Platelet Morphology 

Normal, Hypochromasia 2+, Anisocytosis 1+, Prothrombin Time 17.0H, Prothromb 

Time International Ratio 1.6H, Activated Partial Thromboplast Time 38H, Sodium 

Level 140, Potassium Level 4.3, Chloride Level 109H, Carbon Dioxide Level 22, 

Anion Gap 9, Blood Urea Nitrogen 11, Creatinine 0.9, Estimat Glomerular 

Filtration Rate > 60, Glucose Level 123H, Lactic Acid Level 1.60, Calcium Level 

7.1L, Total Bilirubin 0.4, Aspartate Amino Transf (AST/SGOT) 31, Alanine 

Aminotransferase (ALT/SGPT) 11L, Alkaline Phosphatase 48, Total Protein 4.9L, 

Albumin 1.9L, Globulin 3.0, Albumin/Globulin Ratio 0.6L, Amylase Level 57, 

Lipase 141





Current Medications








 Medications


  (Trade)  Dose


 Ordered  Sig/Marisa


 Route


 PRN Reason  Start Time


 Stop Time Status Last Admin


Dose Admin


 


 Albuterol/


 Ipratropium


  (Albuterol/


 Ipratropium)  3 ml  Q4H  PRN


 HHN


 Shortness of Breath  5/16/20 14:15


 5/21/20 14:14   


 


 


 Cefepime HCl 1 gm/


 Dextrose  55 ml @ 


 110 mls/hr  Q8HR


 IVPB


   5/16/20 22:00


 5/23/20 21:59  5/19/20 14:04


 


 


 Dextrose


  (Dextrose 50%)  25 ml  Q30M  PRN


 IV


 Hypoglycemia  5/16/20 14:15


 8/11/20 06:14   


 


 


 Dextrose


  (Dextrose 50%)  50 ml  Q30M  PRN


 IV


 Hypoglycemia  5/16/20 14:15


 8/11/20 06:14   


 


 


 Diphenhydramine


 HCl


  (Benadryl)  12.5 mg  Q6H  PRN


 IVP


 Itching/Pruritis  5/18/20 17:00


 6/17/20 16:59  5/18/20 20:45


 


 


 Hydromorphone HCl


  (Dilaudid)  0.5 mg  Q3H  PRN


 IVP


 Pain Score 1-3  5/18/20 17:00


 5/25/20 16:59  5/19/20 03:19


 


 


 Hydromorphone HCl


  (Dilaudid)  1 mg  Q3H  PRN


 IVP


 pain score 4-6  5/18/20 17:00


 5/25/20 16:59  5/19/20 14:53


 


 


 Hydromorphone HCl


  (Dilaudid)  2 mg  Q3H  PRN


 IVP


 pain score 7-10  5/18/20 17:00


 5/25/20 16:59  5/18/20 23:02


 


 


 Iohexol


  (Omnipaque 350


 100ml)  100 ml  NOW  PRN


 INJ


 Radiology Procedure  5/19/20 15:00


 5/21/20 14:58   


 


 


 Metronidazole  100 ml @ 


 100 mls/hr  Q8HR


 IVPB


   5/16/20 22:00


 5/22/20 13:59  5/19/20 14:04


 


 


 Micafungin Sodium


 100 mg/Sodium


 Chloride  110 ml @ 


 110 mls/hr  Q24H


 IVPB


   5/19/20 18:00


 5/26/20 17:59   


 


 


 Ondansetron HCl


  (Zofran)  4 mg  Q6H  PRN


 IVP


 Nausea & Vomiting  5/18/20 17:00


 6/17/20 16:59   


 


 


 Pantoprazole


  (Protonix)  40 mg  EVERY 12  HOURS


 IV


   5/16/20 21:00


 6/15/20 08:59  5/19/20 08:26


 


 


 Sodium Chloride  1,000 ml @ 


 100 mls/hr  Q10H


 IV


   5/18/20 19:00


 6/17/20 18:59  5/19/20 15:04


 











Assessment/Plan


Assessment/Plan


IMPRESSION:


1. Gastric mass.


2. Severe anemia.


3. Pulmonary embolism.





DISCUSSION:  





S/p IVC filter.  S/p blood transfusion, IV fluids.  


S/p ex lap, left colectomy, partial gastrectomy, splenectomy, distal 

pancreatectomy, omentectomy 


and  open lysis of adhesions 


I will follow carefully.


Extubated 5/19/20


COVID 19 pcr negative














  ______________________________________________


  MARIANELA Downs Omar Syed MD May 19, 2020 15:58

## 2020-05-19 NOTE — NUR
NURSE NOTES:



called and left message for Dr. Cerna regarding patient's increased WBC of 24.1 today. 
awaiting call back.

## 2020-05-19 NOTE — DIAGNOSTIC IMAGING REPORT
EXAM: ULTRASOUND Venous Duplex Scan Win Leg

 

CLINICAL HISTORY: Leg pain and edema.

 

COMPARISON:  None

 

TECHNIQUE:  Doppler examination include grayscale images obtained with and without

compression, and color and spectral doppler analysis.

 

FINDINGS:  

 

Doppler examination shows normal spontaneity, phasicity, compressibility in the right

lower extremity. There is no thrombus identified by grayscale. Normal color and

spectral flow is identified. There is no evidence of valvular incompetency or

insufficiency.

 

However on the left side, there is evidence of acute DVT with hypoechoic thrombus

noted from the left common femoral vein down to the popliteal vein. These segments

are noncompressible with poor color flow.

 

IMPRESSION:

 

STUDY POSITIVE FOR DVT IN THE LEFT LOWER EXTREMITY.

## 2020-05-19 NOTE — HEMATOLOGY/ONC PROGRESS NOTE
Assessment/Plan


Assessment/Plan


Assessment and Recs


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


--> CT shows    Centrally necrotic mass arising from the gastric fundus 

measuring 12. 6 x 11.1 x 14 cm.  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  

Gastric adenocarcinoma, lymphoma, and other neoplastic etiologies are also in 

the differential. Filling defects within pulmonary vessels at the lung bases 

with small peripheral airspace opacities concerning for pulmonary emboli and 

small pulmonary infarcts.


--> per gi and surgery to obtain a biopsy


--> ideally requires resection if possible with as much as possible negative 

margins for 5/18/20


--> adjuvant chemotherapy v pill (imatinib) based on type of cancer


# Pulmonary embolism -- likely related to hypercoagulable disorder from 

malignancys/p ivc filter


--> case dw pcp, surg, and pulm


--> is not a candidate for anticoagulation given severe anemia and low platelets


--> 5/13 s/p ivc filter placement


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


--> have ordered for repeat prbc transfusions


--> hgb goal is >7


--> have ordered for anemia panel


--> gi to access in re to biopsy/endoscopy


--> IV IRON completed 


--> hgb trend: 8.6-->9.7


# Leukocytosis is likely reactive process from cancer


--> on abx as needed


--> smear is noted


--> meds are reviewed


--> vanc/zosyn-->cefepime/flagyl


--> wbc trend: 21.6-->18-->24.1 


# Diarrhea


--> c diff negative 


# PABLO, improving


# HTN


# Dvt ppx scds





The timing of this note does not necessarily reflect the time of the patient 

was seen.





Greatly appreciate consultation.





Subjective


Allergies:  


Coded Allergies:  


     No Known Allergies (Unverified , 5/12/20)


Subjective


5/14 labs are noted, no bleeding, meds reviewed, for egd when iso off


5/15 icu, no overnight events, alert, iv iron, c diff negative 


5/17 on tele, wbc improving, nc, tarango, no distress 


5/18 for surgery this am, have ordered for ffp and vit k, seen by surg, 

anesthesia


5/19 icu, wbc 24.1, iv abx, afebrile, vent, no acute distress





Objective


Objective





Current Medications








 Medications


  (Trade)  Dose


 Ordered  Sig/Marisa


 Route


 PRN Reason  Start Time


 Stop Time Status Last Admin


Dose Admin


 


 Albuterol/


 Ipratropium


  (Albuterol/


 Ipratropium)  3 ml  Q4H  PRN


 HHN


 Shortness of Breath  5/16/20 14:15


 5/21/20 14:14   


 


 


 Cefepime HCl 1 gm/


 Dextrose  55 ml @ 


 110 mls/hr  Q8HR


 IVPB


   5/16/20 22:00


 5/23/20 21:59  5/19/20 05:25


 


 


 Dextrose


  (Dextrose 50%)  25 ml  Q30M  PRN


 IV


 Hypoglycemia  5/16/20 14:15


 8/11/20 06:14   


 


 


 Dextrose


  (Dextrose 50%)  50 ml  Q30M  PRN


 IV


 Hypoglycemia  5/16/20 14:15


 8/11/20 06:14   


 


 


 Diphenhydramine


 HCl


  (Benadryl)  12.5 mg  Q6H  PRN


 IVP


 Itching/Pruritis  5/18/20 17:00


 6/17/20 16:59  5/18/20 20:45


 


 


 Hydromorphone HCl


  (Dilaudid)  0.5 mg  Q3H  PRN


 IVP


 Pain Score 1-3  5/18/20 17:00


 5/25/20 16:59  5/19/20 03:19


 


 


 Hydromorphone HCl


  (Dilaudid)  1 mg  Q3H  PRN


 IVP


 pain score 4-6  5/18/20 17:00


 5/25/20 16:59   


 


 


 Hydromorphone HCl


  (Dilaudid)  2 mg  Q3H  PRN


 IVP


 pain score 7-10  5/18/20 17:00


 5/25/20 16:59  5/18/20 23:02


 


 


 Metronidazole  100 ml @ 


 100 mls/hr  Q8HR


 IVPB


   5/16/20 22:00


 5/22/20 13:59  5/19/20 05:25


 


 


 Ondansetron HCl


  (Zofran)  4 mg  Q6H  PRN


 IVP


 Nausea & Vomiting  5/18/20 17:00


 6/17/20 16:59   


 


 


 Pantoprazole


  (Protonix)  40 mg  EVERY 12  HOURS


 IV


   5/16/20 21:00


 6/15/20 08:59  5/18/20 20:44


 


 


 Sodium Chloride  1,000 ml @ 


 100 mls/hr  Q10H


 IV


   5/18/20 19:00


 6/17/20 18:59  5/19/20 05:26


 











Last 24 Hour Vital Signs








  Date Time  Temp Pulse Resp B/P (MAP) Pulse Ox O2 Delivery O2 Flow Rate FiO2


 


5/19/20 07:00  113 22 118/76 (90) 100   


 


5/19/20 06:00  114 22 135/80 (98) 100   


 


5/19/20 05:00  107 15 111/79 (90) 100   


 


5/19/20 04:35  107 20     40


 


5/19/20 04:00  113      


 


5/19/20 04:00      Mechanical Ventilator  


 


5/19/20 04:00        40


 


5/19/20 04:00 98.5 109 14 106/81 (89) 100   


 


5/19/20 03:10  108 14     40


 


5/19/20 03:00  107 19 121/70 (87) 100   


 


5/19/20 02:00  108 19 110/83 (92) 100   


 


5/19/20 01:45  109 20 109/75 (86) 100   


 


5/19/20 01:00  109 19 102/76 (85) 100   


 


5/19/20 00:31  118 14     40


 


5/19/20 00:00  115      


 


5/19/20 00:00 98.9 112 20 115/71 (86) 100   


 


5/19/20 00:00        60


 


5/19/20 00:00      Mechanical Ventilator  


 


5/18/20 23:00  123 27 131/84 (100) 100   


 


5/18/20 22:41  117 24     60


 


5/18/20 22:00  119 23 128/91 (103) 100   


 


5/18/20 21:06  125 14     80


 


5/18/20 21:00  122 24 125/84 (98) 100   


 


5/18/20 20:00 99.2 131 30 133/91 (105) 100   


 


5/18/20 20:00      Mechanical Ventilator  


 


5/18/20 20:00        80


 


5/18/20 20:00  129      


 


5/18/20 19:59  129      


 


5/18/20 19:00  132 27 130/94 (106) 100   


 


5/18/20 18:55  130 15     100


 


5/18/20 18:00  130 30 135/92 (106) 100   


 


5/18/20 17:00  122 24 132/89 (103) 100   


 


5/18/20 16:20  105 16  100 Mechanical Ventilator  100


 


5/18/20 16:16  105 16     100


 


5/18/20 16:00  129      


 


5/18/20 16:00 97.5 106 20 126/78 (94) 100   


 


5/18/20 16:00        100


 


5/18/20 16:00      Mechanical Ventilator  


 


5/18/20 15:30        100


 


5/18/20 15:30 98.0 98 20 124/80 100 Mechanical Ventilator  100


 


5/18/20 15:14  98 22 122/81 100 Mechanical Ventilator  100


 


5/18/20 15:04  98 22 121/80 100 Mechanical Ventilator  100


 


5/18/20 14:54  98 20 117/82 100 Mechanical Ventilator  100


 


5/18/20 14:44        100


 


5/18/20 14:44  99 12  100   


 


5/18/20 13:49  96 18 112/85 100 Mechanical Ventilator  100


 


5/18/20 13:44 97.5 97 15 109/85 100 Mechanical Ventilator  100


 


5/18/20 09:00      Nasal Cannula 2.0 


 


5/18/20 08:00 98.7 106 19 105/71 (82) 98   


 


5/18/20 08:00  106      


 


5/18/20 04:00  101      


 


5/18/20 04:00 98.5 100 19 108/70 (83) 100   


 


5/18/20 00:00  100      


 


5/18/20 00:00 98.9 103 21 112/71 (85) 99   


 


5/17/20 21:00      Nasal Cannula 2.0 


 


5/17/20 20:00  102 20  98 Nasal Cannula 2.0 28


 


5/17/20 20:00  103      


 


5/17/20 20:00 99.3 109 21 111/68 (82) 98   


 


5/17/20 20:00     98 Nasal Cannula 2.0 28


 


5/17/20 16:00 97.6 71 22 121/61 (81) 97   


 


5/17/20 16:00  92      


 


5/17/20 12:00  90      


 


5/17/20 12:00 98.2 69 22 110/70 (83) 100   


 


5/17/20 09:00      Nasal Cannula 2.0 


 


5/17/20 08:00 97.0 85 20 104/75 (85) 96   


 


5/17/20 08:00  96      

















Intake and Output  


 


 5/18/20 5/19/20





 19:00 07:00


 


Intake Total 1700 ml 1331.667 ml


 


Output Total 910 ml 900 ml


 


Balance 790 ml 431.667 ml


 


  


 


IV Total 1700 ml 1331.667 ml


 


Output Urine Total 400 ml 600 ml


 


Gastric Drainage Total 20 ml 


 


Drainage Total 290 ml 300 ml


 


Estimated Blood Loss 200 ml 


 


# Bowel Movements 1 











Labs








Test


  5/17/20


08:30 5/18/20


06:07 5/19/20


03:25


 


White Blood Count


  18.9 K/UL


(4.8-10.8) 20.1 K/UL


(4.8-10.8) 24.1 K/UL


(4.8-10.8)


 


Red Blood Count


  3.31 M/UL


(4.70-6.10) 3.52 M/UL


(4.70-6.10) 3.37 M/UL


(4.70-6.10)


 


Hemoglobin


  9.4 G/DL


(14.2-18.0) 10.0 G/DL


(14.2-18.0) 9.7 G/DL


(14.2-18.0)


 


Hematocrit


  28.5 %


(42.0-52.0) 29.7 %


(42.0-52.0) 28.1 %


(42.0-52.0)


 


Mean Corpuscular Volume 86 FL (80-99)  84 FL (80-99)  84 FL (80-99) 


 


Mean Corpuscular Hemoglobin


  28.2 PG


(27.0-31.0) 28.5 PG


(27.0-31.0) 28.8 PG


(27.0-31.0)


 


Mean Corpuscular Hemoglobin


Concent 32.8 G/DL


(32.0-36.0) 33.7 G/DL


(32.0-36.0) 34.4 G/DL


(32.0-36.0)


 


Red Cell Distribution Width


  15.8 %


(11.6-14.8) 15.2 %


(11.6-14.8) 14.0 %


(11.6-14.8)


 


Platelet Count


  96 K/UL


(150-450) 119 K/UL


(150-450) 173 K/UL


(150-450)


 


Mean Platelet Volume


  5.1 FL


(6.5-10.1) 5.7 FL


(6.5-10.1) 5.8 FL


(6.5-10.1)


 


Neutrophils (%) (Auto)  % (45.0-75.0)   % (45.0-75.0)   % (45.0-75.0) 


 


Lymphocytes (%) (Auto)  % (20.0-45.0)   % (20.0-45.0)   % (20.0-45.0) 


 


Monocytes (%) (Auto)  % (1.0-10.0)   % (1.0-10.0)   % (1.0-10.0) 


 


Eosinophils (%) (Auto)  % (0.0-3.0)   % (0.0-3.0)   % (0.0-3.0) 


 


Basophils (%) (Auto)  % (0.0-2.0)   % (0.0-2.0)   % (0.0-2.0) 


 


Differential Total Cells


Counted 100 


  100 


  


 


 


Neutrophils % (Manual) 88 % (45-75)  86 % (45-75)  


 


Lymphocytes % (Manual) 7 % (20-45)  11 % (20-45)  


 


Monocytes % (Manual) 5 % (1-10)  3 % (1-10)  


 


Eosinophils % (Manual) 0 % (0-3)  0 % (0-3)  


 


Basophils % (Manual) 0 % (0-2)  0 % (0-2)  


 


Band Neutrophils 0 % (0-8)  0 % (0-8)  


 


Platelet Estimate Decreased  Decreased  


 


Platelet Morphology Normal  Normal  


 


Hypochromasia 1+  1+  


 


Anisocytosis 1+  1+  


 


Sodium Level


  141 MMOL/L


(136-145) 139 MMOL/L


(136-145) 140 MMOL/L


(136-145)


 


Potassium Level


  3.6 MMOL/L


(3.5-5.1) 3.7 MMOL/L


(3.5-5.1) 4.3 MMOL/L


(3.5-5.1)


 


Chloride Level


  111 MMOL/L


() 108 MMOL/L


() 109 MMOL/L


()


 


Carbon Dioxide Level


  20 MMOL/L


(21-32) 22 MMOL/L


(21-32) 22 MMOL/L


(21-32)


 


Anion Gap


  10 mmol/L


(5-15) 9 mmol/L


(5-15) 9 mmol/L


(5-15)


 


Blood Urea Nitrogen


  8 mg/dL (7-18) 


  5 mg/dL (7-18) 


  11 mg/dL


(7-18)


 


Creatinine


  0.8 MG/DL


(0.55-1.30) 0.8 MG/DL


(0.55-1.30) 0.9 MG/DL


(0.55-1.30)


 


Estimat Glomerular Filtration


Rate > 60 mL/min


(>60) > 60 mL/min


(>60) > 60 mL/min


(>60)


 


Glucose Level


  126 MG/DL


() 95 MG/DL


() 123 MG/DL


()


 


Calcium Level


  7.2 MG/DL


(8.5-10.1) 7.1 MG/DL


(8.5-10.1) 7.1 MG/DL


(8.5-10.1)


 


Erythrocyte Sedimentation Rate


  


  29 MM/HR


(0-20) 


 


 


Prothrombin Time


  


  16.8 SEC


(9.30-11.50) 17.0 SEC


(9.30-11.50)


 


Prothromb Time International


Ratio 


  1.6 (0.9-1.1) 


  1.6 (0.9-1.1) 


 


 


Activated Partial


Thromboplast Time 


  35 SEC (23-33) 


  38 SEC (23-33) 


 


 


Lactic Acid Level


  


  1.60 mmol/L


(0.4-2.0) 1.60 mmol/L


(0.4-2.0)


 


Total Bilirubin


  


  0.2 MG/DL


(0.2-1.0) 0.4 MG/DL


(0.2-1.0)


 


Aspartate Amino Transf


(AST/SGOT) 


  42 U/L (15-37) 


  31 U/L (15-37) 


 


 


Alanine Aminotransferase


(ALT/SGPT) 


  9 U/L (12-78) 


  11 U/L (12-78) 


 


 


Alkaline Phosphatase


  


  66 U/L


() 48 U/L


()


 


C-Reactive Protein,


Quantitative 


  13.0 mg/dL


(0.00-0.90) 


 


 


Total Protein


  


  5.3 G/DL


(6.4-8.2) 4.9 G/DL


(6.4-8.2)


 


Albumin


  


  1.2 G/DL


(3.4-5.0) 1.9 G/DL


(3.4-5.0)


 


Globulin  4.1 g/dL  3.0 g/dL 


 


Albumin/Globulin Ratio  0.3 (1.0-2.7)  0.6 (1.0-2.7) 


 


Amylase Level


  


  63 U/L


() 57 U/L


()


 


Lipase


  


  218 U/L


() 141 U/L


()








Height (Feet):  5


Height (Inches):  7.00


Weight (Pounds):  140


Objective








PE


General Appearance:  alert, mild distress


Head:  normocephalic, atraumatic


Eyes:  bilateral eye PERRL, bilateral eye EOMI


ENT:  uvula midline, dry mucus membranes


Neck:  supple, thyroid normal, supple/symm/no masses


Respiratory:  lungs clear, no respiratory distress, no retraction


Cardiovascular:  normal peripheral pulses, no edema


Gastrointestinal:  non tender, soft, no guarding, no rebound


Musculoskeletal:  normal inspection


Neurologic:  alert, oriented x3


Psychiatric:  mood/affect normal


Skin:  no rash, warm/dry











Jersey Ross MD May 19, 2020 07:54

## 2020-05-19 NOTE — GENERAL PROGRESS NOTE
Assessment/Plan


Problem List:  


(1) Syncope


ICD Codes:  R55 - Syncope and collapse


SNOMED:  571035945


Qualifiers:  


   Qualified Codes:  R55 - Syncope and collapse


(2) Gastric mass


ICD Codes:  K31.89 - Other diseases of stomach and duodenum


SNOMED:  379932711


(3) GI bleed


ICD Codes:  K92.2 - Gastrointestinal hemorrhage, unspecified


SNOMED:  53933864


Qualifiers:  


   Qualified Codes:  K92.2 - Gastrointestinal hemorrhage, unspecified


(4) Pulmonary embolism


ICD Codes:  I26.99 - Other pulmonary embolism without acute cor pulmonale


SNOMED:  87029668


Qualifiers:  


   Qualified Codes:  I26.99 - Other pulmonary embolism without acute cor 

pulmonale


Status:  stable, progressing


Assessment/Plan:


s/p 5 units prbc


2 units FFP


stable H&H now


fu stool c.diff>>>neg


just got extubated


Protonix to Q12


will fu





Subjective


ROS Limited/Unobtainable:  No


Allergies:  


Coded Allergies:  


     No Known Allergies (Unverified , 5/12/20)





Objective





Last 24 Hour Vital Signs








  Date Time  Temp Pulse Resp B/P (MAP) Pulse Ox O2 Delivery O2 Flow Rate FiO2


 


5/19/20 09:16  122 12  100   


 


5/19/20 08:30       4.0 


 


5/19/20 08:10  122 23     30





        30


 


5/19/20 08:00        40


 


5/19/20 07:00  113 22 118/76 (90) 100   


 


5/19/20 06:00  114 22 135/80 (98) 100   


 


5/19/20 05:00  107 15 111/79 (90) 100   


 


5/19/20 04:35  107 20     40


 


5/19/20 04:00  113      


 


5/19/20 04:00      Mechanical Ventilator  


 


5/19/20 04:00        40


 


5/19/20 04:00 98.5 109 14 106/81 (89) 100   


 


5/19/20 03:10  108 14     40


 


5/19/20 03:00  107 19 121/70 (87) 100   


 


5/19/20 02:00  108 19 110/83 (92) 100   


 


5/19/20 01:45  109 20 109/75 (86) 100   


 


5/19/20 01:00  109 19 102/76 (85) 100   


 


5/19/20 00:31  118 14     40


 


5/19/20 00:00  115      


 


5/19/20 00:00 98.9 112 20 115/71 (86) 100   


 


5/19/20 00:00        60


 


5/19/20 00:00      Mechanical Ventilator  


 


5/18/20 23:00  123 27 131/84 (100) 100   


 


5/18/20 22:41  117 24     60


 


5/18/20 22:00  119 23 128/91 (103) 100   


 


5/18/20 21:06  125 14     80


 


5/18/20 21:00  122 24 125/84 (98) 100   


 


5/18/20 20:00 99.2 131 30 133/91 (105) 100   


 


5/18/20 20:00      Mechanical Ventilator  


 


5/18/20 20:00        80


 


5/18/20 20:00  129      


 


5/18/20 19:59  129      


 


5/18/20 19:00  132 27 130/94 (106) 100   


 


5/18/20 18:55  130 15     100


 


5/18/20 18:00  130 30 135/92 (106) 100   


 


5/18/20 17:00  122 24 132/89 (103) 100   


 


5/18/20 16:20  105 16  100 Mechanical Ventilator  100


 


5/18/20 16:16  105 16     100


 


5/18/20 16:00  129      


 


5/18/20 16:00 97.5 106 20 126/78 (94) 100   


 


5/18/20 16:00        100


 


5/18/20 16:00      Mechanical Ventilator  


 


5/18/20 15:30        100


 


5/18/20 15:30 98.0 98 20 124/80 100 Mechanical Ventilator  100


 


5/18/20 15:14  98 22 122/81 100 Mechanical Ventilator  100


 


5/18/20 15:04  98 22 121/80 100 Mechanical Ventilator  100


 


5/18/20 14:54  98 20 117/82 100 Mechanical Ventilator  100


 


5/18/20 14:44        100


 


5/18/20 14:44  99 12  100   


 


5/18/20 13:49  96 18 112/85 100 Mechanical Ventilator  100


 


5/18/20 13:44 97.5 97 15 109/85 100 Mechanical Ventilator  100

















Intake and Output  


 


 5/18/20 5/19/20





 19:00 07:00


 


Intake Total 1700 ml 1331.667 ml


 


Output Total 910 ml 900 ml


 


Balance 790 ml 431.667 ml


 


  


 


IV Total 1700 ml 1331.667 ml


 


Output Urine Total 400 ml 600 ml


 


Gastric Drainage Total 20 ml 


 


Drainage Total 290 ml 300 ml


 


Estimated Blood Loss 200 ml 


 


# Bowel Movements 1 








Laboratory Tests


5/19/20 03:25: 


White Blood Count 24.1*H, Red Blood Count 3.37L, Hemoglobin 9.7L, Hematocrit 

28.1L, Mean Corpuscular Volume 84, Mean Corpuscular Hemoglobin 28.8, Mean 

Corpuscular Hemoglobin Concent 34.4, Red Cell Distribution Width 14.0, Platelet 

Count 173, Mean Platelet Volume 5.8L, Neutrophils (%) (Auto) , Lymphocytes (%) (

Auto) , Monocytes (%) (Auto) , Eosinophils (%) (Auto) , Basophils (%) (Auto) , 

Neutrophils % (Manual) [Pending], Lymphocytes % (Manual) [Pending], Platelet 

Estimate [Pending], Platelet Morphology [Pending], Prothrombin Time 17.0H, 

Prothromb Time International Ratio 1.6H, Activated Partial Thromboplast Time 38H

, Sodium Level 140, Potassium Level 4.3, Chloride Level 109H, Carbon Dioxide 

Level 22, Anion Gap 9, Blood Urea Nitrogen 11, Creatinine 0.9, Estimat 

Glomerular Filtration Rate > 60, Glucose Level 123H, Lactic Acid Level 1.60, 

Calcium Level 7.1L, Total Bilirubin 0.4, Aspartate Amino Transf (AST/SGOT) 31, 

Alanine Aminotransferase (ALT/SGPT) 11L, Alkaline Phosphatase 48, Total Protein 

4.9L, Albumin 1.9L, Globulin 3.0, Albumin/Globulin Ratio 0.6L, Amylase Level 57

, Lipase 141


Height (Feet):  5


Height (Inches):  7.00


Weight (Pounds):  140


General Appearance:  alert


EENT:  normal ENT inspection


Neck:  supple


Cardiovascular:  tachycardia


Respiratory/Chest:  decreased breath sounds


Abdomen:  normal bowel sounds, non tender, soft


Extremities:  non-tender











Marc Nielsen MD May 19, 2020 10:10

## 2020-05-19 NOTE — NUR
NURSE NOTES:



pt is afebrile at this time with temp of 98.3. no complaints of pain nor SOB at this time. 
orally suctioned pt; noted small amount of thick secretions. will continue to monitor.

## 2020-05-19 NOTE — NUR
NURSE NOTES:

Repositioning done. Pt still refused CYNTHIA mattress. Denied pain. Dr. Fischer is present at 
the bedside and discussed about the progress with the pt. Will continue plan of care.

## 2020-05-19 NOTE — NUR
NURSE NOTES:

Spoke with Dr Gonzalez and SERA Fischer to reschedule chest CTA when pt is more medically 
stable- RAD tech notified.

## 2020-05-19 NOTE — NUR
NURSE NOTES:

Received report from JOSIAH Mac. Pt in bed awake and orientedx3. No c/o pain, Denied SOB. O2 
sating 100% on 4LPM via N/C. O2 decreased to 2LPM via N/C and noted sating still 100%. IV 
site in LFA 22G running with NS @100ml/hr patent and intact. IV in RAC 20G SL patent and 
intact. F/C intact and patent. ALLISON drainage dressing intact and patent. Noted serosanguineous 
drainage from ALLISON bag. Pt on NPO. Pt refused CYNTHIA mattress, noted mattress was deflated. Sinus 
tachy noted with  on monitor. Will continue plan of care.

## 2020-05-19 NOTE — NUR
NURSE NOTES:



Received pt from JOSIAH Mojica. pt is observed in bed, AO X3, pain medication given by previous 
nurse. pt is on 4 L O2 via NC, tolerating well, saturation: 98%. no s/sx of respiratory 
distress noted. cardiac monitor shows ST with HR of 114, no s/sx of acute cardiac distress 
noted. NGT noted in R nare, connected to low intermittent suction. pt is currently strict 
NPO. F/C is patent and intact,draining josh urine to gravity. ALLISON drain noted on left 
quadrant. RAC 20 G and LFA 22 G IV sites are patent and intact, asymptomatic. LFA 22 G IV 
site running NS at 100 cc/hr. bed in lowest position and locked, siderails up X3, call light 
within reach. will continue to monitor.

## 2020-05-19 NOTE — NUR
NURSE NOTES:



bed bath given. new gowns and linens applied. pt repositioned. no s/sx of pain noted at this 
time. will continue to monitor.

## 2020-05-19 NOTE — NUR
NURSE NOTES:

Dr. Fischer replied back for hourly urine output. No new orders received. Pt in bed 
resting comfortably. Dilaudid 1mg IVP given for LLQ pain 6/10.

## 2020-05-19 NOTE — NUR
NURSE NOTES:

Pt refused to repositioning and changing. Denied pain. IV sites intact and patent. Denied 
SOB. O2 sating 100% on room air. CYNTHIA mattress still deflated and pt still refused to 
inflate. Benefits and risks for CYNTHIA mattress explained the patient.

## 2020-05-19 NOTE — NUR
NURSE NOTES:

BSW restraints discontinued- pt is cooperative and verbalizes understanding when instructed 
not to pull out NGT.

## 2020-05-19 NOTE — NUR
NURSE NOTES:

Pt received from JOSIAH Moreland. Pt is s/p partial gastrectomy and small bowel resection. Pt 
awake in bed, able to follow simple commands, bilat pupils 3 mm PERRLA +. Pt denies pain at 
this time. Pt noted in ST to cardiac monitor. Bilat radial and dorsalis pedis pulses noted 
2+. Pt is afebrile at this time. Pt is mechanically intubated with 7.5 ETT noted 23 cm at 
lip with the following settings: AC 12  FiO2 40% Peep 5. All lung lobes are CTA. Right 
NGT connected to low intermittent suction with small amount of bright red drainage noted. 
Left Abdominal region is covered with large, dry and intact dressing- ALLISON drain noted with 
serosanguineous drainage. Pt has a tarango draining josh urine. Skin alterations noted- pt on 
CYNTHIA mattress and both heels are floating. RAC 20g IV noted saline locked and LFA 20g IV 
running NS at 100 cc/hr. Bed is in lowest position, alarm on, side rails up x 2, call light 
within reach. Will continue to monitor pt.   

-------------------------------------------------------------------------------

Addendum: 05/19/20 at 1131 by Estefania Chaparro RN

-------------------------------------------------------------------------------

Late entry: Too Moreland RN, Dr Cerna was made aware of WBC today (24.1) over the phone.

## 2020-05-19 NOTE — NUR
P.T Note:

Pt was transferred to ICU due to change in medical condition. Will need order for re consult 
when  medically appropriate. DC P.T at this time.

## 2020-05-19 NOTE — NUR
CASE MANAGEMENT: REVIEW





SI: DEHYDRATION . SYNCOPE . GI BLEED 

EGD w/ BIOPSY 

T 98.8  RR 21 /88 SAT 89% MECH VENT FIO2 40

WBC 24.1 H/H 9.7/28.1 





IS: NS IVF @ 100ML/HR

CEFEPIME IV Q8HR

FLAGYL IV Q8HR

MICAFUNGIN IV Q24HR

WEANING DETERMINATIONS 







***ICU STATUS***

DCP: PATIENT IS FROM HOME

## 2020-05-19 NOTE — NUR
NURSE NOTES:



all due meds given. pt appears to be sleeping, arousable to voice, denies pain and SOB. 
repositioned pt, tolerated well. will continue to monitor.

## 2020-05-19 NOTE — OPERATIVE NOTE - DICTATED
DATE OF OPERATION:  05/18/2020

PREOPERATIVE DIAGNOSIS:  Hemorrhagic potentially infected intra-abdominal

tumor/mass, potential hemorrhagic large gastric etiology presenting with a

hemoglobin of 3.5, in extremis.



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

tumor invading spleen, posterior stomach, pancreatic tail, invasive,

likely seemingly perforated.



OPERATIONS PERFORMED:

1. Exploratory laparotomy.

2. Mobilization of the splenic flexure.

3. Left colectomy with primary side-to-side anastomosis.

4. Partial gastrectomy.

5. Total splenectomy.

6. Distal pancreatectomy.

7. Omentectomy.

8. Open lysis of adhesions.



ATTENDING SURGEON:  Erik Fischer MD



ASSISTANT SURGEON:  Bladimir Garcia MD



ANESTHESIOLOGIST:  Chris Persaud MD



ANESTHESIA:  General GTA.



ESTIMATED BLOOD LOSS:  250 mL.



IV FLUIDS:  Please see anesthesia records.



COMPLICATIONS:  None.



DRAINS:  A 19-Argent[place] Ottoniel left in the left upper quadrant from pancreatic

tail and gastric resection site.



WOUND CONSULTATION:  Class III.



ANTIBIOTICS:  The patient is on scheduled antibiotics for acute active

infectious process.



IMPLANTS:  None.



INDICATIONS FOR PROCEDURE:  This is a 70-year-old male who presented to

[facility] Emergency Department in extremis, septic,

hemoglobin of 3.5, shortness of breath, and PE, who was admitted to the

intensive care unit and resuscitated with blood products, fluid, IVC

filter placed given PE, identified on CT scan to have a large necrotic

tumor of unknown etiology.  A 12.6 x 11.1 x 14 cm central necrotic mass

arising at that time believed to be from the gastric fundus.  After

resuscitation, I had a long discussion with the patient about the findings

and care plan.  Given large tumor, active bleeding, PE, status post filter

and resuscitation, the patient is a very difficult complex case and

recommendations for either evaluation of palliative care versus

nonoperative management versus operative management.  I discussed with

patient likely findings of a cancer, which is likely infected given his

clinical findings and hemorrhagic given his laboratory data and

evaluation.  Having a long discussion with the patient about all the

findings and care plan, the patient was very clear that his goal is to

live possible, but he does not have any expected date or time of how long

he would live for.  He stated he had a great life and wants to continue if

possible, but does not want to be in a nonfunctional state.  We discussed

code status and care plan.  A decision was made that the patient would

like to proceed with surgical intervention, life-saving measures,

understanding that there would be unlikely curative intent here, but

rather hemostasis and allowing him to eat and function again.  The patient

expressed understanding, stated that he was very interested and wanted to

proceed with surgical intervention, but did state that if he required

cardiac resuscitation, he only wanted medications, he does not want CPR in

the event of cardiac failure, and he also does not want to be ventilated

for a prolonged period of time as per his wishes, and he understands the

risks of potential death in the operating room, death postoperatively,

bleeding, infection, operative risks, drain placement, tube placement,

potential reoperation and states that he is comfortable and would like to

proceed and is very much interested in surgical intervention, but does not

want to be in a position where he is in a state that he cannot function.

Consent was obtained.  The patient was scheduled for 05/18/2020.



OPERATIVE NOTE:  The patient was taken to the operating room and placed on

the operating table in supine position with bilateral arms out.  All bony

prominences were well padded.  SCDs were placed.  Preoperative time-out

was taken to identify the patient procedure and operative surgical staff.

Anesthesia was induced.  The patient was intubated.  A Mata catheter was

inserted using standard sterile technique.  NG tube was placed.  The

abdomen was clipped, prepped and draped in standard surgical fashion.  A

detailed preoperative assessment was completed, and given all the

findings, evaluation of the imaging, and the patient's examination, a

decision was made to proceed with a left subcostal.  The left subcostal

incision was made using a fresh #10 scalpel and carried down through

subcutaneous tissue to the anterior rectus sheath.  Anterior rectus sheath

was incised and rectus muscle was identified.  The rectus muscle was

divided out and the posterior rectus sheath was incised and the peritoneal

cavity was entered without complication.  Immediately, upon entering

peritoneal cavity, a fairly large abnormal mass was identified.

Significant serous fluid was identified.  Gentle mobilization of the mass

was done by lysis of adhesions by dissecting the small bowel adhesions

from the mass as well as omentum from the mass.  Once this was completed,

the mass was mobile and it was clearly identified that this mass was

actually around the splenic flexure as the transverse colon was inserting

into the mass and the descending colon exiting from it.  The mass did have

a connection to the spleen and was invading the spleen as well as the

posterior mid body gastrium.  It was also identified to be around the

pancreatic tail.  At this time, a decision was made to proceed with

resection given hemorrhage not for surgical curetted cancer intense, but

rather for hemostasis and given the intraoperative appearance, obstructive

nature of this mass.  The gastrocolic ligament was identified and trans

colon were  from each other.  The omentum was dissected out and

omentectomy was performed.  Following this, the mass was clearly adherent

to the posterior aspect of the gastric body, and a decision was made to

proceed with evaluation of the transverse colon.  The proximal transverse

colon approximately 5 cm away from the area where the mass entered was

noted.  A window was made in the mesentery and the colon was divided.  The

splenic flexure was then mobilized including the mass, identified to have

the spleen attached to the mass.  After further splenic flexure

mobilization, a splenectomy was necessary as they were greatly adherent

and there was no possibility for splenic salvage.  The splenic hilum was

identified and the splenic artery and vein were clearly dissected out and

divided using #0 silk ties.  The splenic hilum was then divided with

Thunderbeat energy device after the artery and vein were cleared and the

short gastrics were divided.  The spleen and splenic flexure were then

mobilized together only, but then identified the distal pancreas in the

splenic hilum as gentle traction was presented.  This area was clearly

adherent as well and the distal pancreatectomy was performed using a #10

scalpel for a fresh clean pancreatic cut.  This portion was then mobilized

and hemostasis of the pancreatic tail and duct were obtained using 2-0

Prolene sutures.  Once hemostasis and duct closure was completed and

identified, care was taken to continue mobilization of the splenic flexure

including the spleen and distal pancreas as one specimen.  The tumor was

also densely adherent to an invading the gastrium body posteriorly, and

therefore an incision was made in the anterior aspect of the gastric body

and evaluated the extent of the gastric invasion and a partial gastrectomy

was performed using a linear stapler basically making a sleeve gastrectomy

to include the mass and the area of invasion.  Once this was completed,

the mass was brought out en bloc followed by dissection of the descending

colon after mobilizing the white line of Toldt on the left side.  A distal

aspect of the descending colon was identified and a window was made in the

mesentery and was divided using a linear stapler.  The mesentery of the

colon was then divided close to the base as possible from the proximal and

distal resection sites.  The specimen hole was then sent to pathology for

review after colon pathologist in the room and identifying all the margins

of the tumor and anatomic findings including the sub-pancreas, spleen,

gastrium, and colon.  At this time, the omentum was then sent to pathology

for review as well.  The wound bed was irrigated and suctioned clear.  The

linear partial gastrectomy was evaluated and staple line from the 100 mm

linear stapler green load was noted to be intact, hemostatic.  The GE

junction was identified and the pars flaccida was not entered.  There was

a tubular stomach that was fairly adequate in size with a patent lumen and

no active bleeding complication with good perfusion identified.  The

distal aspect of stomach, antrum, and pylorus were noted and stable as

well.  Following this, liver was evaluated including the left lobe than

the right lobe and no masses or abnormalities were identified.  The

gallbladder was otherwise normal as well.  The ligament of Treitz was

identified and the small bowel run from the ligament of Treitz down to the

ileocecal valve.  Of note, at the beginning of the case, small bowel was

adherent to the mass and open adhesiolysis was performed including gently

dissecting and gently taking down adhesions of small bowel.  These

portions of small bowels were evaluated and noted to be tumor free and

just dense adhesions without any damage to the small bowel.  The bowel

from ligament of Treitz to the ileocecal valve were otherwise normal.  The

ascending, cecum and transverse colon were evaluated, noted to be

otherwise healthy.  The pelvis was noted to be without complication.  The

sigmoid colon was floppy, but stable with stable diverticula.  The

descending colon portion remaining was stable with good perfusion.  At

this time, a decision made to reconnect the large intestine given

viability and appearance and clinical identification of healthy viable

bowel.  A decision was made not to leave a colostomy as primary

anastomosis would be viable with good probability.  The two stapled ends

were brought together, and an incision was made in both ends of the colon

and a linear stapler inserted and fired.  The remaining enteric defect was

closed using a linear stapler as well.  The patent anastomosis was

identified without complication.  A side-to-side stapled anastomosis

between the transverse colon and the distal descending colon were

identified and appropriate adequate without tension and good perfusion.

The 3-0 silk interrupted sutures were placed for an imbricating Lembert of

the staple line.  Following this, the right upper quadrant was irrigated

and suctioned clear.  Hemostasis was identified from the splenic hilum,

from the distal pancreatectomy, the partial gastrectomy staple line, the

colonic anastomosis and as well omentectomy.  Once irrigated and suctioned

clear and the abdomen was reinspected and noted to be otherwise without

complication, a decision was made to leave a drain.  Drain was left in the

right upper quadrant.  Following this, the bowel and organs were allowed

in the anatomic position, we began to conclude our procedure.  The fascia

was reapproximated using #0 PDS running suture in a two-layer fashion

followed by hemostasis of the subcutaneous tissue, irrigation, and closure

of the skin using surgical skin staples.  Dressing was applied.  The

patient tolerated the procedure well, was given 2 units of PRBCs

intraoperatively and taken to the intensive care unit, intubated

postoperatively until stabilized for extubation.









  ______________________________________________

  Erik Fischer M.D.





DR:  Nate

D:  05/19/2020 14:20

T:  05/19/2020 22:32

JOB#:  [id number]

CC:

## 2020-05-19 NOTE — NUR
NURSE NOTES:

Pt refused repositioning. Elevated BLE with pillows. No c/o pain at this time. Denied SOB 
with sating 98% on room air. Resting in bed.

## 2020-05-19 NOTE — 48 HOUR POST ANESTHESIA EVAL
Post Anesthesia Evaluation


Procedure:  Gastric Resection, Splenectomy


Date of Evaluation:  May 19, 2020


Time of Evaluation:  09:15


Blood Pressure Systolic:  118


0:  70


Pulse Rate:  122


Respiratory Rate:  12 - CPAP


Temperature (Fahrenheit):  98.5


O2 Sat by Pulse Oximetry:  100


Airway:  patent


Nausea:  No


Vomiting:  No


Pain Intensity:  3


Hydration Status:  adequate


Cardiopulmonary Status:


Stable


Mental Status/LOC:  patient returned to baseline


Follow-up Care/Observations:


0


Post-Anesthesia Complications:


0


Follow-up care needed:  N/A











Chris Persaud MD May 19, 2020 09:16

## 2020-05-19 NOTE — NUR
NURSE NOTES:



pt repositioned with pillow support; tolerated well. passive ROM exercises performed. will 
continue to monitor.

## 2020-05-19 NOTE — GENERAL PROGRESS NOTE
Assessment/Plan


Problem List:  


(1) Weak


ICD Codes:  R53.1 - Weakness


SNOMED:  36143577


(2) Malnutrition


ICD Codes:  E46 - Unspecified protein-calorie malnutrition


SNOMED:  73961432


(3) COVID-19 ruled out


ICD Codes:  Z03.818 - Encounter for observation for suspected exposure to other 

biological agents ruled out


SNOMED:  498348856, 419417727


(4) Pulmonary embolism


ICD Codes:  I26.99 - Other pulmonary embolism without acute cor pulmonale


SNOMED:  15697923


Qualifiers:  


   Qualified Codes:  I26.99 - Other pulmonary embolism without acute cor 

pulmonale


(5) GI bleed


ICD Codes:  K92.2 - Gastrointestinal hemorrhage, unspecified


SNOMED:  39991661


Qualifiers:  


   Qualified Codes:  K92.2 - Gastrointestinal hemorrhage, unspecified


(6) Gastric mass


ICD Codes:  K31.89 - Other diseases of stomach and duodenum


SNOMED:  734116042


(7) Syncope


ICD Codes:  R55 - Syncope and collapse


SNOMED:  625783528


Qualifiers:  


   Qualified Codes:  R55 - Syncope and collapse


Status:  unchanged


Assessment/Plan:


o2 pulm tx transfuse prn cbc bmp am





Subjective


Constitutional:  Reports: weakness


Allergies:  


Coded Allergies:  


     No Known Allergies (Unverified , 5/12/20)


All Systems:  reviewed and negative except above


Subjective


o2nc ng in icu s/p sx





Objective





Last 24 Hour Vital Signs








  Date Time  Temp Pulse Resp B/P (MAP) Pulse Ox O2 Delivery O2 Flow Rate FiO2


 


5/19/20 12:00      Mechanical Ventilator 4.0 





      Nasal Cannula  


 


5/19/20 12:00  104      


 


5/19/20 12:00 98.8 99 20 113/78 (90) 100   


 


5/19/20 11:00  98 21 117/75 (89) 100   


 


5/19/20 10:58       2.0 


 


5/19/20 10:00  105 17 115/74 (88) 100   


 


5/19/20 09:16  122 12  100   


 


5/19/20 09:00  111 19 111/79 (90) 100   


 


5/19/20 08:30      Nasal Cannula 4.0 36


 


5/19/20 08:30       4.0 


 


5/19/20 08:30     100  4.0 36


 


5/19/20 08:10  122 23     30





        30


 


5/19/20 08:00 98.8 117 21 129/88 (102) 89   


 


5/19/20 08:00        40


 


5/19/20 08:00  116      


 


5/19/20 08:00      Mechanical Ventilator  


 


5/19/20 07:00  115 20     40


 


5/19/20 07:00  113 22 118/76 (90) 100   


 


5/19/20 06:00  114 22 135/80 (98) 100   


 


5/19/20 05:00  107 15 111/79 (90) 100   


 


5/19/20 04:35  107 20     40


 


5/19/20 04:00  113      


 


5/19/20 04:00      Mechanical Ventilator  


 


5/19/20 04:00        40


 


5/19/20 04:00 98.5 109 14 106/81 (89) 100   


 


5/19/20 03:10  108 14     40


 


5/19/20 03:00  107 19 121/70 (87) 100   


 


5/19/20 02:00  108 19 110/83 (92) 100   


 


5/19/20 01:45  109 20 109/75 (86) 100   


 


5/19/20 01:00  109 19 102/76 (85) 100   


 


5/19/20 00:31  118 14     40


 


5/19/20 00:00  115      


 


5/19/20 00:00 98.9 112 20 115/71 (86) 100   


 


5/19/20 00:00        60


 


5/19/20 00:00      Mechanical Ventilator  


 


5/18/20 23:00  123 27 131/84 (100) 100   


 


5/18/20 22:41  117 24     60


 


5/18/20 22:00  119 23 128/91 (103) 100   


 


5/18/20 21:06  125 14     80


 


5/18/20 21:00  122 24 125/84 (98) 100   


 


5/18/20 20:00 99.2 131 30 133/91 (105) 100   


 


5/18/20 20:00      Mechanical Ventilator  


 


5/18/20 20:00        80


 


5/18/20 20:00  129      


 


5/18/20 19:59  129      


 


5/18/20 19:00  132 27 130/94 (106) 100   


 


5/18/20 18:55  130 15     100


 


5/18/20 18:00  130 30 135/92 (106) 100   


 


5/18/20 17:00  122 24 132/89 (103) 100   


 


5/18/20 16:20  105 16  100 Mechanical Ventilator  100


 


5/18/20 16:16  105 16     100


 


5/18/20 16:00  129      


 


5/18/20 16:00 97.5 106 20 126/78 (94) 100   


 


5/18/20 16:00        100


 


5/18/20 16:00      Mechanical Ventilator  


 


5/18/20 15:30        100


 


5/18/20 15:30 98.0 98 20 124/80 100 Mechanical Ventilator  100


 


5/18/20 15:14  98 22 122/81 100 Mechanical Ventilator  100


 


5/18/20 15:04  98 22 121/80 100 Mechanical Ventilator  100


 


5/18/20 14:54  98 20 117/82 100 Mechanical Ventilator  100


 


5/18/20 14:44        100


 


5/18/20 14:44  99 12  100   


 


5/18/20 13:49  96 18 112/85 100 Mechanical Ventilator  100


 


5/18/20 13:44 97.5 97 15 109/85 100 Mechanical Ventilator  100

















Intake and Output  


 


 5/18/20 5/19/20





 19:00 07:00


 


Intake Total 1700 ml 1331.667 ml


 


Output Total 910 ml 900 ml


 


Balance 790 ml 431.667 ml


 


  


 


IV Total 1700 ml 1331.667 ml


 


Output Urine Total 400 ml 600 ml


 


Gastric Drainage Total 20 ml 


 


Drainage Total 290 ml 300 ml


 


Estimated Blood Loss 200 ml 


 


# Bowel Movements 1 








Laboratory Tests


5/19/20 03:25: 


White Blood Count 24.1*H, Red Blood Count 3.37L, Hemoglobin 9.7L, Hematocrit 

28.1L, Mean Corpuscular Volume 84, Mean Corpuscular Hemoglobin 28.8, Mean 

Corpuscular Hemoglobin Concent 34.4, Red Cell Distribution Width 14.0, Platelet 

Count 173, Mean Platelet Volume 5.8L, Neutrophils (%) (Auto) , Lymphocytes (%) (

Auto) , Monocytes (%) (Auto) , Eosinophils (%) (Auto) , Basophils (%) (Auto) , 

Differential Total Cells Counted 100, Neutrophils % (Manual) 88H, Lymphocytes % 

(Manual) 7L, Monocytes % (Manual) 5, Eosinophils % (Manual) 0, Basophils % (

Manual) 0, Band Neutrophils 0, Platelet Estimate Adequate, Platelet Morphology 

Normal, Hypochromasia 2+, Anisocytosis 1+, Prothrombin Time 17.0H, Prothromb 

Time International Ratio 1.6H, Activated Partial Thromboplast Time 38H, Sodium 

Level 140, Potassium Level 4.3, Chloride Level 109H, Carbon Dioxide Level 22, 

Anion Gap 9, Blood Urea Nitrogen 11, Creatinine 0.9, Estimat Glomerular 

Filtration Rate > 60, Glucose Level 123H, Lactic Acid Level 1.60, Calcium Level 

7.1L, Total Bilirubin 0.4, Aspartate Amino Transf (AST/SGOT) 31, Alanine 

Aminotransferase (ALT/SGPT) 11L, Alkaline Phosphatase 48, Total Protein 4.9L, 

Albumin 1.9L, Globulin 3.0, Albumin/Globulin Ratio 0.6L, Amylase Level 57, 

Lipase 141


Height (Feet):  5


Height (Inches):  7.00


Weight (Pounds):  140


General Appearance:  lethargic


EENT:  normal ENT inspection


Neck:  normal alignment


Cardiovascular:  normal rate, regular rhythm


Respiratory/Chest:  no respiratory distress, no accessory muscle use


Extremities:  normal inspection


Skin:  normal pigmentation











Tom Pierce DO May 19, 2020 13:32

## 2020-05-19 NOTE — DIAGNOSTIC IMAGING REPORT
Procedure: XRAY Chest 1v

Reason for study: Reason For Exam: SOB

 

Comparison films:  5/12/2020.

 

FINDINGS:

 

Endotracheal tube and NG tube are in place. Vascularity is normal. There is left

retrocardiac consolidation along with left effusion. Cardiac and mediastinal

silhouette are within normal limits. Right CP angle is sharp. There is degenerative

changes of the shoulders.

 

IMPRESSION:  

 

Left retrocardiac consolidation and left pleural effusion.

 

Endotracheal tube and NG tube in good position.

## 2020-05-19 NOTE — NUR
SLP ORDERS RECEIVED AND ACKNOWLEDGED FOR BEDSIDE SWALLOW EVALUATION BY DR ALVARES.



UNABLE TO COMPLETE TODAY DUE TO SLP TIME CONSTRAINTS. SLP PLANS TO COMPLETE BEDSIDE SWALLOW 
EVALUATION TOMORROW (5/20/20). 



THANK YOU FOR THIS REFERRAL!

## 2020-05-19 NOTE — NUR
NURSE NOTES:



pt appears to be sleeping at this time. no s/sx of acute distress noted. will continue to 
monitor.

## 2020-05-19 NOTE — NUR
RD ASSESSMENT & RECOMMENDATIONS

SEE CARE ACTIVITY FOR COMPLETE ASSESSMENT



DAILY ESTIMATED NEEDS:

Needs based on Critical care, surgery 61kg 

25-30  kcals/kg 

9485-9148  total kcals

1.2-2  g protein/kg

  g total protein 

25-30  mL/kg

7915-1158  total fluid mLs



NUTRITION DIAGNOSIS:

1) Altered nutrition related lab values R/T sepsis, GIB, clinical

conditions as evidenced by critically elev wbc (27.4-> 21.6), low hgb

(3.5*-> 8.6), low K (3.4).

2) Altered GI function r/t gastric tumor as evidenced by s/p Procedure:

ex lap, left colectomy, partial gastrectomy, spleenectomy, distal

pancreatectomy, omentectomy, open lysis of adhesions, NPO, NGT to LIS.



CURRENT DIET:NPO     



PO DIET RECOMMENDATIONS:

per MD  





ADDITIONAL RECOMMENDATIONS:

* Calibrated bedscale wt for accurate CBW 

* Monitor GI fxn, diet initiation vs TPN 

* Maintain calibrated bed scale wts 

* Rec D5 while NPO  

.

## 2020-05-19 NOTE — CARDIAC ELECTROPHYSIOLOGY PN
Assessment/Plan


Assessment/Plan


1. Hypotension due to profound anemia due to gastric mass.  


S/P5 units of blood transfusion. Echocardiogram  showed EF NL





2. Profound anemia.  Hemoglobin of 3.5.  CT of abdomen and pelvis shows


central necrotic mass in the gastric fundus 12 x 14 x 11 cm.  


 FU  by GI and S/P surgery by Dr Fischer.  Echo Nl EF 





3. Pulmonary embolus.  S/P IVC filter.


4. Sepsis with elevated white count. Ruled out for Covid by 2 negative PCRs





DW RN and Dr Fischer





Subjective


Subjective


S/P surgery by Dr Fischer in ICU off pressors in NSR





Objective





Last 24 Hour Vital Signs








  Date Time  Temp Pulse Resp B/P (MAP) Pulse Ox O2 Delivery O2 Flow Rate FiO2


 


5/19/20 12:00      Mechanical Ventilator 4.0 





      Nasal Cannula  


 


5/19/20 12:00  104      


 


5/19/20 12:00 98.8 99 20 113/78 (90) 100   


 


5/19/20 11:00  98 21 117/75 (89) 100   


 


5/19/20 10:58       2.0 


 


5/19/20 10:00  105 17 115/74 (88) 100   


 


5/19/20 09:16  122 12  100   


 


5/19/20 09:00  111 19 111/79 (90) 100   


 


5/19/20 08:30      Nasal Cannula 4.0 36


 


5/19/20 08:30       4.0 


 


5/19/20 08:30     100  4.0 36


 


5/19/20 08:10  122 23     30





        30


 


5/19/20 08:00 98.8 117 21 129/88 (102) 89   


 


5/19/20 08:00        40


 


5/19/20 08:00  116      


 


5/19/20 08:00      Mechanical Ventilator  


 


5/19/20 07:00  115 20     40


 


5/19/20 07:00  113 22 118/76 (90) 100   


 


5/19/20 06:00  114 22 135/80 (98) 100   


 


5/19/20 05:00  107 15 111/79 (90) 100   


 


5/19/20 04:35  107 20     40


 


5/19/20 04:00  113      


 


5/19/20 04:00      Mechanical Ventilator  


 


5/19/20 04:00        40


 


5/19/20 04:00 98.5 109 14 106/81 (89) 100   


 


5/19/20 03:10  108 14     40


 


5/19/20 03:00  107 19 121/70 (87) 100   


 


5/19/20 02:00  108 19 110/83 (92) 100   


 


5/19/20 01:45  109 20 109/75 (86) 100   


 


5/19/20 01:00  109 19 102/76 (85) 100   


 


5/19/20 00:31  118 14     40


 


5/19/20 00:00  115      


 


5/19/20 00:00 98.9 112 20 115/71 (86) 100   


 


5/19/20 00:00        60


 


5/19/20 00:00      Mechanical Ventilator  


 


5/18/20 23:00  123 27 131/84 (100) 100   


 


5/18/20 22:41  117 24     60


 


5/18/20 22:00  119 23 128/91 (103) 100   


 


5/18/20 21:06  125 14     80


 


5/18/20 21:00  122 24 125/84 (98) 100   


 


5/18/20 20:00 99.2 131 30 133/91 (105) 100   


 


5/18/20 20:00      Mechanical Ventilator  


 


5/18/20 20:00        80


 


5/18/20 20:00  129      


 


5/18/20 19:59  129      


 


5/18/20 19:00  132 27 130/94 (106) 100   


 


5/18/20 18:55  130 15     100


 


5/18/20 18:00  130 30 135/92 (106) 100   


 


5/18/20 17:00  122 24 132/89 (103) 100   


 


5/18/20 16:20  105 16  100 Mechanical Ventilator  100


 


5/18/20 16:16  105 16     100


 


5/18/20 16:00  129      


 


5/18/20 16:00 97.5 106 20 126/78 (94) 100   


 


5/18/20 16:00        100


 


5/18/20 16:00      Mechanical Ventilator  


 


5/18/20 15:30        100


 


5/18/20 15:30 98.0 98 20 124/80 100 Mechanical Ventilator  100


 


5/18/20 15:14  98 22 122/81 100 Mechanical Ventilator  100


 


5/18/20 15:04  98 22 121/80 100 Mechanical Ventilator  100


 


5/18/20 14:54  98 20 117/82 100 Mechanical Ventilator  100


 


5/18/20 14:44        100


 


5/18/20 14:44  99 12  100   


 


5/18/20 13:49  96 18 112/85 100 Mechanical Ventilator  100


 


5/18/20 13:44 97.5 97 15 109/85 100 Mechanical Ventilator  100

















Intake and Output  


 


 5/18/20 5/19/20





 19:00 07:00


 


Intake Total 1700 ml 1331.667 ml


 


Output Total 910 ml 900 ml


 


Balance 790 ml 431.667 ml


 


  


 


IV Total 1700 ml 1331.667 ml


 


Output Urine Total 400 ml 600 ml


 


Gastric Drainage Total 20 ml 


 


Drainage Total 290 ml 300 ml


 


Estimated Blood Loss 200 ml 


 


# Bowel Movements 1 











Laboratory Tests








Test


  5/19/20


03:25


 


White Blood Count


  24.1 K/UL


(4.8-10.8)  *H


 


Red Blood Count


  3.37 M/UL


(4.70-6.10)  L


 


Hemoglobin


  9.7 G/DL


(14.2-18.0)  L


 


Hematocrit


  28.1 %


(42.0-52.0)  L


 


Mean Corpuscular Volume 84 FL (80-99)  


 


Mean Corpuscular Hemoglobin


  28.8 PG


(27.0-31.0)


 


Mean Corpuscular Hemoglobin


Concent 34.4 G/DL


(32.0-36.0)


 


Red Cell Distribution Width


  14.0 %


(11.6-14.8)


 


Platelet Count


  173 K/UL


(150-450)


 


Mean Platelet Volume


  5.8 FL


(6.5-10.1)  L


 


Neutrophils (%) (Auto)


  % (45.0-75.0)


 


 


Lymphocytes (%) (Auto)


  % (20.0-45.0)


 


 


Monocytes (%) (Auto)  % (1.0-10.0)  


 


Eosinophils (%) (Auto)  % (0.0-3.0)  


 


Basophils (%) (Auto)  % (0.0-2.0)  


 


Differential Total Cells


Counted 100  


 


 


Neutrophils % (Manual) 88 % (45-75)  H


 


Lymphocytes % (Manual) 7 % (20-45)  L


 


Monocytes % (Manual) 5 % (1-10)  


 


Eosinophils % (Manual) 0 % (0-3)  


 


Basophils % (Manual) 0 % (0-2)  


 


Band Neutrophils 0 % (0-8)  


 


Platelet Estimate Adequate  


 


Platelet Morphology Normal  


 


Hypochromasia 2+  


 


Anisocytosis 1+  


 


Prothrombin Time


  17.0 SEC


(9.30-11.50)  H


 


Prothromb Time International


Ratio 1.6 (0.9-1.1)


H


 


Activated Partial


Thromboplast Time 38 SEC (23-33)


H


 


Sodium Level


  140 MMOL/L


(136-145)


 


Potassium Level


  4.3 MMOL/L


(3.5-5.1)


 


Chloride Level


  109 MMOL/L


()  H


 


Carbon Dioxide Level


  22 MMOL/L


(21-32)


 


Anion Gap


  9 mmol/L


(5-15)


 


Blood Urea Nitrogen


  11 mg/dL


(7-18)


 


Creatinine


  0.9 MG/DL


(0.55-1.30)


 


Estimat Glomerular Filtration


Rate > 60 mL/min


(>60)


 


Glucose Level


  123 MG/DL


()  H


 


Lactic Acid Level


  1.60 mmol/L


(0.4-2.0)


 


Calcium Level


  7.1 MG/DL


(8.5-10.1)  L


 


Total Bilirubin


  0.4 MG/DL


(0.2-1.0)


 


Aspartate Amino Transf


(AST/SGOT) 31 U/L (15-37)


 


 


Alanine Aminotransferase


(ALT/SGPT) 11 U/L (12-78)


L


 


Alkaline Phosphatase


  48 U/L


()


 


Total Protein


  4.9 G/DL


(6.4-8.2)  L


 


Albumin


  1.9 G/DL


(3.4-5.0)  L


 


Globulin 3.0 g/dL  


 


Albumin/Globulin Ratio


  0.6 (1.0-2.7)


L


 


Amylase Level


  57 U/L


()


 


Lipase


  141 U/L


()








Objective


HEAD AND NECK:  No JVD.


LUNGS:  Decreased breath sounds.


CARDIOVASCULAR:  Regular S1 and S2.  Tachycardic.


ABDOMEN: Post op with ALLISON drain


EXTREMITIES:  No pitting edema.











Aaron Antunez MD May 19, 2020 13:03

## 2020-05-19 NOTE — NUR
NURSE NOTES:

Pt successfuly extubated with Dr Stiles at bedside. Pt was placed on spontaneous mode for 
approximately 15 minutes prior and was suctioned with spO2 noted %. Pt now placed on 
4L Nasal cannula- spO2 remains unchanged. No distress noted. HR 110s. Will continue to 
monitor. 

-------------------------------------------------------------------------------

Addendum: 05/19/20 at 1009 by Estefania Chaparro RN

-------------------------------------------------------------------------------

Late entry: RT Cathleen at bedside during extubation.

## 2020-05-20 VITALS — SYSTOLIC BLOOD PRESSURE: 120 MMHG | DIASTOLIC BLOOD PRESSURE: 76 MMHG

## 2020-05-20 VITALS — DIASTOLIC BLOOD PRESSURE: 69 MMHG | SYSTOLIC BLOOD PRESSURE: 122 MMHG

## 2020-05-20 VITALS — DIASTOLIC BLOOD PRESSURE: 67 MMHG | SYSTOLIC BLOOD PRESSURE: 109 MMHG

## 2020-05-20 VITALS — SYSTOLIC BLOOD PRESSURE: 100 MMHG | DIASTOLIC BLOOD PRESSURE: 75 MMHG

## 2020-05-20 VITALS — DIASTOLIC BLOOD PRESSURE: 72 MMHG | SYSTOLIC BLOOD PRESSURE: 112 MMHG

## 2020-05-20 VITALS — SYSTOLIC BLOOD PRESSURE: 108 MMHG | DIASTOLIC BLOOD PRESSURE: 71 MMHG

## 2020-05-20 VITALS — DIASTOLIC BLOOD PRESSURE: 66 MMHG | SYSTOLIC BLOOD PRESSURE: 112 MMHG

## 2020-05-20 VITALS — DIASTOLIC BLOOD PRESSURE: 77 MMHG | SYSTOLIC BLOOD PRESSURE: 111 MMHG

## 2020-05-20 VITALS — SYSTOLIC BLOOD PRESSURE: 116 MMHG | DIASTOLIC BLOOD PRESSURE: 69 MMHG

## 2020-05-20 VITALS — SYSTOLIC BLOOD PRESSURE: 110 MMHG | DIASTOLIC BLOOD PRESSURE: 70 MMHG

## 2020-05-20 VITALS — SYSTOLIC BLOOD PRESSURE: 115 MMHG | DIASTOLIC BLOOD PRESSURE: 73 MMHG

## 2020-05-20 VITALS — DIASTOLIC BLOOD PRESSURE: 70 MMHG | SYSTOLIC BLOOD PRESSURE: 110 MMHG

## 2020-05-20 VITALS — SYSTOLIC BLOOD PRESSURE: 117 MMHG | DIASTOLIC BLOOD PRESSURE: 72 MMHG

## 2020-05-20 VITALS — DIASTOLIC BLOOD PRESSURE: 67 MMHG | SYSTOLIC BLOOD PRESSURE: 107 MMHG

## 2020-05-20 VITALS — DIASTOLIC BLOOD PRESSURE: 68 MMHG | SYSTOLIC BLOOD PRESSURE: 110 MMHG

## 2020-05-20 VITALS — SYSTOLIC BLOOD PRESSURE: 111 MMHG | DIASTOLIC BLOOD PRESSURE: 70 MMHG

## 2020-05-20 VITALS — DIASTOLIC BLOOD PRESSURE: 68 MMHG | SYSTOLIC BLOOD PRESSURE: 109 MMHG

## 2020-05-20 VITALS — SYSTOLIC BLOOD PRESSURE: 109 MMHG | DIASTOLIC BLOOD PRESSURE: 71 MMHG

## 2020-05-20 VITALS — SYSTOLIC BLOOD PRESSURE: 115 MMHG | DIASTOLIC BLOOD PRESSURE: 72 MMHG

## 2020-05-20 VITALS — SYSTOLIC BLOOD PRESSURE: 122 MMHG | DIASTOLIC BLOOD PRESSURE: 69 MMHG

## 2020-05-20 LAB
ADD MANUAL DIFF: YES
ALBUMIN SERPL-MCNC: 1.4 G/DL (ref 3.4–5)
ALBUMIN/GLOB SERPL: 0.4 {RATIO} (ref 1–2.7)
ALP SERPL-CCNC: 52 U/L (ref 46–116)
ALT SERPL-CCNC: 16 U/L (ref 12–78)
ANION GAP SERPL CALC-SCNC: 9 MMOL/L (ref 5–15)
APPEARANCE UR: (no result)
APTT PPP: (no result) S
AST SERPL-CCNC: 22 U/L (ref 15–37)
BILIRUB SERPL-MCNC: 0.3 MG/DL (ref 0.2–1)
BUN SERPL-MCNC: 8 MG/DL (ref 7–18)
CALCIUM SERPL-MCNC: 6.9 MG/DL (ref 8.5–10.1)
CHLORIDE SERPL-SCNC: 111 MMOL/L (ref 98–107)
CO2 SERPL-SCNC: 21 MMOL/L (ref 21–32)
CREAT SERPL-MCNC: 0.6 MG/DL (ref 0.55–1.3)
ERYTHROCYTE [DISTWIDTH] IN BLOOD BY AUTOMATED COUNT: 14.1 % (ref 11.6–14.8)
GLOBULIN SER-MCNC: 3.2 G/DL
GLUCOSE UR STRIP-MCNC: NEGATIVE MG/DL
HCT VFR BLD CALC: 23.5 % (ref 42–52)
HGB BLD-MCNC: 8 G/DL (ref 14.2–18)
KETONES UR QL STRIP: (no result)
LEUKOCYTE ESTERASE UR QL STRIP: (no result)
MCV RBC AUTO: 84 FL (ref 80–99)
NITRITE UR QL STRIP: NEGATIVE
PH UR STRIP: 5 [PH] (ref 4.5–8)
PLATELET # BLD: 237 K/UL (ref 150–450)
POTASSIUM SERPL-SCNC: 3.7 MMOL/L (ref 3.5–5.1)
PROT UR QL STRIP: (no result)
RBC # BLD AUTO: 2.79 M/UL (ref 4.7–6.1)
SODIUM SERPL-SCNC: 141 MMOL/L (ref 136–145)
SP GR UR STRIP: 1.02 (ref 1–1.03)
UROBILINOGEN UR-MCNC: NORMAL MG/DL (ref 0–1)
WBC # BLD AUTO: 26.7 K/UL (ref 4.8–10.8)

## 2020-05-20 RX ADMIN — DEXTROSE MONOHYDRATE SCH MLS/HR: 50 INJECTION, SOLUTION INTRAVENOUS at 17:31

## 2020-05-20 RX ADMIN — PANTOPRAZOLE SODIUM SCH MG: 40 INJECTION, POWDER, FOR SOLUTION INTRAVENOUS at 10:02

## 2020-05-20 RX ADMIN — DIPHENHYDRAMINE HYDROCHLORIDE PRN MG: 50 INJECTION INTRAMUSCULAR; INTRAVENOUS at 14:23

## 2020-05-20 RX ADMIN — PANTOPRAZOLE SODIUM SCH MG: 40 INJECTION, POWDER, FOR SOLUTION INTRAVENOUS at 21:15

## 2020-05-20 RX ADMIN — SODIUM CHLORIDE PRN MG: 9 INJECTION, SOLUTION INTRAVENOUS at 00:01

## 2020-05-20 RX ADMIN — MEROPENEM SCH MLS/HR: 1 INJECTION INTRAVENOUS at 23:52

## 2020-05-20 RX ADMIN — SODIUM CHLORIDE SCH MLS/HR: 0.9 INJECTION INTRAVENOUS at 14:22

## 2020-05-20 RX ADMIN — DIPHENHYDRAMINE HYDROCHLORIDE PRN MG: 50 INJECTION INTRAMUSCULAR; INTRAVENOUS at 06:21

## 2020-05-20 RX ADMIN — DIPHENHYDRAMINE HYDROCHLORIDE PRN MG: 50 INJECTION INTRAMUSCULAR; INTRAVENOUS at 17:48

## 2020-05-20 RX ADMIN — SODIUM CHLORIDE SCH MLS/HR: 0.9 INJECTION INTRAVENOUS at 05:29

## 2020-05-20 NOTE — HEMATOLOGY/ONC PROGRESS NOTE
Assessment/Plan


Assessment/Plan


Assessment and Recs


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


--> CT shows    Centrally necrotic mass arising from the gastric fundus 

measuring 12. 6 x 11.1 x 14 cm.  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  

Gastric adenocarcinoma, lymphoma, and other neoplastic etiologies are also in 

the differential. Filling defects within pulmonary vessels at the lung bases 

with small peripheral airspace opacities concerning for pulmonary emboli and 

small pulmonary infarcts.


--> per gi and surgery to obtain a biopsy


--> ideally requires resection if possible with as much as possible negative 

margins for 5/18/20


--> adjuvant chemotherapy v pill (imatinib) based on type of cancer


# Pulmonary embolism -- likely related to hypercoagulable disorder from 

malignancys/p ivc filter


--> case dw pcp, surg, and pulm


--> is not a candidate for anticoagulation given severe anemia and low platelets


--> 5/13 s/p ivc filter placement


--> 5/19 cxr: Left retrocardiac consolidation and left pleural effusion.


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


--> have ordered for repeat prbc transfusions


--> hgb goal is >7


--> have ordered for anemia panel


--> gi to access in re to biopsy/endoscopy


--> IV IRON completed 


--> hgb trend: 8.6-->9.7-->8 


# Leukocytosis is likely reactive process from cancer


--> on abx as needed


--> smear is noted


--> meds are reviewed


--> vanc/zosyn-->cefepime/flagyl


--> wbc trend: 21.6-->18-->24.1-->26.7 


# Diarrhea


--> c diff negative 


# PABLO, improving


# HTN


# Dvt ppx scds





The timing of this note does not necessarily reflect the time of the patient 

was seen.





Greatly appreciate consultation.





Subjective


Allergies:  


Coded Allergies:  


     No Known Allergies (Unverified , 5/12/20)


Subjective


5/14 labs are noted, no bleeding, meds reviewed, for egd when iso off


5/15 icu, no overnight events, alert, iv iron, c diff negative 


5/17 on tele, wbc improving, nc, emelina, no distress 


5/18 for surgery this am, have ordered for ffp and vit k, seen by surg, 

anesthesia


5/19 icu, wbc 24.1, iv abx, afebrile, vent, no acute distress 


5/20 refusing scd's, labs reviewed, no sob, hr 105





Objective


Objective





Current Medications








 Medications


  (Trade)  Dose


 Ordered  Sig/Marisa


 Route


 PRN Reason  Start Time


 Stop Time Status Last Admin


Dose Admin


 


 Albuterol/


 Ipratropium


  (Albuterol/


 Ipratropium)  3 ml  Q4H  PRN


 HHN


 Shortness of Breath  5/16/20 14:15


 5/21/20 14:14   


 


 


 Cefepime HCl 1 gm/


 Dextrose  55 ml @ 


 110 mls/hr  Q8HR


 IVPB


   5/16/20 22:00


 5/23/20 21:59  5/20/20 05:29


 


 


 Dextrose


  (Dextrose 50%)  25 ml  Q30M  PRN


 IV


 Hypoglycemia  5/16/20 14:15


 8/11/20 06:14   


 


 


 Dextrose


  (Dextrose 50%)  50 ml  Q30M  PRN


 IV


 Hypoglycemia  5/16/20 14:15


 8/11/20 06:14   


 


 


 Diphenhydramine


 HCl


  (Benadryl)  12.5 mg  Q6H  PRN


 IVP


 Itching/Pruritis  5/18/20 17:00


 6/17/20 16:59  5/18/20 20:45


 


 


 Hydromorphone HCl


  (Dilaudid)  0.5 mg  Q3H  PRN


 IVP


 Pain Score 1-3  5/18/20 17:00


 5/25/20 16:59  5/20/20 00:01


 


 


 Hydromorphone HCl


  (Dilaudid)  1 mg  Q3H  PRN


 IVP


 pain score 4-6  5/18/20 17:00


 5/25/20 16:59  5/20/20 06:21


 


 


 Hydromorphone HCl


  (Dilaudid)  2 mg  Q3H  PRN


 IVP


 pain score 7-10  5/18/20 17:00


 5/25/20 16:59  5/18/20 23:02


 


 


 Iohexol


  (Omnipaque 350


 100ml)  100 ml  NOW  PRN


 INJ


 Radiology Procedure  5/19/20 15:00


 5/21/20 14:58   


 


 


 Metronidazole  100 ml @ 


 100 mls/hr  Q8HR


 IVPB


   5/16/20 22:00


 5/22/20 13:59  5/20/20 05:28


 


 


 Micafungin Sodium


 100 mg/Sodium


 Chloride  110 ml @ 


 110 mls/hr  Q24H


 IVPB


   5/19/20 18:00


 5/26/20 17:59  5/19/20 18:11


 


 


 Ondansetron HCl


  (Zofran)  4 mg  Q6H  PRN


 IVP


 Nausea & Vomiting  5/18/20 17:00


 6/17/20 16:59   


 


 


 Pantoprazole


  (Protonix)  40 mg  EVERY 12  HOURS


 IV


   5/16/20 21:00


 6/15/20 08:59  5/19/20 20:56


 


 


 Sodium Chloride  1,000 ml @ 


 100 mls/hr  Q10H


 IV


   5/18/20 19:00


 6/17/20 18:59  5/20/20 01:16


 











Last 24 Hour Vital Signs








  Date Time  Temp Pulse Resp B/P (MAP) Pulse Ox O2 Delivery O2 Flow Rate FiO2


 


5/20/20 07:00  105 17 110/70 (83) 98   


 


5/20/20 06:00  105 22 109/67 (81) 100   


 


5/20/20 05:00  107 20 120/76 (91) 99   


 


5/20/20 04:00  106      


 


5/20/20 04:00 99.2 108 24 111/77 (88) 99   


 


5/20/20 04:00      Room Air  





      Room Air  


 


5/20/20 03:00  109 24 115/72 (86) 100   


 


5/20/20 02:00  108 23 111/70 (84) 98   


 


5/20/20 01:00  112 22 112/72 (85) 98   


 


5/20/20 00:00  108      


 


5/20/20 00:00      Room Air  





      Room Air  


 


5/20/20 00:00 98.5 109 22 110/68 (82) 99   


 


5/19/20 23:00  111 16 119/65 (83) 98   


 


5/19/20 22:00  111 17 108/68 (81) 98   


 


5/19/20 21:00  113 23 117/65 (82) 100   


 


5/19/20 20:00  105      


 


5/19/20 20:00      Room Air  





      Room Air  


 


5/19/20 20:00 98.3 109 15 110/66 (81) 97   


 


5/19/20 19:00  103 22 112/68 (83) 100   


 


5/19/20 18:00  103 21 117/69 (85) 100   


 


5/19/20 17:00  103 15 108/72 (84) 100   


 


5/19/20 16:00 98.7 106 15 113/73 (86) 100   


 


5/19/20 16:00      Room Air  





      Room Air  


 


5/19/20 16:00  107      


 


5/19/20 15:00  106 18 102/72 (82) 98   


 


5/19/20 14:00  99 24 114/79 (91) 100   


 


5/19/20 13:00  99 20 111/77 (88) 100   


 


5/19/20 12:00      Room Air 2.0 





      Room Air  


 


5/19/20 12:00  104      


 


5/19/20 12:00 98.8 99 20 113/78 (90) 100   


 


5/19/20 11:00  98 21 117/75 (89) 100   


 


5/19/20 10:58       2.0 


 


5/19/20 10:00  105 17 115/74 (88) 100   


 


5/19/20 09:16  122 12  100   


 


5/19/20 09:00  111 19 111/79 (90) 100   


 


5/19/20 08:30      Nasal Cannula 4.0 36


 


5/19/20 08:30       4.0 


 


5/19/20 08:30     100  4.0 36


 


5/19/20 08:10  122 23     30





        30


 


5/19/20 08:00 98.8 117 21 129/88 (102) 89   


 


5/19/20 08:00        40


 


5/19/20 08:00  116      


 


5/19/20 08:00      Mechanical Ventilator  


 


5/19/20 07:00  115 20     40


 


5/19/20 07:00  113 22 118/76 (90) 100   


 


5/19/20 06:00  114 22 135/80 (98) 100   


 


5/19/20 05:00  107 15 111/79 (90) 100   


 


5/19/20 04:35  107 20     40


 


5/19/20 04:00  113      


 


5/19/20 04:00      Mechanical Ventilator  


 


5/19/20 04:00        40


 


5/19/20 04:00 98.5 109 14 106/81 (89) 100   


 


5/19/20 03:10  108 14     40


 


5/19/20 03:00  107 19 121/70 (87) 100   


 


5/19/20 02:00  108 19 110/83 (92) 100   


 


5/19/20 01:45  109 20 109/75 (86) 100   


 


5/19/20 01:00  109 19 102/76 (85) 100   


 


5/19/20 00:31  118 14     40


 


5/19/20 00:00  115      


 


5/19/20 00:00 98.9 112 20 115/71 (86) 100   


 


5/19/20 00:00        60


 


5/19/20 00:00      Mechanical Ventilator  


 


5/18/20 23:00  123 27 131/84 (100) 100   


 


5/18/20 22:41  117 24     60


 


5/18/20 22:00  119 23 128/91 (103) 100   


 


5/18/20 21:06  125 14     80


 


5/18/20 21:00  122 24 125/84 (98) 100   


 


5/18/20 20:00 99.2 131 30 133/91 (105) 100   


 


5/18/20 20:00      Mechanical Ventilator  


 


5/18/20 20:00        80


 


5/18/20 20:00  129      


 


5/18/20 19:59  129      


 


5/18/20 19:00  132 27 130/94 (106) 100   


 


5/18/20 18:55  130 15     100


 


5/18/20 18:00  130 30 135/92 (106) 100   


 


5/18/20 17:00  122 24 132/89 (103) 100   


 


5/18/20 16:20  105 16  100 Mechanical Ventilator  100


 


5/18/20 16:16  105 16     100


 


5/18/20 16:00  129      


 


5/18/20 16:00 97.5 106 20 126/78 (94) 100   


 


5/18/20 16:00        100


 


5/18/20 16:00      Mechanical Ventilator  


 


5/18/20 15:30        100


 


5/18/20 15:30 98.0 98 20 124/80 100 Mechanical Ventilator  100


 


5/18/20 15:14  98 22 122/81 100 Mechanical Ventilator  100


 


5/18/20 15:04  98 22 121/80 100 Mechanical Ventilator  100


 


5/18/20 14:54  98 20 117/82 100 Mechanical Ventilator  100


 


5/18/20 14:44        100


 


5/18/20 14:44  99 12  100   


 


5/18/20 13:49  96 18 112/85 100 Mechanical Ventilator  100


 


5/18/20 13:44 97.5 97 15 109/85 100 Mechanical Ventilator  100

















Intake and Output  


 


 5/19/20 5/20/20





 19:00 07:00


 


Intake Total 1465 ml 1483.333 ml


 


Output Total 1120 ml 1680 ml


 


Balance 345 ml -196.667 ml


 


  


 


IV Total 1465 ml 1483.333 ml


 


Output Urine Total 670 ml 1510 ml


 


Gastric Drainage Total 330 ml 50 ml


 


Drainage Total 120 ml 120 ml











Labs








Test


  5/18/20


06:07 5/19/20


03:25 5/20/20


04:10


 


White Blood Count


  20.1 K/UL


(4.8-10.8) 24.1 K/UL


(4.8-10.8) 26.7 K/UL


(4.8-10.8)


 


Red Blood Count


  3.52 M/UL


(4.70-6.10) 3.37 M/UL


(4.70-6.10) 2.79 M/UL


(4.70-6.10)


 


Hemoglobin


  10.0 G/DL


(14.2-18.0) 9.7 G/DL


(14.2-18.0) 8.0 G/DL


(14.2-18.0)


 


Hematocrit


  29.7 %


(42.0-52.0) 28.1 %


(42.0-52.0) 23.5 %


(42.0-52.0)


 


Mean Corpuscular Volume 84 FL (80-99)  84 FL (80-99)  84 FL (80-99) 


 


Mean Corpuscular Hemoglobin


  28.5 PG


(27.0-31.0) 28.8 PG


(27.0-31.0) 28.7 PG


(27.0-31.0)


 


Mean Corpuscular Hemoglobin


Concent 33.7 G/DL


(32.0-36.0) 34.4 G/DL


(32.0-36.0) 34.1 G/DL


(32.0-36.0)


 


Red Cell Distribution Width


  15.2 %


(11.6-14.8) 14.0 %


(11.6-14.8) 14.1 %


(11.6-14.8)


 


Platelet Count


  119 K/UL


(150-450) 173 K/UL


(150-450) 237 K/UL


(150-450)


 


Mean Platelet Volume


  5.7 FL


(6.5-10.1) 5.8 FL


(6.5-10.1) 5.0 FL


(6.5-10.1)


 


Neutrophils (%) (Auto)  % (45.0-75.0)   % (45.0-75.0)   % (45.0-75.0) 


 


Lymphocytes (%) (Auto)  % (20.0-45.0)   % (20.0-45.0)   % (20.0-45.0) 


 


Monocytes (%) (Auto)  % (1.0-10.0)   % (1.0-10.0)   % (1.0-10.0) 


 


Eosinophils (%) (Auto)  % (0.0-3.0)   % (0.0-3.0)   % (0.0-3.0) 


 


Basophils (%) (Auto)  % (0.0-2.0)   % (0.0-2.0)   % (0.0-2.0) 


 


Differential Total Cells


Counted 100 


  100 


  


 


 


Neutrophils % (Manual) 86 % (45-75)  88 % (45-75)  


 


Lymphocytes % (Manual) 11 % (20-45)  7 % (20-45)  


 


Monocytes % (Manual) 3 % (1-10)  5 % (1-10)  


 


Eosinophils % (Manual) 0 % (0-3)  0 % (0-3)  


 


Basophils % (Manual) 0 % (0-2)  0 % (0-2)  


 


Band Neutrophils 0 % (0-8)  0 % (0-8)  


 


Platelet Estimate Decreased  Adequate  


 


Platelet Morphology Normal  Normal  


 


Hypochromasia 1+  2+  


 


Anisocytosis 1+  1+  


 


Erythrocyte Sedimentation Rate


  29 MM/HR


(0-20) 


  


 


 


Prothrombin Time


  16.8 SEC


(9.30-11.50) 17.0 SEC


(9.30-11.50) 


 


 


Prothromb Time International


Ratio 1.6 (0.9-1.1) 


  1.6 (0.9-1.1) 


  


 


 


Activated Partial


Thromboplast Time 35 SEC (23-33) 


  38 SEC (23-33) 


  


 


 


Sodium Level


  139 MMOL/L


(136-145) 140 MMOL/L


(136-145) 141 MMOL/L


(136-145)


 


Potassium Level


  3.7 MMOL/L


(3.5-5.1) 4.3 MMOL/L


(3.5-5.1) 3.7 MMOL/L


(3.5-5.1)


 


Chloride Level


  108 MMOL/L


() 109 MMOL/L


() 111 MMOL/L


()


 


Carbon Dioxide Level


  22 MMOL/L


(21-32) 22 MMOL/L


(21-32) 21 MMOL/L


(21-32)


 


Anion Gap


  9 mmol/L


(5-15) 9 mmol/L


(5-15) 9 mmol/L


(5-15)


 


Blood Urea Nitrogen


  5 mg/dL (7-18) 


  11 mg/dL


(7-18) 8 mg/dL (7-18) 


 


 


Creatinine


  0.8 MG/DL


(0.55-1.30) 0.9 MG/DL


(0.55-1.30) 0.6 MG/DL


(0.55-1.30)


 


Estimat Glomerular Filtration


Rate > 60 mL/min


(>60) > 60 mL/min


(>60) > 60 mL/min


(>60)


 


Glucose Level


  95 MG/DL


() 123 MG/DL


() 93 MG/DL


()


 


Lactic Acid Level


  1.60 mmol/L


(0.4-2.0) 1.60 mmol/L


(0.4-2.0) 


 


 


Calcium Level


  7.1 MG/DL


(8.5-10.1) 7.1 MG/DL


(8.5-10.1) 6.9 MG/DL


(8.5-10.1)


 


Total Bilirubin


  0.2 MG/DL


(0.2-1.0) 0.4 MG/DL


(0.2-1.0) 0.3 MG/DL


(0.2-1.0)


 


Aspartate Amino Transf


(AST/SGOT) 42 U/L (15-37) 


  31 U/L (15-37) 


  22 U/L (15-37) 


 


 


Alanine Aminotransferase


(ALT/SGPT) 9 U/L (12-78) 


  11 U/L (12-78) 


  16 U/L (12-78) 


 


 


Alkaline Phosphatase


  66 U/L


() 48 U/L


() 52 U/L


()


 


C-Reactive Protein,


Quantitative 13.0 mg/dL


(0.00-0.90) 


  


 


 


Total Protein


  5.3 G/DL


(6.4-8.2) 4.9 G/DL


(6.4-8.2) 4.6 G/DL


(6.4-8.2)


 


Albumin


  1.2 G/DL


(3.4-5.0) 1.9 G/DL


(3.4-5.0) 1.4 G/DL


(3.4-5.0)


 


Globulin 4.1 g/dL  3.0 g/dL  3.2 g/dL 


 


Albumin/Globulin Ratio 0.3 (1.0-2.7)  0.6 (1.0-2.7)  0.4 (1.0-2.7) 


 


Amylase Level


  63 U/L


() 57 U/L


() 


 


 


Lipase


  218 U/L


() 141 U/L


() 


 








Height (Feet):  5


Height (Inches):  7.00


Weight (Pounds):  146


Objective








PE


General Appearance:  alert, mild distress


Head:  normocephalic, atraumatic


Eyes:  bilateral eye PERRL, bilateral eye EOMI


ENT:  uvula midline, dry mucus membranes


Neck:  supple, thyroid normal, supple/symm/no masses


Respiratory:  lungs clear, no respiratory distress, no retraction


Cardiovascular:  normal peripheral pulses, no edema


Gastrointestinal:  non tender, soft, no guarding, no rebound


Musculoskeletal:  normal inspection


Neurologic:  alert, oriented x3


Psychiatric:  mood/affect normal


Skin:  no rash, warm/dry











Jersey Ross MD May 20, 2020 09:05

## 2020-05-20 NOTE — GENERAL PROGRESS NOTE
Assessment/Plan


Problem List:  


(1) Weak


ICD Codes:  R53.1 - Weakness


SNOMED:  82055868


(2) Malnutrition


ICD Codes:  E46 - Unspecified protein-calorie malnutrition


SNOMED:  66213248


(3) COVID-19 ruled out


ICD Codes:  Z03.818 - Encounter for observation for suspected exposure to other 

biological agents ruled out


SNOMED:  979491028, 444160656


(4) Pulmonary embolism


ICD Codes:  I26.99 - Other pulmonary embolism without acute cor pulmonale


SNOMED:  37544470


Qualifiers:  


   Qualified Codes:  I26.99 - Other pulmonary embolism without acute cor 

pulmonale


(5) GI bleed


ICD Codes:  K92.2 - Gastrointestinal hemorrhage, unspecified


SNOMED:  61719842


Qualifiers:  


   Qualified Codes:  K92.2 - Gastrointestinal hemorrhage, unspecified


(6) Gastric mass


ICD Codes:  K31.89 - Other diseases of stomach and duodenum


SNOMED:  108302348


(7) Syncope


ICD Codes:  R55 - Syncope and collapse


SNOMED:  116971729


Qualifiers:  


   Qualified Codes:  R55 - Syncope and collapse


Status:  unchanged


Assessment/Plan:


o2 pulm tx transfuse prn cbc bmp am ltach eval, aru eval





Subjective


Constitutional:  Reports: weakness


Allergies:  


Coded Allergies:  


     No Known Allergies (Unverified , 5/12/20)


All Systems:  reviewed and negative except above


Subjective


o2nc ng in icu s/p sx





Objective





Last 24 Hour Vital Signs








  Date Time  Temp Pulse Resp B/P (MAP) Pulse Ox O2 Delivery O2 Flow Rate FiO2


 


5/20/20 07:00  105 17 110/70 (83) 98   


 


5/20/20 06:00  105 22 109/67 (81) 100   


 


5/20/20 05:00  107 20 120/76 (91) 99   


 


5/20/20 04:00  106      


 


5/20/20 04:00 99.2 108 24 111/77 (88) 99   


 


5/20/20 04:00      Room Air  





      Room Air  


 


5/20/20 03:00  109 24 115/72 (86) 100   


 


5/20/20 02:00  108 23 111/70 (84) 98   


 


5/20/20 01:00  112 22 112/72 (85) 98   


 


5/20/20 00:00  108      


 


5/20/20 00:00      Room Air  





      Room Air  


 


5/20/20 00:00 98.5 109 22 110/68 (82) 99   


 


5/19/20 23:00  111 16 119/65 (83) 98   


 


5/19/20 22:00  111 17 108/68 (81) 98   


 


5/19/20 21:00  113 23 117/65 (82) 100   


 


5/19/20 20:00  105      


 


5/19/20 20:00      Room Air  





      Room Air  


 


5/19/20 20:00 98.3 109 15 110/66 (81) 97   


 


5/19/20 19:00  103 22 112/68 (83) 100   


 


5/19/20 18:00  103 21 117/69 (85) 100   


 


5/19/20 17:00  103 15 108/72 (84) 100   


 


5/19/20 16:00 98.7 106 15 113/73 (86) 100   


 


5/19/20 16:00      Room Air  





      Room Air  


 


5/19/20 16:00  107      


 


5/19/20 15:00  106 18 102/72 (82) 98   


 


5/19/20 14:00  99 24 114/79 (91) 100   


 


5/19/20 13:00  99 20 111/77 (88) 100   


 


5/19/20 12:00      Room Air 2.0 





      Room Air  


 


5/19/20 12:00  104      


 


5/19/20 12:00 98.8 99 20 113/78 (90) 100   


 


5/19/20 11:00  98 21 117/75 (89) 100   


 


5/19/20 10:58       2.0 


 


5/19/20 10:00  105 17 115/74 (88) 100   


 


5/19/20 09:16  122 12  100   


 


5/19/20 09:00  111 19 111/79 (90) 100   

















Intake and Output  


 


 5/19/20 5/20/20





 19:00 07:00


 


Intake Total 1465 ml 1483.333 ml


 


Output Total 1120 ml 1680 ml


 


Balance 345 ml -196.667 ml


 


  


 


IV Total 1465 ml 1483.333 ml


 


Output Urine Total 670 ml 1510 ml


 


Gastric Drainage Total 330 ml 50 ml


 


Drainage Total 120 ml 120 ml








Laboratory Tests


5/20/20 04:10: 


White Blood Count 26.7*H, Red Blood Count 2.79L, Hemoglobin 8.0L, Hematocrit 

23.5L, Mean Corpuscular Volume 84, Mean Corpuscular Hemoglobin 28.7, Mean 

Corpuscular Hemoglobin Concent 34.1, Red Cell Distribution Width 14.1, Platelet 

Count 237, Mean Platelet Volume 5.0L, Neutrophils (%) (Auto) , Lymphocytes (%) (

Auto) , Monocytes (%) (Auto) , Eosinophils (%) (Auto) , Basophils (%) (Auto) , 

Neutrophils % (Manual) [Pending], Lymphocytes % (Manual) [Pending], Platelet 

Estimate [Pending], Platelet Morphology [Pending], Sodium Level 141, Potassium 

Level 3.7, Chloride Level 111H, Carbon Dioxide Level 21, Anion Gap 9, Blood 

Urea Nitrogen 8, Creatinine 0.6, Estimat Glomerular Filtration Rate > 60, 

Glucose Level 93, Calcium Level 6.9L, Total Bilirubin 0.3, Aspartate Amino 

Transf (AST/SGOT) 22, Alanine Aminotransferase (ALT/SGPT) 16, Alkaline 

Phosphatase 52, Total Protein 4.6L, Albumin 1.4L, Globulin 3.2, Albumin/

Globulin Ratio 0.4L


Height (Feet):  5


Height (Inches):  7.00


Weight (Pounds):  146


General Appearance:  lethargic


EENT:  normal ENT inspection


Neck:  normal alignment


Cardiovascular:  normal peripheral pulses, normal rate, regular rhythm


Respiratory/Chest:  chest wall non-tender, lungs clear, normal breath sounds


Abdomen:  soft, hypoactive bowel sounds


Extremities:  normal inspection











Tom Pierce DO May 20, 2020 08:33

## 2020-05-20 NOTE — INFECTIOUS DISEASES PROG NOTE
Assessment/Plan


Assessment/Plan








Assessment:


Severe Sepsis


Enterobacter bacteremia- likely from GI source


S. epi bacteremia, contaminant


GGO on lung bases- COVID19 neg x2


   -5/19 CXR:  Left retrocardiac consolidation and left pleural effusion.


  -5/15 SARS-COV2 PCR neg


  -5/12 Bcx 1/4 S.epi, 2/4 E. aerogenes (pan S); 5/13 Bcx Neg


            CXR: no acute disease


            SARS-COV2 PCR





Afebrile


Leukocytosis; persistent- increasing post-op








Severe anemia (blood loss)/GIB likely 2ry to gastric mass


Thrombocytopenia, worsening


  -5/18 SP ex lap, left colectomy, partial gastrectomy, splenectomy, distal 

pancreatectomy, omentectomy , open lysis of adhesions


          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 

invading spleen, posterior stomach, pancreatic tail, invasive, likely seemingly 

perforated.





  -CT abd/p:   Centrally necrotic mass arising from the gastric fundus 

measuring 12. 6 x 11.1 x 14 cm.  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  

Gastric adenocarcinoma, lymphoma, and other neoplastic etiologies are also in 

the differential. Filling defects within pulmonary vessels at the lung bases 

with small peripheral airspace opacities concerning for pulmonary emboli and 

small pulmonary infarcts.








Mild AST elevation





Lactic acidosis, SP


   -5/12 u/a neg; ucx 30-40K mixed urogenital contaminants





Diarrhea


    -Cdiff,stool cx neg





Syncopal episode- likely due to severe dehydration and anemia


  -CT head: No acute findings in the head/brain.





Acute PE/DVT -(per CT abd/p findings)


  -sp IVC filter placement 5/13 (retrievable- given presence of bacteremia)





PABLO, SP





HTN





Plan:


  


- Switch empiric Cefepime #6(abx d #9) and Flagyl #6 (abx d #9) to Meropenem 

and add PO Zyvox given worsening WBC


-Contmpiric Micafungin #2 for intraabdominal coverage


      - 5/15/20 SP Zosyn #4 and Vancomycin #4





-f/u cx


-Monitor CBC/CMP, temperature


-ICU care/aspiration precautions


-COVID19 neg x2


-f/u repeat Bcx


-Sx,pulm, Heme/onc, GI f/u


-wound care per surgical team


-u/a w/ reflex, Bcx x2





Thank you for consulting [facility]. Will continue to follow along wiht 

you.





Discussed with RN.





Subjective


Allergies:  


Coded Allergies:  


     No Known Allergies (Unverified , 5/12/20)


Subjective


afebrile


at RA


wbc increasing





Objective


Vital Signs





Last 24 Hour Vital Signs








  Date Time  Temp Pulse Resp B/P (MAP) Pulse Ox O2 Delivery O2 Flow Rate FiO2


 


5/20/20 14:00  93 24 110/70 (83) 100   


 


5/20/20 13:00  94 24 107/67 (80) 100   


 


5/20/20 12:00  98      


 


5/20/20 12:00      Room Air  





      Room Air  


 


5/20/20 12:00 99.5 101 25 112/66 (81) 100   


 


5/20/20 11:30      Room Air  





      Room Air  


 


5/20/20 11:00  99 24 122/69 (86) 100   


 


5/20/20 10:00  102 23 122/69 (86) 100   


 


5/20/20 09:00  105 23 109/71 (84) 99   


 


5/20/20 08:00      Room Air  





      Room Air  


 


5/20/20 08:00  101      


 


5/20/20 08:00 99.7 103 19 100/75 (83) 99   


 


5/20/20 07:00  105 17 110/70 (83) 98   


 


5/20/20 06:00  105 22 109/67 (81) 100   


 


5/20/20 05:00  107 20 120/76 (91) 99   


 


5/20/20 04:00  106      


 


5/20/20 04:00 99.2 108 24 111/77 (88) 99   


 


5/20/20 04:00      Room Air  





      Room Air  


 


5/20/20 03:00  109 24 115/72 (86) 100   


 


5/20/20 02:00  108 23 111/70 (84) 98   


 


5/20/20 01:00  112 22 112/72 (85) 98   


 


5/20/20 00:00  108      


 


5/20/20 00:00      Room Air  





      Room Air  


 


5/20/20 00:00 98.5 109 22 110/68 (82) 99   


 


5/19/20 23:00  111 16 119/65 (83) 98   


 


5/19/20 22:00  111 17 108/68 (81) 98   


 


5/19/20 21:00  113 23 117/65 (82) 100   


 


5/19/20 20:00  105      


 


5/19/20 20:00      Room Air  





      Room Air  


 


5/19/20 20:00 98.3 109 15 110/66 (81) 97   


 


5/19/20 19:00  103 22 112/68 (83) 100   


 


5/19/20 18:00  103 21 117/69 (85) 100   


 


5/19/20 17:00  103 15 108/72 (84) 100   


 


5/19/20 16:00 98.7 106 15 113/73 (86) 100   


 


5/19/20 16:00      Room Air  





      Room Air  


 


5/19/20 16:00  107      


 


5/19/20 15:00  106 18 102/72 (82) 98   








Height (Feet):  5


Height (Inches):  7.00


Weight (Pounds):  146





Laboratory Tests








Test


  5/20/20


04:10


 


White Blood Count


  26.7 K/UL


(4.8-10.8)  *H


 


Red Blood Count


  2.79 M/UL


(4.70-6.10)  L


 


Hemoglobin


  8.0 G/DL


(14.2-18.0)  L


 


Hematocrit


  23.5 %


(42.0-52.0)  L


 


Mean Corpuscular Volume 84 FL (80-99)  


 


Mean Corpuscular Hemoglobin


  28.7 PG


(27.0-31.0)


 


Mean Corpuscular Hemoglobin


Concent 34.1 G/DL


(32.0-36.0)


 


Red Cell Distribution Width


  14.1 %


(11.6-14.8)


 


Platelet Count


  237 K/UL


(150-450)


 


Mean Platelet Volume


  5.0 FL


(6.5-10.1)  L


 


Neutrophils (%) (Auto)


  % (45.0-75.0)


 


 


Lymphocytes (%) (Auto)


  % (20.0-45.0)


 


 


Monocytes (%) (Auto)  % (1.0-10.0)  


 


Eosinophils (%) (Auto)  % (0.0-3.0)  


 


Basophils (%) (Auto)  % (0.0-2.0)  


 


Differential Total Cells


Counted 100  


 


 


Neutrophils % (Manual) 89 % (45-75)  H


 


Lymphocytes % (Manual) 10 % (20-45)  L


 


Monocytes % (Manual) 1 % (1-10)  


 


Eosinophils % (Manual) 0 % (0-3)  


 


Basophils % (Manual) 0 % (0-2)  


 


Band Neutrophils 0 % (0-8)  


 


Platelet Estimate Adequate  


 


Platelet Morphology Normal  


 


Polychromasia 1+  


 


Hypochromasia 1+  


 


Anisocytosis 1+  


 


Sodium Level


  141 MMOL/L


(136-145)


 


Potassium Level


  3.7 MMOL/L


(3.5-5.1)


 


Chloride Level


  111 MMOL/L


()  H


 


Carbon Dioxide Level


  21 MMOL/L


(21-32)


 


Anion Gap


  9 mmol/L


(5-15)


 


Blood Urea Nitrogen


  8 mg/dL (7-18)


 


 


Creatinine


  0.6 MG/DL


(0.55-1.30)


 


Estimat Glomerular Filtration


Rate > 60 mL/min


(>60)


 


Glucose Level


  93 MG/DL


()


 


Calcium Level


  6.9 MG/DL


(8.5-10.1)  L


 


Total Bilirubin


  0.3 MG/DL


(0.2-1.0)


 


Aspartate Amino Transf


(AST/SGOT) 22 U/L (15-37)


 


 


Alanine Aminotransferase


(ALT/SGPT) 16 U/L (12-78)


 


 


Alkaline Phosphatase


  52 U/L


()


 


Total Protein


  4.6 G/DL


(6.4-8.2)  L


 


Albumin


  1.4 G/DL


(3.4-5.0)  L


 


Globulin 3.2 g/dL  


 


Albumin/Globulin Ratio


  0.4 (1.0-2.7)


L











Current Medications








 Medications


  (Trade)  Dose


 Ordered  Sig/Marisa


 Route


 PRN Reason  Start Time


 Stop Time Status Last Admin


Dose Admin


 


 Albuterol/


 Ipratropium


  (Albuterol/


 Ipratropium)  3 ml  Q4H  PRN


 HHN


 Shortness of Breath  5/16/20 14:15


 5/21/20 14:14   


 


 


 Cefepime HCl 1 gm/


 Dextrose  55 ml @ 


 110 mls/hr  Q8HR


 IVPB


   5/16/20 22:00


 5/23/20 21:59  5/20/20 14:22


 


 


 Dextrose


  (Dextrose 50%)  25 ml  Q30M  PRN


 IV


 Hypoglycemia  5/16/20 14:15


 8/11/20 06:14   


 


 


 Dextrose


  (Dextrose 50%)  50 ml  Q30M  PRN


 IV


 Hypoglycemia  5/16/20 14:15


 8/11/20 06:14   


 


 


 Diphenhydramine


 HCl


  (Benadryl)  12.5 mg  Q6H  PRN


 IVP


 Itching/Pruritis  5/18/20 17:00


 6/17/20 16:59  5/18/20 20:45


 


 


 Hydromorphone HCl


  (Dilaudid)  0.5 mg  Q3H  PRN


 IVP


 Pain Score 1-3  5/18/20 17:00


 5/25/20 16:59  5/20/20 00:01


 


 


 Hydromorphone HCl


  (Dilaudid)  1 mg  Q3H  PRN


 IVP


 pain score 4-6  5/18/20 17:00


 5/25/20 16:59  5/20/20 14:23


 


 


 Hydromorphone HCl


  (Dilaudid)  2 mg  Q3H  PRN


 IVP


 pain score 7-10  5/18/20 17:00


 5/25/20 16:59  5/18/20 23:02


 


 


 Iohexol


  (Omnipaque 350


 100ml)  100 ml  NOW  PRN


 INJ


 Radiology Procedure  5/19/20 15:00


 5/21/20 14:58   


 


 


 Metronidazole  100 ml @ 


 100 mls/hr  Q8HR


 IVPB


   5/16/20 22:00


 5/22/20 13:59  5/20/20 14:22


 


 


 Micafungin Sodium


 100 mg/Sodium


 Chloride  110 ml @ 


 110 mls/hr  Q24H


 IVPB


   5/19/20 18:00


 5/26/20 17:59  5/19/20 18:11


 


 


 Ondansetron HCl


  (Zofran)  4 mg  Q6H  PRN


 IVP


 Nausea & Vomiting  5/18/20 17:00


 6/17/20 16:59   


 


 


 Pantoprazole


  (Protonix)  40 mg  EVERY 12  HOURS


 IV


   5/16/20 21:00


 6/15/20 08:59  5/20/20 10:02


 


 


 Sodium Chloride  1,000 ml @ 


 100 mls/hr  Q10H


 IV


   5/18/20 19:00


 6/17/20 18:59  5/20/20 11:58


 

















Alexa Gonzalez M.D. May 20, 2020 14:52

## 2020-05-20 NOTE — NUR
CASE MANAGEMENT: DCP



PER ORDER PATIENT REFERRED TO [facility] FAXED [phone]

CM FOLLOWED UP WITH SERGIO [phone]

CHART BEING REVIEWED 



PER ORDER PATIENT REFERRED TO [facility] FAXED [phone]

CM FOLLOW UP WITH SANDY [phone]

CHART BEING REVIEWED 



CM SPOKE WITH PATIENT REGARDING TRANSFERRING TO [facility] / ARU

PATIENT STATED HE WILL THINK ABOUT IT. FAMILY NAWAF BURKS [phone] NOTIFIED OF 'S 
ORDER. FAMILY STATED HE DOES NOT WANT TO BE CONTACTED REGARDING DECISION MAKING. HE FURTHER 
STATED PATIENT CAN MAKE HIS OWN DECISIONS. 

-------------------------------------------------------------------------------

Addendum: 05/20/20 at 1354 by WON IYER 

-------------------------------------------------------------------------------

PER SERGIO COOLEY [phone] / DR. GOLDSTEIN CANCELLED LTACH REFERRAL DUE TO PATIENT 
BEING MEDICARE PART A ONLY


-------------------------------------------------------------------------------

Addendum: 05/20/20 at 1557 by WON IYER 

-------------------------------------------------------------------------------

PER SANDY [phone] [facility] / PATIENT WILL NEED PT NOTES. DR. GOLDSTEIN MADE AWARE. 



PER SANDY [phone] [facility] / HE HAS NOT BEEN UNABLE TO SPEAK WITH THE PATIENT. HE 
HAS CALLED ICU BUT PATIENT HAS NO BEDSIDE PHONE. PATIENT'S CELLPHONE NUMBER WAS PROVIDED BUT 
PER SANDY, PATIENT IS NOT ANSWERING THE PHONE. 



DR. GOLDSTEIN MADE AWARE. 

PER DR. GOLDSTEIN [facility] ARU REFERRAL ORDER

PER DR. GOLDSTEIN CANCEL LTACH REFERRAL ORDER

## 2020-05-20 NOTE — NUR
NURSE NOTES:



pt repositioned. per request, does not want SCD on RLE at this time. no acute distress 
noted. will continue to monitor.

## 2020-05-20 NOTE — NUR
NURSE NOTES:



Turned and repositioned pt. Pt helps turning. Tolerate to turning good. Temp 99.7 axillary. 
Will continue to monitor.

## 2020-05-20 NOTE — NUR
NURSE NOTES:



at this time, pt appears to be comfortable; no s/sx of pain. no s/sx of acute distress 
noted. will continue to monitor.

## 2020-05-20 NOTE — NUR
NURSE NOTES:



pt repositioned; per request did not want pillow support at this time. performed passive ROM 
exercises, pt tolerated well. no complaints of pain nor SOB. will continue to monitor.

## 2020-05-20 NOTE — PULMONOLOGY PROGRESS NOTE
Subjective


ROS Limited/Unobtainable:  No


Interval Events:  s/p laparotomy 5/18/20; extubated 5/19/20


Constitutional:  Reports: no symptoms


HEENT:  Repors: no symptoms


Respiratory:  Reports: no symptoms


Cardiovascular:  Reports: no symptoms


Gastrointestinal/Abdominal:  Reports: no symptoms


Genitourinary:  Reports: no symptoms


Neurologic:  Reports: no symptoms


Allergies:  


Coded Allergies:  


     No Known Allergies (Unverified , 5/12/20)


All Systems:  reviewed and negative except above





Objective





Last 24 Hour Vital Signs








  Date Time  Temp Pulse Resp B/P (MAP) Pulse Ox O2 Delivery O2 Flow Rate FiO2


 


5/20/20 07:00  105 17 110/70 (83) 98   


 


5/20/20 06:00  105 22 109/67 (81) 100   


 


5/20/20 05:00  107 20 120/76 (91) 99   


 


5/20/20 04:00  106      


 


5/20/20 04:00 99.2 108 24 111/77 (88) 99   


 


5/20/20 04:00      Room Air  





      Room Air  


 


5/20/20 03:00  109 24 115/72 (86) 100   


 


5/20/20 02:00  108 23 111/70 (84) 98   


 


5/20/20 01:00  112 22 112/72 (85) 98   


 


5/20/20 00:00  108      


 


5/20/20 00:00      Room Air  





      Room Air  


 


5/20/20 00:00 98.5 109 22 110/68 (82) 99   


 


5/19/20 23:00  111 16 119/65 (83) 98   


 


5/19/20 22:00  111 17 108/68 (81) 98   


 


5/19/20 21:00  113 23 117/65 (82) 100   


 


5/19/20 20:00  105      


 


5/19/20 20:00      Room Air  





      Room Air  


 


5/19/20 20:00 98.3 109 15 110/66 (81) 97   


 


5/19/20 19:00  103 22 112/68 (83) 100   


 


5/19/20 18:00  103 21 117/69 (85) 100   


 


5/19/20 17:00  103 15 108/72 (84) 100   


 


5/19/20 16:00 98.7 106 15 113/73 (86) 100   


 


5/19/20 16:00      Room Air  





      Room Air  


 


5/19/20 16:00  107      


 


5/19/20 15:00  106 18 102/72 (82) 98   


 


5/19/20 14:00  99 24 114/79 (91) 100   


 


5/19/20 13:00  99 20 111/77 (88) 100   


 


5/19/20 12:00      Room Air 2.0 





      Room Air  


 


5/19/20 12:00  104      


 


5/19/20 12:00 98.8 99 20 113/78 (90) 100   

















Intake and Output  


 


 5/19/20 5/20/20





 19:00 07:00


 


Intake Total 1465 ml 1483.333 ml


 


Output Total 1120 ml 1680 ml


 


Balance 345 ml -196.667 ml


 


  


 


IV Total 1465 ml 1483.333 ml


 


Output Urine Total 670 ml 1510 ml


 


Gastric Drainage Total 330 ml 50 ml


 


Drainage Total 120 ml 120 ml








General Appearance:  no acute distress


HEENT:  normocephalic


Respiratory:  chest wall non-tender, lungs clear


Cardiovascular:  normal peripheral pulses


Abdomen:  non distended


Extremities:  no cyanosis


Laboratory Tests


5/20/20 04:10: 


White Blood Count 26.7*H, Red Blood Count 2.79L, Hemoglobin 8.0L, Hematocrit 

23.5L, Mean Corpuscular Volume 84, Mean Corpuscular Hemoglobin 28.7, Mean 

Corpuscular Hemoglobin Concent 34.1, Red Cell Distribution Width 14.1, Platelet 

Count 237, Mean Platelet Volume 5.0L, Neutrophils (%) (Auto) , Lymphocytes (%) (

Auto) , Monocytes (%) (Auto) , Eosinophils (%) (Auto) , Basophils (%) (Auto) , 

Differential Total Cells Counted 100, Neutrophils % (Manual) 89H, Lymphocytes % 

(Manual) 10L, Monocytes % (Manual) 1, Eosinophils % (Manual) 0, Basophils % (

Manual) 0, Band Neutrophils 0, Platelet Estimate Adequate, Platelet Morphology 

Normal, Polychromasia 1+, Hypochromasia 1+, Anisocytosis 1+, Sodium Level 141, 

Potassium Level 3.7, Chloride Level 111H, Carbon Dioxide Level 21, Anion Gap 9, 

Blood Urea Nitrogen 8, Creatinine 0.6, Estimat Glomerular Filtration Rate > 60, 

Glucose Level 93, Calcium Level 6.9L, Total Bilirubin 0.3, Aspartate Amino 

Transf (AST/SGOT) 22, Alanine Aminotransferase (ALT/SGPT) 16, Alkaline 

Phosphatase 52, Total Protein 4.6L, Albumin 1.4L, Globulin 3.2, Albumin/

Globulin Ratio 0.4L





Current Medications








 Medications


  (Trade)  Dose


 Ordered  Sig/Marisa


 Route


 PRN Reason  Start Time


 Stop Time Status Last Admin


Dose Admin


 


 Albuterol/


 Ipratropium


  (Albuterol/


 Ipratropium)  3 ml  Q4H  PRN


 HHN


 Shortness of Breath  5/16/20 14:15


 5/21/20 14:14   


 


 


 Cefepime HCl 1 gm/


 Dextrose  55 ml @ 


 110 mls/hr  Q8HR


 IVPB


   5/16/20 22:00


 5/23/20 21:59  5/20/20 05:29


 


 


 Dextrose


  (Dextrose 50%)  25 ml  Q30M  PRN


 IV


 Hypoglycemia  5/16/20 14:15


 8/11/20 06:14   


 


 


 Dextrose


  (Dextrose 50%)  50 ml  Q30M  PRN


 IV


 Hypoglycemia  5/16/20 14:15


 8/11/20 06:14   


 


 


 Diphenhydramine


 HCl


  (Benadryl)  12.5 mg  Q6H  PRN


 IVP


 Itching/Pruritis  5/18/20 17:00


 6/17/20 16:59  5/18/20 20:45


 


 


 Hydromorphone HCl


  (Dilaudid)  0.5 mg  Q3H  PRN


 IVP


 Pain Score 1-3  5/18/20 17:00


 5/25/20 16:59  5/20/20 00:01


 


 


 Hydromorphone HCl


  (Dilaudid)  1 mg  Q3H  PRN


 IVP


 pain score 4-6  5/18/20 17:00


 5/25/20 16:59  5/20/20 06:21


 


 


 Hydromorphone HCl


  (Dilaudid)  2 mg  Q3H  PRN


 IVP


 pain score 7-10  5/18/20 17:00


 5/25/20 16:59  5/18/20 23:02


 


 


 Iohexol


  (Omnipaque 350


 100ml)  100 ml  NOW  PRN


 INJ


 Radiology Procedure  5/19/20 15:00


 5/21/20 14:58   


 


 


 Metronidazole  100 ml @ 


 100 mls/hr  Q8HR


 IVPB


   5/16/20 22:00


 5/22/20 13:59  5/20/20 05:28


 


 


 Micafungin Sodium


 100 mg/Sodium


 Chloride  110 ml @ 


 110 mls/hr  Q24H


 IVPB


   5/19/20 18:00


 5/26/20 17:59  5/19/20 18:11


 


 


 Ondansetron HCl


  (Zofran)  4 mg  Q6H  PRN


 IVP


 Nausea & Vomiting  5/18/20 17:00


 6/17/20 16:59   


 


 


 Pantoprazole


  (Protonix)  40 mg  EVERY 12  HOURS


 IV


   5/16/20 21:00


 6/15/20 08:59  5/20/20 10:02


 


 


 Sodium Chloride  1,000 ml @ 


 100 mls/hr  Q10H


 IV


   5/18/20 19:00


 6/17/20 18:59  5/20/20 01:16


 











Assessment/Plan


Assessment/Plan


IMPRESSION:


1. Gastric mass.


2. Severe anemia.


3. Pulmonary embolism.





DISCUSSION:  





S/p IVC filter.  S/p blood transfusion, IV fluids.  


S/p ex lap, left colectomy, partial gastrectomy, splenectomy, distal 

pancreatectomy, omentectomy 


and  open lysis of adhesions 


I will follow carefully.


Extubated 5/19/20


COVID 19 pcr negative


Currently on RA


Will transfer to tele














  ______________________________________________


  MARIANELA Downs Omar Syed MD May 20, 2020 11:53

## 2020-05-20 NOTE — NUR
NURSE NOTES:



pt c/o pain in abdomen area. pt was given dilaudid as prescribed per MD for pain. will 
continue to monitor.

## 2020-05-20 NOTE — CARDIAC ELECTROPHYSIOLOGY PN
Assessment/Plan


Assessment/Plan


1. Hypotension due to profound anemia due to gastric mass.  


S/P  5 units of blood transfusion. Echocardiogram  showed EF NL





2. Profound anemia.  Hemoglobin of 3.5.  CT of abdomen and pelvis shows


central necrotic mass in the gastric fundus 12 x 14 x 11 cm.  


 FU  by GI and S/P surgery by Dr Fischer.   


 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





3. Pulmonary embolus.  S/P IVC filter.


4. Sepsis with elevated white count. Ruled out for Covid by 2 negative PCRs





DW RN and Dr Fischer





Subjective


Subjective


S/P surgery by Dr Fischer in ICU off pressors in NSR. Alert. NGT is in





Objective





Last 24 Hour Vital Signs








  Date Time  Temp Pulse Resp B/P (MAP) Pulse Ox O2 Delivery O2 Flow Rate FiO2


 


5/20/20 07:00  105 17 110/70 (83) 98   


 


5/20/20 06:00  105 22 109/67 (81) 100   


 


5/20/20 05:00  107 20 120/76 (91) 99   


 


5/20/20 04:00  106      


 


5/20/20 04:00 99.2 108 24 111/77 (88) 99   


 


5/20/20 04:00      Room Air  





      Room Air  


 


5/20/20 03:00  109 24 115/72 (86) 100   


 


5/20/20 02:00  108 23 111/70 (84) 98   


 


5/20/20 01:00  112 22 112/72 (85) 98   


 


5/20/20 00:00  108      


 


5/20/20 00:00      Room Air  





      Room Air  


 


5/20/20 00:00 98.5 109 22 110/68 (82) 99   


 


5/19/20 23:00  111 16 119/65 (83) 98   


 


5/19/20 22:00  111 17 108/68 (81) 98   


 


5/19/20 21:00  113 23 117/65 (82) 100   


 


5/19/20 20:00  105      


 


5/19/20 20:00      Room Air  





      Room Air  


 


5/19/20 20:00 98.3 109 15 110/66 (81) 97   


 


5/19/20 19:00  103 22 112/68 (83) 100   


 


5/19/20 18:00  103 21 117/69 (85) 100   


 


5/19/20 17:00  103 15 108/72 (84) 100   


 


5/19/20 16:00 98.7 106 15 113/73 (86) 100   


 


5/19/20 16:00      Room Air  





      Room Air  


 


5/19/20 16:00  107      


 


5/19/20 15:00  106 18 102/72 (82) 98   


 


5/19/20 14:00  99 24 114/79 (91) 100   


 


5/19/20 13:00  99 20 111/77 (88) 100   


 


5/19/20 12:00      Room Air 2.0 





      Room Air  


 


5/19/20 12:00  104      


 


5/19/20 12:00 98.8 99 20 113/78 (90) 100   


 


5/19/20 11:00  98 21 117/75 (89) 100   


 


5/19/20 10:58       2.0 

















Intake and Output  


 


 5/19/20 5/20/20





 19:00 07:00


 


Intake Total 1465 ml 1483.333 ml


 


Output Total 1120 ml 1680 ml


 


Balance 345 ml -196.667 ml


 


  


 


IV Total 1465 ml 1483.333 ml


 


Output Urine Total 670 ml 1510 ml


 


Gastric Drainage Total 330 ml 50 ml


 


Drainage Total 120 ml 120 ml











Laboratory Tests








Test


  5/20/20


04:10


 


White Blood Count


  26.7 K/UL


(4.8-10.8)  *H


 


Red Blood Count


  2.79 M/UL


(4.70-6.10)  L


 


Hemoglobin


  8.0 G/DL


(14.2-18.0)  L


 


Hematocrit


  23.5 %


(42.0-52.0)  L


 


Mean Corpuscular Volume 84 FL (80-99)  


 


Mean Corpuscular Hemoglobin


  28.7 PG


(27.0-31.0)


 


Mean Corpuscular Hemoglobin


Concent 34.1 G/DL


(32.0-36.0)


 


Red Cell Distribution Width


  14.1 %


(11.6-14.8)


 


Platelet Count


  237 K/UL


(150-450)


 


Mean Platelet Volume


  5.0 FL


(6.5-10.1)  L


 


Neutrophils (%) (Auto)


  % (45.0-75.0)


 


 


Lymphocytes (%) (Auto)


  % (20.0-45.0)


 


 


Monocytes (%) (Auto)  % (1.0-10.0)  


 


Eosinophils (%) (Auto)  % (0.0-3.0)  


 


Basophils (%) (Auto)  % (0.0-2.0)  


 


Differential Total Cells


Counted 100  


 


 


Neutrophils % (Manual) 89 % (45-75)  H


 


Lymphocytes % (Manual) 10 % (20-45)  L


 


Monocytes % (Manual) 1 % (1-10)  


 


Eosinophils % (Manual) 0 % (0-3)  


 


Basophils % (Manual) 0 % (0-2)  


 


Band Neutrophils 0 % (0-8)  


 


Platelet Estimate Adequate  


 


Platelet Morphology Normal  


 


Polychromasia 1+  


 


Hypochromasia 1+  


 


Anisocytosis 1+  


 


Sodium Level


  141 MMOL/L


(136-145)


 


Potassium Level


  3.7 MMOL/L


(3.5-5.1)


 


Chloride Level


  111 MMOL/L


()  H


 


Carbon Dioxide Level


  21 MMOL/L


(21-32)


 


Anion Gap


  9 mmol/L


(5-15)


 


Blood Urea Nitrogen


  8 mg/dL (7-18)


 


 


Creatinine


  0.6 MG/DL


(0.55-1.30)


 


Estimat Glomerular Filtration


Rate > 60 mL/min


(>60)


 


Glucose Level


  93 MG/DL


()


 


Calcium Level


  6.9 MG/DL


(8.5-10.1)  L


 


Total Bilirubin


  0.3 MG/DL


(0.2-1.0)


 


Aspartate Amino Transf


(AST/SGOT) 22 U/L (15-37)


 


 


Alanine Aminotransferase


(ALT/SGPT) 16 U/L (12-78)


 


 


Alkaline Phosphatase


  52 U/L


()


 


Total Protein


  4.6 G/DL


(6.4-8.2)  L


 


Albumin


  1.4 G/DL


(3.4-5.0)  L


 


Globulin 3.2 g/dL  


 


Albumin/Globulin Ratio


  0.4 (1.0-2.7)


L








Objective


HEAD AND NECK:  No JVD.


LUNGS:  Decreased breath sounds.


CARDIOVASCULAR:  Regular S1 and S2.  Tachycardic.


ABDOMEN: Post op with ALLISON drain


EXTREMITIES:  No pitting edema.











Aaron Antunez MD May 20, 2020 10:36

## 2020-05-20 NOTE — NUR
NURSE NOTES:



Turned and repositioned pt. Pt is sleeping comfortably in bed. No pain or discomfort noted. 
Temp 98.7 axillary. Oral care done.

## 2020-05-20 NOTE — GENERAL PROGRESS NOTE
Assessment/Plan


Problem List:  


(1) Syncope


ICD Codes:  R55 - Syncope and collapse


SNOMED:  233126458


Qualifiers:  


   Qualified Codes:  R55 - Syncope and collapse


(2) Gastric mass


ICD Codes:  K31.89 - Other diseases of stomach and duodenum


SNOMED:  769333123


(3) GI bleed


ICD Codes:  K92.2 - Gastrointestinal hemorrhage, unspecified


SNOMED:  13327233


Qualifiers:  


   Qualified Codes:  K92.2 - Gastrointestinal hemorrhage, unspecified


(4) Pulmonary embolism


ICD Codes:  I26.99 - Other pulmonary embolism without acute cor pulmonale


SNOMED:  38677387


Qualifiers:  


   Qualified Codes:  I26.99 - Other pulmonary embolism without acute cor 

pulmonale


Status:  unchanged


Assessment/Plan:





s/p surg:





OPERATIONS PERFORMED:


1. Exploratory laparotomy.


2. Mobilization of the splenic flexure.


3. Left colectomy with primary side-to-side anastomosis.


4. Partial gastrectomy.


5. Total splenectomy.


6. Distal pancreatectomy.


7. Omentectomy.


8. Open lysis of adhesions.





fu surg recs


fu path


fu oncology


icu care


IVF


pain control


abx


will fu





Subjective


ROS Limited/Unobtainable:  No


Allergies:  


Coded Allergies:  


     No Known Allergies (Unverified , 5/12/20)





Objective





Last 24 Hour Vital Signs








  Date Time  Temp Pulse Resp B/P (MAP) Pulse Ox O2 Delivery O2 Flow Rate FiO2


 


5/20/20 07:00  105 17 110/70 (83) 98   


 


5/20/20 06:00  105 22 109/67 (81) 100   


 


5/20/20 05:00  107 20 120/76 (91) 99   


 


5/20/20 04:00  106      


 


5/20/20 04:00 99.2 108 24 111/77 (88) 99   


 


5/20/20 04:00      Room Air  





      Room Air  


 


5/20/20 03:00  109 24 115/72 (86) 100   


 


5/20/20 02:00  108 23 111/70 (84) 98   


 


5/20/20 01:00  112 22 112/72 (85) 98   


 


5/20/20 00:00  108      


 


5/20/20 00:00      Room Air  





      Room Air  


 


5/20/20 00:00 98.5 109 22 110/68 (82) 99   


 


5/19/20 23:00  111 16 119/65 (83) 98   


 


5/19/20 22:00  111 17 108/68 (81) 98   


 


5/19/20 21:00  113 23 117/65 (82) 100   


 


5/19/20 20:00  105      


 


5/19/20 20:00      Room Air  





      Room Air  


 


5/19/20 20:00 98.3 109 15 110/66 (81) 97   


 


5/19/20 19:00  103 22 112/68 (83) 100   


 


5/19/20 18:00  103 21 117/69 (85) 100   


 


5/19/20 17:00  103 15 108/72 (84) 100   


 


5/19/20 16:00 98.7 106 15 113/73 (86) 100   


 


5/19/20 16:00      Room Air  





      Room Air  


 


5/19/20 16:00  107      


 


5/19/20 15:00  106 18 102/72 (82) 98   


 


5/19/20 14:00  99 24 114/79 (91) 100   


 


5/19/20 13:00  99 20 111/77 (88) 100   


 


5/19/20 12:00      Room Air 2.0 





      Room Air  


 


5/19/20 12:00  104      


 


5/19/20 12:00 98.8 99 20 113/78 (90) 100   

















Intake and Output  


 


 5/19/20 5/20/20





 19:00 07:00


 


Intake Total 1465 ml 1483.333 ml


 


Output Total 1120 ml 1680 ml


 


Balance 345 ml -196.667 ml


 


  


 


IV Total 1465 ml 1483.333 ml


 


Output Urine Total 670 ml 1510 ml


 


Gastric Drainage Total 330 ml 50 ml


 


Drainage Total 120 ml 120 ml








Laboratory Tests


5/20/20 04:10: 


White Blood Count 26.7*H, Red Blood Count 2.79L, Hemoglobin 8.0L, Hematocrit 

23.5L, Mean Corpuscular Volume 84, Mean Corpuscular Hemoglobin 28.7, Mean 

Corpuscular Hemoglobin Concent 34.1, Red Cell Distribution Width 14.1, Platelet 

Count 237, Mean Platelet Volume 5.0L, Neutrophils (%) (Auto) , Lymphocytes (%) (

Auto) , Monocytes (%) (Auto) , Eosinophils (%) (Auto) , Basophils (%) (Auto) , 

Differential Total Cells Counted 100, Neutrophils % (Manual) 89H, Lymphocytes % 

(Manual) 10L, Monocytes % (Manual) 1, Eosinophils % (Manual) 0, Basophils % (

Manual) 0, Band Neutrophils 0, Platelet Estimate Adequate, Platelet Morphology 

Normal, Polychromasia 1+, Hypochromasia 1+, Anisocytosis 1+, Sodium Level 141, 

Potassium Level 3.7, Chloride Level 111H, Carbon Dioxide Level 21, Anion Gap 9, 

Blood Urea Nitrogen 8, Creatinine 0.6, Estimat Glomerular Filtration Rate > 60, 

Glucose Level 93, Calcium Level 6.9L, Total Bilirubin 0.3, Aspartate Amino 

Transf (AST/SGOT) 22, Alanine Aminotransferase (ALT/SGPT) 16, Alkaline 

Phosphatase 52, Total Protein 4.6L, Albumin 1.4L, Globulin 3.2, Albumin/

Globulin Ratio 0.4L


Height (Feet):  5


Height (Inches):  7.00


Weight (Pounds):  146


General Appearance:  lethargic


EENT:  normal ENT inspection


Neck:  supple


Cardiovascular:  tachycardia


Respiratory/Chest:  decreased breath sounds


Abdomen:  decreased bowel sounds, distended, tender


Extremities:  non-tender











Marc Nielsen MD May 20, 2020 11:57

## 2020-05-20 NOTE — NUR
CASE MANAGEMENT: REVIEW





SI: SYNCOPE . GI BLEED . DVT . GASTRIC CA . DEHYDRATION 

IVC FILTER PLACEMENT 

EGD w/ BIOPSY 

T 99.2  RR 22 /97 SAT 99% ROOM AIR

WBC 26.7 H/H 8.0/23.5 CHLORIDE 111









IS: NS IVF @ 100ML/HR

CEFEPIME IV Q8HR

FLAGYL IV Q8HR

MICAFUNGIN IV Q24HR

BEDSIDE SWALLOW EVAL 

NPO 







***ICU STATUS***

DCP: PATIENT IS FROM HOME

## 2020-05-20 NOTE — NUR
TRANSFER TO FLOOR:

Patient transferred to [facility].   Report given to JOSIAH Gamez.  Belongings and medications 
given to JOSIAH Gamez. Patient is alert and orieted x4 and aware of transfer.

## 2020-05-20 NOTE — NUR
NURSE NOTES:

Pt was transferred to the unit. Received report from Mi Yeon, ICU RN. Pt A/Ox3, no s/sx of 
acute distress. Breathing even and unlabored in RA. Tele monitor attached, pt NSR with HR of 
93. VS WNL. IV sites patent and asymptomatic, running medications as ordered. Belongings 
checked. Bed on lowest position, call light within reach. Will continue plan of care.

## 2020-05-20 NOTE — NUR
NURSE NOTES:



bed bath given. passive ROM exercises performed. pt tolerated well. will continue to 
monitor.

## 2020-05-20 NOTE — NUR
NURSE NOTES:



Report received from JOSIAH Horton. Pt is awake, alert and oriented x3-4. Pt is able to 
verbalize needs. No pain or discomfort at this time. On RA. O2 sat 99%. ST on cardiac 
monitor 100's. Right NGT in place connected to LI suction. Mata in place draining to 
gravity. ALLISON drain to left lower quadrant, draining serosang liquid. Abdominal dressing dry 
and intact. IV to right AC G20 and left FA G22 patent and asymptomatic. Pt is on NS at 
100cc/hr. Bed in lowest position. Side rails up x3. Call light within reach. Will resume 
plan fo care.

## 2020-05-20 NOTE — NUR
NURSE NOTES:



Dr Fischer here to see the patient. He removed NGT by himself. Updated him with pt's 
current condition. Order to transfer patient to Tele received and noted by charge nurse.

## 2020-05-20 NOTE — NUR
NURSE NOTES:

Report received from Coreen RAHMAN. Patient is awake and alert x 4. Patient is noted to be on 
room air. Patient denies chest pain and shortness of breath. Patient is on standard 
precautions, noted to be covid 19 negative x 2 since admission. Patient noted to be on a 
strict NPO diet due to recent abdominal surgery. No ICE chips to be given. Patient noted to 
have right AC 20 domingo with fluids running per MD orders. Patient noted to have 22 gag in 
left forearm. Patient noted to have ALLISON drain draining serous sanguinous fluid in left lower 
quadrant. Abdominal dressing clean dry and intact. Bed locked, alarmed, and in lowest 
position. Will continue to follow plan of care.

## 2020-05-20 NOTE — NUR
DISCONTINUE SLP ORDERS



SLP orders received and acknowledged for Bedside Swallow Evaluation. Chart review completed, 
went to bedside and spoke with charge nurse regarding orders. 



Patient is s/p OR and extubation (5/19/20), multiple procedures completed in OR (OPERATIONS 
PERFORMED: 1. Exploratory laparotomy. 2. Mobilization of the splenic flexure. 3. Left 
colectomy with primary side-to-side anastomosis. 4. Partial gastrectomy. 5. Total 
splenectomy. 6. Distal pancreatectomy. 7. Omentectomy. 8. Open lysis of adhesions.) with Dr. Fischer and Patient is NPO s/p OR till Saturday (5/23/20). 



Discontinue SLP orders at this time as Patient is NPO and unable to consume PO till Saturday 
(PO trials are required to complete bedside swallow evaluation).



After Saturday, if MD continues to be concerned for Patients swallow safety and risk of 
aspiration, please re-refer Patient to SLP.



Thank you for this referral!

## 2020-05-21 VITALS — SYSTOLIC BLOOD PRESSURE: 127 MMHG | DIASTOLIC BLOOD PRESSURE: 77 MMHG

## 2020-05-21 VITALS — DIASTOLIC BLOOD PRESSURE: 76 MMHG | SYSTOLIC BLOOD PRESSURE: 117 MMHG

## 2020-05-21 VITALS — SYSTOLIC BLOOD PRESSURE: 117 MMHG | DIASTOLIC BLOOD PRESSURE: 73 MMHG

## 2020-05-21 VITALS — DIASTOLIC BLOOD PRESSURE: 68 MMHG | SYSTOLIC BLOOD PRESSURE: 117 MMHG

## 2020-05-21 VITALS — DIASTOLIC BLOOD PRESSURE: 69 MMHG | SYSTOLIC BLOOD PRESSURE: 112 MMHG

## 2020-05-21 VITALS — DIASTOLIC BLOOD PRESSURE: 74 MMHG | SYSTOLIC BLOOD PRESSURE: 114 MMHG

## 2020-05-21 LAB
ADD MANUAL DIFF: YES
ANION GAP SERPL CALC-SCNC: 10 MMOL/L (ref 5–15)
APTT BLD: 35 SEC (ref 23–33)
BUN SERPL-MCNC: 8 MG/DL (ref 7–18)
CALCIUM SERPL-MCNC: 7.1 MG/DL (ref 8.5–10.1)
CHLORIDE SERPL-SCNC: 109 MMOL/L (ref 98–107)
CO2 SERPL-SCNC: 23 MMOL/L (ref 21–32)
CREAT SERPL-MCNC: 0.5 MG/DL (ref 0.55–1.3)
ERYTHROCYTE [DISTWIDTH] IN BLOOD BY AUTOMATED COUNT: 13.8 % (ref 11.6–14.8)
HCT VFR BLD CALC: 22.7 % (ref 42–52)
HGB BLD-MCNC: 7.7 G/DL (ref 14.2–18)
INR PPP: 1.4 (ref 0.9–1.1)
MCV RBC AUTO: 84 FL (ref 80–99)
PLATELET # BLD: 274 K/UL (ref 150–450)
POTASSIUM SERPL-SCNC: 3.3 MMOL/L (ref 3.5–5.1)
RBC # BLD AUTO: 2.69 M/UL (ref 4.7–6.1)
SODIUM SERPL-SCNC: 142 MMOL/L (ref 136–145)
WBC # BLD AUTO: 20.2 K/UL (ref 4.8–10.8)

## 2020-05-21 RX ADMIN — DEXTROSE MONOHYDRATE SCH MLS/HR: 50 INJECTION, SOLUTION INTRAVENOUS at 17:42

## 2020-05-21 RX ADMIN — MEROPENEM SCH MLS/HR: 1 INJECTION INTRAVENOUS at 16:24

## 2020-05-21 RX ADMIN — MEROPENEM SCH MLS/HR: 1 INJECTION INTRAVENOUS at 08:55

## 2020-05-21 RX ADMIN — PANTOPRAZOLE SODIUM SCH MG: 40 INJECTION, POWDER, FOR SOLUTION INTRAVENOUS at 08:55

## 2020-05-21 RX ADMIN — PANTOPRAZOLE SODIUM SCH MG: 40 INJECTION, POWDER, FOR SOLUTION INTRAVENOUS at 20:31

## 2020-05-21 NOTE — CARDIAC ELECTROPHYSIOLOGY PN
Assessment/Plan


Assessment/Plan


1. Hypotension due to profound anemia due to gastric mass.  


S/P  5 units of blood transfusion. Echocardiogram  showed EF NL





2. Profound anemia.  Hemoglobin of 3.5.  CT of abdomen and pelvis shows


central necrotic mass in the gastric fundus 12 x 14 x 11 cm.  


 FU  by GI and S/P surgery by Dr Fischer.   


 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





3. Bilateral  Pulmonary embolus.  S/P IVC filter.Off anticoagulation for gi 

bleed, Hb 3.5 and post op.


4. Sepsis with elevated white count. Ruled out for Covid by 2 negative PCRs





DW RN and Dr Fischer





Subjective


Subjective


S/P surgery by Dr Fischer. Transferred out of ICU   in NSR. Alert. NGT 

removed and is NPO. Chest CT bilateral PE





Objective





Last 24 Hour Vital Signs








  Date Time  Temp Pulse Resp B/P (MAP) Pulse Ox O2 Delivery O2 Flow Rate FiO2


 


5/21/20 13:09 97.5       


 


5/21/20 12:42      Room Air  





      Room Air  


 


5/21/20 12:00 97.9 81 18 117/73 (88) 96   


 


5/21/20 12:00  89      


 


5/21/20 08:00 97.5 70 20 112/69 (83) 97   


 


5/21/20 08:00  81      


 


5/21/20 08:00      Room Air  





      Room Air  


 


5/21/20 04:44      Room Air  





      Room Air  


 


5/21/20 04:00 98.5 90 18 117/76 (90) 98   


 


5/21/20 04:00  85      


 


5/21/20 00:00      Room Air  





      Room Air  


 


5/21/20 00:00 98.4 86 16 117/68 (84) 99   


 


5/21/20 00:00  80      


 


5/20/20 20:00 98.0 95 18 115/73 (87) 98   


 


5/20/20 20:00  92      


 


5/20/20 20:00     95 Room Air  21


 


5/20/20 20:00      Room Air  





      Room Air  


 


5/20/20 18:00  99 25 117/72 (87)    


 


5/20/20 17:00 98.7 92 21 116/69 (85) 100   


 


5/20/20 16:00  91 11 109/68 (82) 100   


 


5/20/20 16:00      Room Air  





      Room Air  

















Intake and Output  


 


 5/20/20 5/21/20





 19:00 07:00


 


Intake Total 1720 ml 1055 ml


 


Output Total 765 ml 500 ml


 


Balance 955 ml 555 ml


 


  


 


IV Total 1720 ml 1055 ml


 


Output Urine Total 740 ml 500 ml


 


Stool Total  0 ml


 


Drainage Total 25 ml 











Laboratory Tests








Test


  5/20/20


19:00 5/21/20


05:00


 


Urine Color Stacey   


 


Urine Appearance


  Slightly


cloudy 


 


 


Urine pH 5 (4.5-8.0)   


 


Urine Specific Gravity


  1.020


(1.005-1.035) 


 


 


Urine Protein


  2+ (NEGATIVE)


H 


 


 


Urine Glucose (UA)


  Negative


(NEGATIVE) 


 


 


Urine Ketones


  3+ (NEGATIVE)


H 


 


 


Urine Blood


  3+ (NEGATIVE)


H 


 


 


Urine Nitrite


  Negative


(NEGATIVE) 


 


 


Urine Bilirubin


  Negative


(NEGATIVE) 


 


 


Urine Ictotest


  Negative


(NEGATIVE) 


 


 


Urine Urobilinogen


  Normal MG/DL


(0.0-1.0) 


 


 


Urine Leukocyte Esterase


  2+ (NEGATIVE)


H 


 


 


Urine RBC


  5-10 /HPF (0 -


0)  H 


 


 


Urine WBC


  10-15 /HPF (0


- 0)  H 


 


 


Urine Squamous Epithelial


Cells Few /LPF


(NONE/OCC) 


 


 


Urine Uric Acid Crystals


  Moderate /LPF


(NONE)  H 


 


 


Urine Bacteria


  Moderate /HPF


(NONE)  H 


 


 


White Blood Count


  


  20.2 K/UL


(4.8-10.8)  H


 


Red Blood Count


  


  2.69 M/UL


(4.70-6.10)  L


 


Hemoglobin


  


  7.7 G/DL


(14.2-18.0)  L


 


Hematocrit


  


  22.7 %


(42.0-52.0)  L


 


Mean Corpuscular Volume  84 FL (80-99)  


 


Mean Corpuscular Hemoglobin


  


  28.6 PG


(27.0-31.0)


 


Mean Corpuscular Hemoglobin


Concent 


  34.0 G/DL


(32.0-36.0)


 


Red Cell Distribution Width


  


  13.8 %


(11.6-14.8)


 


Platelet Count


  


  274 K/UL


(150-450)


 


Mean Platelet Volume


  


  5.0 FL


(6.5-10.1)  L


 


Neutrophils (%) (Auto)


  


  % (45.0-75.0)


 


 


Lymphocytes (%) (Auto)


  


  % (20.0-45.0)


 


 


Monocytes (%) (Auto)   % (1.0-10.0)  


 


Eosinophils (%) (Auto)   % (0.0-3.0)  


 


Basophils (%) (Auto)   % (0.0-2.0)  


 


Differential Total Cells


Counted 


  100  


 


 


Neutrophils % (Manual)  95 % (45-75)  H


 


Lymphocytes % (Manual)  3 % (20-45)  L


 


Monocytes % (Manual)  2 % (1-10)  


 


Eosinophils % (Manual)  0 % (0-3)  


 


Basophils % (Manual)  0 % (0-2)  


 


Band Neutrophils  0 % (0-8)  


 


Platelet Estimate  Adequate  


 


Platelet Morphology  Normal  


 


Polychromasia  1+  


 


Hypochromasia  1+  


 


Prothrombin Time


  


  15.4 SEC


(9.30-11.50)  H


 


Prothromb Time International


Ratio 


  1.4 (0.9-1.1)


H


 


Activated Partial


Thromboplast Time 


  35 SEC (23-33)


H


 


Sodium Level


  


  142 MMOL/L


(136-145)


 


Potassium Level


  


  3.3 MMOL/L


(3.5-5.1)  L


 


Chloride Level


  


  109 MMOL/L


()  H


 


Carbon Dioxide Level


  


  23 MMOL/L


(21-32)


 


Anion Gap


  


  10 mmol/L


(5-15)


 


Blood Urea Nitrogen


  


  8 mg/dL (7-18)


 


 


Creatinine


  


  0.5 MG/DL


(0.55-1.30)  L


 


Estimat Glomerular Filtration


Rate 


  > 60 mL/min


(>60)


 


Glucose Level


  


  98 MG/DL


()


 


Calcium Level


  


  7.1 MG/DL


(8.5-10.1)  L











Microbiology








 Date/Time


Source Procedure


Growth Status


 


 


 5/20/20 19:00


Urine,Clean Catch Urine Culture - Preliminary


NO GROWTH Resulted








Objective


HEAD AND NECK:  No JVD.


LUNGS:  Decreased breath sounds.


CARDIOVASCULAR:  Regular S1 and S2.  Tachycardic.


ABDOMEN: Post op with ALLISON drain


EXTREMITIES:  No pitting edema.











Aaron Antunez MD May 21, 2020 15:46

## 2020-05-21 NOTE — DIAGNOSTIC IMAGING REPORT
EXAM: CT CTA Chest w Contrast

 

CLINICAL HISTORY: Shortness of breath.

 

TECHNIQUE:  CT angiogram of the pulmonary vasculature performed with IV contrast. 2-D

and 3-D reformat images obtained.

 

All CT scans at this facility are performed using dose modulation techniques as

appropriate to a performed exam including the following: automated exposure control

with  adjustment of the mA and/or kV according to patient size.

 

RADIATION DOSE:  

CTDIvol:   35.2 mGy

DLP:   167.1 mGy-cm

Dose information generated by the CT scanner is available in PACS.

 

COMPARISON:  None

 

FINDINGS:  

 

There is adequate opacification of the pulmonary vascularity. There is hypoechoic

thrombus identified at the right hilum extending into the segmental branches of the

right upper lobe and segmental and subsegmental branches of the right lower lobe. On

the left side, there are also small filling defects noted in the subsegmental

branches of the left lower lobe. There is no sign of significant heart strain. The

aorta is normal in caliber and there is no intimal flap or dissection. There is no

mediastinal mass or adenopathy. 

 

There are bilateral pleural effusions left greater than right. Dependent

infiltrate/consolidation noted in the lung bases bilaterally. Mild dependent

infiltrates also noted in the dependent aspect of the left upper lobe. 

 

Limited images through the abdomen show an slight findings of hepatic cyst and

granuloma. Postsurgical changes noted in the left upper quadrant with anastomotic

sutures along with some free fluid and small foci of free air from recent abdominal

surgery. Midline skin staples noted in the epigastric region.

 

IMPRESSION:

 

STUDY POSITIVE FOR BILATERAL PULMONARY EMBOLISM.

 

BILATERAL PLEURAL EFFUSIONS WITH DEPENDENT INFILTRATES/CONSOLIDATION IN THE LUNG

BASES.

 

POSTOPERATIVE CHANGES IN THE UPPER ABDOMEN WITH ANASTOMOTIC SUTURES LEFT UPPER

QUADRANT AND MIDLINE SKIN STAPLES. FREE FLUID AND SCATTERED SMALL FOCI OF FREE AIR

ALSO NOTED IN LEFT UPPER QUADRANT FROM RECENT ABDOMINAL SURGERY.

 

HEPATIC CYST AND GRANULOMA.

 

Critical value findings were communicated to the patient's nurse in 2 E. 5/21/2020 at

12:07 PM

## 2020-05-21 NOTE — NUR
NURSE NOTES:

Notified Md Pierce of H/H of 7.7 and 22.7... waiting on reply. 

-------------------------------------------------------------------------------

Addendum: 05/21/20 at 0845 by RIYA CROCKETT RN RN

-------------------------------------------------------------------------------

 NOTIFIED OF HGB 7.7 WITH NO NEW ORDER

## 2020-05-21 NOTE — NUR
CASE MANAGEMENT: REVIEW 



5/21/2020



SI;SEPSIS. PULMONARY EMBOLISM. 

VS: T 97.5 HR 70 RR 20 B/P 112/69 SATS 97% ON RA 

LABS: WBC 20.2 HGB 7.7 HCT 22.7 K 3.3  CR 0.5 CA 7.1 



IS:NS @ 100 ML/HR 

LINEZOLID IV Q12H 

MICAFUNGIN IV Q24H 

MEROPENEM IV Q8H 

CTA CHEST  IMPRESSION: STUDY POSITIVE FOR BILATERAL PULMONARY EMBOLISM. BILATERAL PLEURAL 
 EFFUSIONS WITH DEPENDENT INFILTRATES/CONSOLIDATION IN THE LUNG BASES.



**TELE*** 



PLAN OF CARE: 

CTA CHEST >>IVC FILTER 5/13

URINE CX

## 2020-05-21 NOTE — GENERAL PROGRESS NOTE
Assessment/Plan


Problem List:  


(1) Weak


ICD Codes:  R53.1 - Weakness


SNOMED:  00471884


(2) Malnutrition


ICD Codes:  E46 - Unspecified protein-calorie malnutrition


SNOMED:  99680509


(3) COVID-19 ruled out


ICD Codes:  Z03.818 - Encounter for observation for suspected exposure to other 

biological agents ruled out


SNOMED:  661215140, [id number]


(4) Pulmonary embolism


ICD Codes:  I26.99 - Other pulmonary embolism without acute cor pulmonale


SNOMED:  50849341


Qualifiers:  


   Qualified Codes:  I26.99 - Other pulmonary embolism without acute cor 

pulmonale


(5) GI bleed


ICD Codes:  K92.2 - Gastrointestinal hemorrhage, unspecified


SNOMED:  87760572


Qualifiers:  


   Qualified Codes:  K92.2 - Gastrointestinal hemorrhage, unspecified


(6) Gastric mass


ICD Codes:  K31.89 - Other diseases of stomach and duodenum


SNOMED:  986118836


(7) Syncope


ICD Codes:  R55 - Syncope and collapse


SNOMED:  305469522


Qualifiers:  


   Qualified Codes:  R55 - Syncope and collapse


Status:  unchanged


Assessment/Plan:


o2 pulm tx transfuse prn cbc bmp am





Subjective


Constitutional:  Reports: weakness


Allergies:  


Coded Allergies:  


     No Known Allergies (Unverified , 5/12/20)


All Systems:  reviewed and negative except above


Subjective


sleepy calm





Objective





Last 24 Hour Vital Signs








  Date Time  Temp Pulse Resp B/P (MAP) Pulse Ox O2 Delivery O2 Flow Rate FiO2


 


5/21/20 08:00 97.5 70 20 112/69 (83) 97   


 


5/21/20 08:00  81      


 


5/21/20 08:00      Room Air  





      Room Air  


 


5/21/20 04:44      Room Air  





      Room Air  


 


5/21/20 04:00 98.5 90 18 117/76 (90) 98   


 


5/21/20 04:00  85      


 


5/21/20 00:00      Room Air  





      Room Air  


 


5/21/20 00:00 98.4 86 16 117/68 (84) 99   


 


5/21/20 00:00  80      


 


5/20/20 20:00 98.0 95 18 115/73 (87) 98   


 


5/20/20 20:00  92      


 


5/20/20 20:00     95 Room Air  21


 


5/20/20 20:00      Room Air  





      Room Air  


 


5/20/20 18:00  99 25 117/72 (87)    


 


5/20/20 17:00 98.7 92 21 116/69 (85) 100   


 


5/20/20 16:00  91 11 109/68 (82) 100   


 


5/20/20 16:00      Room Air  





      Room Air  


 


5/20/20 15:24  96      


 


5/20/20 15:00  97 16 108/71 (83) 99   


 


5/20/20 14:00  93 24 110/70 (83) 100   


 


5/20/20 13:00  94 24 107/67 (80) 100   

















Intake and Output  


 


 5/20/20 5/21/20





 19:00 07:00


 


Intake Total 1720 ml 1055 ml


 


Output Total 765 ml 500 ml


 


Balance 955 ml 555 ml


 


  


 


IV Total 1720 ml 1055 ml


 


Output Urine Total 740 ml 500 ml


 


Stool Total  0 ml


 


Drainage Total 25 ml 








Laboratory Tests


5/20/20 19:00: 


Urine Color Stacey, Urine Appearance Slightly cloudy, Urine pH 5, Urine Specific 

Gravity 1.020, Urine Protein 2+H, Urine Glucose (UA) Negative, Urine Ketones 3+H

, Urine Blood 3+H, Urine Nitrite Negative, Urine Bilirubin Negative, Urine 

Ictotest Negative, Urine Urobilinogen Normal, Urine Leukocyte Esterase 2+H, 

Urine RBC 5-10H, Urine WBC 10-15H, Urine Squamous Epithelial Cells Few, Urine 

Uric Acid Crystals ModerateH, Urine Bacteria ModerateH


5/21/20 05:00: 


White Blood Count 20.2H, Red Blood Count 2.69L, Hemoglobin 7.7L, Hematocrit 

22.7L, Mean Corpuscular Volume 84, Mean Corpuscular Hemoglobin 28.6, Mean 

Corpuscular Hemoglobin Concent 34.0, Red Cell Distribution Width 13.8, Platelet 

Count 274, Mean Platelet Volume 5.0L, Neutrophils (%) (Auto) , Lymphocytes (%) (

Auto) , Monocytes (%) (Auto) , Eosinophils (%) (Auto) , Basophils (%) (Auto) , 

Differential Total Cells Counted 100, Neutrophils % (Manual) 95H, Lymphocytes % 

(Manual) 3L, Monocytes % (Manual) 2, Eosinophils % (Manual) 0, Basophils % (

Manual) 0, Band Neutrophils 0, Platelet Estimate Adequate, Platelet Morphology 

Normal, Polychromasia 1+, Hypochromasia 1+, Prothrombin Time 15.4H, Prothromb 

Time International Ratio 1.4H, Activated Partial Thromboplast Time 35H, Sodium 

Level 142, Potassium Level 3.3L, Chloride Level 109H, Carbon Dioxide Level 23, 

Anion Gap 10, Blood Urea Nitrogen 8, Creatinine 0.5L, Estimat Glomerular 

Filtration Rate > 60, Glucose Level 98, Calcium Level 7.1L


Height (Feet):  5


Height (Inches):  7.00


Weight (Pounds):  187


General Appearance:  lethargic


EENT:  normal ENT inspection


Neck:  normal alignment


Cardiovascular:  normal peripheral pulses, normal rate, regular rhythm


Respiratory/Chest:  chest wall non-tender, lungs clear, normal breath sounds


Abdomen:  soft, decreased bowel sounds


Extremities:  normal inspection


Edema:  no edema noted Arm (L), no edema noted Arm (R), no edema noted Leg (L), 

no edema noted Leg (R), no edema noted Pedal (L), no edema noted Pedal (R), no 

edema noted Generalized


Neurologic:  motor weakness


Skin:  normal pigmentation, warm/dry











Tom Pierce DO May 21, 2020 12:15

## 2020-05-21 NOTE — INFECTIOUS DISEASES PROG NOTE
Assessment/Plan


Assessment/Plan








Assessment:


Severe Sepsis


Enterobacter bacteremia- likely from GI source


S. epi bacteremia, contaminant


GGO on lung bases- COVID19 neg x2


    -5/21 CTA chest: STUDY POSITIVE FOR BILATERAL PULMONARY EMBOLISM. BILATERAL 

PLEURAL EFFUSIONS WITH DEPENDENT INFILTRATES/CONSOLIDATION IN THE LUNG


BASES.POSTOPERATIVE CHANGES IN THE UPPER ABDOMEN WITH ANASTOMOTIC SUTURES LEFT 

UPPER QUADRANT AND MIDLINE SKIN STAPLES. FREE FLUID AND SCATTERED SMALL FOCI OF 

FREE AIR ALSO NOTED IN LEFT UPPER QUADRANT FROM RECENT ABDOMINAL SURGERY. 

HEPATIC CYST AND GRANULOMA.


   -5/19 CXR:  Left retrocardiac consolidation and left pleural effusion.


  -5/15 SARS-COV2 PCR neg


  -5/12 Bcx 1/4 S.epi, 2/4 E. aerogenes (pan S); 5/13 Bcx Neg


            CXR: no acute disease


            SARS-COV2 PCR





Afebrile


Leukocytosis; persistent- increased post-op- now improving


   5/20 u/a wbc 10-15, nit neg, leuk +2





Severe anemia (blood loss)/GIB likely 2ry to gastric mass


Thrombocytopenia, worsening


  -5/18 SP ex lap, left colectomy, partial gastrectomy, splenectomy, distal 

pancreatectomy, omentectomy , open lysis of adhesions


          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 

invading spleen, posterior stomach, pancreatic tail, invasive, likely seemingly 

perforated.


                       --path: invasive adenocarcinoma, moderately 

differentiated





  -CT abd/p:   Centrally necrotic mass arising from the gastric fundus 

measuring 12. 6 x 11.1 x 14 cm.  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  

Gastric adenocarcinoma, lymphoma, and other neoplastic etiologies are also in 

the differential. Filling defects within pulmonary vessels at the lung bases 

with small peripheral airspace opacities concerning for pulmonary emboli and 

small pulmonary infarcts.








Mild AST elevation





Lactic acidosis, SP


   -5/12 u/a neg; ucx 30-40K mixed urogenital contaminants





Diarrhea


    -Cdiff,stool cx neg





Syncopal episode- likely due to severe dehydration and anemia


  -CT head: No acute findings in the head/brain.





Acute PE/DVT -(per CT abd/p findings)


  -sp IVC filter placement 5/13 (retrievable- given presence of bacteremia)





PABLO, SP





HTN





Plan:


  


- Continue Meropenem and PO Zyvox #2 (abx d #10) given worsening WBC


-Contmpiric Micafungin #3 for intraabdominal coverage


      -5/20 SP Cefepime #6, Flagyl #6


      - 5/15/20 SP Zosyn #4 and Vancomycin #4





-f/u cx


-Monitor CBC/CMP, temperature


-ICU care/aspiration precautions


-COVID19 neg x2


-f/u repeat Bcx


-Sx,pulm, Heme/onc, GI f/u


-wound care per surgical team


-f/u u/a w/ reflex, Bcx x2





Thank you for consulting [facility]. Will continue to follow along wiht 

you.





Discussed with RN.





Subjective


Allergies:  


Coded Allergies:  


     No Known Allergies (Unverified , 5/12/20)


Subjective


afebrile


at RA


wbc improving





Objective


Vital Signs





Last 24 Hour Vital Signs








  Date Time  Temp Pulse Resp B/P (MAP) Pulse Ox O2 Delivery O2 Flow Rate FiO2


 


5/21/20 13:09 97.5       


 


5/21/20 12:42      Room Air  





      Room Air  


 


5/21/20 12:00 97.9 81 18 117/73 (88) 96   


 


5/21/20 12:00  89      


 


5/21/20 08:00 97.5 70 20 112/69 (83) 97   


 


5/21/20 08:00  81      


 


5/21/20 08:00      Room Air  





      Room Air  


 


5/21/20 04:44      Room Air  





      Room Air  


 


5/21/20 04:00 98.5 90 18 117/76 (90) 98   


 


5/21/20 04:00  85      


 


5/21/20 00:00      Room Air  





      Room Air  


 


5/21/20 00:00 98.4 86 16 117/68 (84) 99   


 


5/21/20 00:00  80      


 


5/20/20 20:00 98.0 95 18 115/73 (87) 98   


 


5/20/20 20:00  92      


 


5/20/20 20:00     95 Room Air  21


 


5/20/20 20:00      Room Air  





      Room Air  


 


5/20/20 18:00  99 25 117/72 (87)    


 


5/20/20 17:00 98.7 92 21 116/69 (85) 100   


 


5/20/20 16:00  91 11 109/68 (82) 100   


 


5/20/20 16:00      Room Air  





      Room Air  








Height (Feet):  5


Height (Inches):  7.00


Weight (Pounds):  187


Objective


HEAD AND NECK:  No JVD.


LUNGS:  Decreased breath sounds.


CARDIOVASCULAR:  Regular S1 and S2.  Tachycardic.


ABDOMEN: Post op dressings in place, ALLISON drain


EXTREMITIES:  No pitting edema.





Microbiology








 Date/Time


Source Procedure


Growth Status


 


 


 5/20/20 19:00


Urine,Clean Catch Urine Culture - Preliminary


NO GROWTH Resulted











Laboratory Tests








Test


  5/20/20


19:00 5/21/20


05:00


 


Urine Color Stacey   


 


Urine Appearance


  Slightly


cloudy 


 


 


Urine pH 5 (4.5-8.0)   


 


Urine Specific Gravity


  1.020


(1.005-1.035) 


 


 


Urine Protein


  2+ (NEGATIVE)


H 


 


 


Urine Glucose (UA)


  Negative


(NEGATIVE) 


 


 


Urine Ketones


  3+ (NEGATIVE)


H 


 


 


Urine Blood


  3+ (NEGATIVE)


H 


 


 


Urine Nitrite


  Negative


(NEGATIVE) 


 


 


Urine Bilirubin


  Negative


(NEGATIVE) 


 


 


Urine Ictotest


  Negative


(NEGATIVE) 


 


 


Urine Urobilinogen


  Normal MG/DL


(0.0-1.0) 


 


 


Urine Leukocyte Esterase


  2+ (NEGATIVE)


H 


 


 


Urine RBC


  5-10 /HPF (0 -


0)  H 


 


 


Urine WBC


  10-15 /HPF (0


- 0)  H 


 


 


Urine Squamous Epithelial


Cells Few /LPF


(NONE/OCC) 


 


 


Urine Uric Acid Crystals


  Moderate /LPF


(NONE)  H 


 


 


Urine Bacteria


  Moderate /HPF


(NONE)  H 


 


 


White Blood Count


  


  20.2 K/UL


(4.8-10.8)  H


 


Red Blood Count


  


  2.69 M/UL


(4.70-6.10)  L


 


Hemoglobin


  


  7.7 G/DL


(14.2-18.0)  L


 


Hematocrit


  


  22.7 %


(42.0-52.0)  L


 


Mean Corpuscular Volume  84 FL (80-99)  


 


Mean Corpuscular Hemoglobin


  


  28.6 PG


(27.0-31.0)


 


Mean Corpuscular Hemoglobin


Concent 


  34.0 G/DL


(32.0-36.0)


 


Red Cell Distribution Width


  


  13.8 %


(11.6-14.8)


 


Platelet Count


  


  274 K/UL


(150-450)


 


Mean Platelet Volume


  


  5.0 FL


(6.5-10.1)  L


 


Neutrophils (%) (Auto)


  


  % (45.0-75.0)


 


 


Lymphocytes (%) (Auto)


  


  % (20.0-45.0)


 


 


Monocytes (%) (Auto)   % (1.0-10.0)  


 


Eosinophils (%) (Auto)   % (0.0-3.0)  


 


Basophils (%) (Auto)   % (0.0-2.0)  


 


Differential Total Cells


Counted 


  100  


 


 


Neutrophils % (Manual)  95 % (45-75)  H


 


Lymphocytes % (Manual)  3 % (20-45)  L


 


Monocytes % (Manual)  2 % (1-10)  


 


Eosinophils % (Manual)  0 % (0-3)  


 


Basophils % (Manual)  0 % (0-2)  


 


Band Neutrophils  0 % (0-8)  


 


Platelet Estimate  Adequate  


 


Platelet Morphology  Normal  


 


Polychromasia  1+  


 


Hypochromasia  1+  


 


Prothrombin Time


  


  15.4 SEC


(9.30-11.50)  H


 


Prothromb Time International


Ratio 


  1.4 (0.9-1.1)


H


 


Activated Partial


Thromboplast Time 


  35 SEC (23-33)


H


 


Sodium Level


  


  142 MMOL/L


(136-145)


 


Potassium Level


  


  3.3 MMOL/L


(3.5-5.1)  L


 


Chloride Level


  


  109 MMOL/L


()  H


 


Carbon Dioxide Level


  


  23 MMOL/L


(21-32)


 


Anion Gap


  


  10 mmol/L


(5-15)


 


Blood Urea Nitrogen


  


  8 mg/dL (7-18)


 


 


Creatinine


  


  0.5 MG/DL


(0.55-1.30)  L


 


Estimat Glomerular Filtration


Rate 


  > 60 mL/min


(>60)


 


Glucose Level


  


  98 MG/DL


()


 


Calcium Level


  


  7.1 MG/DL


(8.5-10.1)  L











Current Medications








 Medications


  (Trade)  Dose


 Ordered  Sig/Marisa


 Route


 PRN Reason  Start Time


 Stop Time Status Last Admin


Dose Admin


 


 Albuterol/


 Ipratropium


  (Albuterol/


 Ipratropium)  3 ml  Q4H  PRN


 HHN


 Shortness of Breath  5/20/20 19:00


 5/21/20 18:59   


 


 


 Dextrose


  (Dextrose 50%)  25 ml  Q30M  PRN


 IV


 Hypoglycemia  5/20/20 18:45


 8/11/20 06:14   


 


 


 Dextrose


  (Dextrose 50%)  50 ml  Q30M  PRN


 IV


 Hypoglycemia  5/20/20 18:45


 8/11/20 06:14   


 


 


 Diphenhydramine


 HCl


  (Benadryl)  12.5 mg  Q6H  PRN


 IVP


 Itching/Pruritis  5/20/20 19:00


 6/17/20 18:59   


 


 


 Hydromorphone HCl


  (Dilaudid)  0.5 mg  Q3H  PRN


 IVP


 Pain Score 1-3  5/20/20 19:00


 5/25/20 18:59   


 


 


 Hydromorphone HCl


  (Dilaudid)  1 mg  Q3H  PRN


 IVP


 pain score 4-6  5/20/20 21:00


 5/25/20 20:59   


 


 


 Hydromorphone HCl


  (Dilaudid)  2 mg  Q3H  PRN


 IVP


 pain score 7-10  5/20/20 19:00


 5/25/20 18:59  5/21/20 15:37


 


 


 Iohexol


  (Omnipaque 350


 100ml)  100 ml  NOW  PRN


 INJ


 Radiology Procedure  5/21/20 15:00


 5/22/20 14:59   


 


 


 Linezolid  300 ml @ 


 300 mls/hr  Q12HR


 IVPB


   5/20/20 21:00


 5/27/20 15:59  5/21/20 09:01


 


 


 Meropenem 1 gm/


 Sodium Chloride  55 ml @ 


 110 mls/hr  Q8H


 IVPB


   5/21/20 00:00


 5/25/20 15:59  5/21/20 08:55


 


 


 Micafungin Sodium


 100 mg/Sodium


 Chloride  110 ml @ 


 110 mls/hr  Q24H


 IVPB


   5/21/20 18:00


 5/26/20 17:59   


 


 


 Ondansetron HCl


  (Zofran)  4 mg  Q6H  PRN


 IVP


 Nausea & Vomiting  5/20/20 19:00


 6/17/20 18:59   


 


 


 Pantoprazole


  (Protonix)  40 mg  EVERY 12  HOURS


 IV


   5/20/20 21:00


 6/15/20 08:59  5/21/20 08:55


 


 


 Sodium Chloride  1,000 ml @ 


 100 mls/hr  Q10H


 IV


   5/20/20 18:45


 6/17/20 18:59  5/21/20 14:45


 

















Alexa Gonzalez M.D. May 21, 2020 15:45

## 2020-05-21 NOTE — HEMATOLOGY/ONC PROGRESS NOTE
Assessment/Plan


Assessment/Plan


Assessment and Recs


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


--> CT shows    Centrally necrotic mass arising from the gastric fundus 

measuring 12. 6 x 11.1 x 14 cm.  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  

Gastric adenocarcinoma, lymphoma, and other neoplastic etiologies are also in 

the differential. Filling defects within pulmonary vessels at the lung bases 

with small peripheral airspace opacities concerning for pulmonary emboli and 

small pulmonary infarcts.


--> per gi and surgery to obtain a biopsy


--> ideally requires resection if possible with as much as possible negative 

margins for 5/18/20


--> adjuvant chemotherapy v pill (imatinib) based on type of cancer


# Pulmonary embolism -- likely related to hypercoagulable disorder from 

malignancys/p ivc filter


--> case dw pcp, surg, and pulm


--> is not a candidate for anticoagulation given severe anemia and low platelets


--> 5/13 s/p ivc filter placement


--> 5/19 cxr: Left retrocardiac consolidation and left pleural effusion.


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


--> have ordered for repeat prbc transfusions


--> hgb goal is >7


--> have reviewed anemia panel


--> gi to access in re to biopsy/endoscopy


--> IV IRON completed 


--> hgb trend: 8.6-->9.7-->8-->7.7 


# Leukocytosis is likely reactive process from cancer


--> on abx as needed


--> smear is noted


--> meds are reviewed


--> vanc/zosyn-->cefepime/flagyl-->mekhi 


--> wbc trend: 21.6-->18-->24.1-->26.7-->20.2 


--> covid negative x2 since admission 


# Diarrhea


--> c diff negative 


# PABLO, improving


# HTN


# Dvt ppx scds





The timing of this note does not necessarily reflect the time of the patient 

was seen.





Greatly appreciate consultation.





Subjective


Allergies:  


Coded Allergies:  


     No Known Allergies (Unverified , 5/12/20)


Subjective


5/14 labs are noted, no bleeding, meds reviewed, for egd when iso off


5/15 icu, no overnight events, alert, iv iron, c diff negative 


5/17 on tele, wbc improving, nc, tarango, no distress 


5/18 for surgery this am, have ordered for ffp and vit k, seen by surg, 

anesthesia


5/19 icu, wbc 24.1, iv abx, afebrile, vent, no acute distress 


5/20 refusing scd's, labs reviewed, no sob, hr 105 


5/21 transferred to [facility], alert, room air, hgb 7.7





Objective


Objective





Current Medications








 Medications


  (Trade)  Dose


 Ordered  Sig/Marisa


 Route


 PRN Reason  Start Time


 Stop Time Status Last Admin


Dose Admin


 


 Albuterol/


 Ipratropium


  (Albuterol/


 Ipratropium)  3 ml  Q4H  PRN


 HHN


 Shortness of Breath  5/20/20 19:00


 5/21/20 18:59   


 


 


 Dextrose


  (Dextrose 50%)  25 ml  Q30M  PRN


 IV


 Hypoglycemia  5/20/20 18:45


 8/11/20 06:14   


 


 


 Dextrose


  (Dextrose 50%)  50 ml  Q30M  PRN


 IV


 Hypoglycemia  5/20/20 18:45


 8/11/20 06:14   


 


 


 Diphenhydramine


 HCl


  (Benadryl)  12.5 mg  Q6H  PRN


 IVP


 Itching/Pruritis  5/20/20 19:00


 6/17/20 18:59   


 


 


 Hydromorphone HCl


  (Dilaudid)  0.5 mg  Q3H  PRN


 IVP


 Pain Score 1-3  5/20/20 19:00


 5/25/20 18:59   


 


 


 Hydromorphone HCl


  (Dilaudid)  1 mg  Q3H  PRN


 IVP


 pain score 4-6  5/20/20 21:00


 5/25/20 20:59   


 


 


 Hydromorphone HCl


  (Dilaudid)  2 mg  Q3H  PRN


 IVP


 pain score 7-10  5/20/20 19:00


 5/25/20 18:59  5/21/20 02:34


 


 


 Iohexol


  (Omnipaque 350


 100ml)  100 ml  NOW  PRN


 INJ


 Radiology Procedure  5/21/20 15:00


 5/22/20 14:59   


 


 


 Linezolid  300 ml @ 


 300 mls/hr  Q12HR


 IVPB


   5/20/20 21:00


 5/27/20 15:59  5/20/20 21:15


 


 


 Meropenem 1 gm/


 Sodium Chloride  55 ml @ 


 110 mls/hr  Q8H


 IVPB


   5/21/20 00:00


 5/25/20 15:59  5/20/20 23:52


 


 


 Micafungin Sodium


 100 mg/Sodium


 Chloride  110 ml @ 


 110 mls/hr  Q24H


 IVPB


   5/21/20 18:00


 5/26/20 17:59   


 


 


 Ondansetron HCl


  (Zofran)  4 mg  Q6H  PRN


 IVP


 Nausea & Vomiting  5/20/20 19:00


 6/17/20 18:59   


 


 


 Pantoprazole


  (Protonix)  40 mg  EVERY 12  HOURS


 IV


   5/20/20 21:00


 6/15/20 08:59  5/20/20 21:15


 


 


 Sodium Chloride  1,000 ml @ 


 100 mls/hr  Q10H


 IV


   5/20/20 18:45


 6/17/20 18:59  5/20/20 22:15


 











Last 24 Hour Vital Signs








  Date Time  Temp Pulse Resp B/P (MAP) Pulse Ox O2 Delivery O2 Flow Rate FiO2


 


5/21/20 04:44      Room Air  





      Room Air  


 


5/21/20 04:00 98.5 90 18 117/76 (90) 98   


 


5/21/20 04:00  85      


 


5/21/20 00:00      Room Air  





      Room Air  


 


5/21/20 00:00 98.4 86 16 117/68 (84) 99   


 


5/21/20 00:00  80      


 


5/20/20 20:00 98.0 95 18 115/73 (87) 98   


 


5/20/20 20:00  92      


 


5/20/20 20:00     95 Room Air  21


 


5/20/20 20:00      Room Air  





      Room Air  


 


5/20/20 18:00  99 25 117/72 (87)    


 


5/20/20 17:00 98.7 92 21 116/69 (85) 100   


 


5/20/20 16:00  91 11 109/68 (82) 100   


 


5/20/20 16:00      Room Air  





      Room Air  


 


5/20/20 15:24  96      


 


5/20/20 15:00  97 16 108/71 (83) 99   


 


5/20/20 14:00  93 24 110/70 (83) 100   


 


5/20/20 13:00  94 24 107/67 (80) 100   


 


5/20/20 12:00  98      


 


5/20/20 12:00      Room Air  





      Room Air  


 


5/20/20 12:00 99.5 101 25 112/66 (81) 100   


 


5/20/20 11:30      Room Air  





      Room Air  


 


5/20/20 11:00  99 24 122/69 (86) 100   


 


5/20/20 10:00  102 23 122/69 (86) 100   


 


5/20/20 09:00  105 23 109/71 (84) 99   


 


5/20/20 08:00      Room Air  





      Room Air  


 


5/20/20 08:00  101      


 


5/20/20 08:00 99.7 103 19 100/75 (83) 99   


 


5/20/20 07:00  105 17 110/70 (83) 98   


 


5/20/20 06:00  105 22 109/67 (81) 100   


 


5/20/20 05:00  107 20 120/76 (91) 99   


 


5/20/20 04:00  106      


 


5/20/20 04:00 99.2 108 24 111/77 (88) 99   


 


5/20/20 04:00      Room Air  





      Room Air  


 


5/20/20 03:00  109 24 115/72 (86) 100   


 


5/20/20 02:00  108 23 111/70 (84) 98   


 


5/20/20 01:00  112 22 112/72 (85) 98   


 


5/20/20 00:00  108      


 


5/20/20 00:00      Room Air  





      Room Air  


 


5/20/20 00:00 98.5 109 22 110/68 (82) 99   


 


5/19/20 23:00  111 16 119/65 (83) 98   


 


5/19/20 22:00  111 17 108/68 (81) 98   


 


5/19/20 21:00  113 23 117/65 (82) 100   


 


5/19/20 20:00  105      


 


5/19/20 20:00      Room Air  





      Room Air  


 


5/19/20 20:00 98.3 109 15 110/66 (81) 97   


 


5/19/20 19:00  103 22 112/68 (83) 100   


 


5/19/20 18:00  103 21 117/69 (85) 100   


 


5/19/20 17:00  103 15 108/72 (84) 100   


 


5/19/20 16:00 98.7 106 15 113/73 (86) 100   


 


5/19/20 16:00      Room Air  





      Room Air  


 


5/19/20 16:00  107      


 


5/19/20 15:00  106 18 102/72 (82) 98   


 


5/19/20 14:00  99 24 114/79 (91) 100   


 


5/19/20 13:00  99 20 111/77 (88) 100   


 


5/19/20 12:00      Room Air 2.0 





      Room Air  


 


5/19/20 12:00  104      


 


5/19/20 12:00 98.8 99 20 113/78 (90) 100   


 


5/19/20 11:00  98 21 117/75 (89) 100   


 


5/19/20 10:58       2.0 


 


5/19/20 10:00  105 17 115/74 (88) 100   


 


5/19/20 09:16  122 12  100   


 


5/19/20 09:00  111 19 111/79 (90) 100   


 


5/19/20 08:30      Nasal Cannula 4.0 36


 


5/19/20 08:30       4.0 


 


5/19/20 08:30     100  4.0 36


 


5/19/20 08:10  122 23     30





        30

















Intake and Output  


 


 5/20/20 5/21/20





 19:00 07:00


 


Intake Total 1720 ml 1055 ml


 


Output Total 765 ml 500 ml


 


Balance 955 ml 555 ml


 


  


 


IV Total 1720 ml 1055 ml


 


Output Urine Total 740 ml 500 ml


 


Stool Total  0 ml


 


Drainage Total 25 ml 











Labs








Test


  5/19/20


03:25 5/20/20


04:10 5/20/20


19:00 5/21/20


05:00


 


White Blood Count


  24.1 K/UL


(4.8-10.8) 26.7 K/UL


(4.8-10.8) 


  20.2 K/UL


(4.8-10.8)


 


Red Blood Count


  3.37 M/UL


(4.70-6.10) 2.79 M/UL


(4.70-6.10) 


  2.69 M/UL


(4.70-6.10)


 


Hemoglobin


  9.7 G/DL


(14.2-18.0) 8.0 G/DL


(14.2-18.0) 


  7.7 G/DL


(14.2-18.0)


 


Hematocrit


  28.1 %


(42.0-52.0) 23.5 %


(42.0-52.0) 


  22.7 %


(42.0-52.0)


 


Mean Corpuscular Volume 84 FL (80-99)  84 FL (80-99)   84 FL (80-99) 


 


Mean Corpuscular Hemoglobin


  28.8 PG


(27.0-31.0) 28.7 PG


(27.0-31.0) 


  28.6 PG


(27.0-31.0)


 


Mean Corpuscular Hemoglobin


Concent 34.4 G/DL


(32.0-36.0) 34.1 G/DL


(32.0-36.0) 


  34.0 G/DL


(32.0-36.0)


 


Red Cell Distribution Width


  14.0 %


(11.6-14.8) 14.1 %


(11.6-14.8) 


  13.8 %


(11.6-14.8)


 


Platelet Count


  173 K/UL


(150-450) 237 K/UL


(150-450) 


  274 K/UL


(150-450)


 


Mean Platelet Volume


  5.8 FL


(6.5-10.1) 5.0 FL


(6.5-10.1) 


  5.0 FL


(6.5-10.1)


 


Neutrophils (%) (Auto)  % (45.0-75.0)   % (45.0-75.0)    % (45.0-75.0) 


 


Lymphocytes (%) (Auto)  % (20.0-45.0)   % (20.0-45.0)    % (20.0-45.0) 


 


Monocytes (%) (Auto)  % (1.0-10.0)   % (1.0-10.0)    % (1.0-10.0) 


 


Eosinophils (%) (Auto)  % (0.0-3.0)   % (0.0-3.0)    % (0.0-3.0) 


 


Basophils (%) (Auto)  % (0.0-2.0)   % (0.0-2.0)    % (0.0-2.0) 


 


Differential Total Cells


Counted 100 


  100 


  


  


 


 


Neutrophils % (Manual) 88 % (45-75)  89 % (45-75)   


 


Lymphocytes % (Manual) 7 % (20-45)  10 % (20-45)   


 


Monocytes % (Manual) 5 % (1-10)  1 % (1-10)   


 


Eosinophils % (Manual) 0 % (0-3)  0 % (0-3)   


 


Basophils % (Manual) 0 % (0-2)  0 % (0-2)   


 


Band Neutrophils 0 % (0-8)  0 % (0-8)   


 


Platelet Estimate Adequate  Adequate   


 


Platelet Morphology Normal  Normal   


 


Hypochromasia 2+  1+   


 


Anisocytosis 1+  1+   


 


Prothrombin Time


  17.0 SEC


(9.30-11.50) 


  


  15.4 SEC


(9.30-11.50)


 


Prothromb Time International


Ratio 1.6 (0.9-1.1) 


  


  


  1.4 (0.9-1.1) 


 


 


Activated Partial


Thromboplast Time 38 SEC (23-33) 


  


  


  35 SEC (23-33) 


 


 


Sodium Level


  140 MMOL/L


(136-145) 141 MMOL/L


(136-145) 


  142 MMOL/L


(136-145)


 


Potassium Level


  4.3 MMOL/L


(3.5-5.1) 3.7 MMOL/L


(3.5-5.1) 


  3.3 MMOL/L


(3.5-5.1)


 


Chloride Level


  109 MMOL/L


() 111 MMOL/L


() 


  109 MMOL/L


()


 


Carbon Dioxide Level


  22 MMOL/L


(21-32) 21 MMOL/L


(21-32) 


  23 MMOL/L


(21-32)


 


Anion Gap


  9 mmol/L


(5-15) 9 mmol/L


(5-15) 


  10 mmol/L


(5-15)


 


Blood Urea Nitrogen


  11 mg/dL


(7-18) 8 mg/dL (7-18) 


  


  8 mg/dL (7-18) 


 


 


Creatinine


  0.9 MG/DL


(0.55-1.30) 0.6 MG/DL


(0.55-1.30) 


  0.5 MG/DL


(0.55-1.30)


 


Estimat Glomerular Filtration


Rate > 60 mL/min


(>60) > 60 mL/min


(>60) 


  > 60 mL/min


(>60)


 


Glucose Level


  123 MG/DL


() 93 MG/DL


() 


  98 MG/DL


()


 


Lactic Acid Level


  1.60 mmol/L


(0.4-2.0) 


  


  


 


 


Calcium Level


  7.1 MG/DL


(8.5-10.1) 6.9 MG/DL


(8.5-10.1) 


  7.1 MG/DL


(8.5-10.1)


 


Total Bilirubin


  0.4 MG/DL


(0.2-1.0) 0.3 MG/DL


(0.2-1.0) 


  


 


 


Aspartate Amino Transf


(AST/SGOT) 31 U/L (15-37) 


  22 U/L (15-37) 


  


  


 


 


Alanine Aminotransferase


(ALT/SGPT) 11 U/L (12-78) 


  16 U/L (12-78) 


  


  


 


 


Alkaline Phosphatase


  48 U/L


() 52 U/L


() 


  


 


 


Total Protein


  4.9 G/DL


(6.4-8.2) 4.6 G/DL


(6.4-8.2) 


  


 


 


Albumin


  1.9 G/DL


(3.4-5.0) 1.4 G/DL


(3.4-5.0) 


  


 


 


Globulin 3.0 g/dL  3.2 g/dL   


 


Albumin/Globulin Ratio 0.6 (1.0-2.7)  0.4 (1.0-2.7)   


 


Amylase Level


  57 U/L


() 


  


  


 


 


Lipase


  141 U/L


() 


  


  


 


 


Polychromasia  1+   


 


Urine Color   Stacey  


 


Urine Appearance


  


  


  Slightly


cloudy 


 


 


Urine pH   5 (4.5-8.0)  


 


Urine Specific Gravity


  


  


  1.020


(1.005-1.035) 


 


 


Urine Protein   2+ (NEGATIVE)  


 


Urine Glucose (UA)


  


  


  Negative


(NEGATIVE) 


 


 


Urine Ketones   3+ (NEGATIVE)  


 


Urine Blood   3+ (NEGATIVE)  


 


Urine Nitrite


  


  


  Negative


(NEGATIVE) 


 


 


Urine Bilirubin


  


  


  Negative


(NEGATIVE) 


 


 


Urine Ictotest


  


  


  Negative


(NEGATIVE) 


 


 


Urine Urobilinogen


  


  


  Normal MG/DL


(0.0-1.0) 


 


 


Urine Leukocyte Esterase   2+ (NEGATIVE)  


 


Urine RBC


  


  


  5-10 /HPF (0 -


0) 


 


 


Urine WBC


  


  


  10-15 /HPF (0


- 0) 


 


 


Urine Squamous Epithelial


Cells 


  


  Few /LPF


(NONE/OCC) 


 


 


Urine Uric Acid Crystals


  


  


  Moderate /LPF


(NONE) 


 


 


Urine Bacteria


  


  


  Moderate /HPF


(NONE) 


 








Height (Feet):  5


Height (Inches):  7.00


Weight (Pounds):  187


Objective


PE


General Appearance:  alert, mild distress


Head:  normocephalic, atraumatic


Eyes:  bilateral eye PERRL, bilateral eye EOMI


ENT:  uvula midline, dry mucus membranes


Neck:  supple, thyroid normal, supple/symm/no masses


Respiratory:  lungs clear, no respiratory distress, no retraction


Cardiovascular:  normal peripheral pulses, no edema


Gastrointestinal:  non tender, soft, no guarding, no rebound


Musculoskeletal:  normal inspection


Neurologic:  alert, oriented x3


Psychiatric:  mood/affect normal


Skin:  no rash, warm/dry











Jersey Ross MD May 21, 2020 08:08

## 2020-05-21 NOTE — PULMONOLOGY PROGRESS NOTE
Subjective


ROS Limited/Unobtainable:  No


Interval Events:  s/p laparotomy 5/18/20; extubated 5/19/20


Constitutional:  Reports: no symptoms


HEENT:  Repors: no symptoms


Respiratory:  Reports: no symptoms


Cardiovascular:  Reports: no symptoms


Gastrointestinal/Abdominal:  Reports: no symptoms


Genitourinary:  Reports: no symptoms


Neurologic:  Reports: no symptoms


Allergies:  


Coded Allergies:  


     No Known Allergies (Unverified , 5/12/20)


All Systems:  reviewed and negative except above





Objective





Last 24 Hour Vital Signs








  Date Time  Temp Pulse Resp B/P (MAP) Pulse Ox O2 Delivery O2 Flow Rate FiO2


 


5/21/20 08:00 97.5 70 20 112/69 (83) 97   


 


5/21/20 08:00      Room Air  





      Room Air  


 


5/21/20 04:44      Room Air  





      Room Air  


 


5/21/20 04:00 98.5 90 18 117/76 (90) 98   


 


5/21/20 04:00  85      


 


5/21/20 00:00      Room Air  





      Room Air  


 


5/21/20 00:00 98.4 86 16 117/68 (84) 99   


 


5/21/20 00:00  80      


 


5/20/20 20:00 98.0 95 18 115/73 (87) 98   


 


5/20/20 20:00  92      


 


5/20/20 20:00     95 Room Air  21


 


5/20/20 20:00      Room Air  





      Room Air  


 


5/20/20 18:00  99 25 117/72 (87)    


 


5/20/20 17:00 98.7 92 21 116/69 (85) 100   


 


5/20/20 16:00  91 11 109/68 (82) 100   


 


5/20/20 16:00      Room Air  





      Room Air  


 


5/20/20 15:24  96      


 


5/20/20 15:00  97 16 108/71 (83) 99   


 


5/20/20 14:00  93 24 110/70 (83) 100   


 


5/20/20 13:00  94 24 107/67 (80) 100   


 


5/20/20 12:00  98      


 


5/20/20 12:00      Room Air  





      Room Air  


 


5/20/20 12:00 99.5 101 25 112/66 (81) 100   


 


5/20/20 11:30      Room Air  





      Room Air  


 


5/20/20 11:00  99 24 122/69 (86) 100   


 


5/20/20 10:00  102 23 122/69 (86) 100   

















Intake and Output  


 


 5/20/20 5/21/20





 19:00 07:00


 


Intake Total 1720 ml 1055 ml


 


Output Total 765 ml 500 ml


 


Balance 955 ml 555 ml


 


  


 


IV Total 1720 ml 1055 ml


 


Output Urine Total 740 ml 500 ml


 


Stool Total  0 ml


 


Drainage Total 25 ml 








General Appearance:  no acute distress


HEENT:  normocephalic


Respiratory:  chest wall non-tender, lungs clear


Cardiovascular:  normal peripheral pulses


Abdomen:  non distended


Extremities:  no cyanosis





Microbiology








 Date/Time


Source Procedure


Growth Status


 


 


 5/20/20 19:00


Urine,Clean Catch Urine Culture - Preliminary


NO GROWTH Resulted








Laboratory Tests


5/20/20 19:00: 


Urine Color Stacey, Urine Appearance Slightly cloudy, Urine pH 5, Urine Specific 

Gravity 1.020, Urine Protein 2+H, Urine Glucose (UA) Negative, Urine Ketones 3+H

, Urine Blood 3+H, Urine Nitrite Negative, Urine Bilirubin Negative, Urine 

Ictotest Negative, Urine Urobilinogen Normal, Urine Leukocyte Esterase 2+H, 

Urine RBC 5-10H, Urine WBC 10-15H, Urine Squamous Epithelial Cells Few, Urine 

Uric Acid Crystals ModerateH, Urine Bacteria ModerateH


5/21/20 05:00: 


White Blood Count 20.2H, Red Blood Count 2.69L, Hemoglobin 7.7L, Hematocrit 

22.7L, Mean Corpuscular Volume 84, Mean Corpuscular Hemoglobin 28.6, Mean 

Corpuscular Hemoglobin Concent 34.0, Red Cell Distribution Width 13.8, Platelet 

Count 274, Mean Platelet Volume 5.0L, Neutrophils (%) (Auto) , Lymphocytes (%) (

Auto) , Monocytes (%) (Auto) , Eosinophils (%) (Auto) , Basophils (%) (Auto) , 

Differential Total Cells Counted 100, Neutrophils % (Manual) 95H, Lymphocytes % 

(Manual) 3L, Monocytes % (Manual) 2, Eosinophils % (Manual) 0, Basophils % (

Manual) 0, Band Neutrophils 0, Platelet Estimate Adequate, Platelet Morphology 

Normal, Polychromasia 1+, Hypochromasia 1+, Prothrombin Time 15.4H, Prothromb 

Time International Ratio 1.4H, Activated Partial Thromboplast Time 35H, Sodium 

Level 142, Potassium Level 3.3L, Chloride Level 109H, Carbon Dioxide Level 23, 

Anion Gap 10, Blood Urea Nitrogen 8, Creatinine 0.5L, Estimat Glomerular 

Filtration Rate > 60, Glucose Level 98, Calcium Level 7.1L





Current Medications








 Medications


  (Trade)  Dose


 Ordered  Sig/Marisa


 Route


 PRN Reason  Start Time


 Stop Time Status Last Admin


Dose Admin


 


 Albuterol/


 Ipratropium


  (Albuterol/


 Ipratropium)  3 ml  Q4H  PRN


 HHN


 Shortness of Breath  5/20/20 19:00


 5/21/20 18:59   


 


 


 Dextrose


  (Dextrose 50%)  25 ml  Q30M  PRN


 IV


 Hypoglycemia  5/20/20 18:45


 8/11/20 06:14   


 


 


 Dextrose


  (Dextrose 50%)  50 ml  Q30M  PRN


 IV


 Hypoglycemia  5/20/20 18:45


 8/11/20 06:14   


 


 


 Diphenhydramine


 HCl


  (Benadryl)  12.5 mg  Q6H  PRN


 IVP


 Itching/Pruritis  5/20/20 19:00


 6/17/20 18:59   


 


 


 Hydromorphone HCl


  (Dilaudid)  0.5 mg  Q3H  PRN


 IVP


 Pain Score 1-3  5/20/20 19:00


 5/25/20 18:59   


 


 


 Hydromorphone HCl


  (Dilaudid)  1 mg  Q3H  PRN


 IVP


 pain score 4-6  5/20/20 21:00


 5/25/20 20:59   


 


 


 Hydromorphone HCl


  (Dilaudid)  2 mg  Q3H  PRN


 IVP


 pain score 7-10  5/20/20 19:00


 5/25/20 18:59  5/21/20 02:34


 


 


 Iohexol


  (Omnipaque 350


 100ml)  100 ml  NOW  PRN


 INJ


 Radiology Procedure  5/21/20 15:00


 5/22/20 14:59   


 


 


 Linezolid  300 ml @ 


 300 mls/hr  Q12HR


 IVPB


   5/20/20 21:00


 5/27/20 15:59  5/21/20 09:01


 


 


 Meropenem 1 gm/


 Sodium Chloride  55 ml @ 


 110 mls/hr  Q8H


 IVPB


   5/21/20 00:00


 5/25/20 15:59  5/21/20 08:55


 


 


 Micafungin Sodium


 100 mg/Sodium


 Chloride  110 ml @ 


 110 mls/hr  Q24H


 IVPB


   5/21/20 18:00


 5/26/20 17:59   


 


 


 Ondansetron HCl


  (Zofran)  4 mg  Q6H  PRN


 IVP


 Nausea & Vomiting  5/20/20 19:00


 6/17/20 18:59   


 


 


 Pantoprazole


  (Protonix)  40 mg  EVERY 12  HOURS


 IV


   5/20/20 21:00


 6/15/20 08:59  5/21/20 08:55


 


 


 Sodium Chloride  1,000 ml @ 


 100 mls/hr  Q10H


 IV


   5/20/20 18:45


 6/17/20 18:59  5/20/20 22:15


 











Assessment/Plan


Assessment/Plan


IMPRESSION:


1. Gastric mass.S/p laparotomy


2. Severe anemia. Corrected


3. Pulmonary embolism.





DISCUSSION:  





S/p IVC filter.  S/p blood transfusion, IV fluids.  


S/p ex lap, left colectomy, partial gastrectomy, splenectomy, distal 

pancreatectomy, omentectomy 


and  open lysis of adhesions 


I will follow carefully.


Extubated 5/19/20


COVID 19 pcr negative


Currently on RA

















  ______________________________________________


  MARIANELA Downs Omar Syed MD May 21, 2020 09:50

## 2020-05-21 NOTE — NUR
NURSE NOTES:

Received pt and report from JOSIAH Pa. Observed pt resting in bed with both eyes open and 
watching television. Pt is A/Ox4. Cardiac monitor is in placed; pt is NSR. IV site intact, 
asymptomatic, and patent; currently running NS @100ml/hr. Bed is in the lowest position and 
locked. Call light and bedside table is within reach. Pt is NPO per Dr. Fischer's order. 
No signs/symptoms of acute distress note at this time. Will continue plan of care.

## 2020-05-21 NOTE — GENERAL PROGRESS NOTE
Assessment/Plan


Problem List:  


(1) Syncope


ICD Codes:  R55 - Syncope and collapse


SNOMED:  941006353


Qualifiers:  


   Qualified Codes:  R55 - Syncope and collapse


(2) Gastric mass


ICD Codes:  K31.89 - Other diseases of stomach and duodenum


SNOMED:  581104003


(3) GI bleed


ICD Codes:  K92.2 - Gastrointestinal hemorrhage, unspecified


SNOMED:  77350999


Qualifiers:  


   Qualified Codes:  K92.2 - Gastrointestinal hemorrhage, unspecified


(4) Pulmonary embolism


ICD Codes:  I26.99 - Other pulmonary embolism without acute cor pulmonale


SNOMED:  48214181


Qualifiers:  


   Qualified Codes:  I26.99 - Other pulmonary embolism without acute cor 

pulmonale


Status:  unchanged


Assessment/Plan:





s/p surg:





OPERATIONS PERFORMED:


1. Exploratory laparotomy.


2. Mobilization of the splenic flexure.


3. Left colectomy with primary side-to-side anastomosis.


4. Partial gastrectomy.


5. Total splenectomy.


6. Distal pancreatectomy.


7. Omentectomy.


8. Open lysis of adhesions.





fu surg recs


fu path


fu oncology


IVF


pain control


abx


will fu





Subjective


ROS Limited/Unobtainable:  No


Allergies:  


Coded Allergies:  


     No Known Allergies (Unverified , 5/12/20)





Objective





Last 24 Hour Vital Signs








  Date Time  Temp Pulse Resp B/P (MAP) Pulse Ox O2 Delivery O2 Flow Rate FiO2


 


5/21/20 04:44      Room Air  





      Room Air  


 


5/21/20 04:00 98.5 90 18 117/76 (90) 98   


 


5/21/20 04:00  85      


 


5/21/20 00:00      Room Air  





      Room Air  


 


5/21/20 00:00 98.4 86 16 117/68 (84) 99   


 


5/21/20 00:00  80      


 


5/20/20 20:00 98.0 95 18 115/73 (87) 98   


 


5/20/20 20:00  92      


 


5/20/20 20:00     95 Room Air  21


 


5/20/20 20:00      Room Air  





      Room Air  


 


5/20/20 18:00  99 25 117/72 (87)    


 


5/20/20 17:00 98.7 92 21 116/69 (85) 100   


 


5/20/20 16:00  91 11 109/68 (82) 100   


 


5/20/20 16:00      Room Air  





      Room Air  


 


5/20/20 15:24  96      


 


5/20/20 15:00  97 16 108/71 (83) 99   


 


5/20/20 14:00  93 24 110/70 (83) 100   


 


5/20/20 13:00  94 24 107/67 (80) 100   


 


5/20/20 12:00  98      


 


5/20/20 12:00      Room Air  





      Room Air  


 


5/20/20 12:00 99.5 101 25 112/66 (81) 100   


 


5/20/20 11:30      Room Air  





      Room Air  


 


5/20/20 11:00  99 24 122/69 (86) 100   


 


5/20/20 10:00  102 23 122/69 (86) 100   

















Intake and Output  


 


 5/20/20 5/21/20





 19:00 07:00


 


Intake Total 1720 ml 1055 ml


 


Output Total 765 ml 500 ml


 


Balance 955 ml 555 ml


 


  


 


IV Total 1720 ml 1055 ml


 


Output Urine Total 740 ml 500 ml


 


Stool Total  0 ml


 


Drainage Total 25 ml 








Laboratory Tests


5/20/20 19:00: 


Urine Color Stacey, Urine Appearance Slightly cloudy, Urine pH 5, Urine Specific 

Gravity 1.020, Urine Protein 2+H, Urine Glucose (UA) Negative, Urine Ketones 3+H

, Urine Blood 3+H, Urine Nitrite Negative, Urine Bilirubin Negative, Urine 

Ictotest Negative, Urine Urobilinogen Normal, Urine Leukocyte Esterase 2+H, 

Urine RBC 5-10H, Urine WBC 10-15H, Urine Squamous Epithelial Cells Few, Urine 

Uric Acid Crystals ModerateH, Urine Bacteria ModerateH


5/21/20 05:00: 


White Blood Count 20.2H, Red Blood Count 2.69L, Hemoglobin 7.7L, Hematocrit 

22.7L, Mean Corpuscular Volume 84, Mean Corpuscular Hemoglobin 28.6, Mean 

Corpuscular Hemoglobin Concent 34.0, Red Cell Distribution Width 13.8, Platelet 

Count 274, Mean Platelet Volume 5.0L, Neutrophils (%) (Auto) , Lymphocytes (%) (

Auto) , Monocytes (%) (Auto) , Eosinophils (%) (Auto) , Basophils (%) (Auto) , 

Differential Total Cells Counted 100, Neutrophils % (Manual) 95H, Lymphocytes % 

(Manual) 3L, Monocytes % (Manual) 2, Eosinophils % (Manual) 0, Basophils % (

Manual) 0, Band Neutrophils 0, Platelet Estimate Adequate, Platelet Morphology 

Normal, Polychromasia 1+, Hypochromasia 1+, Prothrombin Time 15.4H, Prothromb 

Time International Ratio 1.4H, Activated Partial Thromboplast Time 35H, Sodium 

Level 142, Potassium Level 3.3L, Chloride Level 109H, Carbon Dioxide Level 23, 

Anion Gap 10, Blood Urea Nitrogen 8, Creatinine 0.5L, Estimat Glomerular 

Filtration Rate > 60, Glucose Level 98, Calcium Level 7.1L


Height (Feet):  5


Height (Inches):  7.00


Weight (Pounds):  187


General Appearance:  no apparent distress


EENT:  normal ENT inspection


Neck:  normal alignment


Cardiovascular:  normal rate


Respiratory/Chest:  decreased breath sounds


Abdomen:  hyperactive bowel sounds - drainage there, other - post surgical


Extremities:  non-tender











Marc Nielsen MD May 21, 2020 09:05

## 2020-05-22 VITALS — SYSTOLIC BLOOD PRESSURE: 121 MMHG | DIASTOLIC BLOOD PRESSURE: 81 MMHG

## 2020-05-22 VITALS — SYSTOLIC BLOOD PRESSURE: 127 MMHG | DIASTOLIC BLOOD PRESSURE: 70 MMHG

## 2020-05-22 VITALS — DIASTOLIC BLOOD PRESSURE: 74 MMHG | SYSTOLIC BLOOD PRESSURE: 135 MMHG

## 2020-05-22 VITALS — SYSTOLIC BLOOD PRESSURE: 127 MMHG | DIASTOLIC BLOOD PRESSURE: 76 MMHG

## 2020-05-22 VITALS — DIASTOLIC BLOOD PRESSURE: 72 MMHG | SYSTOLIC BLOOD PRESSURE: 121 MMHG

## 2020-05-22 VITALS — DIASTOLIC BLOOD PRESSURE: 60 MMHG | SYSTOLIC BLOOD PRESSURE: 120 MMHG

## 2020-05-22 LAB
ADD MANUAL DIFF: YES
ANION GAP SERPL CALC-SCNC: 9 MMOL/L (ref 5–15)
BUN SERPL-MCNC: 5 MG/DL (ref 7–18)
CALCIUM SERPL-MCNC: 7.4 MG/DL (ref 8.5–10.1)
CHLORIDE SERPL-SCNC: 109 MMOL/L (ref 98–107)
CO2 SERPL-SCNC: 24 MMOL/L (ref 21–32)
CREAT SERPL-MCNC: 0.6 MG/DL (ref 0.55–1.3)
ERYTHROCYTE [DISTWIDTH] IN BLOOD BY AUTOMATED COUNT: 14.3 % (ref 11.6–14.8)
HCT VFR BLD CALC: 26.6 % (ref 42–52)
HGB BLD-MCNC: 8.9 G/DL (ref 14.2–18)
MCV RBC AUTO: 85 FL (ref 80–99)
PLATELET # BLD: 300 K/UL (ref 150–450)
POTASSIUM SERPL-SCNC: 3.3 MMOL/L (ref 3.5–5.1)
RBC # BLD AUTO: 3.12 M/UL (ref 4.7–6.1)
SODIUM SERPL-SCNC: 142 MMOL/L (ref 136–145)
WBC # BLD AUTO: 18.6 K/UL (ref 4.8–10.8)

## 2020-05-22 RX ADMIN — DEXTROSE MONOHYDRATE SCH MLS/HR: 50 INJECTION, SOLUTION INTRAVENOUS at 17:50

## 2020-05-22 RX ADMIN — DIPHENHYDRAMINE HYDROCHLORIDE PRN MG: 50 INJECTION INTRAMUSCULAR; INTRAVENOUS at 16:36

## 2020-05-22 RX ADMIN — PANTOPRAZOLE SODIUM SCH MG: 40 INJECTION, POWDER, FOR SOLUTION INTRAVENOUS at 08:34

## 2020-05-22 RX ADMIN — MEROPENEM SCH MLS/HR: 1 INJECTION INTRAVENOUS at 16:26

## 2020-05-22 RX ADMIN — DIPHENHYDRAMINE HYDROCHLORIDE PRN MG: 50 INJECTION INTRAMUSCULAR; INTRAVENOUS at 08:30

## 2020-05-22 RX ADMIN — PANTOPRAZOLE SODIUM SCH MG: 40 INJECTION, POWDER, FOR SOLUTION INTRAVENOUS at 21:24

## 2020-05-22 RX ADMIN — DIPHENHYDRAMINE HYDROCHLORIDE PRN MG: 50 INJECTION INTRAMUSCULAR; INTRAVENOUS at 04:34

## 2020-05-22 RX ADMIN — MEROPENEM SCH MLS/HR: 1 INJECTION INTRAVENOUS at 00:28

## 2020-05-22 RX ADMIN — DIPHENHYDRAMINE HYDROCHLORIDE PRN MG: 50 INJECTION INTRAMUSCULAR; INTRAVENOUS at 13:24

## 2020-05-22 RX ADMIN — MEROPENEM SCH MLS/HR: 1 INJECTION INTRAVENOUS at 08:37

## 2020-05-22 NOTE — NUR
RD ASSESSMENT & RECOMMENDATIONS

SEE CARE ACTIVITY FOR COMPLETE ASSESSMENT



DAILY ESTIMATED NEEDS:

Needs based on Surgery 61kg 

25-35  kcals/kg 

3545-1139  total kcals

1-2  g protein/kg

  g total protein 

25-30  mL/kg

2429-0470  total fluid mLs



NUTRITION DIAGNOSIS:

1) Altered nutrition related lab values R/T sepsis, GIB, clinical

conditions as evidenced by critically elev wbc (27.4-> 21.6), low hgb

(3.5*-> 8.6), low K (3.4).

2) Altered GI function r/t gastric tumor as evidenced by s/p Procedure:

ex lap, left colectomy, partial gastrectomy, spleenectomy, distal

pancreatectomy, omentectomy, open lysis of adhesions, NPO, NGT to LIS.



CURRENT DIET:NPO     



PO DIET RECOMMENDATIONS:

per MD  



ADDITIONAL RECOMMENDATIONS:

* Calibrated bedscale wt for accurate CBW 

* Monitor GI fxn, diet initiation vs TPN 

* Maintain calibrated bed scale wts 

* Rec D5 while NPO  

* Add Ensure Clear to CLD

## 2020-05-22 NOTE — NUR
NURSE NOTES:

Received report from Rashida FARFAN RN. Pt in bed, soft wrist restraints bilaterally, denies 
pain, in stable condition. Bed in lowest position, bed alarm armed, call light within reach. 
Will continue close monitoring and plan of care.

## 2020-05-22 NOTE — GENERAL PROGRESS NOTE
Assessment/Plan


Problem List:  


(1) Weak


ICD Codes:  R53.1 - Weakness


SNOMED:  72144149


(2) Malnutrition


ICD Codes:  E46 - Unspecified protein-calorie malnutrition


SNOMED:  70111993


(3) COVID-19 ruled out


ICD Codes:  Z03.818 - Encounter for observation for suspected exposure to other 

biological agents ruled out


SNOMED:  016164948, 032994079


(4) Pulmonary embolism


ICD Codes:  I26.99 - Other pulmonary embolism without acute cor pulmonale


SNOMED:  02241936


Qualifiers:  


   Qualified Codes:  I26.99 - Other pulmonary embolism without acute cor 

pulmonale


(5) GI bleed


ICD Codes:  K92.2 - Gastrointestinal hemorrhage, unspecified


SNOMED:  02216548


Qualifiers:  


   Qualified Codes:  K92.2 - Gastrointestinal hemorrhage, unspecified


(6) Gastric mass


ICD Codes:  K31.89 - Other diseases of stomach and duodenum


SNOMED:  321588644


(7) Syncope


ICD Codes:  R55 - Syncope and collapse


SNOMED:  098602383


Qualifiers:  


   Qualified Codes:  R55 - Syncope and collapse


Status:  unchanged


Assessment/Plan:


o2 pulm tx transfuse prn cbc bmp am





Subjective


Constitutional:  Reports: weakness


Allergies:  


Coded Allergies:  


     No Known Allergies (Unverified , 5/12/20)


All Systems:  reviewed and negative except above


Subjective


sleepy calm





Objective





Last 24 Hour Vital Signs








  Date Time  Temp Pulse Resp B/P (MAP) Pulse Ox O2 Delivery O2 Flow Rate FiO2


 


5/22/20 08:00  82      


 


5/22/20 08:00 97.9 87 17 121/72 (88) 96   


 


5/22/20 04:00  85      


 


5/22/20 04:00 97.5 89 18 127/76 (93) 97   


 


5/22/20 00:00 97.7 88 17 121/81 (94) 98   


 


5/22/20 00:00  86      


 


5/21/20 21:00      Room Air  





      Room Air  


 


5/21/20 20:12  84 18  96 Room Air  21


 


5/21/20 20:00 98.1 98 17 127/77 (94) 98   


 


5/21/20 20:00  95      


 


5/21/20 16:00 98.2 86 16 114/74 (87) 96   


 


5/21/20 16:00  86      


 


5/21/20 13:09 97.5       


 


5/21/20 12:42      Room Air  





      Room Air  


 


5/21/20 12:00 97.9 81 18 117/73 (88) 96   


 


5/21/20 12:00  89      

















Intake and Output  


 


 5/21/20 5/22/20





 19:00 07:00


 


Intake Total 120 ml 


 


Output Total 500 ml 400 ml


 


Balance -380 ml -400 ml


 


  


 


Intake Oral 120 ml 


 


Output Urine Total 500 ml 400 ml








Laboratory Tests


5/22/20 05:40: 


White Blood Count 18.6H, Red Blood Count 3.12L, Hemoglobin 8.9L, Hematocrit 

26.6L, Mean Corpuscular Volume 85, Mean Corpuscular Hemoglobin 28.5, Mean 

Corpuscular Hemoglobin Concent 33.4, Red Cell Distribution Width 14.3, Platelet 

Count 300, Mean Platelet Volume 4.5L, Neutrophils (%) (Auto) , Lymphocytes (%) (

Auto) , Monocytes (%) (Auto) , Eosinophils (%) (Auto) , Basophils (%) (Auto) , 

Neutrophils % (Manual) [Pending], Lymphocytes % (Manual) [Pending], Platelet 

Estimate [Pending], Platelet Morphology [Pending], Sodium Level 142, Potassium 

Level 3.3L, Chloride Level 109H, Carbon Dioxide Level 24, Anion Gap 9, Blood 

Urea Nitrogen 5L, Creatinine 0.6, Estimat Glomerular Filtration Rate > 60, 

Glucose Level 100, Calcium Level 7.4L


Height (Feet):  5


Height (Inches):  7.00


Weight (Pounds):  189


General Appearance:  lethargic


EENT:  normal ENT inspection


Neck:  normal alignment


Cardiovascular:  normal peripheral pulses, normal rate, regular rhythm


Respiratory/Chest:  chest wall non-tender, lungs clear, normal breath sounds


Abdomen:  normal bowel sounds, non tender, soft


Extremities:  normal inspection


Edema:  no edema noted Arm (L), no edema noted Arm (R), no edema noted Leg (L), 

no edema noted Leg (R), no edema noted Pedal (L), no edema noted Pedal (R), no 

edema noted Generalized


Neurologic:  motor weakness


Skin:  normal pigmentation, warm/dry











PierceTom Donnelly DO May 22, 2020 09:57

## 2020-05-22 NOTE — PULMONOLOGY PROGRESS NOTE
Subjective


ROS Limited/Unobtainable:  Yes


Interval Events:  s/p laparotomy 5/18/20; extubated 5/19/20


Constitutional:  Reports: no symptoms


HEENT:  Repors: no symptoms


Respiratory:  Reports: no symptoms


Cardiovascular:  Reports: no symptoms


Gastrointestinal/Abdominal:  Reports: no symptoms


Genitourinary:  Reports: no symptoms


Neurologic:  Reports: no symptoms


Allergies:  


Coded Allergies:  


     No Known Allergies (Unverified , 5/12/20)


All Systems:  reviewed and negative except above





Objective





Last 24 Hour Vital Signs








  Date Time  Temp Pulse Resp B/P (MAP) Pulse Ox O2 Delivery O2 Flow Rate FiO2


 


5/22/20 09:00      Room Air  





      Room Air  


 


5/22/20 08:00  82      


 


5/22/20 08:00 97.9 87 17 121/72 (88) 96   


 


5/22/20 04:00  85      


 


5/22/20 04:00 97.5 89 18 127/76 (93) 97   


 


5/22/20 00:00 97.7 88 17 121/81 (94) 98   


 


5/22/20 00:00  86      


 


5/21/20 21:00      Room Air  





      Room Air  


 


5/21/20 20:12  84 18  96 Room Air  21


 


5/21/20 20:00 98.1 98 17 127/77 (94) 98   


 


5/21/20 20:00  95      


 


5/21/20 16:00 98.2 86 16 114/74 (87) 96   


 


5/21/20 16:00  86      


 


5/21/20 13:09 97.5       


 


5/21/20 12:42      Room Air  





      Room Air  


 


5/21/20 12:00 97.9 81 18 117/73 (88) 96   


 


5/21/20 12:00  89      

















Intake and Output  


 


 5/21/20 5/22/20





 19:00 07:00


 


Intake Total 120 ml 


 


Output Total 500 ml 400 ml


 


Balance -380 ml -400 ml


 


  


 


Intake Oral 120 ml 


 


Output Urine Total 500 ml 400 ml








General Appearance:  no acute distress


HEENT:  normocephalic


Respiratory:  chest wall non-tender, lungs clear


Cardiovascular:  normal peripheral pulses


Abdomen:  non distended


Extremities:  no cyanosis





Microbiology








 Date/Time


Source Procedure


Growth Status


 


 


 5/20/20 19:55


Blood Blood Culture - Preliminary


NO GROWTH AFTER 24 HOURS Resulted


 


 5/20/20 19:35


Blood Blood Culture - Preliminary


NO GROWTH AFTER 24 HOURS Resulted


 


 5/20/20 19:00


Urine,Clean Catch Urine Culture - Final


NO GROWTH AFTER 48 HOURS Complete








Laboratory Tests


5/22/20 05:40: 


White Blood Count 18.6H, Red Blood Count 3.12L, Hemoglobin 8.9L, Hematocrit 

26.6L, Mean Corpuscular Volume 85, Mean Corpuscular Hemoglobin 28.5, Mean 

Corpuscular Hemoglobin Concent 33.4, Red Cell Distribution Width 14.3, Platelet 

Count 300, Mean Platelet Volume 4.5L, Neutrophils (%) (Auto) , Lymphocytes (%) (

Auto) , Monocytes (%) (Auto) , Eosinophils (%) (Auto) , Basophils (%) (Auto) , 

Neutrophils % (Manual) [Pending], Lymphocytes % (Manual) [Pending], Platelet 

Estimate [Pending], Platelet Morphology [Pending], Sodium Level 142, Potassium 

Level 3.3L, Chloride Level 109H, Carbon Dioxide Level 24, Anion Gap 9, Blood 

Urea Nitrogen 5L, Creatinine 0.6, Estimat Glomerular Filtration Rate > 60, 

Glucose Level 100, Calcium Level 7.4L





Current Medications








 Medications


  (Trade)  Dose


 Ordered  Sig/Marisa


 Route


 PRN Reason  Start Time


 Stop Time Status Last Admin


Dose Admin


 


 Dextrose


  (Dextrose 50%)  25 ml  Q30M  PRN


 IV


 Hypoglycemia  5/20/20 18:45


 8/11/20 06:14   


 


 


 Dextrose


  (Dextrose 50%)  50 ml  Q30M  PRN


 IV


 Hypoglycemia  5/20/20 18:45


 8/11/20 06:14   


 


 


 Diphenhydramine


 HCl


  (Benadryl)  12.5 mg  Q6H  PRN


 IVP


 Itching/Pruritis  5/20/20 19:00


 6/17/20 18:59   


 


 


 Hydromorphone HCl


  (Dilaudid)  0.5 mg  Q3H  PRN


 IVP


 Pain Score 1-3  5/20/20 19:00


 5/25/20 18:59   


 


 


 Hydromorphone HCl


  (Dilaudid)  1 mg  Q3H  PRN


 IVP


 pain score 4-6  5/20/20 21:00


 5/25/20 20:59  5/22/20 08:30


 


 


 Hydromorphone HCl


  (Dilaudid)  2 mg  Q3H  PRN


 IVP


 pain score 7-10  5/20/20 19:00


 5/25/20 18:59  5/21/20 20:47


 


 


 Iohexol


  (Omnipaque 350


 100ml)  100 ml  NOW  PRN


 INJ


 Radiology Procedure  5/21/20 15:00


 5/22/20 14:59   


 


 


 Linezolid  300 ml @ 


 300 mls/hr  Q12HR


 IVPB


   5/20/20 21:00


 5/27/20 15:59  5/22/20 08:52


 


 


 Meropenem 1 gm/


 Sodium Chloride  55 ml @ 


 110 mls/hr  Q8H


 IVPB


   5/21/20 00:00


 5/25/20 15:59  5/22/20 08:37


 


 


 Micafungin Sodium


 100 mg/Sodium


 Chloride  110 ml @ 


 110 mls/hr  Q24H


 IVPB


   5/21/20 18:00


 5/26/20 17:59  5/21/20 17:42


 


 


 Ondansetron HCl


  (Zofran)  4 mg  Q6H  PRN


 IVP


 Nausea & Vomiting  5/20/20 19:00


 6/17/20 18:59   


 


 


 Pantoprazole


  (Protonix)  40 mg  EVERY 12  HOURS


 IV


   5/20/20 21:00


 6/15/20 08:59  5/22/20 08:34


 


 


 Potassium Chloride  100 ml @ 


 110 mls/hr  Q1H


 IVPB


   5/22/20 10:15


 5/22/20 12:10   


 


 


 Sodium Chloride  1,000 ml @ 


 100 mls/hr  Q10H


 IV


   5/20/20 18:45


 6/17/20 18:59  5/22/20 10:28


 











Assessment/Plan


Assessment/Plan


IMPRESSION:


1. Gastric mass.S/p laparotomy


2. Severe anemia. Corrected


3. Pulmonary embolism.





DISCUSSION:  





S/p IVC filter.  S/p blood transfusion, IV fluids.  


S/p ex lap, left colectomy, partial gastrectomy, splenectomy, distal 

pancreatectomy, omentectomy 


and  open lysis of adhesions 


I will follow carefully.


Extubated 5/19/20


COVID 19 pcr negative


Currently on RA

















  ______________________________________________


  MARIANELA Downs Omar Syed MD May 22, 2020 10:34

## 2020-05-22 NOTE — GENERAL PROGRESS NOTE
Assessment/Plan


Problem List:  


(1) Syncope


ICD Codes:  R55 - Syncope and collapse


SNOMED:  014044574


Qualifiers:  


   Qualified Codes:  R55 - Syncope and collapse


(2) Gastric mass


ICD Codes:  K31.89 - Other diseases of stomach and duodenum


SNOMED:  934171758


(3) GI bleed


ICD Codes:  K92.2 - Gastrointestinal hemorrhage, unspecified


SNOMED:  20420609


Qualifiers:  


   Qualified Codes:  K92.2 - Gastrointestinal hemorrhage, unspecified


(4) Pulmonary embolism


ICD Codes:  I26.99 - Other pulmonary embolism without acute cor pulmonale


SNOMED:  75843083


Qualifiers:  


   Qualified Codes:  I26.99 - Other pulmonary embolism without acute cor 

pulmonale


Status:  unchanged


Assessment/Plan:





s/p surg:





OPERATIONS PERFORMED:


1. Exploratory laparotomy.


2. Mobilization of the splenic flexure.


3. Left colectomy with primary side-to-side anastomosis.


4. Partial gastrectomy.


5. Total splenectomy.


6. Distal pancreatectomy.


7. Omentectomy.


8. Open lysis of adhesions.





fu surg recs


fu path


fu oncology


IVF


pain control


abx


will fu





Subjective


ROS Limited/Unobtainable:  Yes


Allergies:  


Coded Allergies:  


     No Known Allergies (Unverified , 5/12/20)





Objective





Last 24 Hour Vital Signs








  Date Time  Temp Pulse Resp B/P (MAP) Pulse Ox O2 Delivery O2 Flow Rate FiO2


 


5/22/20 04:00  85      


 


5/22/20 04:00 97.5 89 18 127/76 (93) 97   


 


5/22/20 00:00 97.7 88 17 121/81 (94) 98   


 


5/22/20 00:00  86      


 


5/21/20 21:00      Room Air  





      Room Air  


 


5/21/20 20:12  84 18  96 Room Air  21


 


5/21/20 20:00 98.1 98 17 127/77 (94) 98   


 


5/21/20 20:00  95      


 


5/21/20 16:00 98.2 86 16 114/74 (87) 96   


 


5/21/20 16:00  86      


 


5/21/20 13:09 97.5       


 


5/21/20 12:42      Room Air  





      Room Air  


 


5/21/20 12:00 97.9 81 18 117/73 (88) 96   


 


5/21/20 12:00  89      

















Intake and Output  


 


 5/21/20 5/22/20





 19:00 07:00


 


Intake Total 120 ml 


 


Output Total 500 ml 400 ml


 


Balance -380 ml -400 ml


 


  


 


Intake Oral 120 ml 


 


Output Urine Total 500 ml 400 ml








Laboratory Tests


5/22/20 05:40: 


White Blood Count 18.6H, Red Blood Count 3.12L, Hemoglobin 8.9L, Hematocrit 

26.6L, Mean Corpuscular Volume 85, Mean Corpuscular Hemoglobin 28.5, Mean 

Corpuscular Hemoglobin Concent 33.4, Red Cell Distribution Width 14.3, Platelet 

Count 300, Mean Platelet Volume 4.5L, Neutrophils (%) (Auto) , Lymphocytes (%) (

Auto) , Monocytes (%) (Auto) , Eosinophils (%) (Auto) , Basophils (%) (Auto) , 

Neutrophils % (Manual) [Pending], Lymphocytes % (Manual) [Pending], Platelet 

Estimate [Pending], Platelet Morphology [Pending], Sodium Level 142, Potassium 

Level 3.3L, Chloride Level 109H, Carbon Dioxide Level 24, Anion Gap 9, Blood 

Urea Nitrogen 5L, Creatinine 0.6, Estimat Glomerular Filtration Rate > 60, 

Glucose Level 100, Calcium Level 7.4L


Height (Feet):  5


Height (Inches):  7.00


Weight (Pounds):  189


General Appearance:  no apparent distress


EENT:  PERRL/EOMI


Neck:  supple


Cardiovascular:  normal rate


Respiratory/Chest:  decreased breath sounds


Abdomen:  soft, other - post surgical


Extremities:  non-tender











Marc Nielsen MD May 22, 2020 08:17

## 2020-05-22 NOTE — NUR
NURSE NOTES: Report received from JOSIAH Bowman. Pt. AOx4. On RA. Denies SOB. Pain rating 7/10 at 
this time. Repositioned Pt. BUE edema noted. Pt. confirms that his arms are not different 
from the regular size. Bed on lowest position, side rails upx2, brakes engaged. Call light 
within easy reach.

## 2020-05-22 NOTE — NUR
NURSE NOTES:WOUND CARE FOLLOW-UP NOTES:Reabsorbing Sacral DTPI. Base of wound is maroon with 
marginal non-blanching erythema along borders. Periwound without fluctuance or induration. 
Wound Tx are effective and continued as care-planned.



Tx.Plan:Apply Moisture Barrier Paste to Sacrum. Cover with Optifoam Drsg. Change every 3 
Days and prn.

           Cover each hip with Optifoam drsgs. Change every 7 days and prn.

           Apply Cavilon Skin Barrier to heels. Cover with Optifoam drsg. Change every 7 
days and prn.

           Reposition at least every 2hours or as tolerated.

           Off-load heels with Pillow.

## 2020-05-22 NOTE — INFECTIOUS DISEASES PROG NOTE
Assessment/Plan


Assessment/Plan








Assessment:


Severe Sepsis


Enterobacter bacteremia- likely from GI source


S. epi bacteremia, contaminant


GGO on lung bases- COVID19 neg x2


    -5/21 CTA chest: STUDY POSITIVE FOR BILATERAL PULMONARY EMBOLISM. BILATERAL 

PLEURAL EFFUSIONS WITH DEPENDENT INFILTRATES/CONSOLIDATION IN THE LUNG


BASES.POSTOPERATIVE CHANGES IN THE UPPER ABDOMEN WITH ANASTOMOTIC SUTURES LEFT 

UPPER QUADRANT AND MIDLINE SKIN STAPLES. FREE FLUID AND SCATTERED SMALL FOCI OF 

FREE AIR ALSO NOTED IN LEFT UPPER QUADRANT FROM RECENT ABDOMINAL SURGERY. 

HEPATIC CYST AND GRANULOMA.


   -5/19 CXR:  Left retrocardiac consolidation and left pleural effusion.


  -5/15 SARS-COV2 PCR neg


  -5/12 Bcx 1/4 S.epi, 2/4 E. aerogenes (pan S); 5/13 Bcx Neg


            CXR: no acute disease


            SARS-COV2 PCR





Afebrile


Leukocytosis; persistent- increased post-op- now improving


   5/20 u/a wbc 10-15, nit neg, leuk +2





Severe anemia (blood loss)/GIB likely 2ry to gastric mass


Thrombocytopenia, worsening


  -5/18 SP ex lap, left colectomy, partial gastrectomy, splenectomy, distal 

pancreatectomy, omentectomy , open lysis of adhesions


          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 

invading spleen, posterior stomach, pancreatic tail, invasive, likely seemingly 

perforated.


                       --path: invasive adenocarcinoma, moderately 

differentiated





  -CT abd/p:   Centrally necrotic mass arising from the gastric fundus 

measuring 12. 6 x 11.1 x 14 cm.  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  

Gastric adenocarcinoma, lymphoma, and other neoplastic etiologies are also in 

the differential. Filling defects within pulmonary vessels at the lung bases 

with small peripheral airspace opacities concerning for pulmonary emboli and 

small pulmonary infarcts.








Mild AST elevation





Lactic acidosis, SP


   -5/12 u/a neg; ucx 30-40K mixed urogenital contaminants





Diarrhea


    -Cdiff,stool cx neg





Syncopal episode- likely due to severe dehydration and anemia


  -CT head: No acute findings in the head/brain.





Acute PE/DVT -(per CT abd/p findings)


  -sp IVC filter placement 5/13 (retrievable- given presence of bacteremia)





PABOL, SP





HTN





Plan:


  


- Continue Meropenem and PO Zyvox #3 (abx d #11/14) given worsening WBC


-Contmpiric Micafungin #4/5 for intraabdominal coverage


      -5/20 SP Cefepime #6, Flagyl #6


      - 5/15/20 SP Zosyn #4 and Vancomycin #4





-f/u cx


-Monitor CBC/CMP, temperature


-aspiration precautions


-COVID19 neg x2


-Sx,pulm, Heme/onc, GI f/u


-wound care per surgical team








Thank you for consulting [facility]. Will continue to follow along wiht 

you.





Discussed with RN.





Subjective


Allergies:  


Coded Allergies:  


     No Known Allergies (Unverified , 5/12/20)


Subjective


afebrile


at RA


wbc improving





Objective


Vital Signs





Last 24 Hour Vital Signs








  Date Time  Temp Pulse Resp B/P (MAP) Pulse Ox O2 Delivery O2 Flow Rate FiO2


 


5/22/20 09:00      Room Air  





      Room Air  


 


5/22/20 08:00  82      


 


5/22/20 08:00 97.9 87 17 121/72 (88) 96   


 


5/22/20 04:00  85      


 


5/22/20 04:00 97.5 89 18 127/76 (93) 97   


 


5/22/20 00:00 97.7 88 17 121/81 (94) 98   


 


5/22/20 00:00  86      


 


5/21/20 21:00      Room Air  





      Room Air  


 


5/21/20 20:12  84 18  96 Room Air  21


 


5/21/20 20:00 98.1 98 17 127/77 (94) 98   


 


5/21/20 20:00  95      


 


5/21/20 16:00 98.2 86 16 114/74 (87) 96   


 


5/21/20 16:00  86      








Height (Feet):  5


Height (Inches):  7.00


Weight (Pounds):  189


Objective


HEAD AND NECK:  No JVD.


LUNGS:  Decreased breath sounds.


CARDIOVASCULAR:  Regular S1 and S2.  Tachycardic.


ABDOMEN: Post op dressings in place, ALLISON drain


EXTREMITIES:  No pitting edema.





Microbiology








 Date/Time


Source Procedure


Growth Status


 


 


 5/20/20 19:55


Blood Blood Culture - Preliminary


NO GROWTH AFTER 24 HOURS Resulted


 


 5/20/20 19:35


Blood Blood Culture - Preliminary


NO GROWTH AFTER 24 HOURS Resulted


 


 5/20/20 19:00


Urine,Clean Catch Urine Culture - Final


NO GROWTH AFTER 48 HOURS Complete











Laboratory Tests








Test


  5/22/20


05:40


 


White Blood Count


  18.6 K/UL


(4.8-10.8)  H


 


Red Blood Count


  3.12 M/UL


(4.70-6.10)  L


 


Hemoglobin


  8.9 G/DL


(14.2-18.0)  L


 


Hematocrit


  26.6 %


(42.0-52.0)  L


 


Mean Corpuscular Volume 85 FL (80-99)  


 


Mean Corpuscular Hemoglobin


  28.5 PG


(27.0-31.0)


 


Mean Corpuscular Hemoglobin


Concent 33.4 G/DL


(32.0-36.0)


 


Red Cell Distribution Width


  14.3 %


(11.6-14.8)


 


Platelet Count


  300 K/UL


(150-450)


 


Mean Platelet Volume


  4.5 FL


(6.5-10.1)  L


 


Neutrophils (%) (Auto)


  % (45.0-75.0)


 


 


Lymphocytes (%) (Auto)


  % (20.0-45.0)


 


 


Monocytes (%) (Auto)  % (1.0-10.0)  


 


Eosinophils (%) (Auto)  % (0.0-3.0)  


 


Basophils (%) (Auto)  % (0.0-2.0)  


 


Differential Total Cells


Counted 100  


 


 


Neutrophils % (Manual) 91 % (45-75)  H


 


Lymphocytes % (Manual) 6 % (20-45)  L


 


Monocytes % (Manual) 3 % (1-10)  


 


Eosinophils % (Manual) 0 % (0-3)  


 


Basophils % (Manual) 0 % (0-2)  


 


Band Neutrophils 0 % (0-8)  


 


Platelet Estimate Adequate  


 


Platelet Morphology Normal  


 


Sodium Level


  142 MMOL/L


(136-145)


 


Potassium Level


  3.3 MMOL/L


(3.5-5.1)  L


 


Chloride Level


  109 MMOL/L


()  H


 


Carbon Dioxide Level


  24 MMOL/L


(21-32)


 


Anion Gap


  9 mmol/L


(5-15)


 


Blood Urea Nitrogen


  5 mg/dL (7-18)


L


 


Creatinine


  0.6 MG/DL


(0.55-1.30)


 


Estimat Glomerular Filtration


Rate > 60 mL/min


(>60)


 


Glucose Level


  100 MG/DL


()


 


Calcium Level


  7.4 MG/DL


(8.5-10.1)  L











Current Medications








 Medications


  (Trade)  Dose


 Ordered  Sig/Marisa


 Route


 PRN Reason  Start Time


 Stop Time Status Last Admin


Dose Admin


 


 Dextrose


  (Dextrose 50%)  25 ml  Q30M  PRN


 IV


 Hypoglycemia  5/20/20 18:45


 8/11/20 06:14   


 


 


 Dextrose


  (Dextrose 50%)  50 ml  Q30M  PRN


 IV


 Hypoglycemia  5/20/20 18:45


 8/11/20 06:14   


 


 


 Diphenhydramine


 HCl


  (Benadryl)  12.5 mg  Q6H  PRN


 IVP


 Itching/Pruritis  5/20/20 19:00


 6/17/20 18:59   


 


 


 Hydromorphone HCl


  (Dilaudid)  0.5 mg  Q3H  PRN


 IVP


 Pain Score 1-3  5/20/20 19:00


 5/25/20 18:59   


 


 


 Hydromorphone HCl


  (Dilaudid)  1 mg  Q3H  PRN


 IVP


 pain score 4-6  5/20/20 21:00


 5/25/20 20:59  5/22/20 13:24


 


 


 Hydromorphone HCl


  (Dilaudid)  2 mg  Q3H  PRN


 IVP


 pain score 7-10  5/20/20 19:00


 5/25/20 18:59  5/21/20 20:47


 


 


 Iohexol


  (Omnipaque 350


 100ml)  100 ml  NOW  PRN


 INJ


 Radiology Procedure  5/21/20 15:00


 5/22/20 14:59   


 


 


 Linezolid  300 ml @ 


 300 mls/hr  Q12HR


 IVPB


   5/20/20 21:00


 5/27/20 15:59  5/22/20 08:52


 


 


 Meropenem 1 gm/


 Sodium Chloride  55 ml @ 


 110 mls/hr  Q8H


 IVPB


   5/21/20 00:00


 5/25/20 15:59  5/22/20 08:37


 


 


 Micafungin Sodium


 100 mg/Sodium


 Chloride  110 ml @ 


 110 mls/hr  Q24H


 IVPB


   5/21/20 18:00


 5/26/20 17:59  5/21/20 17:42


 


 


 Ondansetron HCl


  (Zofran)  4 mg  Q6H  PRN


 IVP


 Nausea & Vomiting  5/20/20 19:00


 6/17/20 18:59   


 


 


 Pantoprazole


  (Protonix)  40 mg  EVERY 12  HOURS


 IV


   5/20/20 21:00


 6/15/20 08:59  5/22/20 08:34


 


 


 Sodium Chloride  1,000 ml @ 


 100 mls/hr  Q10H


 IV


   5/20/20 18:45


 6/17/20 18:59  5/22/20 10:28


 

















Alexa Gonzalez M.D. May 22, 2020 13:40

## 2020-05-22 NOTE — CARDIAC ELECTROPHYSIOLOGY PN
Assessment/Plan


Assessment/Plan


1. Hypotension due to profound anemia due to gastric mass.  


S/P  5 units of blood transfusion. EF NL





2. Profound anemia.  Hemoglobin of 3.5.  CT of abdomen and pelvis shows


central necrotic mass in the gastric fundus 12 x 14 x 11 cm.  


 FU  by GI and S/P surgery by Dr Fischer.   


 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 

pancreatic tail  likely  perforated.





3. Bilateral  Pulmonary embolus.  S/P IVC filter.


      Off anticoagulation for gi bleed, Hb 3.5 and post op.


4. Sepsis with elevated white count. Ruled out for Covid by 2 negative PCRs





DW RN





Subjective


Subjective


NPO in NSR. Alert.Chest CT bilateral PE. Off anticoagulation for GI bleed. S/P 

IVC filter. Still has abd drain





Objective





Last 24 Hour Vital Signs








  Date Time  Temp Pulse Resp B/P (MAP) Pulse Ox O2 Delivery O2 Flow Rate FiO2


 


5/22/20 04:00  85      


 


5/22/20 04:00 97.5 89 18 127/76 (93) 97   


 


5/22/20 00:00 97.7 88 17 121/81 (94) 98   


 


5/22/20 00:00  86      


 


5/21/20 21:00      Room Air  





      Room Air  


 


5/21/20 20:12  84 18  96 Room Air  21


 


5/21/20 20:00 98.1 98 17 127/77 (94) 98   


 


5/21/20 20:00  95      


 


5/21/20 16:00 98.2 86 16 114/74 (87) 96   


 


5/21/20 16:00  86      


 


5/21/20 13:09 97.5       


 


5/21/20 12:42      Room Air  





      Room Air  


 


5/21/20 12:00 97.9 81 18 117/73 (88) 96   


 


5/21/20 12:00  89      

















Intake and Output  


 


 5/21/20 5/22/20





 19:00 07:00


 


Intake Total 120 ml 


 


Output Total 500 ml 400 ml


 


Balance -380 ml -400 ml


 


  


 


Intake Oral 120 ml 


 


Output Urine Total 500 ml 400 ml











Laboratory Tests








Test


  5/22/20


05:40


 


White Blood Count


  18.6 K/UL


(4.8-10.8)  H


 


Red Blood Count


  3.12 M/UL


(4.70-6.10)  L


 


Hemoglobin


  8.9 G/DL


(14.2-18.0)  L


 


Hematocrit


  26.6 %


(42.0-52.0)  L


 


Mean Corpuscular Volume 85 FL (80-99)  


 


Mean Corpuscular Hemoglobin


  28.5 PG


(27.0-31.0)


 


Mean Corpuscular Hemoglobin


Concent 33.4 G/DL


(32.0-36.0)


 


Red Cell Distribution Width


  14.3 %


(11.6-14.8)


 


Platelet Count


  300 K/UL


(150-450)


 


Mean Platelet Volume


  4.5 FL


(6.5-10.1)  L


 


Neutrophils (%) (Auto)


  % (45.0-75.0)


 


 


Lymphocytes (%) (Auto)


  % (20.0-45.0)


 


 


Monocytes (%) (Auto)  % (1.0-10.0)  


 


Eosinophils (%) (Auto)  % (0.0-3.0)  


 


Basophils (%) (Auto)  % (0.0-2.0)  


 


Neutrophils % (Manual) Pending  


 


Lymphocytes % (Manual) Pending  


 


Platelet Estimate Pending  


 


Platelet Morphology Pending  


 


Sodium Level


  142 MMOL/L


(136-145)


 


Potassium Level


  3.3 MMOL/L


(3.5-5.1)  L


 


Chloride Level


  109 MMOL/L


()  H


 


Carbon Dioxide Level


  24 MMOL/L


(21-32)


 


Anion Gap


  9 mmol/L


(5-15)


 


Blood Urea Nitrogen


  5 mg/dL (7-18)


L


 


Creatinine


  0.6 MG/DL


(0.55-1.30)


 


Estimat Glomerular Filtration


Rate > 60 mL/min


(>60)


 


Glucose Level


  100 MG/DL


()


 


Calcium Level


  7.4 MG/DL


(8.5-10.1)  L











Microbiology








 Date/Time


Source Procedure


Growth Status


 


 


 5/20/20 19:55


Blood Blood Culture - Preliminary


NO GROWTH AFTER 24 HOURS Resulted


 


 5/20/20 19:35


Blood Blood Culture - Preliminary


NO GROWTH AFTER 24 HOURS Resulted


 


 5/20/20 19:00


Urine,Clean Catch Urine Culture - Final


NO GROWTH AFTER 48 HOURS Complete








Objective


HEAD AND NECK:  No JVD.


LUNGS:  Decreased breath sounds.


CARDIOVASCULAR:  Regular S1 and S2.  Tachycardic.


ABDOMEN: Post op with ALLISON drain


EXTREMITIES:  No pitting edema.











Aaron Antunez MD May 22, 2020 08:28

## 2020-05-22 NOTE — NUR
CASE MANAGEMENT: REVIEW 



5/22/2020



SI;SEPSIS. PULMONARY EMBOLISM. 

VS: T 97.5 HR 89 rr 18 b/p 127/76 sats 97% on ra 

LABS: WBC 18.6 K 3.3  BUN 5 CA 7.4 



IS:NS @ 100 ML/HR 

LINEZOLID IV Q12H 

MICAFUNGIN IV Q24H 

MEROPENEM IV Q8H 



**TELE*** 



PLAN OF CARE: 

KUB 

NPO

## 2020-05-22 NOTE — DIAGNOSTIC IMAGING REPORT
EXAM: XRAY Abdomen 1v

 

HISTORY: Abdominal discomfort. Status post surgery.

 

COMPARISON: 5/13/2020.

 

TECHNIQUE:  Frontal view of the abdomen obtained.

 

FINDINGS:

 

There are new skin staples noted in the left upper quadrant. Multiple overlying

artifacts noted.  Mild gaseous distention of central small bowel loops as well as the

right and transverse colon noted perhaps a postoperative ileus. Air is seen down to

the rectum. No definite pathologic calcifications identified.  There is no sign of

free air. There is a new IVC filter in place.

 

IMPRESSION:

 

PROBABLE POSTOPERATIVE ILEUS.

## 2020-05-22 NOTE — HEMATOLOGY/ONC PROGRESS NOTE
Assessment/Plan


Assessment/Plan


Assessment and Recs


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 

stomach, pancreas stomach and COLON PRIMARY and path staging pT4No --> had 0/27 

lymph nodes that are noted


--> CT shows    Centrally necrotic mass arising from the gastric fundus 

measuring 12. 6 x 11.1 x 14 cm. Filling defects within pulmonary vessels at the 

lung bases with small peripheral airspace opacities concerning for pulmonary 

emboli and small pulmonary infarcts.


--> resection was completed by Dr. Fischer, he is recovering well


--> pathology reviewed


--> needs a pet/ct scan outpatient


--> adjuvant chemotherapy for residual tumor (+ margin on one side)


--> dw him and he will come in outpatient


# Pulmonary embolism -- likely related to hypercoagulable disorder from 

malignancys/p ivc filter


--> case dw pcp, surg, and pulm


--> is not a candidate for anticoagulation given severe anemia and low platelets


--> 5/13 s/p ivc filter placement


--> 5/19 cxr: Left retrocardiac consolidation and left pleural effusion.


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


--> have ordered for repeat prbc transfusions


--> hgb goal is >7


--> have reviewed anemia panel


--> gi to access in re to biopsy/endoscopy


--> IV IRON completed 


--> hgb trend: 8.6-->9.7-->8-->7.7 


# Leukocytosis is likely reactive process from cancer


--> on abx as needed


--> smear is noted


--> meds are reviewed


--> vanc/zosyn-->cefepime/flagyl-->mekhi /micaf


--> wbc trend: 21.6-->18-->24.1-->26.7-->20.2 


--> covid negative x2 since admission 


# Diarrhea


--> c diff negative 


# PABLO, improving


# HTN


# Dvt ppx scds





The timing of this note does not necessarily reflect the time of the patient 

was seen.





Greatly appreciate consultation.





Subjective


Constitutional:  Denies: no symptoms, chills, fever, malaise, weakness, other


HEENT:  Denies: no symptoms, eye pain, blurred vision, tearing, double vision, 

ear pain, ear discharge, nose pain, nose congestion, throat pain, throat 

swelling, mouth pain, mouth swelling, other


Cardiovascular:  Denies: no symptoms, chest pain, edema, irregular heart rate, 

lightheadedness, palpitations, syncope, other


Respiratory:  Denies: no symptoms, cough, shortness of breath, SOB with 

excertion, SOB at rest, sputum, wheezing, other


Gastrointestinal/Abdominal:  Denies: no symptoms, abdomen distended, abdominal 

pain, black stools, tarry stools, blood in stool, constipated, diarrhea, 

difficulty swallowing, nausea, poor appetite, poor fluid intake, rectal bleeding

, vomiting, other


Neurologic/Psychiatric:  Denies: no symptoms, anxiety, depressed, emotional 

problems, headache, numbness, paresthesia, pre-existing deficit, seizure, 

tingling, tremors, weakness, other


Endocrine:  Denies: no symptoms, excessive sweating, flushing, intolerance to 

cold, intolerance to heat, increased hunger, increased thirst, increased urine, 

unexplained weight gain, unexplained weight loss, other


Hematologic/Lymphatic:  Denies: no symptoms, anemia, easy bleeding, easy 

bruising, adenopathy, other


Allergies:  


Coded Allergies:  


     No Known Allergies (Unverified , 5/12/20)


Subjective


5/14 labs are noted, no bleeding, meds reviewed, for egd when iso off


5/15 icu, no overnight events, alert, iv iron, c diff negative 


5/17 on tele, wbc improving, nc, tarango, no distress 


5/18 for surgery this am, have ordered for ffp and vit k, seen by surg, 

anesthesia


5/19 icu, wbc 24.1, iv abx, afebrile, vent, no acute distress 


5/20 refusing scd's, labs reviewed, no sob, hr 105 


5/21 transferred to tele, alert, room air, hgb 7.7 


5/22 is feeling better, dw him re outpatient chemo and he has agreed to come in





Objective


Objective





Current Medications








 Medications


  (Trade)  Dose


 Ordered  Sig/Marisa


 Route


 PRN Reason  Start Time


 Stop Time Status Last Admin


Dose Admin


 


 Dextrose


  (Dextrose 50%)  25 ml  Q30M  PRN


 IV


 Hypoglycemia  5/20/20 18:45


 8/11/20 06:14   


 


 


 Dextrose


  (Dextrose 50%)  50 ml  Q30M  PRN


 IV


 Hypoglycemia  5/20/20 18:45


 8/11/20 06:14   


 


 


 Diphenhydramine


 HCl


  (Benadryl)  12.5 mg  Q6H  PRN


 IVP


 Itching/Pruritis  5/20/20 19:00


 6/17/20 18:59   


 


 


 Hydromorphone HCl


  (Dilaudid)  0.5 mg  Q3H  PRN


 IVP


 Pain Score 1-3  5/20/20 19:00


 5/25/20 18:59   


 


 


 Hydromorphone HCl


  (Dilaudid)  1 mg  Q3H  PRN


 IVP


 pain score 4-6  5/20/20 21:00


 5/25/20 20:59  5/22/20 08:30


 


 


 Hydromorphone HCl


  (Dilaudid)  2 mg  Q3H  PRN


 IVP


 pain score 7-10  5/20/20 19:00


 5/25/20 18:59  5/21/20 20:47


 


 


 Iohexol


  (Omnipaque 350


 100ml)  100 ml  NOW  PRN


 INJ


 Radiology Procedure  5/21/20 15:00


 5/22/20 14:59   


 


 


 Linezolid  300 ml @ 


 300 mls/hr  Q12HR


 IVPB


   5/20/20 21:00


 5/27/20 15:59  5/22/20 08:52


 


 


 Meropenem 1 gm/


 Sodium Chloride  55 ml @ 


 110 mls/hr  Q8H


 IVPB


   5/21/20 00:00


 5/25/20 15:59  5/22/20 08:37


 


 


 Micafungin Sodium


 100 mg/Sodium


 Chloride  110 ml @ 


 110 mls/hr  Q24H


 IVPB


   5/21/20 18:00


 5/26/20 17:59  5/21/20 17:42


 


 


 Ondansetron HCl


  (Zofran)  4 mg  Q6H  PRN


 IVP


 Nausea & Vomiting  5/20/20 19:00


 6/17/20 18:59   


 


 


 Pantoprazole


  (Protonix)  40 mg  EVERY 12  HOURS


 IV


   5/20/20 21:00


 6/15/20 08:59  5/22/20 08:34


 


 


 Potassium Chloride  100 ml @ 


 110 mls/hr  Q1H


 IVPB


   5/22/20 10:15


 5/22/20 12:10   


 


 


 Sodium Chloride  1,000 ml @ 


 100 mls/hr  Q10H


 IV


   5/20/20 18:45


 6/17/20 18:59  5/22/20 00:29


 











Last 24 Hour Vital Signs








  Date Time  Temp Pulse Resp B/P (MAP) Pulse Ox O2 Delivery O2 Flow Rate FiO2


 


5/22/20 09:00      Room Air  





      Room Air  


 


5/22/20 08:00  82      


 


5/22/20 08:00 97.9 87 17 121/72 (88) 96   


 


5/22/20 04:00  85      


 


5/22/20 04:00 97.5 89 18 127/76 (93) 97   


 


5/22/20 00:00 97.7 88 17 121/81 (94) 98   


 


5/22/20 00:00  86      


 


5/21/20 21:00      Room Air  





      Room Air  


 


5/21/20 20:12  84 18  96 Room Air  21


 


5/21/20 20:00 98.1 98 17 127/77 (94) 98   


 


5/21/20 20:00  95      


 


5/21/20 16:00 98.2 86 16 114/74 (87) 96   


 


5/21/20 16:00  86      


 


5/21/20 13:09 97.5       


 


5/21/20 12:42      Room Air  





      Room Air  


 


5/21/20 12:00 97.9 81 18 117/73 (88) 96   


 


5/21/20 12:00  89      


 


5/21/20 08:00 97.5 70 20 112/69 (83) 97   


 


5/21/20 08:00  81      


 


5/21/20 08:00      Room Air  





      Room Air  


 


5/21/20 04:44      Room Air  





      Room Air  


 


5/21/20 04:00 98.5 90 18 117/76 (90) 98   


 


5/21/20 04:00  85      


 


5/21/20 00:00      Room Air  





      Room Air  


 


5/21/20 00:00 98.4 86 16 117/68 (84) 99   


 


5/21/20 00:00  80      


 


5/20/20 20:00 98.0 95 18 115/73 (87) 98   


 


5/20/20 20:00  92      


 


5/20/20 20:00     95 Room Air  21


 


5/20/20 20:00      Room Air  





      Room Air  


 


5/20/20 18:00  99 25 117/72 (87)    


 


5/20/20 17:00 98.7 92 21 116/69 (85) 100   


 


5/20/20 16:00  91 11 109/68 (82) 100   


 


5/20/20 16:00      Room Air  





      Room Air  


 


5/20/20 15:24  96      


 


5/20/20 15:00  97 16 108/71 (83) 99   


 


5/20/20 14:00  93 24 110/70 (83) 100   


 


5/20/20 13:00  94 24 107/67 (80) 100   


 


5/20/20 12:00  98      


 


5/20/20 12:00      Room Air  





      Room Air  


 


5/20/20 12:00 99.5 101 25 112/66 (81) 100   


 


5/20/20 11:30      Room Air  





      Room Air  


 


5/20/20 11:00  99 24 122/69 (86) 100   

















Intake and Output  


 


 5/21/20 5/22/20





 19:00 07:00


 


Intake Total 120 ml 


 


Output Total 500 ml 400 ml


 


Balance -380 ml -400 ml


 


  


 


Intake Oral 120 ml 


 


Output Urine Total 500 ml 400 ml











Labs








Test


  5/20/20


04:10 5/20/20


19:00 5/21/20


05:00 5/22/20


05:40


 


White Blood Count


  26.7 K/UL


(4.8-10.8) 


  20.2 K/UL


(4.8-10.8) 18.6 K/UL


(4.8-10.8)


 


Red Blood Count


  2.79 M/UL


(4.70-6.10) 


  2.69 M/UL


(4.70-6.10) 3.12 M/UL


(4.70-6.10)


 


Hemoglobin


  8.0 G/DL


(14.2-18.0) 


  7.7 G/DL


(14.2-18.0) 8.9 G/DL


(14.2-18.0)


 


Hematocrit


  23.5 %


(42.0-52.0) 


  22.7 %


(42.0-52.0) 26.6 %


(42.0-52.0)


 


Mean Corpuscular Volume 84 FL (80-99)   84 FL (80-99)  85 FL (80-99) 


 


Mean Corpuscular Hemoglobin


  28.7 PG


(27.0-31.0) 


  28.6 PG


(27.0-31.0) 28.5 PG


(27.0-31.0)


 


Mean Corpuscular Hemoglobin


Concent 34.1 G/DL


(32.0-36.0) 


  34.0 G/DL


(32.0-36.0) 33.4 G/DL


(32.0-36.0)


 


Red Cell Distribution Width


  14.1 %


(11.6-14.8) 


  13.8 %


(11.6-14.8) 14.3 %


(11.6-14.8)


 


Platelet Count


  237 K/UL


(150-450) 


  274 K/UL


(150-450) 300 K/UL


(150-450)


 


Mean Platelet Volume


  5.0 FL


(6.5-10.1) 


  5.0 FL


(6.5-10.1) 4.5 FL


(6.5-10.1)


 


Neutrophils (%) (Auto)  % (45.0-75.0)    % (45.0-75.0)   % (45.0-75.0) 


 


Lymphocytes (%) (Auto)  % (20.0-45.0)    % (20.0-45.0)   % (20.0-45.0) 


 


Monocytes (%) (Auto)  % (1.0-10.0)    % (1.0-10.0)   % (1.0-10.0) 


 


Eosinophils (%) (Auto)  % (0.0-3.0)    % (0.0-3.0)   % (0.0-3.0) 


 


Basophils (%) (Auto)  % (0.0-2.0)    % (0.0-2.0)   % (0.0-2.0) 


 


Differential Total Cells


Counted 100 


  


  100 


  


 


 


Neutrophils % (Manual) 89 % (45-75)   95 % (45-75)  


 


Lymphocytes % (Manual) 10 % (20-45)   3 % (20-45)  


 


Monocytes % (Manual) 1 % (1-10)   2 % (1-10)  


 


Eosinophils % (Manual) 0 % (0-3)   0 % (0-3)  


 


Basophils % (Manual) 0 % (0-2)   0 % (0-2)  


 


Band Neutrophils 0 % (0-8)   0 % (0-8)  


 


Platelet Estimate Adequate   Adequate  


 


Platelet Morphology Normal   Normal  


 


Polychromasia 1+   1+  


 


Hypochromasia 1+   1+  


 


Anisocytosis 1+    


 


Sodium Level


  141 MMOL/L


(136-145) 


  142 MMOL/L


(136-145) 142 MMOL/L


(136-145)


 


Potassium Level


  3.7 MMOL/L


(3.5-5.1) 


  3.3 MMOL/L


(3.5-5.1) 3.3 MMOL/L


(3.5-5.1)


 


Chloride Level


  111 MMOL/L


() 


  109 MMOL/L


() 109 MMOL/L


()


 


Carbon Dioxide Level


  21 MMOL/L


(21-32) 


  23 MMOL/L


(21-32) 24 MMOL/L


(21-32)


 


Anion Gap


  9 mmol/L


(5-15) 


  10 mmol/L


(5-15) 9 mmol/L


(5-15)


 


Blood Urea Nitrogen 8 mg/dL (7-18)   8 mg/dL (7-18)  5 mg/dL (7-18) 


 


Creatinine


  0.6 MG/DL


(0.55-1.30) 


  0.5 MG/DL


(0.55-1.30) 0.6 MG/DL


(0.55-1.30)


 


Estimat Glomerular Filtration


Rate > 60 mL/min


(>60) 


  > 60 mL/min


(>60) > 60 mL/min


(>60)


 


Glucose Level


  93 MG/DL


() 


  98 MG/DL


() 100 MG/DL


()


 


Calcium Level


  6.9 MG/DL


(8.5-10.1) 


  7.1 MG/DL


(8.5-10.1) 7.4 MG/DL


(8.5-10.1)


 


Total Bilirubin


  0.3 MG/DL


(0.2-1.0) 


  


  


 


 


Aspartate Amino Transf


(AST/SGOT) 22 U/L (15-37) 


  


  


  


 


 


Alanine Aminotransferase


(ALT/SGPT) 16 U/L (12-78) 


  


  


  


 


 


Alkaline Phosphatase


  52 U/L


() 


  


  


 


 


Total Protein


  4.6 G/DL


(6.4-8.2) 


  


  


 


 


Albumin


  1.4 G/DL


(3.4-5.0) 


  


  


 


 


Globulin 3.2 g/dL    


 


Albumin/Globulin Ratio 0.4 (1.0-2.7)    


 


Urine Color  Stacey   


 


Urine Appearance


  


  Slightly


cloudy 


  


 


 


Urine pH  5 (4.5-8.0)   


 


Urine Specific Gravity


  


  1.020


(1.005-1.035) 


  


 


 


Urine Protein  2+ (NEGATIVE)   


 


Urine Glucose (UA)


  


  Negative


(NEGATIVE) 


  


 


 


Urine Ketones  3+ (NEGATIVE)   


 


Urine Blood  3+ (NEGATIVE)   


 


Urine Nitrite


  


  Negative


(NEGATIVE) 


  


 


 


Urine Bilirubin


  


  Negative


(NEGATIVE) 


  


 


 


Urine Ictotest


  


  Negative


(NEGATIVE) 


  


 


 


Urine Urobilinogen


  


  Normal MG/DL


(0.0-1.0) 


  


 


 


Urine Leukocyte Esterase  2+ (NEGATIVE)   


 


Urine RBC


  


  5-10 /HPF (0 -


0) 


  


 


 


Urine WBC


  


  10-15 /HPF (0


- 0) 


  


 


 


Urine Squamous Epithelial


Cells 


  Few /LPF


(NONE/OCC) 


  


 


 


Urine Uric Acid Crystals


  


  Moderate /LPF


(NONE) 


  


 


 


Urine Bacteria


  


  Moderate /HPF


(NONE) 


  


 


 


Prothrombin Time


  


  


  15.4 SEC


(9.30-11.50) 


 


 


Prothromb Time International


Ratio 


  


  1.4 (0.9-1.1) 


  


 


 


Activated Partial


Thromboplast Time 


  


  35 SEC (23-33) 


  


 








Height (Feet):  5


Height (Inches):  7.00


Weight (Pounds):  189


Objective


PE


General Appearance:  alert, mild distress


Head:  normocephalic, atraumatic


Eyes:  bilateral eye PERRL, bilateral eye EOMI


ENT:  uvula midline, dry mucus membranes


Neck:  supple, thyroid normal, supple/symm/no masses


Respiratory:  lungs clear, no respiratory distress, no retraction


Cardiovascular:  normal peripheral pulses, no edema


Gastrointestinal:  non tender, soft, no guarding, no rebound


Musculoskeletal:  normal inspection


Neurologic:  alert, oriented x3


Psychiatric:  mood/affect normal


Skin:  no rash, warm/dry











Jersey Ross MD May 22, 2020 10:26

## 2020-05-23 VITALS — SYSTOLIC BLOOD PRESSURE: 116 MMHG | DIASTOLIC BLOOD PRESSURE: 77 MMHG

## 2020-05-23 VITALS — SYSTOLIC BLOOD PRESSURE: 128 MMHG | DIASTOLIC BLOOD PRESSURE: 83 MMHG

## 2020-05-23 VITALS — DIASTOLIC BLOOD PRESSURE: 72 MMHG | SYSTOLIC BLOOD PRESSURE: 123 MMHG

## 2020-05-23 VITALS — SYSTOLIC BLOOD PRESSURE: 132 MMHG | DIASTOLIC BLOOD PRESSURE: 75 MMHG

## 2020-05-23 VITALS — DIASTOLIC BLOOD PRESSURE: 80 MMHG | SYSTOLIC BLOOD PRESSURE: 120 MMHG

## 2020-05-23 VITALS — SYSTOLIC BLOOD PRESSURE: 122 MMHG | DIASTOLIC BLOOD PRESSURE: 74 MMHG

## 2020-05-23 LAB
ADD MANUAL DIFF: NO
ANION GAP SERPL CALC-SCNC: 8 MMOL/L (ref 5–15)
BASOPHILS NFR BLD AUTO: 0.6 % (ref 0–2)
BUN SERPL-MCNC: 4 MG/DL (ref 7–18)
CALCIUM SERPL-MCNC: 7.3 MG/DL (ref 8.5–10.1)
CHLORIDE SERPL-SCNC: 108 MMOL/L (ref 98–107)
CO2 SERPL-SCNC: 24 MMOL/L (ref 21–32)
CREAT SERPL-MCNC: 0.5 MG/DL (ref 0.55–1.3)
EOSINOPHIL NFR BLD AUTO: 0.9 % (ref 0–3)
ERYTHROCYTE [DISTWIDTH] IN BLOOD BY AUTOMATED COUNT: 14.6 % (ref 11.6–14.8)
HCT VFR BLD CALC: 26.8 % (ref 42–52)
HGB BLD-MCNC: 9 G/DL (ref 14.2–18)
LYMPHOCYTES NFR BLD AUTO: 6.7 % (ref 20–45)
MCV RBC AUTO: 86 FL (ref 80–99)
MONOCYTES NFR BLD AUTO: 7 % (ref 1–10)
NEUTROPHILS NFR BLD AUTO: 84.8 % (ref 45–75)
PLATELET # BLD: 339 K/UL (ref 150–450)
POTASSIUM SERPL-SCNC: 3.5 MMOL/L (ref 3.5–5.1)
RBC # BLD AUTO: 3.14 M/UL (ref 4.7–6.1)
SODIUM SERPL-SCNC: 140 MMOL/L (ref 136–145)
WBC # BLD AUTO: 16.6 K/UL (ref 4.8–10.8)

## 2020-05-23 RX ADMIN — HEPARIN SODIUM SCH UNITS: 5000 INJECTION INTRAVENOUS; SUBCUTANEOUS at 20:32

## 2020-05-23 RX ADMIN — PANTOPRAZOLE SODIUM SCH MG: 40 INJECTION, POWDER, FOR SOLUTION INTRAVENOUS at 08:59

## 2020-05-23 RX ADMIN — MEROPENEM SCH MLS/HR: 1 INJECTION INTRAVENOUS at 08:59

## 2020-05-23 RX ADMIN — HEPARIN SODIUM SCH UNITS: 5000 INJECTION INTRAVENOUS; SUBCUTANEOUS at 14:13

## 2020-05-23 RX ADMIN — PANTOPRAZOLE SODIUM SCH MG: 40 INJECTION, POWDER, FOR SOLUTION INTRAVENOUS at 20:30

## 2020-05-23 RX ADMIN — DIPHENHYDRAMINE HYDROCHLORIDE PRN MG: 50 INJECTION INTRAMUSCULAR; INTRAVENOUS at 09:47

## 2020-05-23 RX ADMIN — MEROPENEM SCH MLS/HR: 1 INJECTION INTRAVENOUS at 00:22

## 2020-05-23 RX ADMIN — MEROPENEM SCH MLS/HR: 1 INJECTION INTRAVENOUS at 16:23

## 2020-05-23 RX ADMIN — DEXTROSE MONOHYDRATE SCH MLS/HR: 50 INJECTION, SOLUTION INTRAVENOUS at 17:41

## 2020-05-23 NOTE — HEMATOLOGY/ONC PROGRESS NOTE
Assessment/Plan


Assessment/Plan


Assessment and Recs


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 

stomach, pancreas stomach and COLON PRIMARY and path staging pT4No --> had 0/27 

lymph nodes that are noted


--> CT shows    Centrally necrotic mass arising from the gastric fundus 

measuring 12. 6 x 11.1 x 14 cm. Filling defects within pulmonary vessels at the 

lung bases with small peripheral airspace opacities concerning for pulmonary 

emboli and small pulmonary infarcts.


--> resection was completed by Dr. Fischer, he is recovering well


--> pathology reviewed


--> needs a pet/ct scan outpatient


--> adjuvant chemotherapy for residual tumor (+ margin on one side)


--> dw him and he will come in outpatient


--> 5/22 abd xr: PROBABLE POSTOPERATIVE ILEUS.


# Pulmonary embolism -- likely related to hypercoagulable disorder from 

malignancys/p ivc filter


--> case dw pcp, surg, and pulm


--> is not a candidate for anticoagulation given severe anemia and low platelets


--> 5/13 s/p ivc filter placement


--> 5/19 cxr: Left retrocardiac consolidation and left pleural effusion.


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


--> have ordered for repeat prbc transfusions


--> hgb goal is >7


--> have reviewed anemia panel


--> gi to access in re to biopsy/endoscopy


--> IV IRON completed 


--> hgb trend: 8.6-->9.7-->8-->7.7 -->9


# Leukocytosis is likely reactive process from cancer


--> on abx as needed


--> smear is noted


--> meds are reviewed


--> vanc/zosyn-->cefepime/flagyl-->mekhi/micaf/linezolid


--> wbc trend: 21.6-->18-->24.1-->26.7-->20.2-->16.6 


--> covid negative x2 since admission 


# Diarrhea


--> c diff negative 


# PABLO, improving


# HTN


# Dvt ppx scds





The timing of this note does not necessarily reflect the time of the patient 

was seen.





Greatly appreciate consultation.





Subjective


Allergies:  


Coded Allergies:  


     No Known Allergies (Unverified , 5/12/20)


Subjective


5/14 labs are noted, no bleeding, meds reviewed, for egd when iso off


5/15 icu, no overnight events, alert, iv iron, c diff negative 


5/17 on tele, wbc improving, nc, tarango, no distress 


5/18 for surgery this am, have ordered for ffp and vit k, seen by surg, 

anesthesia


5/19 icu, wbc 24.1, iv abx, afebrile, vent, no acute distress 


5/20 refusing scd's, labs reviewed, no sob, hr 105 


5/21 transferred to tele, alert, room air, hgb 7.7 


5/22 is feeling better, dw him re outpatient chemo and he has agreed to come in


5/23 blood cx neg. x2, room air, low k, h/h stable





Objective


Objective





Current Medications








 Medications


  (Trade)  Dose


 Ordered  Sig/Marisa


 Route


 PRN Reason  Start Time


 Stop Time Status Last Admin


Dose Admin


 


 Dextrose


  (Dextrose 50%)  25 ml  Q30M  PRN


 IV


 Hypoglycemia  5/20/20 18:45


 8/11/20 06:14   


 


 


 Dextrose


  (Dextrose 50%)  50 ml  Q30M  PRN


 IV


 Hypoglycemia  5/20/20 18:45


 8/11/20 06:14   


 


 


 Diphenhydramine


 HCl


  (Benadryl)  12.5 mg  Q6H  PRN


 IVP


 Itching/Pruritis  5/20/20 19:00


 6/17/20 18:59   


 


 


 Heparin Sodium


  (Porcine)


  (Heparin 5000


 units/ml)  5,000 units  EVERY 8  HOURS


 SUBQ


   5/23/20 14:00


 7/7/20 13:59   


 


 


 Hydromorphone HCl


  (Dilaudid)  0.5 mg  Q3H  PRN


 IVP


 Pain Score 1-3  5/20/20 19:00


 5/25/20 18:59  5/22/20 21:25


 


 


 Hydromorphone HCl


  (Dilaudid)  1 mg  Q3H  PRN


 IVP


 pain score 4-6  5/20/20 21:00


 5/25/20 20:59  5/23/20 09:47


 


 


 Hydromorphone HCl


  (Dilaudid)  2 mg  Q3H  PRN


 IVP


 pain score 7-10  5/20/20 19:00


 5/25/20 18:59  5/23/20 03:07


 


 


 Linezolid  300 ml @ 


 300 mls/hr  Q12HR


 IVPB


   5/20/20 21:00


 5/27/20 15:59  5/23/20 09:47


 


 


 Meropenem 1 gm/


 Sodium Chloride  55 ml @ 


 110 mls/hr  Q8H


 IVPB


   5/21/20 00:00


 5/25/20 15:59  5/23/20 08:59


 


 


 Micafungin Sodium


 100 mg/Sodium


 Chloride  110 ml @ 


 110 mls/hr  Q24H


 IVPB


   5/21/20 18:00


 5/24/20 17:59  5/22/20 17:50


 


 


 Ondansetron HCl


  (Zofran)  4 mg  Q6H  PRN


 IVP


 Nausea & Vomiting  5/20/20 19:00


 6/17/20 18:59  5/23/20 09:12


 


 


 Pantoprazole


  (Protonix)  40 mg  EVERY 12  HOURS


 IV


   5/20/20 21:00


 6/15/20 08:59  5/23/20 08:59


 


 


 Sodium Chloride  1,000 ml @ 


 100 mls/hr  Q10H


 IV


   5/20/20 18:45


 6/17/20 18:59  5/23/20 05:36


 











Last 24 Hour Vital Signs








  Date Time  Temp Pulse Resp B/P (MAP) Pulse Ox O2 Delivery O2 Flow Rate FiO2


 


5/23/20 04:00 97.9 84 18 123/72 (89) 96   


 


5/23/20 04:00  82      


 


5/23/20 00:00  76      


 


5/23/20 00:00 98.4 91 18 122/74 (90) 98   


 


5/22/20 21:55 98.6       


 


5/22/20 21:00      Room Air  





      Room Air  


 


5/22/20 20:11  87 18  98 Room Air  21


 


5/22/20 20:00 98.1 89 20 127/70 (89) 98   


 


5/22/20 20:00  84      


 


5/22/20 16:00 98.6 87 15 135/74 (94) 99   


 


5/22/20 16:00  81      


 


5/22/20 12:00  86      


 


5/22/20 12:00 96.8 85 18 120/60 (80) 98   


 


5/22/20 09:00      Room Air  





      Room Air  


 


5/22/20 08:00  82      


 


5/22/20 08:00 97.9 87 17 121/72 (88) 96   


 


5/22/20 04:00  85      


 


5/22/20 04:00 97.5 89 18 127/76 (93) 97   


 


5/22/20 00:00 97.7 88 17 121/81 (94) 98   


 


5/22/20 00:00  86      


 


5/21/20 21:00      Room Air  





      Room Air  


 


5/21/20 20:12  84 18  96 Room Air  21


 


5/21/20 20:00 98.1 98 17 127/77 (94) 98   


 


5/21/20 20:00  95      


 


5/21/20 16:00 98.2 86 16 114/74 (87) 96   


 


5/21/20 16:00  86      


 


5/21/20 13:09 97.5       


 


5/21/20 12:42      Room Air  





      Room Air  


 


5/21/20 12:00 97.9 81 18 117/73 (88) 96   


 


5/21/20 12:00  89      

















Intake and Output  


 


 5/22/20 5/23/20





 19:00 07:00


 


Output Total 480 ml 300 ml


 


Balance -480 ml -300 ml


 


  


 


Output Urine Total 400 ml 300 ml


 


Drainage Total 80 ml 


 


# Voids  1











Labs








Test


  5/20/20


19:00 5/21/20


05:00 5/22/20


05:40 5/23/20


06:15


 


Urine Color Stacey    


 


Urine Appearance


  Slightly


cloudy 


  


  


 


 


Urine pH 5 (4.5-8.0)    


 


Urine Specific Gravity


  1.020


(1.005-1.035) 


  


  


 


 


Urine Protein 2+ (NEGATIVE)    


 


Urine Glucose (UA)


  Negative


(NEGATIVE) 


  


  


 


 


Urine Ketones 3+ (NEGATIVE)    


 


Urine Blood 3+ (NEGATIVE)    


 


Urine Nitrite


  Negative


(NEGATIVE) 


  


  


 


 


Urine Bilirubin


  Negative


(NEGATIVE) 


  


  


 


 


Urine Ictotest


  Negative


(NEGATIVE) 


  


  


 


 


Urine Urobilinogen


  Normal MG/DL


(0.0-1.0) 


  


  


 


 


Urine Leukocyte Esterase 2+ (NEGATIVE)    


 


Urine RBC


  5-10 /HPF (0 -


0) 


  


  


 


 


Urine WBC


  10-15 /HPF (0


- 0) 


  


  


 


 


Urine Squamous Epithelial


Cells Few /LPF


(NONE/OCC) 


  


  


 


 


Urine Uric Acid Crystals


  Moderate /LPF


(NONE) 


  


  


 


 


Urine Bacteria


  Moderate /HPF


(NONE) 


  


  


 


 


White Blood Count


  


  20.2 K/UL


(4.8-10.8) 18.6 K/UL


(4.8-10.8) 16.6 K/UL


(4.8-10.8)


 


Red Blood Count


  


  2.69 M/UL


(4.70-6.10) 3.12 M/UL


(4.70-6.10) 3.14 M/UL


(4.70-6.10)


 


Hemoglobin


  


  7.7 G/DL


(14.2-18.0) 8.9 G/DL


(14.2-18.0) 9.0 G/DL


(14.2-18.0)


 


Hematocrit


  


  22.7 %


(42.0-52.0) 26.6 %


(42.0-52.0) 26.8 %


(42.0-52.0)


 


Mean Corpuscular Volume  84 FL (80-99)  85 FL (80-99)  86 FL (80-99) 


 


Mean Corpuscular Hemoglobin


  


  28.6 PG


(27.0-31.0) 28.5 PG


(27.0-31.0) 28.6 PG


(27.0-31.0)


 


Mean Corpuscular Hemoglobin


Concent 


  34.0 G/DL


(32.0-36.0) 33.4 G/DL


(32.0-36.0) 33.4 G/DL


(32.0-36.0)


 


Red Cell Distribution Width


  


  13.8 %


(11.6-14.8) 14.3 %


(11.6-14.8) 14.6 %


(11.6-14.8)


 


Platelet Count


  


  274 K/UL


(150-450) 300 K/UL


(150-450) 339 K/UL


(150-450)


 


Mean Platelet Volume


  


  5.0 FL


(6.5-10.1) 4.5 FL


(6.5-10.1) 4.6 FL


(6.5-10.1)


 


Neutrophils (%) (Auto)


  


   % (45.0-75.0) 


   % (45.0-75.0) 


  84.8 %


(45.0-75.0)


 


Lymphocytes (%) (Auto)


  


   % (20.0-45.0) 


   % (20.0-45.0) 


  6.7 %


(20.0-45.0)


 


Monocytes (%) (Auto)


  


   % (1.0-10.0) 


   % (1.0-10.0) 


  7.0 %


(1.0-10.0)


 


Eosinophils (%) (Auto)


  


   % (0.0-3.0) 


   % (0.0-3.0) 


  0.9 %


(0.0-3.0)


 


Basophils (%) (Auto)


  


   % (0.0-2.0) 


   % (0.0-2.0) 


  0.6 %


(0.0-2.0)


 


Differential Total Cells


Counted 


  100 


  100 


  


 


 


Neutrophils % (Manual)  95 % (45-75)  91 % (45-75)  


 


Lymphocytes % (Manual)  3 % (20-45)  6 % (20-45)  


 


Monocytes % (Manual)  2 % (1-10)  3 % (1-10)  


 


Eosinophils % (Manual)  0 % (0-3)  0 % (0-3)  


 


Basophils % (Manual)  0 % (0-2)  0 % (0-2)  


 


Band Neutrophils  0 % (0-8)  0 % (0-8)  


 


Platelet Estimate  Adequate  Adequate  


 


Platelet Morphology  Normal  Normal  


 


Polychromasia  1+   


 


Hypochromasia  1+   


 


Prothrombin Time


  


  15.4 SEC


(9.30-11.50) 


  


 


 


Prothromb Time International


Ratio 


  1.4 (0.9-1.1) 


  


  


 


 


Activated Partial


Thromboplast Time 


  35 SEC (23-33) 


  


  


 


 


Sodium Level


  


  142 MMOL/L


(136-145) 142 MMOL/L


(136-145) 140 MMOL/L


(136-145)


 


Potassium Level


  


  3.3 MMOL/L


(3.5-5.1) 3.3 MMOL/L


(3.5-5.1) 3.5 MMOL/L


(3.5-5.1)


 


Chloride Level


  


  109 MMOL/L


() 109 MMOL/L


() 108 MMOL/L


()


 


Carbon Dioxide Level


  


  23 MMOL/L


(21-32) 24 MMOL/L


(21-32) 24 MMOL/L


(21-32)


 


Anion Gap


  


  10 mmol/L


(5-15) 9 mmol/L


(5-15) 8 mmol/L


(5-15)


 


Blood Urea Nitrogen  8 mg/dL (7-18)  5 mg/dL (7-18)  4 mg/dL (7-18) 


 


Creatinine


  


  0.5 MG/DL


(0.55-1.30) 0.6 MG/DL


(0.55-1.30) 0.5 MG/DL


(0.55-1.30)


 


Estimat Glomerular Filtration


Rate 


  > 60 mL/min


(>60) > 60 mL/min


(>60) > 60 mL/min


(>60)


 


Glucose Level


  


  98 MG/DL


() 100 MG/DL


() 78 MG/DL


()


 


Calcium Level


  


  7.1 MG/DL


(8.5-10.1) 7.4 MG/DL


(8.5-10.1) 7.3 MG/DL


(8.5-10.1)








Height (Feet):  5


Height (Inches):  7.00


Weight (Pounds):  190


Objective


PE


General Appearance:  alert, mild distress


Head:  normocephalic, atraumatic


Eyes:  bilateral eye PERRL, bilateral eye EOMI


ENT:  uvula midline, dry mucus membranes


Neck:  supple, thyroid normal, supple/symm/no masses


Respiratory:  lungs clear, no respiratory distress, no retraction


Cardiovascular:  normal peripheral pulses, no edema


Gastrointestinal:  non tender, soft, no guarding, no rebound


Musculoskeletal:  normal inspection


Neurologic:  alert, oriented x3


Psychiatric:  mood/affect normal


Skin:  no rash, warm/dry











Jersey Ross MD May 23, 2020 10:40

## 2020-05-23 NOTE — NUR
NURSE NOTES: D/c tarango per dr. Fischer's order. Patient tolerated well, catheter intact, 
no s/s of bleeding. Will monitor for urine output if patient's able to void himself.

## 2020-05-23 NOTE — CARDIAC ELECTROPHYSIOLOGY PN
Assessment/Plan


Assessment/Plan


1. Hypotension due to profound anemia due to gastric mass.  


S/P  5 units of blood transfusion. EF NL





2. Profound anemia.  Hemoglobin of 3.5.  CT of abdomen and pelvis shows


central necrotic mass in the gastric fundus 12 x 14 x 11 cm.  


 FU  by GI and S/P surgery by Dr Fischer.   


 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 

pancreatic tail  likely  perforated.


On clear liquid diet now





3. Bilateral  Pulmonary embolus.  S/P IVC filter.


      Off anticoagulation for gi bleed, Hb 3.5 and post op.


4. Sepsis with elevated white count. Ruled out for Covid by 2 negative PCRs





DW RN





Subjective


Subjective


 Alert.Chest CT bilateral PE. Off anticoagulation for GI bleed. S/P IVC filter. 

Still has abd drain. Now on clear liquis diet





Objective





Last 24 Hour Vital Signs








  Date Time  Temp Pulse Resp B/P (MAP) Pulse Ox O2 Delivery O2 Flow Rate FiO2


 


5/23/20 13:29 99.1       


 


5/23/20 12:00  92      


 


5/23/20 12:00 99.1 91 20 120/80 (93) 96   


 


5/23/20 10:17 97.9       


 


5/23/20 09:00      Room Air  





      Room Air  


 


5/23/20 08:00  79      


 


5/23/20 08:00 97.9 76 18 116/77 (90) 99   


 


5/23/20 04:00 97.9 84 18 123/72 (89) 96   


 


5/23/20 04:00  82      


 


5/23/20 00:00  76      


 


5/23/20 00:00 98.4 91 18 122/74 (90) 98   


 


5/22/20 21:55 98.6       


 


5/22/20 21:00      Room Air  





      Room Air  


 


5/22/20 20:11  87 18  98 Room Air  21


 


5/22/20 20:00 98.1 89 20 127/70 (89) 98   


 


5/22/20 20:00  84      


 


5/22/20 16:00 98.6 87 15 135/74 (94) 99   


 


5/22/20 16:00  81      

















Intake and Output  


 


 5/22/20 5/23/20





 19:00 07:00


 


Output Total 480 ml 300 ml


 


Balance -480 ml -300 ml


 


  


 


Output Urine Total 400 ml 300 ml


 


Drainage Total 80 ml 


 


# Voids  1











Laboratory Tests








Test


  5/23/20


06:15


 


White Blood Count


  16.6 K/UL


(4.8-10.8)  H


 


Red Blood Count


  3.14 M/UL


(4.70-6.10)  L


 


Hemoglobin


  9.0 G/DL


(14.2-18.0)  L


 


Hematocrit


  26.8 %


(42.0-52.0)  L


 


Mean Corpuscular Volume 86 FL (80-99)  


 


Mean Corpuscular Hemoglobin


  28.6 PG


(27.0-31.0)


 


Mean Corpuscular Hemoglobin


Concent 33.4 G/DL


(32.0-36.0)


 


Red Cell Distribution Width


  14.6 %


(11.6-14.8)


 


Platelet Count


  339 K/UL


(150-450)


 


Mean Platelet Volume


  4.6 FL


(6.5-10.1)  L


 


Neutrophils (%) (Auto)


  84.8 %


(45.0-75.0)  H


 


Lymphocytes (%) (Auto)


  6.7 %


(20.0-45.0)  L


 


Monocytes (%) (Auto)


  7.0 %


(1.0-10.0)


 


Eosinophils (%) (Auto)


  0.9 %


(0.0-3.0)


 


Basophils (%) (Auto)


  0.6 %


(0.0-2.0)


 


Sodium Level


  140 MMOL/L


(136-145)


 


Potassium Level


  3.5 MMOL/L


(3.5-5.1)


 


Chloride Level


  108 MMOL/L


()  H


 


Carbon Dioxide Level


  24 MMOL/L


(21-32)


 


Anion Gap


  8 mmol/L


(5-15)


 


Blood Urea Nitrogen


  4 mg/dL (7-18)


L


 


Creatinine


  0.5 MG/DL


(0.55-1.30)  L


 


Estimat Glomerular Filtration


Rate > 60 mL/min


(>60)


 


Glucose Level


  78 MG/DL


()


 


Calcium Level


  7.3 MG/DL


(8.5-10.1)  L











Microbiology








 Date/Time


Source Procedure


Growth Status


 


 


 5/20/20 19:55


Blood Blood Culture - Preliminary


NO GROWTH AFTER 48 HOURS Resulted


 


 5/20/20 19:35


Blood Blood Culture - Preliminary


NO GROWTH AFTER 48 HOURS Resulted


 


 5/20/20 19:00


Urine,Clean Catch Urine Culture - Final


NO GROWTH AFTER 48 HOURS Complete








Objective


HEAD AND NECK:  No JVD.


LUNGS:  Decreased breath sounds.


CARDIOVASCULAR:  Regular S1 and S2.  Tachycardic.


ABDOMEN: Post op with ALLISON drain


EXTREMITIES:  No pitting edema.











Aaron Antunez MD May 23, 2020 14:06

## 2020-05-23 NOTE — PULMONOLOGY PROGRESS NOTE
Subjective


ROS Limited/Unobtainable:  Yes


Interval Events:  s/p laparotomy 5/18/20; extubated 5/19/20


Constitutional:  Reports: no symptoms


HEENT:  Repors: no symptoms


Respiratory:  Reports: no symptoms


Cardiovascular:  Reports: no symptoms


Gastrointestinal/Abdominal:  Reports: no symptoms


Genitourinary:  Reports: no symptoms


Neurologic:  Reports: no symptoms


Allergies:  


Coded Allergies:  


     No Known Allergies (Unverified , 5/12/20)


All Systems:  reviewed and negative except above





Objective





Last 24 Hour Vital Signs








  Date Time  Temp Pulse Resp B/P (MAP) Pulse Ox O2 Delivery O2 Flow Rate FiO2


 


5/23/20 10:17 97.9       


 


5/23/20 09:00      Room Air  





      Room Air  


 


5/23/20 08:00 97.9 76 18 116/77 (90) 99   


 


5/23/20 04:00 97.9 84 18 123/72 (89) 96   


 


5/23/20 04:00  82      


 


5/23/20 00:00  76      


 


5/23/20 00:00 98.4 91 18 122/74 (90) 98   


 


5/22/20 21:55 98.6       


 


5/22/20 21:00      Room Air  





      Room Air  


 


5/22/20 20:11  87 18  98 Room Air  21


 


5/22/20 20:00 98.1 89 20 127/70 (89) 98   


 


5/22/20 20:00  84      


 


5/22/20 16:00 98.6 87 15 135/74 (94) 99   


 


5/22/20 16:00  81      


 


5/22/20 12:00  86      


 


5/22/20 12:00 96.8 85 18 120/60 (80) 98   

















Intake and Output  


 


 5/22/20 5/23/20





 19:00 07:00


 


Output Total 480 ml 300 ml


 


Balance -480 ml -300 ml


 


  


 


Output Urine Total 400 ml 300 ml


 


Drainage Total 80 ml 


 


# Voids  1








General Appearance:  no acute distress


HEENT:  normocephalic


Respiratory:  chest wall non-tender, lungs clear


Cardiovascular:  normal peripheral pulses


Abdomen:  non distended


Extremities:  no cyanosis





Microbiology








 Date/Time


Source Procedure


Growth Status


 


 


 5/20/20 19:55


Blood Blood Culture - Preliminary


NO GROWTH AFTER 48 HOURS Resulted


 


 5/20/20 19:35


Blood Blood Culture - Preliminary


NO GROWTH AFTER 48 HOURS Resulted


 


 5/20/20 19:00


Urine,Clean Catch Urine Culture - Final


NO GROWTH AFTER 48 HOURS Complete








Laboratory Tests


5/23/20 06:15: 


White Blood Count 16.6H, Red Blood Count 3.14L, Hemoglobin 9.0L, Hematocrit 

26.8L, Mean Corpuscular Volume 86, Mean Corpuscular Hemoglobin 28.6, Mean 

Corpuscular Hemoglobin Concent 33.4, Red Cell Distribution Width 14.6, Platelet 

Count 339, Mean Platelet Volume 4.6L, Neutrophils (%) (Auto) 84.8H, Lymphocytes 

(%) (Auto) 6.7L, Monocytes (%) (Auto) 7.0, Eosinophils (%) (Auto) 0.9, 

Basophils (%) (Auto) 0.6, Sodium Level 140, Potassium Level 3.5, Chloride Level 

108H, Carbon Dioxide Level 24, Anion Gap 8, Blood Urea Nitrogen 4L, Creatinine 

0.5L, Estimat Glomerular Filtration Rate > 60, Glucose Level 78, Calcium Level 

7.3L





Current Medications








 Medications


  (Trade)  Dose


 Ordered  Sig/Marisa


 Route


 PRN Reason  Start Time


 Stop Time Status Last Admin


Dose Admin


 


 Dextrose


  (Dextrose 50%)  25 ml  Q30M  PRN


 IV


 Hypoglycemia  5/20/20 18:45


 8/11/20 06:14   


 


 


 Dextrose


  (Dextrose 50%)  50 ml  Q30M  PRN


 IV


 Hypoglycemia  5/20/20 18:45


 8/11/20 06:14   


 


 


 Diphenhydramine


 HCl


  (Benadryl)  12.5 mg  Q6H  PRN


 IVP


 Itching/Pruritis  5/20/20 19:00


 6/17/20 18:59   


 


 


 Heparin Sodium


  (Porcine)


  (Heparin 5000


 units/ml)  5,000 units  EVERY 8  HOURS


 SUBQ


   5/23/20 14:00


 7/7/20 13:59   


 


 


 Hydromorphone HCl


  (Dilaudid)  0.5 mg  Q3H  PRN


 IVP


 Pain Score 1-3  5/20/20 19:00


 5/25/20 18:59  5/22/20 21:25


 


 


 Hydromorphone HCl


  (Dilaudid)  1 mg  Q3H  PRN


 IVP


 pain score 4-6  5/20/20 21:00


 5/25/20 20:59  5/23/20 09:47


 


 


 Hydromorphone HCl


  (Dilaudid)  2 mg  Q3H  PRN


 IVP


 pain score 7-10  5/20/20 19:00


 5/25/20 18:59  5/23/20 03:07


 


 


 Linezolid  300 ml @ 


 300 mls/hr  Q12HR


 IVPB


   5/20/20 21:00


 5/27/20 15:59  5/23/20 09:47


 


 


 Meropenem 1 gm/


 Sodium Chloride  55 ml @ 


 110 mls/hr  Q8H


 IVPB


   5/21/20 00:00


 5/25/20 15:59  5/23/20 08:59


 


 


 Micafungin Sodium


 100 mg/Sodium


 Chloride  110 ml @ 


 110 mls/hr  Q24H


 IVPB


   5/21/20 18:00


 5/24/20 17:59  5/22/20 17:50


 


 


 Ondansetron HCl


  (Zofran)  4 mg  Q6H  PRN


 IVP


 Nausea & Vomiting  5/20/20 19:00


 6/17/20 18:59  5/23/20 09:12


 


 


 Pantoprazole


  (Protonix)  40 mg  EVERY 12  HOURS


 IV


   5/20/20 21:00


 6/15/20 08:59  5/23/20 08:59


 


 


 Sodium Chloride  1,000 ml @ 


 100 mls/hr  Q10H


 IV


   5/20/20 18:45


 6/17/20 18:59  5/23/20 05:36


 











Assessment/Plan


Assessment/Plan


IMPRESSION:


1. Gastric mass.S/p laparotomy


2. Severe anemia. Corrected


3. Pulmonary embolism.





DISCUSSION:  





S/p IVC filter.  S/p blood transfusion, IV fluids.  


S/p ex lap, left colectomy, partial gastrectomy, splenectomy, distal 

pancreatectomy, omentectomy 


and  open lysis of adhesions 


I will follow carefully.


Extubated 5/19/20


COVID 19 pcr negative


Currently on RA

















  ______________________________________________


  MARIANELA Downs Omar Syed MD May 23, 2020 10:51

## 2020-05-23 NOTE — INFECTIOUS DISEASES PROG NOTE
Assessment/Plan


Assessment/Plan








Assessment:


Severe Sepsis


Enterobacter bacteremia- likely from GI source


S. epi bacteremia, contaminant


GGO on lung bases- COVID19 neg x2


    -5/21 CTA chest: STUDY POSITIVE FOR BILATERAL PULMONARY EMBOLISM. BILATERAL 

PLEURAL EFFUSIONS WITH DEPENDENT INFILTRATES/CONSOLIDATION IN THE LUNG


BASES.POSTOPERATIVE CHANGES IN THE UPPER ABDOMEN WITH ANASTOMOTIC SUTURES LEFT 

UPPER QUADRANT AND MIDLINE SKIN STAPLES. FREE FLUID AND SCATTERED SMALL FOCI OF 

FREE AIR ALSO NOTED IN LEFT UPPER QUADRANT FROM RECENT ABDOMINAL SURGERY. 

HEPATIC CYST AND GRANULOMA.


   -5/19 CXR:  Left retrocardiac consolidation and left pleural effusion.


  -5/15 SARS-COV2 PCR neg


  -5/12 Bcx 1/4 S.epi, 2/4 E. aerogenes (pan S); 5/13 Bcx Neg


            CXR: no acute disease


            SARS-COV2 PCR





Afebrile


Leukocytosis; persistent- increased post-op- now improving


   5/20 u/a wbc 10-15, nit neg, leuk +2





Severe anemia (blood loss)/GIB likely 2ry to gastric mass


Thrombocytopenia, worsening


  -5/18 SP ex lap, left colectomy, partial gastrectomy, splenectomy, distal 

pancreatectomy, omentectomy , open lysis of adhesions


          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 

invading spleen, posterior stomach, pancreatic tail, invasive, likely seemingly 

perforated.


                       --path: invasive adenocarcinoma, moderately 

differentiated





  -CT abd/p:   Centrally necrotic mass arising from the gastric fundus 

measuring 12. 6 x 11.1 x 14 cm.  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  

Gastric adenocarcinoma, lymphoma, and other neoplastic etiologies are also in 

the differential. Filling defects within pulmonary vessels at the lung bases 

with small peripheral airspace opacities concerning for pulmonary emboli and 

small pulmonary infarcts.








Post-op ileus


  -5/22 kUB:  PROBABLE POSTOPERATIVE ILEUS.


 








Mild AST elevation





Lactic acidosis, SP


   -5/12 u/a neg; ucx 30-40K mixed urogenital contaminants





Diarrhea


    -Cdiff,stool cx neg





Syncopal episode- likely due to severe dehydration and anemia


  -CT head: No acute findings in the head/brain.





Acute PE/DVT -(per CT abd/p findings)


  -sp IVC filter placement 5/13 (retrievable- given presence of bacteremia)





PABLO, SP





HTN





Plan:


  


- Continue Meropenem and PO Zyvox #4 (abx d #12/14) given worsening WBC


-Contmpiric Micafungin #5/5 for intraabdominal coverage


      -5/20 SP Cefepime #6, Flagyl #6


      - 5/15/20 SP Zosyn #4 and Vancomycin #4





-f/u cx


-Monitor CBC/CMP, temperature


-aspiration precautions


-COVID19 neg x2


-Sx,pulm, Heme/onc, GI f/u


-wound care per surgical team








Thank you for consulting [facility]. Will continue to follow along wiht 

you.





Discussed with RN.





Subjective


Allergies:  


Coded Allergies:  


     No Known Allergies (Unverified , 5/12/20)


Subjective


afebrile


at RA


wbc improving





Objective


Vital Signs





Last 24 Hour Vital Signs








  Date Time  Temp Pulse Resp B/P (MAP) Pulse Ox O2 Delivery O2 Flow Rate FiO2


 


5/23/20 13:29 99.1       


 


5/23/20 12:00  92      


 


5/23/20 12:00 99.1 91 20 120/80 (93) 96   


 


5/23/20 10:17 97.9       


 


5/23/20 09:00      Room Air  





      Room Air  


 


5/23/20 08:00  79      


 


5/23/20 08:00 97.9 76 18 116/77 (90) 99   


 


5/23/20 04:00 97.9 84 18 123/72 (89) 96   


 


5/23/20 04:00  82      


 


5/23/20 00:00  76      


 


5/23/20 00:00 98.4 91 18 122/74 (90) 98   


 


5/22/20 21:55 98.6       


 


5/22/20 21:00      Room Air  





      Room Air  


 


5/22/20 20:11  87 18  98 Room Air  21


 


5/22/20 20:00 98.1 89 20 127/70 (89) 98   


 


5/22/20 20:00  84      


 


5/22/20 16:00 98.6 87 15 135/74 (94) 99   


 


5/22/20 16:00  81      








Height (Feet):  5


Height (Inches):  7.00


Weight (Pounds):  190


Objective


HEAD AND NECK:  No JVD.


LUNGS:  Decreased breath sounds.


CARDIOVASCULAR:  Regular S1 and S2.  Tachycardic.


ABDOMEN: Post op dressings in place, ALLISON drain


EXTREMITIES:  No pitting edema.





Microbiology








 Date/Time


Source Procedure


Growth Status


 


 


 5/20/20 19:55


Blood Blood Culture - Preliminary


NO GROWTH AFTER 48 HOURS Resulted


 


 5/20/20 19:35


Blood Blood Culture - Preliminary


NO GROWTH AFTER 48 HOURS Resulted


 


 5/20/20 19:00


Urine,Clean Catch Urine Culture - Final


NO GROWTH AFTER 48 HOURS Complete











Laboratory Tests








Test


  5/23/20


06:15


 


White Blood Count


  16.6 K/UL


(4.8-10.8)  H


 


Red Blood Count


  3.14 M/UL


(4.70-6.10)  L


 


Hemoglobin


  9.0 G/DL


(14.2-18.0)  L


 


Hematocrit


  26.8 %


(42.0-52.0)  L


 


Mean Corpuscular Volume 86 FL (80-99)  


 


Mean Corpuscular Hemoglobin


  28.6 PG


(27.0-31.0)


 


Mean Corpuscular Hemoglobin


Concent 33.4 G/DL


(32.0-36.0)


 


Red Cell Distribution Width


  14.6 %


(11.6-14.8)


 


Platelet Count


  339 K/UL


(150-450)


 


Mean Platelet Volume


  4.6 FL


(6.5-10.1)  L


 


Neutrophils (%) (Auto)


  84.8 %


(45.0-75.0)  H


 


Lymphocytes (%) (Auto)


  6.7 %


(20.0-45.0)  L


 


Monocytes (%) (Auto)


  7.0 %


(1.0-10.0)


 


Eosinophils (%) (Auto)


  0.9 %


(0.0-3.0)


 


Basophils (%) (Auto)


  0.6 %


(0.0-2.0)


 


Sodium Level


  140 MMOL/L


(136-145)


 


Potassium Level


  3.5 MMOL/L


(3.5-5.1)


 


Chloride Level


  108 MMOL/L


()  H


 


Carbon Dioxide Level


  24 MMOL/L


(21-32)


 


Anion Gap


  8 mmol/L


(5-15)


 


Blood Urea Nitrogen


  4 mg/dL (7-18)


L


 


Creatinine


  0.5 MG/DL


(0.55-1.30)  L


 


Estimat Glomerular Filtration


Rate > 60 mL/min


(>60)


 


Glucose Level


  78 MG/DL


()


 


Calcium Level


  7.3 MG/DL


(8.5-10.1)  L











Current Medications








 Medications


  (Trade)  Dose


 Ordered  Sig/Marisa


 Route


 PRN Reason  Start Time


 Stop Time Status Last Admin


Dose Admin


 


 Dextrose


  (Dextrose 50%)  25 ml  Q30M  PRN


 IV


 Hypoglycemia  5/20/20 18:45


 8/11/20 06:14   


 


 


 Dextrose


  (Dextrose 50%)  50 ml  Q30M  PRN


 IV


 Hypoglycemia  5/20/20 18:45


 8/11/20 06:14   


 


 


 Diphenhydramine


 HCl


  (Benadryl)  12.5 mg  Q6H  PRN


 IVP


 Itching/Pruritis  5/20/20 19:00


 6/17/20 18:59   


 


 


 Heparin Sodium


  (Porcine)


  (Heparin 5000


 units/ml)  5,000 units  EVERY 8  HOURS


 SUBQ


   5/23/20 14:00


 7/7/20 13:59  5/23/20 14:13


 


 


 Hydromorphone HCl


  (Dilaudid)  0.5 mg  Q3H  PRN


 IVP


 Pain Score 1-3  5/20/20 19:00


 5/25/20 18:59  5/22/20 21:25


 


 


 Hydromorphone HCl


  (Dilaudid)  1 mg  Q3H  PRN


 IVP


 pain score 4-6  5/20/20 21:00


 5/25/20 20:59  5/23/20 09:47


 


 


 Hydromorphone HCl


  (Dilaudid)  2 mg  Q3H  PRN


 IVP


 pain score 7-10  5/20/20 19:00


 5/25/20 18:59  5/23/20 12:59


 


 


 Linezolid  300 ml @ 


 300 mls/hr  Q12HR


 IVPB


   5/20/20 21:00


 5/27/20 15:59  5/23/20 09:47


 


 


 Meropenem 1 gm/


 Sodium Chloride  55 ml @ 


 110 mls/hr  Q8H


 IVPB


   5/21/20 00:00


 5/25/20 15:59  5/23/20 08:59


 


 


 Micafungin Sodium


 100 mg/Sodium


 Chloride  110 ml @ 


 110 mls/hr  Q24H


 IVPB


   5/21/20 18:00


 5/24/20 17:59  5/22/20 17:50


 


 


 Ondansetron HCl


  (Zofran)  4 mg  Q6H  PRN


 IVP


 Nausea & Vomiting  5/20/20 19:00


 6/17/20 18:59  5/23/20 09:12


 


 


 Pantoprazole


  (Protonix)  40 mg  EVERY 12  HOURS


 IV


   5/20/20 21:00


 6/15/20 08:59  5/23/20 08:59


 


 


 Sodium Chloride  1,000 ml @ 


 100 mls/hr  Q10H


 IV


   5/20/20 18:45


 6/17/20 18:59  5/23/20 05:36


 

















Alexa Gonzalez M.D. May 23, 2020 15:10

## 2020-05-23 NOTE — GENERAL PROGRESS NOTE
Assessment/Plan


Problem List:  


(1) Lower extremity edema


ICD Codes:  R60.0 - Localized edema


SNOMED:  025745467


(2) COVID-19 ruled out


ICD Codes:  Z03.818 - Encounter for observation for suspected exposure to other 

biological agents ruled out


SNOMED:  821616124, 441132663


(3) Pulmonary embolism


ICD Codes:  I26.99 - Other pulmonary embolism without acute cor pulmonale


SNOMED:  50344646


Qualifiers:  


   Qualified Codes:  I26.99 - Other pulmonary embolism without acute cor 

pulmonale


(4) GI bleed


ICD Codes:  K92.2 - Gastrointestinal hemorrhage, unspecified


SNOMED:  19128846


Qualifiers:  


   Qualified Codes:  K92.2 - Gastrointestinal hemorrhage, unspecified


(5) Gastric mass


ICD Codes:  K31.89 - Other diseases of stomach and duodenum


SNOMED:  317289282


(6) Syncope


ICD Codes:  R55 - Syncope and collapse


SNOMED:  730201402


Qualifiers:  


   Qualified Codes:  R55 - Syncope and collapse


(7) Malnutrition


ICD Codes:  E46 - Unspecified protein-calorie malnutrition


SNOMED:  75173559


(8) Weak


ICD Codes:  R53.1 - Weakness


SNOMED:  55661030


Status:  unchanged


Assessment/Plan:


low k


persistent leukocytosis


afebrile


abx per id


moniter for bleeding





Subjective


ROS Limited/Unobtainable:  Yes


Allergies:  


Coded Allergies:  


     No Known Allergies (Unverified , 5/12/20)





Objective





Last 24 Hour Vital Signs








  Date Time  Temp Pulse Resp B/P (MAP) Pulse Ox O2 Delivery O2 Flow Rate FiO2


 


5/23/20 04:00 97.9 84 18 123/72 (89) 96   


 


5/23/20 04:00  82      


 


5/23/20 00:00  76      


 


5/23/20 00:00 98.4 91 18 122/74 (90) 98   


 


5/22/20 21:55 98.6       


 


5/22/20 21:00      Room Air  





      Room Air  


 


5/22/20 20:11  87 18  98 Room Air  21


 


5/22/20 20:00 98.1 89 20 127/70 (89) 98   


 


5/22/20 20:00  84      


 


5/22/20 16:00 98.6 87 15 135/74 (94) 99   


 


5/22/20 16:00  81      


 


5/22/20 12:00  86      


 


5/22/20 12:00 96.8 85 18 120/60 (80) 98   

















Intake and Output  


 


 5/22/20 5/23/20





 19:00 07:00


 


Output Total 480 ml 300 ml


 


Balance -480 ml -300 ml


 


  


 


Output Urine Total 400 ml 300 ml


 


Drainage Total 80 ml 


 


# Voids  1








Laboratory Tests


5/23/20 06:15: 


White Blood Count 16.6H, Red Blood Count 3.14L, Hemoglobin 9.0L, Hematocrit 

26.8L, Mean Corpuscular Volume 86, Mean Corpuscular Hemoglobin 28.6, Mean 

Corpuscular Hemoglobin Concent 33.4, Red Cell Distribution Width 14.6, Platelet 

Count 339, Mean Platelet Volume 4.6L, Neutrophils (%) (Auto) 84.8H, Lymphocytes 

(%) (Auto) 6.7L, Monocytes (%) (Auto) 7.0, Eosinophils (%) (Auto) 0.9, 

Basophils (%) (Auto) 0.6, Sodium Level 140, Potassium Level 3.5, Chloride Level 

108H, Carbon Dioxide Level 24, Anion Gap 8, Blood Urea Nitrogen 4L, Creatinine 

0.5L, Estimat Glomerular Filtration Rate > 60, Glucose Level 78, Calcium Level 

7.3L


Height (Feet):  5


Height (Inches):  7.00


Weight (Pounds):  190











William Lloyd MD May 23, 2020 09:38

## 2020-05-23 NOTE — NUR
NURSE NOTES:

Received report from Marianne RAYA RN. Pt in stable condition. Will continue plan of care and 
close monitoring.

## 2020-05-23 NOTE — NUR
NURSE NOTES: Tried to take wound pictures and assessment, patient refused pictures to be 
taken at this. Patient stated to do it later at night. Will endorse to receiving nurse.

## 2020-05-24 VITALS — DIASTOLIC BLOOD PRESSURE: 79 MMHG | SYSTOLIC BLOOD PRESSURE: 127 MMHG

## 2020-05-24 VITALS — DIASTOLIC BLOOD PRESSURE: 82 MMHG | SYSTOLIC BLOOD PRESSURE: 125 MMHG

## 2020-05-24 VITALS — SYSTOLIC BLOOD PRESSURE: 116 MMHG | DIASTOLIC BLOOD PRESSURE: 74 MMHG

## 2020-05-24 VITALS — DIASTOLIC BLOOD PRESSURE: 74 MMHG | SYSTOLIC BLOOD PRESSURE: 116 MMHG

## 2020-05-24 VITALS — DIASTOLIC BLOOD PRESSURE: 76 MMHG | SYSTOLIC BLOOD PRESSURE: 123 MMHG

## 2020-05-24 VITALS — SYSTOLIC BLOOD PRESSURE: 118 MMHG | DIASTOLIC BLOOD PRESSURE: 78 MMHG

## 2020-05-24 LAB
ADD MANUAL DIFF: NO
ALBUMIN SERPL-MCNC: 1.2 G/DL (ref 3.4–5)
ALBUMIN/GLOB SERPL: 0.3 {RATIO} (ref 1–2.7)
ALP SERPL-CCNC: 64 U/L (ref 46–116)
ALT SERPL-CCNC: 16 U/L (ref 12–78)
AMYLASE SERPL-CCNC: 40 U/L (ref 25–115)
ANION GAP SERPL CALC-SCNC: 6 MMOL/L (ref 5–15)
AST SERPL-CCNC: 25 U/L (ref 15–37)
BASOPHILS NFR BLD AUTO: 0.4 % (ref 0–2)
BILIRUB SERPL-MCNC: 0.2 MG/DL (ref 0.2–1)
BUN SERPL-MCNC: 4 MG/DL (ref 7–18)
CALCIUM SERPL-MCNC: 7 MG/DL (ref 8.5–10.1)
CHLORIDE SERPL-SCNC: 108 MMOL/L (ref 98–107)
CO2 SERPL-SCNC: 26 MMOL/L (ref 21–32)
CREAT SERPL-MCNC: 0.8 MG/DL (ref 0.55–1.3)
EOSINOPHIL NFR BLD AUTO: 1.3 % (ref 0–3)
ERYTHROCYTE [DISTWIDTH] IN BLOOD BY AUTOMATED COUNT: 14.3 % (ref 11.6–14.8)
GLOBULIN SER-MCNC: 4 G/DL
HCT VFR BLD CALC: 27.3 % (ref 42–52)
HGB BLD-MCNC: 9.3 G/DL (ref 14.2–18)
LYMPHOCYTES NFR BLD AUTO: 9.5 % (ref 20–45)
MCV RBC AUTO: 84 FL (ref 80–99)
MONOCYTES NFR BLD AUTO: 6.8 % (ref 1–10)
NEUTROPHILS NFR BLD AUTO: 82.1 % (ref 45–75)
PLATELET # BLD: 344 K/UL (ref 150–450)
POTASSIUM SERPL-SCNC: 3.1 MMOL/L (ref 3.5–5.1)
RBC # BLD AUTO: 3.24 M/UL (ref 4.7–6.1)
SODIUM SERPL-SCNC: 140 MMOL/L (ref 136–145)
WBC # BLD AUTO: 15.5 K/UL (ref 4.8–10.8)

## 2020-05-24 RX ADMIN — HEPARIN SODIUM SCH UNITS: 5000 INJECTION INTRAVENOUS; SUBCUTANEOUS at 13:46

## 2020-05-24 RX ADMIN — HEPARIN SODIUM SCH UNITS: 5000 INJECTION INTRAVENOUS; SUBCUTANEOUS at 21:12

## 2020-05-24 RX ADMIN — PANTOPRAZOLE SODIUM SCH MG: 40 INJECTION, POWDER, FOR SOLUTION INTRAVENOUS at 08:44

## 2020-05-24 RX ADMIN — MEROPENEM SCH MLS/HR: 1 INJECTION INTRAVENOUS at 00:00

## 2020-05-24 RX ADMIN — MEROPENEM SCH MLS/HR: 1 INJECTION INTRAVENOUS at 15:27

## 2020-05-24 RX ADMIN — MEROPENEM SCH MLS/HR: 1 INJECTION INTRAVENOUS at 23:16

## 2020-05-24 RX ADMIN — MEROPENEM SCH MLS/HR: 1 INJECTION INTRAVENOUS at 08:44

## 2020-05-24 RX ADMIN — HEPARIN SODIUM SCH UNITS: 5000 INJECTION INTRAVENOUS; SUBCUTANEOUS at 06:00

## 2020-05-24 RX ADMIN — PANTOPRAZOLE SODIUM SCH MG: 40 INJECTION, POWDER, FOR SOLUTION INTRAVENOUS at 21:10

## 2020-05-24 NOTE — NUR
HAND-OFF: 

Report given to Marianne RAYA RN. Pt in stable condition. Endorsed plan of care and close 
monitoring.

## 2020-05-24 NOTE — NUR
NURSE NOTES:

Received report from JOSIAH Lopes. Patient is awake, lying in semi roberson's; resting 
comfortably; complained of pain at abdominal area with a pain scale of 9/10, nonradiating. 
Patient requesting of pain medication. Will give PRN pain medication as ordered. A/Ox4. No 
signs of acute distress noted. Checked IV site and flushed with ongoing IV fluid as 
prescribed. No erythema, bleeding or infiltration noted. ALLISON drain noted on LLQ area draining 
to serous fluid; emptied 100cc fluid and compressed drain on negative pressure. On P200 
mattress for wound management. Bed at lowest position, brakes on, bed alarm on, siderails 
x3. Call light within reach. Will continue to monitor.

## 2020-05-24 NOTE — PULMONOLOGY PROGRESS NOTE
Subjective


ROS Limited/Unobtainable:  Yes


Interval Events:  s/p laparotomy 5/18/20; extubated 5/19/20


Constitutional:  Reports: no symptoms


HEENT:  Repors: no symptoms


Respiratory:  Reports: no symptoms


Cardiovascular:  Reports: no symptoms


Gastrointestinal/Abdominal:  Reports: no symptoms


Genitourinary:  Reports: no symptoms


Neurologic:  Reports: no symptoms


Allergies:  


Coded Allergies:  


     No Known Allergies (Unverified , 5/12/20)


All Systems:  reviewed and negative except above





Objective





Last 24 Hour Vital Signs








  Date Time  Temp Pulse Resp B/P (MAP) Pulse Ox O2 Delivery O2 Flow Rate FiO2


 


5/24/20 09:00      Room Air  





      Room Air  


 


5/24/20 08:00 97.9 87 16 116/74 (88) 98   


 


5/24/20 04:00  80      


 


5/24/20 04:00 98.1 81 22 127/79 (95) 98   


 


5/24/20 00:00  83      


 


5/24/20 00:00 97.9 86 22 125/82 (96) 98   


 


5/23/20 21:00      Room Air  





      Room Air  


 


5/23/20 20:00 95.7 95 18 132/75 (94) 95   


 


5/23/20 16:53 99.1       


 


5/23/20 16:00 99.1 94 19 128/83 (98) 96   


 


5/23/20 16:00  97      


 


5/23/20 12:00  92      


 


5/23/20 12:00 99.1 91 20 120/80 (93) 96   


 


5/23/20 10:17 97.9       

















Intake and Output  


 


 5/23/20 5/24/20





 19:00 07:00


 


Intake Total 1540 ml 360 ml


 


Output Total 670 ml 400 ml


 


Balance 870 ml -40 ml


 


  


 


Intake Oral 1540 ml 


 


Other  360 ml


 


Output Urine Total 650 ml 300 ml


 


Drainage Total 20 ml 100 ml








General Appearance:  no acute distress


HEENT:  normocephalic


Respiratory:  chest wall non-tender, lungs clear


Cardiovascular:  normal peripheral pulses


Abdomen:  non distended


Extremities:  no cyanosis


Laboratory Tests


5/24/20 05:45: 


White Blood Count 15.5H, Red Blood Count 3.24L, Hemoglobin 9.3L, Hematocrit 

27.3L, Mean Corpuscular Volume 84, Mean Corpuscular Hemoglobin 28.6, Mean 

Corpuscular Hemoglobin Concent 33.9, Red Cell Distribution Width 14.3, Platelet 

Count 344, Mean Platelet Volume 4.9L, Neutrophils (%) (Auto) 82.1H, Lymphocytes 

(%) (Auto) 9.5L, Monocytes (%) (Auto) 6.8, Eosinophils (%) (Auto) 1.3, 

Basophils (%) (Auto) 0.4, Sodium Level 140, Potassium Level 3.1L, Chloride 

Level 108H, Carbon Dioxide Level 26, Anion Gap 6, Blood Urea Nitrogen 4L, 

Creatinine 0.8#, Estimat Glomerular Filtration Rate > 60, Glucose Level 109H, 

Calcium Level 7.0L, Total Bilirubin 0.2, Aspartate Amino Transf (AST/SGOT) 25, 

Alanine Aminotransferase (ALT/SGPT) 16, Alkaline Phosphatase 64, Total Protein 

5.2L, Albumin 1.2L, Globulin 4.0, Albumin/Globulin Ratio 0.3L, Amylase Level 40

, Lipase 179





Current Medications








 Medications


  (Trade)  Dose


 Ordered  Sig/Marisa


 Route


 PRN Reason  Start Time


 Stop Time Status Last Admin


Dose Admin


 


 Dextrose


  (Dextrose 50%)  25 ml  Q30M  PRN


 IV


 Hypoglycemia  5/20/20 18:45


 8/11/20 06:14   


 


 


 Dextrose


  (Dextrose 50%)  50 ml  Q30M  PRN


 IV


 Hypoglycemia  5/20/20 18:45


 8/11/20 06:14   


 


 


 Diphenhydramine


 HCl


  (Benadryl)  12.5 mg  Q6H  PRN


 IVP


 Itching/Pruritis  5/20/20 19:00


 6/17/20 18:59   


 


 


 Heparin Sodium


  (Porcine)


  (Heparin 5000


 units/ml)  5,000 units  EVERY 8  HOURS


 SUBQ


   5/23/20 14:00


 7/7/20 13:59  5/24/20 06:00


 


 


 Hydromorphone HCl


  (Dilaudid)  0.5 mg  Q3H  PRN


 IVP


 Pain Score 1-3  5/20/20 19:00


 5/25/20 18:59  5/22/20 21:25


 


 


 Hydromorphone HCl


  (Dilaudid)  1 mg  Q3H  PRN


 IVP


 pain score 4-6  5/20/20 21:00


 5/25/20 20:59  5/23/20 09:47


 


 


 Hydromorphone HCl


  (Dilaudid)  2 mg  Q3H  PRN


 IVP


 pain score 7-10  5/20/20 19:00


 5/25/20 18:59  5/24/20 06:33


 


 


 Linezolid  300 ml @ 


 300 mls/hr  Q12HR


 IVPB


   5/20/20 21:00


 5/27/20 15:59  5/24/20 09:13


 


 


 Meropenem 1 gm/


 Sodium Chloride  55 ml @ 


 110 mls/hr  Q8H


 IVPB


   5/21/20 00:00


 5/25/20 15:59  5/24/20 08:44


 


 


 Micafungin Sodium


 100 mg/Sodium


 Chloride  110 ml @ 


 110 mls/hr  Q24H


 IVPB


   5/21/20 18:00


 5/24/20 17:59  5/23/20 17:41


 


 


 Ondansetron HCl


  (Zofran)  4 mg  Q6H  PRN


 IVP


 Nausea & Vomiting  5/20/20 19:00


 6/17/20 18:59  5/23/20 09:12


 


 


 Pantoprazole


  (Protonix)  40 mg  EVERY 12  HOURS


 IV


   5/20/20 21:00


 6/15/20 08:59  5/24/20 08:44


 


 


 Sodium Chloride  1,000 ml @ 


 100 mls/hr  Q10H


 IV


   5/20/20 18:45


 6/17/20 18:59  5/24/20 03:10


 











Assessment/Plan


Assessment/Plan


IMPRESSION:


1. Gastric mass.S/p laparotomy


2. Severe anemia. Corrected


3. Pulmonary embolism.





DISCUSSION:  





S/p IVC filter.  S/p blood transfusion, IV fluids.  


S/p ex lap, left colectomy, partial gastrectomy, splenectomy, distal 

pancreatectomy, omentectomy 


and  open lysis of adhesions 


I will follow carefully.


Extubated 5/19/20


COVID 19 pcr negative


Currently on RA

















  ______________________________________________


  MARIANELA Downs Omar Syed MD May 24, 2020 10:08

## 2020-05-24 NOTE — NUR
NURSE NOTES: Nurse report given by JOSIAH Hollingsworth. Patient's awake and in bed, eyes open 
spontaneously, breathing regular and unlabored, no s/s of distress or SOB, AO x4. C/o pain 
on back, 8/10, pain med was provided. Bed low and locked, call light within reach, side 
rails x 3. IV is running fluid, no s/s of tenderness or infiltration. Remy graf noted on 
LLQ, full with serous drainage fluid, notified to previous nurse to collect and record the 
output; otherwise, no s/s of tenderness or redness, no swelling at site. Bulb was compressed 
after discard previous fluid. Attempted to perform skin assessment, especially around 
sacral, patient refused to be turned and checked, noted. Will continue to monitor at this 
time.

## 2020-05-24 NOTE — HEMATOLOGY/ONC PROGRESS NOTE
Assessment/Plan


Assessment/Plan


Assessment and Recs


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 

stomach, pancreas stomach and COLON PRIMARY and path staging pT4No --> had 0/27 

lymph nodes that are noted


--> CT shows    Centrally necrotic mass arising from the gastric fundus 

measuring 12. 6 x 11.1 x 14 cm. Filling defects within pulmonary vessels at the 

lung bases with small peripheral airspace opacities concerning for pulmonary 

emboli and small pulmonary infarcts.


--> resection was completed by Dr. Fischer, he is recovering well


--> pathology reviewed


--> needs a pet/ct scan outpatient


--> adjuvant chemotherapy for residual tumor (+ margin on one side)


--> dw him and he will come in outpatient


--> 5/22 abd xr: PROBABLE POSTOPERATIVE ILEUS.


# Pulmonary embolism -- likely related to hypercoagulable disorder from 

malignancys/p ivc filter


--> case dw pcp, surg, and pulm


--> is not a candidate for anticoagulation given severe anemia and low platelets


--> 5/13 s/p ivc filter placement


--> 5/19 cxr: Left retrocardiac consolidation and left pleural effusion.


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


--> have ordered for repeat prbc transfusions


--> hgb goal is >7


--> have reviewed anemia panel


--> gi to access in re to biopsy/endoscopy


--> IV IRON completed 


--> hgb trend: 8.6-->9.7-->8-->7.7 -->9->9.3


# Leukocytosis is likely reactive process from cancer


--> on abx as needed


--> smear is noted


--> meds are reviewed


--> vanc/zosyn-->cefepime/flagyl-->mekhi/micaf/linezolid


--> wbc trend: 21.6-->18-->24.1-->26.7-->20.2-->16.6 


--> covid negative x2 since admission 


# Diarrhea


--> c diff negative 


# PABLO, improving


# HTN


# Dvt ppx heparin sq





The timing of this note does not necessarily reflect the time of the patient 

was seen.





Greatly appreciate consultation.





Subjective


HEENT:  Denies: no symptoms, eye pain, blurred vision, tearing, double vision, 

ear pain, ear discharge, nose pain, nose congestion, throat pain, throat 

swelling, mouth pain, mouth swelling, other


Cardiovascular:  Denies: no symptoms, chest pain, edema, irregular heart rate, 

lightheadedness, palpitations, syncope, other


Gastrointestinal/Abdominal:  Denies: no symptoms, abdomen distended, abdominal 

pain, black stools, tarry stools, blood in stool, constipated, diarrhea, 

difficulty swallowing, nausea, poor appetite, poor fluid intake, rectal bleeding

, vomiting, other


Genitourinary:  Denies: no symptoms, burning, discharge, frequency, flank pain, 

hematuria, incontinence, pain, urgency, other


Neurologic/Psychiatric:  Denies: no symptoms, anxiety, depressed, emotional 

problems, headache, numbness, paresthesia, pre-existing deficit, seizure, 

tingling, tremors, weakness, other


Endocrine:  Denies: no symptoms, excessive sweating, flushing, intolerance to 

cold, intolerance to heat, increased hunger, increased thirst, increased urine, 

unexplained weight gain, unexplained weight loss, other


Allergies:  


Coded Allergies:  


     No Known Allergies (Unverified , 5/12/20)


Subjective


5/14 labs are noted, no bleeding, meds reviewed, for egd when iso off


5/15 icu, no overnight events, alert, iv iron, c diff negative 


5/17 on tele, wbc improving, nc, tarango, no distress 


5/18 for surgery this am, have ordered for ffp and vit k, seen by surg, 

anesthesia


5/19 icu, wbc 24.1, iv abx, afebrile, vent, no acute distress 


5/20 refusing scd's, labs reviewed, no sob, hr 105 


5/21 transferred to tele, alert, room air, hgb 7.7 


5/22 is feeling better, dw him re outpatient chemo and he has agreed to come in


5/23 blood cx neg. x2, room air, low k, h/h stable 


5/24 labs reviewed, hgb 9.3, no hemolysis is seen, comfortable





Objective


Objective





Current Medications








 Medications


  (Trade)  Dose


 Ordered  Sig/Marisa


 Route


 PRN Reason  Start Time


 Stop Time Status Last Admin


Dose Admin


 


 Dextrose


  (Dextrose 50%)  25 ml  Q30M  PRN


 IV


 Hypoglycemia  5/20/20 18:45


 8/11/20 06:14   


 


 


 Dextrose


  (Dextrose 50%)  50 ml  Q30M  PRN


 IV


 Hypoglycemia  5/20/20 18:45


 8/11/20 06:14   


 


 


 Diphenhydramine


 HCl


  (Benadryl)  12.5 mg  Q6H  PRN


 IVP


 Itching/Pruritis  5/20/20 19:00


 6/17/20 18:59   


 


 


 Heparin Sodium


  (Porcine)


  (Heparin 5000


 units/ml)  5,000 units  EVERY 8  HOURS


 SUBQ


   5/23/20 14:00


 7/7/20 13:59  5/24/20 06:00


 


 


 Hydromorphone HCl


  (Dilaudid)  0.5 mg  Q3H  PRN


 IVP


 Pain Score 1-3  5/20/20 19:00


 5/25/20 18:59  5/22/20 21:25


 


 


 Hydromorphone HCl


  (Dilaudid)  1 mg  Q3H  PRN


 IVP


 pain score 4-6  5/20/20 21:00


 5/25/20 20:59  5/23/20 09:47


 


 


 Hydromorphone HCl


  (Dilaudid)  2 mg  Q3H  PRN


 IVP


 pain score 7-10  5/20/20 19:00


 5/25/20 18:59  5/24/20 06:33


 


 


 Linezolid  300 ml @ 


 300 mls/hr  Q12HR


 IVPB


   5/20/20 21:00


 5/27/20 15:59  5/24/20 09:13


 


 


 Meropenem 1 gm/


 Sodium Chloride  55 ml @ 


 110 mls/hr  Q8H


 IVPB


   5/21/20 00:00


 5/25/20 15:59  5/24/20 08:44


 


 


 Micafungin Sodium


 100 mg/Sodium


 Chloride  110 ml @ 


 110 mls/hr  Q24H


 IVPB


   5/21/20 18:00


 5/24/20 17:59  5/23/20 17:41


 


 


 Ondansetron HCl


  (Zofran)  4 mg  Q6H  PRN


 IVP


 Nausea & Vomiting  5/20/20 19:00


 6/17/20 18:59  5/23/20 09:12


 


 


 Pantoprazole


  (Protonix)  40 mg  EVERY 12  HOURS


 IV


   5/20/20 21:00


 6/15/20 08:59  5/24/20 08:44


 


 


 Sodium Chloride  1,000 ml @ 


 100 mls/hr  Q10H


 IV


   5/20/20 18:45


 6/17/20 18:59  5/24/20 03:10


 











Last 24 Hour Vital Signs








  Date Time  Temp Pulse Resp B/P (MAP) Pulse Ox O2 Delivery O2 Flow Rate FiO2


 


5/24/20 09:00      Room Air  





      Room Air  


 


5/24/20 08:00 97.9 87 16 116/74 (88) 98   


 


5/24/20 04:00  80      


 


5/24/20 04:00 98.1 81 22 127/79 (95) 98   


 


5/24/20 00:00  83      


 


5/24/20 00:00 97.9 86 22 125/82 (96) 98   


 


5/23/20 21:00      Room Air  





      Room Air  


 


5/23/20 20:00 95.7 95 18 132/75 (94) 95   


 


5/23/20 16:53 99.1       


 


5/23/20 16:00 99.1 94 19 128/83 (98) 96   


 


5/23/20 16:00  97      


 


5/23/20 12:00  92      


 


5/23/20 12:00 99.1 91 20 120/80 (93) 96   


 


5/23/20 10:17 97.9       


 


5/23/20 09:00      Room Air  





      Room Air  


 


5/23/20 08:00  79      


 


5/23/20 08:00 97.9 76 18 116/77 (90) 99   


 


5/23/20 04:00 97.9 84 18 123/72 (89) 96   


 


5/23/20 04:00  82      


 


5/23/20 00:00  76      


 


5/23/20 00:00 98.4 91 18 122/74 (90) 98   


 


5/22/20 21:55 98.6       


 


5/22/20 21:00      Room Air  





      Room Air  


 


5/22/20 20:11  87 18  98 Room Air  21


 


5/22/20 20:00 98.1 89 20 127/70 (89) 98   


 


5/22/20 20:00  84      


 


5/22/20 16:00 98.6 87 15 135/74 (94) 99   


 


5/22/20 16:00  81      


 


5/22/20 12:00  86      


 


5/22/20 12:00 96.8 85 18 120/60 (80) 98   

















Intake and Output  


 


 5/23/20 5/24/20





 19:00 07:00


 


Intake Total 1540 ml 360 ml


 


Output Total 670 ml 400 ml


 


Balance 870 ml -40 ml


 


  


 


Intake Oral 1540 ml 


 


Other  360 ml


 


Output Urine Total 650 ml 300 ml


 


Drainage Total 20 ml 100 ml











Labs








Test


  5/22/20


05:40 5/23/20


06:15 5/24/20


05:45


 


White Blood Count


  18.6 K/UL


(4.8-10.8) 16.6 K/UL


(4.8-10.8) 15.5 K/UL


(4.8-10.8)


 


Red Blood Count


  3.12 M/UL


(4.70-6.10) 3.14 M/UL


(4.70-6.10) 3.24 M/UL


(4.70-6.10)


 


Hemoglobin


  8.9 G/DL


(14.2-18.0) 9.0 G/DL


(14.2-18.0) 9.3 G/DL


(14.2-18.0)


 


Hematocrit


  26.6 %


(42.0-52.0) 26.8 %


(42.0-52.0) 27.3 %


(42.0-52.0)


 


Mean Corpuscular Volume 85 FL (80-99)  86 FL (80-99)  84 FL (80-99) 


 


Mean Corpuscular Hemoglobin


  28.5 PG


(27.0-31.0) 28.6 PG


(27.0-31.0) 28.6 PG


(27.0-31.0)


 


Mean Corpuscular Hemoglobin


Concent 33.4 G/DL


(32.0-36.0) 33.4 G/DL


(32.0-36.0) 33.9 G/DL


(32.0-36.0)


 


Red Cell Distribution Width


  14.3 %


(11.6-14.8) 14.6 %


(11.6-14.8) 14.3 %


(11.6-14.8)


 


Platelet Count


  300 K/UL


(150-450) 339 K/UL


(150-450) 344 K/UL


(150-450)


 


Mean Platelet Volume


  4.5 FL


(6.5-10.1) 4.6 FL


(6.5-10.1) 4.9 FL


(6.5-10.1)


 


Neutrophils (%) (Auto)


   % (45.0-75.0) 


  84.8 %


(45.0-75.0) 82.1 %


(45.0-75.0)


 


Lymphocytes (%) (Auto)


   % (20.0-45.0) 


  6.7 %


(20.0-45.0) 9.5 %


(20.0-45.0)


 


Monocytes (%) (Auto)


   % (1.0-10.0) 


  7.0 %


(1.0-10.0) 6.8 %


(1.0-10.0)


 


Eosinophils (%) (Auto)


   % (0.0-3.0) 


  0.9 %


(0.0-3.0) 1.3 %


(0.0-3.0)


 


Basophils (%) (Auto)


   % (0.0-2.0) 


  0.6 %


(0.0-2.0) 0.4 %


(0.0-2.0)


 


Differential Total Cells


Counted 100 


  


  


 


 


Neutrophils % (Manual) 91 % (45-75)   


 


Lymphocytes % (Manual) 6 % (20-45)   


 


Monocytes % (Manual) 3 % (1-10)   


 


Eosinophils % (Manual) 0 % (0-3)   


 


Basophils % (Manual) 0 % (0-2)   


 


Band Neutrophils 0 % (0-8)   


 


Platelet Estimate Adequate   


 


Platelet Morphology Normal   


 


Sodium Level


  142 MMOL/L


(136-145) 140 MMOL/L


(136-145) 140 MMOL/L


(136-145)


 


Potassium Level


  3.3 MMOL/L


(3.5-5.1) 3.5 MMOL/L


(3.5-5.1) 3.1 MMOL/L


(3.5-5.1)


 


Chloride Level


  109 MMOL/L


() 108 MMOL/L


() 108 MMOL/L


()


 


Carbon Dioxide Level


  24 MMOL/L


(21-32) 24 MMOL/L


(21-32) 26 MMOL/L


(21-32)


 


Anion Gap


  9 mmol/L


(5-15) 8 mmol/L


(5-15) 6 mmol/L


(5-15)


 


Blood Urea Nitrogen 5 mg/dL (7-18)  4 mg/dL (7-18)  4 mg/dL (7-18) 


 


Creatinine


  0.6 MG/DL


(0.55-1.30) 0.5 MG/DL


(0.55-1.30) 0.8 MG/DL


(0.55-1.30)


 


Estimat Glomerular Filtration


Rate > 60 mL/min


(>60) > 60 mL/min


(>60) > 60 mL/min


(>60)


 


Glucose Level


  100 MG/DL


() 78 MG/DL


() 109 MG/DL


()


 


Calcium Level


  7.4 MG/DL


(8.5-10.1) 7.3 MG/DL


(8.5-10.1) 7.0 MG/DL


(8.5-10.1)


 


Total Bilirubin


  


  


  0.2 MG/DL


(0.2-1.0)


 


Aspartate Amino Transf


(AST/SGOT) 


  


  25 U/L (15-37) 


 


 


Alanine Aminotransferase


(ALT/SGPT) 


  


  16 U/L (12-78) 


 


 


Alkaline Phosphatase


  


  


  64 U/L


()


 


Total Protein


  


  


  5.2 G/DL


(6.4-8.2)


 


Albumin


  


  


  1.2 G/DL


(3.4-5.0)


 


Globulin   4.0 g/dL 


 


Albumin/Globulin Ratio   0.3 (1.0-2.7) 


 


Amylase Level


  


  


  40 U/L


()


 


Lipase


  


  


  179 U/L


()








Height (Feet):  5


Height (Inches):  7.00


Weight (Pounds):  191


Objective


PE


General Appearance:  alert, mild distress


Head:  normocephalic, atraumatic


Eyes:  bilateral eye PERRL, bilateral eye EOMI


ENT:  uvula midline, dry mucus membranes


Neck:  supple, thyroid normal, supple/symm/no masses


Respiratory:  lungs clear, no respiratory distress, no retraction


Cardiovascular:  normal peripheral pulses, no edema


Gastrointestinal:  non tender, soft, no guarding, no rebound


Musculoskeletal:  normal inspection


Neurologic:  alert, oriented x3


Psychiatric:  mood/affect normal


Skin:  no rash, warm/dry











Jersey Ross MD May 24, 2020 10:23

## 2020-05-24 NOTE — GENERAL PROGRESS NOTE
Assessment/Plan


Problem List:  


(1) Lower extremity edema


ICD Codes:  R60.0 - Localized edema


SNOMED:  582468490


(2) COVID-19 ruled out


ICD Codes:  Z03.818 - Encounter for observation for suspected exposure to other 

biological agents ruled out


SNOMED:  422811756, 602568297


(3) Pulmonary embolism


ICD Codes:  I26.99 - Other pulmonary embolism without acute cor pulmonale


SNOMED:  88094521


Qualifiers:  


   Qualified Codes:  I26.99 - Other pulmonary embolism without acute cor 

pulmonale


(4) GI bleed


ICD Codes:  K92.2 - Gastrointestinal hemorrhage, unspecified


SNOMED:  98896556


Qualifiers:  


   Qualified Codes:  K92.2 - Gastrointestinal hemorrhage, unspecified


(5) Gastric mass


ICD Codes:  K31.89 - Other diseases of stomach and duodenum


SNOMED:  672187734


(6) Syncope


ICD Codes:  R55 - Syncope and collapse


SNOMED:  129341908


Qualifiers:  


   Qualified Codes:  R55 - Syncope and collapse


(7) Malnutrition


ICD Codes:  E46 - Unspecified protein-calorie malnutrition


SNOMED:  36099837


(8) Weak


ICD Codes:  R53.1 - Weakness


SNOMED:  78989228


Status:  unchanged


Assessment/Plan:


low k replacement per renal


 leukocytosis is improving


anemia is stable


afebrile


abx per id


moniter for bleeding





Subjective


ROS Limited/Unobtainable:  Yes


Allergies:  


Coded Allergies:  


     No Known Allergies (Unverified , 5/12/20)





Objective





Last 24 Hour Vital Signs








  Date Time  Temp Pulse Resp B/P (MAP) Pulse Ox O2 Delivery O2 Flow Rate FiO2


 


5/24/20 12:00 97.7 92 17 118/78 (91) 98   


 


5/24/20 12:00  89      


 


5/24/20 09:00      Room Air  





      Room Air  


 


5/24/20 08:00 97.9 87 16 116/74 (88) 98   


 


5/24/20 08:00  84      


 


5/24/20 04:00  80      


 


5/24/20 04:00 98.1 81 22 127/79 (95) 98   


 


5/24/20 00:00  83      


 


5/24/20 00:00 97.9 86 22 125/82 (96) 98   


 


5/23/20 21:00      Room Air  





      Room Air  


 


5/23/20 20:00 95.7 95 18 132/75 (94) 95   


 


5/23/20 16:53 99.1       


 


5/23/20 16:00 99.1 94 19 128/83 (98) 96   


 


5/23/20 16:00  97      

















Intake and Output  


 


 5/23/20 5/24/20





 19:00 07:00


 


Intake Total 1540 ml 360 ml


 


Output Total 670 ml 400 ml


 


Balance 870 ml -40 ml


 


  


 


Intake Oral 1540 ml 


 


Other  360 ml


 


Output Urine Total 650 ml 300 ml


 


Drainage Total 20 ml 100 ml








Laboratory Tests


5/24/20 05:45: 


White Blood Count 15.5H, Red Blood Count 3.24L, Hemoglobin 9.3L, Hematocrit 

27.3L, Mean Corpuscular Volume 84, Mean Corpuscular Hemoglobin 28.6, Mean 

Corpuscular Hemoglobin Concent 33.9, Red Cell Distribution Width 14.3, Platelet 

Count 344, Mean Platelet Volume 4.9L, Neutrophils (%) (Auto) 82.1H, Lymphocytes 

(%) (Auto) 9.5L, Monocytes (%) (Auto) 6.8, Eosinophils (%) (Auto) 1.3, 

Basophils (%) (Auto) 0.4, Sodium Level 140, Potassium Level 3.1L, Chloride 

Level 108H, Carbon Dioxide Level 26, Anion Gap 6, Blood Urea Nitrogen 4L, 

Creatinine 0.8#, Estimat Glomerular Filtration Rate > 60, Glucose Level 109H, 

Calcium Level 7.0L, Total Bilirubin 0.2, Aspartate Amino Transf (AST/SGOT) 25, 

Alanine Aminotransferase (ALT/SGPT) 16, Alkaline Phosphatase 64, Total Protein 

5.2L, Albumin 1.2L, Globulin 4.0, Albumin/Globulin Ratio 0.3L, Amylase Level 40

, Lipase 179


Height (Feet):  5


Height (Inches):  7.00


Weight (Pounds):  191











William Lloyd MD May 24, 2020 15:24

## 2020-05-24 NOTE — CARDIAC ELECTROPHYSIOLOGY PN
Assessment/Plan


Assessment/Plan


1. Hypotension due to profound anemia due to gastric mass.  


    S/P  5 units of blood transfusion. EF NL





2. Profound anemia.  Hemoglobin of 3.5.  CT of abdomen and pelvis shows


central necrotic mass in the gastric fundus 12 x 14 x 11 cm.  


 FU  by GI and S/P surgery by Dr Fischer.   


 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 

pancreatic tail  likely  perforated.


  On full liquid diet now





3. Bilateral  Pulmonary embolus.  S/P IVC filter.


      Off anticoagulation for gi bleed, Hb 3.5 and post op.





4. Sepsis with elevated white count. Ruled out for Covid by 2 negative PCRs





DW RN





Subjective


Subjective


 Alert.  Still has abd drain. Now on full liquid diet





Objective





Last 24 Hour Vital Signs








  Date Time  Temp Pulse Resp B/P (MAP) Pulse Ox O2 Delivery O2 Flow Rate FiO2


 


5/24/20 12:00 97.7 92 17 118/78 (91) 98   


 


5/24/20 12:00  89      


 


5/24/20 09:00      Room Air  





      Room Air  


 


5/24/20 08:00 97.9 87 16 116/74 (88) 98   


 


5/24/20 08:00  84      


 


5/24/20 04:00  80      


 


5/24/20 04:00 98.1 81 22 127/79 (95) 98   


 


5/24/20 00:00  83      


 


5/24/20 00:00 97.9 86 22 125/82 (96) 98   


 


5/23/20 21:00      Room Air  





      Room Air  


 


5/23/20 20:00 95.7 95 18 132/75 (94) 95   


 


5/23/20 16:53 99.1       

















Intake and Output  


 


 5/23/20 5/24/20





 19:00 07:00


 


Intake Total 1540 ml 360 ml


 


Output Total 670 ml 400 ml


 


Balance 870 ml -40 ml


 


  


 


Intake Oral 1540 ml 


 


Other  360 ml


 


Output Urine Total 650 ml 300 ml


 


Drainage Total 20 ml 100 ml











Laboratory Tests








Test


  5/24/20


05:45


 


White Blood Count


  15.5 K/UL


(4.8-10.8)  H


 


Red Blood Count


  3.24 M/UL


(4.70-6.10)  L


 


Hemoglobin


  9.3 G/DL


(14.2-18.0)  L


 


Hematocrit


  27.3 %


(42.0-52.0)  L


 


Mean Corpuscular Volume 84 FL (80-99)  


 


Mean Corpuscular Hemoglobin


  28.6 PG


(27.0-31.0)


 


Mean Corpuscular Hemoglobin


Concent 33.9 G/DL


(32.0-36.0)


 


Red Cell Distribution Width


  14.3 %


(11.6-14.8)


 


Platelet Count


  344 K/UL


(150-450)


 


Mean Platelet Volume


  4.9 FL


(6.5-10.1)  L


 


Neutrophils (%) (Auto)


  82.1 %


(45.0-75.0)  H


 


Lymphocytes (%) (Auto)


  9.5 %


(20.0-45.0)  L


 


Monocytes (%) (Auto)


  6.8 %


(1.0-10.0)


 


Eosinophils (%) (Auto)


  1.3 %


(0.0-3.0)


 


Basophils (%) (Auto)


  0.4 %


(0.0-2.0)


 


Sodium Level


  140 MMOL/L


(136-145)


 


Potassium Level


  3.1 MMOL/L


(3.5-5.1)  L


 


Chloride Level


  108 MMOL/L


()  H


 


Carbon Dioxide Level


  26 MMOL/L


(21-32)


 


Anion Gap


  6 mmol/L


(5-15)


 


Blood Urea Nitrogen


  4 mg/dL (7-18)


L


 


Creatinine


  0.8 MG/DL


(0.55-1.30)  #


 


Estimat Glomerular Filtration


Rate > 60 mL/min


(>60)


 


Glucose Level


  109 MG/DL


()  H


 


Calcium Level


  7.0 MG/DL


(8.5-10.1)  L


 


Total Bilirubin


  0.2 MG/DL


(0.2-1.0)


 


Aspartate Amino Transf


(AST/SGOT) 25 U/L (15-37)


 


 


Alanine Aminotransferase


(ALT/SGPT) 16 U/L (12-78)


 


 


Alkaline Phosphatase


  64 U/L


()


 


Total Protein


  5.2 G/DL


(6.4-8.2)  L


 


Albumin


  1.2 G/DL


(3.4-5.0)  L


 


Globulin 4.0 g/dL  


 


Albumin/Globulin Ratio


  0.3 (1.0-2.7)


L


 


Amylase Level


  40 U/L


()


 


Lipase


  179 U/L


()








Objective


HEAD AND NECK:  No JVD.


LUNGS:  Decreased breath sounds.


CARDIOVASCULAR:  Regular S1 and S2.  Tachycardic.


ABDOMEN: Post op with ALLISON drain


EXTREMITIES:  No pitting edema.











Aaron Antunez MD May 24, 2020 16:24

## 2020-05-25 VITALS — SYSTOLIC BLOOD PRESSURE: 120 MMHG | DIASTOLIC BLOOD PRESSURE: 68 MMHG

## 2020-05-25 VITALS — SYSTOLIC BLOOD PRESSURE: 129 MMHG | DIASTOLIC BLOOD PRESSURE: 78 MMHG

## 2020-05-25 VITALS — DIASTOLIC BLOOD PRESSURE: 93 MMHG | SYSTOLIC BLOOD PRESSURE: 135 MMHG

## 2020-05-25 VITALS — SYSTOLIC BLOOD PRESSURE: 128 MMHG | DIASTOLIC BLOOD PRESSURE: 81 MMHG

## 2020-05-25 VITALS — DIASTOLIC BLOOD PRESSURE: 77 MMHG | SYSTOLIC BLOOD PRESSURE: 124 MMHG

## 2020-05-25 VITALS — DIASTOLIC BLOOD PRESSURE: 82 MMHG | SYSTOLIC BLOOD PRESSURE: 126 MMHG

## 2020-05-25 LAB
ADD MANUAL DIFF: NO
ALBUMIN SERPL-MCNC: 1.2 G/DL (ref 3.4–5)
ALP SERPL-CCNC: 68 U/L (ref 46–116)
ALT SERPL-CCNC: 12 U/L (ref 12–78)
ANION GAP SERPL CALC-SCNC: 7 MMOL/L (ref 5–15)
AST SERPL-CCNC: 30 U/L (ref 15–37)
BASOPHILS NFR BLD AUTO: 0.7 % (ref 0–2)
BILIRUB DIRECT SERPL-MCNC: < 0.1 MG/DL (ref 0–0.3)
BILIRUB SERPL-MCNC: 0.1 MG/DL (ref 0.2–1)
BUN SERPL-MCNC: 2 MG/DL (ref 7–18)
CALCIUM SERPL-MCNC: 6.8 MG/DL (ref 8.5–10.1)
CHLORIDE SERPL-SCNC: 108 MMOL/L (ref 98–107)
CO2 SERPL-SCNC: 25 MMOL/L (ref 21–32)
CREAT SERPL-MCNC: 0.6 MG/DL (ref 0.55–1.3)
EOSINOPHIL NFR BLD AUTO: 2.2 % (ref 0–3)
ERYTHROCYTE [DISTWIDTH] IN BLOOD BY AUTOMATED COUNT: 13.9 % (ref 11.6–14.8)
HCT VFR BLD CALC: 25 % (ref 42–52)
HGB BLD-MCNC: 8.4 G/DL (ref 14.2–18)
LYMPHOCYTES NFR BLD AUTO: 12.7 % (ref 20–45)
MCV RBC AUTO: 84 FL (ref 80–99)
MONOCYTES NFR BLD AUTO: 8.2 % (ref 1–10)
NEUTROPHILS NFR BLD AUTO: 76.2 % (ref 45–75)
PHOSPHATE SERPL-MCNC: 2.2 MG/DL (ref 2.5–4.9)
PLATELET # BLD: 326 K/UL (ref 150–450)
POTASSIUM SERPL-SCNC: 2.9 MMOL/L (ref 3.5–5.1)
RBC # BLD AUTO: 3 M/UL (ref 4.7–6.1)
SODIUM SERPL-SCNC: 140 MMOL/L (ref 136–145)
WBC # BLD AUTO: 11.3 K/UL (ref 4.8–10.8)

## 2020-05-25 RX ADMIN — PANTOPRAZOLE SODIUM SCH MG: 40 INJECTION, POWDER, FOR SOLUTION INTRAVENOUS at 08:15

## 2020-05-25 RX ADMIN — HYDROCODONE BITARTRATE AND ACETAMINOPHEN PRN TAB: 5; 325 TABLET ORAL at 18:20

## 2020-05-25 RX ADMIN — HYDROCODONE BITARTRATE AND ACETAMINOPHEN PRN TAB: 5; 325 TABLET ORAL at 14:23

## 2020-05-25 RX ADMIN — HEPARIN SODIUM SCH UNITS: 5000 INJECTION INTRAVENOUS; SUBCUTANEOUS at 05:04

## 2020-05-25 RX ADMIN — MEROPENEM SCH MLS/HR: 1 INJECTION INTRAVENOUS at 16:19

## 2020-05-25 RX ADMIN — MEROPENEM SCH MLS/HR: 1 INJECTION INTRAVENOUS at 08:16

## 2020-05-25 RX ADMIN — HEPARIN SODIUM SCH UNITS: 5000 INJECTION INTRAVENOUS; SUBCUTANEOUS at 13:50

## 2020-05-25 RX ADMIN — HEPARIN SODIUM SCH UNITS: 5000 INJECTION INTRAVENOUS; SUBCUTANEOUS at 21:30

## 2020-05-25 RX ADMIN — HYDROCODONE BITARTRATE AND ACETAMINOPHEN PRN TAB: 5; 325 TABLET ORAL at 22:32

## 2020-05-25 NOTE — NUR
NURSE NOTES: RECEIVED PATIENT LYING IN BED, AWAKE, ALERT/ORIENTED X4, VERBALLY RESPONSIVE, 
VERY TALKATIVE, VITALS MONITORED AND RECORDED, AFEBRILE. NO SIGNS AND SYMPTOMS OF ACUTE 
CARDIO RESPIRATORY DISTRESS/SHORTNESS OF BREATH, DENIES CHEST PAIN, NOTED WITH PERIPHERAL 
[place], BILATERAL LOWER EXTREMITIES ELEVATED ON PILLOW FOR COMFORT/PRESSURE RELIEF, 
TOLERATING WELL. ABDOMEN NOTED WITH STAPLES, DRY AND INTACT, NO SIGNS OF INFECTION, NO 
REDNESS/WARMTH/DRAINAGE NOTED, ALLISON DRAIN INTACT, YELLOWISH COLOR, APPROX 10 ML NOTED IN ALLISON. 
SIDE RAILS UP X3/BED IN LOWEST POSITION FOR SAFETY, ENCOURAGED PATIENT TO UTILIZE CALL LIGHT 
FOR ASSISTANCE, VERBALIZED UNDERSTANDING. CONTINUE WITH CURRENT PLAN OF CARE. NAD.

## 2020-05-25 NOTE — PULMONOLOGY PROGRESS NOTE
Subjective


ROS Limited/Unobtainable:  Yes


Interval Events:  s/p laparotomy 5/18/20; extubated 5/19/20


Constitutional:  Reports: no symptoms


HEENT:  Repors: no symptoms


Respiratory:  Reports: no symptoms


Cardiovascular:  Reports: no symptoms


Gastrointestinal/Abdominal:  Reports: no symptoms


Genitourinary:  Reports: no symptoms


Neurologic:  Reports: no symptoms


Allergies:  


Coded Allergies:  


     No Known Allergies (Unverified , 5/12/20)


All Systems:  reviewed and negative except above





Objective





Last 24 Hour Vital Signs








  Date Time  Temp Pulse Resp B/P (MAP) Pulse Ox O2 Delivery O2 Flow Rate FiO2


 


5/25/20 08:00 97.5 95 20 126/82 (97) 98   


 


5/25/20 04:14 98.0       


 


5/25/20 04:00  80      


 


5/25/20 04:00 98.1 89 18 124/77 (93) 96   


 


5/25/20 00:00  82      


 


5/25/20 00:00 98.0 81 16 120/68 (85) 97   


 


5/24/20 21:00      Room Air  





      Room Air  


 


5/24/20 20:00  82      


 


5/24/20 20:00 97.5 80 16 116/74 (88) 91   


 


5/24/20 16:00  89      


 


5/24/20 16:00 98.1 91 17 123/76 (92) 98   


 


5/24/20 15:58 97.7       


 


5/24/20 12:00 97.7 92 17 118/78 (91) 98   


 


5/24/20 12:00  89      

















Intake and Output  


 


 5/24/20 5/25/20





 19:00 07:00


 


Intake Total 1270 ml 650 ml


 


Output Total 410 ml 600 ml


 


Balance 860 ml 50 ml


 


  


 


Intake Oral 1220 ml 


 


IV Total 50 ml 650 ml


 


Output Urine Total 300 ml 300 ml


 


Drainage Total 110 ml 300 ml


 


# Voids  1


 


# Bowel Movements 1 








General Appearance:  no acute distress


HEENT:  normocephalic


Respiratory:  chest wall non-tender, lungs clear


Cardiovascular:  normal peripheral pulses


Abdomen:  non distended


Extremities:  no cyanosis


Laboratory Tests


5/25/20 04:00: 


White Blood Count 11.3H, Red Blood Count 3.00L, Hemoglobin 8.4L, Hematocrit 

25.0L, Mean Corpuscular Volume 84, Mean Corpuscular Hemoglobin 28.0, Mean 

Corpuscular Hemoglobin Concent 33.5, Red Cell Distribution Width 13.9, Platelet 

Count 326, Mean Platelet Volume 4.3L, Neutrophils (%) (Auto) 76.2H, Lymphocytes 

(%) (Auto) 12.7L, Monocytes (%) (Auto) 8.2, Eosinophils (%) (Auto) 2.2, 

Basophils (%) (Auto) 0.7, Sodium Level 140, Potassium Level 2.9L, Chloride 

Level 108H, Carbon Dioxide Level 25, Anion Gap 7, Blood Urea Nitrogen 2L, 

Creatinine 0.6, Estimat Glomerular Filtration Rate > 60, Glucose Level 100, 

Calcium Level 6.8L





Current Medications








 Medications


  (Trade)  Dose


 Ordered  Sig/Marisa


 Route


 PRN Reason  Start Time


 Stop Time Status Last Admin


Dose Admin


 


 Dextrose


  (Dextrose 50%)  25 ml  Q30M  PRN


 IV


 Hypoglycemia  5/20/20 18:45


 8/11/20 06:14   


 


 


 Dextrose


  (Dextrose 50%)  50 ml  Q30M  PRN


 IV


 Hypoglycemia  5/20/20 18:45


 8/11/20 06:14   


 


 


 Diphenhydramine


 HCl


  (Benadryl)  12.5 mg  Q6H  PRN


 IVP


 Itching/Pruritis  5/20/20 19:00


 6/17/20 18:59   


 


 


 Folic Acid


  (Folate)  1 mg  DAILY


 ORAL


   5/25/20 09:45


 6/24/20 09:44  5/25/20 09:49


 


 


 Heparin Sodium


  (Porcine)


  (Heparin 5000


 units/ml)  5,000 units  EVERY 8  HOURS


 SUBQ


   5/23/20 14:00


 7/7/20 13:59  5/24/20 21:12


 


 


 Hydromorphone HCl


  (Dilaudid)  0.5 mg  Q3H  PRN


 IVP


 Pain Score 1-3  5/24/20 13:00


 6/5/20 19:00   


 


 


 Hydromorphone HCl


  (Dilaudid)  1 mg  Q3H  PRN


 IVP


 pain score 4-6  5/24/20 12:00


 6/5/20 21:00  5/24/20 15:28


 


 


 Hydromorphone HCl


  (Dilaudid)  2 mg  Q3H  PRN


 IVP


 pain score 7-10  5/24/20 13:00


 6/5/20 19:00  5/25/20 09:58


 


 


 Linezolid  300 ml @ 


 300 mls/hr  Q12HR


 IVPB


   5/20/20 21:00


 5/27/20 15:59  5/25/20 09:50


 


 


 Meropenem 1 gm/


 Sodium Chloride  55 ml @ 


 110 mls/hr  Q8H


 IVPB


   5/21/20 00:00


 5/25/20 23:59  5/25/20 08:16


 


 


 Ondansetron HCl


  (Zofran)  4 mg  Q6H  PRN


 IVP


 Nausea & Vomiting  5/20/20 19:00


 6/17/20 18:59  5/23/20 09:12


 


 


 Pantoprazole


  (Protonix)  40 mg  EVERY 12  HOURS


 IV


   5/20/20 21:00


 6/15/20 08:59  5/25/20 08:15


 


 


 Sodium Chloride  1,000 ml @ 


 50 mls/hr  Q20H


 IV


   5/24/20 14:16


 6/23/20 14:15  5/25/20 09:50


 











Assessment/Plan


Assessment/Plan


IMPRESSION:


1. Gastric mass.S/p laparotomy


2. Severe anemia. Corrected


3. Pulmonary embolism.





DISCUSSION:  





S/p IVC filter.  S/p blood transfusion, IV fluids.  


S/p ex lap, left colectomy, partial gastrectomy, splenectomy, distal 

pancreatectomy, omentectomy 


and  open lysis of adhesions 


I will follow carefully.


Extubated 5/19/20


COVID 19 pcr negative


Currently on RA

















  ______________________________________________


  MARIANELA Downs Omar Syed MD May 25, 2020 10:17

## 2020-05-25 NOTE — CARDIAC ELECTROPHYSIOLOGY PN
Assessment/Plan


Assessment/Plan


1. Hypotension due to profound anemia due to gastric mass.  


     S/P 5 units of blood transfusion. EF NL





2. Profound anemia.  Hemoglobin of 3.5.  CT of abdomen and pelvis shows central 

necrotic mass in the gastric fundus 12 x 14 x 11 cm.  


     FU  by GI and S/P surgery by Dr Fischer.   


      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 

pancreatic tail likely  perforated.


        On solid diet now





3. Bilateral  Pulmonary embolus.  S/P IVC filter.


      Off anticoagulation for gi bleed, Hb 3.5 and post op.





4. Sepsis with elevated white count. 


       Ruled out for Covid by 2 negative PCRs





DW RN





Subjective


Subjective


 Alert.  Still has abd drain. Now on full liquid diet





Objective





Last 24 Hour Vital Signs








  Date Time  Temp Pulse Resp B/P (MAP) Pulse Ox O2 Delivery O2 Flow Rate FiO2


 


5/25/20 09:00      Room Air  





      Room Air  


 


5/25/20 08:00  93      


 


5/25/20 08:00 97.5 95 20 126/82 (97) 98   


 


5/25/20 04:14 98.0       


 


5/25/20 04:00  80      


 


5/25/20 04:00 98.1 89 18 124/77 (93) 96   


 


5/25/20 00:00  82      


 


5/25/20 00:00 98.0 81 16 120/68 (85) 97   


 


5/24/20 21:00      Room Air  





      Room Air  


 


5/24/20 20:00  82      


 


5/24/20 20:00 97.5 80 16 116/74 (88) 91   


 


5/24/20 16:00  89      


 


5/24/20 16:00 98.1 91 17 123/76 (92) 98   


 


5/24/20 15:58 97.7       

















Intake and Output  


 


 5/24/20 5/25/20





 19:00 07:00


 


Intake Total 1270 ml 650 ml


 


Output Total 410 ml 600 ml


 


Balance 860 ml 50 ml


 


  


 


Intake Oral 1220 ml 


 


IV Total 50 ml 650 ml


 


Output Urine Total 300 ml 300 ml


 


Drainage Total 110 ml 300 ml


 


# Voids  1


 


# Bowel Movements 1 











Laboratory Tests








Test


  5/25/20


04:00


 


White Blood Count


  11.3 K/UL


(4.8-10.8)  H


 


Red Blood Count


  3.00 M/UL


(4.70-6.10)  L


 


Hemoglobin


  8.4 G/DL


(14.2-18.0)  L


 


Hematocrit


  25.0 %


(42.0-52.0)  L


 


Mean Corpuscular Volume 84 FL (80-99)  


 


Mean Corpuscular Hemoglobin


  28.0 PG


(27.0-31.0)


 


Mean Corpuscular Hemoglobin


Concent 33.5 G/DL


(32.0-36.0)


 


Red Cell Distribution Width


  13.9 %


(11.6-14.8)


 


Platelet Count


  326 K/UL


(150-450)


 


Mean Platelet Volume


  4.3 FL


(6.5-10.1)  L


 


Neutrophils (%) (Auto)


  76.2 %


(45.0-75.0)  H


 


Lymphocytes (%) (Auto)


  12.7 %


(20.0-45.0)  L


 


Monocytes (%) (Auto)


  8.2 %


(1.0-10.0)


 


Eosinophils (%) (Auto)


  2.2 %


(0.0-3.0)


 


Basophils (%) (Auto)


  0.7 %


(0.0-2.0)


 


Sodium Level


  140 MMOL/L


(136-145)


 


Potassium Level


  2.9 MMOL/L


(3.5-5.1)  L


 


Chloride Level


  108 MMOL/L


()  H


 


Carbon Dioxide Level


  25 MMOL/L


(21-32)


 


Anion Gap


  7 mmol/L


(5-15)


 


Blood Urea Nitrogen


  2 mg/dL (7-18)


L


 


Creatinine


  0.6 MG/DL


(0.55-1.30)


 


Estimat Glomerular Filtration


Rate > 60 mL/min


(>60)


 


Glucose Level


  100 MG/DL


()


 


Uric Acid


  2.0 MG/DL


(2.6-7.2)  L


 


Calcium Level


  6.8 MG/DL


(8.5-10.1)  L


 


Phosphorus Level


  2.2 MG/DL


(2.5-4.9)  L


 


Magnesium Level


  1.8 MG/DL


(1.8-2.4)


 


Total Bilirubin


  0.1 MG/DL


(0.2-1.0)  L


 


Direct Bilirubin


  < 0.1 MG/DL


(0.0-0.3)


 


Aspartate Amino Transf


(AST/SGOT) 30 U/L (15-37)


 


 


Alanine Aminotransferase


(ALT/SGPT) 12 U/L (12-78)


 


 


Alkaline Phosphatase


  68 U/L


()


 


Total Protein


  5.0 G/DL


(6.4-8.2)  L


 


Albumin


  1.2 G/DL


(3.4-5.0)  L








Objective


HEAD AND NECK:  No JVD.


LUNGS:  Decreased breath sounds.


CARDIOVASCULAR:  Tachycardia S1 and S2 .


ABDOMEN: Post op with ALLISON drain


EXTREMITIES:  No pitting edema.











Aaron Antunez MD May 25, 2020 13:06

## 2020-05-25 NOTE — CONSULTATION
Consult Note


Consult Note


I am asked to manage the patient's progressive hypokalemia by Dr Tom Pierce


Patient interviewed, examined, data reviewed,


Day 13 hospitalization





On admission:


This is a 70-year-old male with a known


history of hypertension who came to hospital with diarrhea.  These were


reported to be loose and watery.  Patient also had a syncopal episode and


was found down.  Patient was seen and evaluated overnight in the hospital.


He is currently in the ICU.  His workup has been notable for significant


anemia.  A CT of the abdomen was performed, which showed an abdominal


mass.  He has also been found to have bilateral pulmonary emboli on CT


imaging.





.


Assessment/Plan





Progressive hypokalemia most likely due to GI loss


Gastric mass status post laparotomy, Moderately differentiated adenocarcinoma 

primary COLON CANCER


Previously severe anemia which is corrected with blood transfusion


Pulmonary embolism


S/p ex lap, left colectomy, partial gastrectomy, splenectomy, distal 

pancreatectomy, omentectomy 


Severe sepsis with Enterobacter  bacteremia likely from GI source


COVID-19 negative x2


Hypertension by history


Presented on admission with hypotension due to severe anemia











Suggestions:


Potassium chloride 40 mEq on IV ordered


Stop IV hydration, patient on clear liquid diet


Continue to monitor electrolytes and chemistries


Medication list reviewed


Further recommendations based on ordered lab results





I spent an additional 36 minutes on review of medical records consult notes 

progress notes procedures imaging labs hemodynamics and other clinical 

documentations














Juan Daniel Mckeon MD May 25, 2020 10:24

## 2020-05-25 NOTE — NUR
NURSE NOTES:

Per Dr. Pierce, to call Dr Mckeon for orders. Noted and carried out. Will inform AM RN.

## 2020-05-25 NOTE — HEMATOLOGY/ONC PROGRESS NOTE
Assessment/Plan


Assessment/Plan


Assessment and Recs


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 

stomach, pancreas stomach and COLON PRIMARY and path staging [id number] --> had 0/27 

lymph nodes that are noted


--> CT shows    Centrally necrotic mass arising from the gastric fundus 

measuring 12. 6 x 11.1 x 14 cm. Filling defects within pulmonary vessels at the 

lung bases with small peripheral airspace opacities concerning for pulmonary 

emboli and small pulmonary infarcts.


--> resection was completed by Dr. Fischer, he is recovering well


--> pathology reviewed


--> needs a pet/ct scan outpatient


--> adjuvant chemotherapy for residual tumor (+ margin on one side)


--> dw him and he will come in outpatient


--> 5/22 abd xr: PROBABLE POSTOPERATIVE ILEUS


# Pulmonary embolism -- likely related to hypercoagulable disorder from 

malignancys/p ivc filter


--> case dw pcp, surg, and pulm


--> is not a candidate for anticoagulation given severe anemia and low platelets


--> 5/13 s/p ivc filter placement


--> 5/19 cxr: Left retrocardiac consolidation and left pleural effusion.


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


--> have ordered for repeat prbc transfusions


--> hgb goal is >7


--> have reviewed anemia panel


--> gi to access in re to biopsy/endoscopy


--> IV IRON completed 


--> hgb trend: 8.6-->9.7-->8-->7.7 -->9->9.3


# Leukocytosis is likely reactive process from cancer


--> on abx as needed


--> smear is noted


--> meds are reviewed


--> vanc/zosyn-->cefepime/flagyl-->mekhi/micaf/linezolid


--> wbc trend: 21.6-->18-->24.1-->26.7-->20.2-->16.6 


--> covid negative x2 since admission 


# Diarrhea


--> c diff negative 


# PABLO, improving


# HTN


# Dvt ppx heparin sq





The timing of this note does not necessarily reflect the time of the patient 

was seen.





Greatly appreciate consultation.





Subjective


Constitutional:  Denies: no symptoms, chills, fever, malaise, weakness, other


HEENT:  Denies: no symptoms, eye pain, blurred vision, tearing, double vision, 

ear pain, ear discharge, nose pain, nose congestion, throat pain, throat 

swelling, mouth pain, mouth swelling, other


Cardiovascular:  Denies: no symptoms, chest pain, edema, irregular heart rate, 

lightheadedness, palpitations, syncope, other


Gastrointestinal/Abdominal:  Denies: no symptoms, abdomen distended, abdominal 

pain, black stools, tarry stools, blood in stool, constipated, diarrhea, 

difficulty swallowing, nausea, poor appetite, poor fluid intake, rectal bleeding

, vomiting, other


Genitourinary:  Denies: no symptoms, burning, discharge, frequency, flank pain, 

hematuria, incontinence, pain, urgency, other


Neurologic/Psychiatric:  Denies: no symptoms, anxiety, depressed, emotional 

problems, headache, numbness, paresthesia, pre-existing deficit, seizure, 

tingling, tremors, weakness, other


Endocrine:  Denies: no symptoms, excessive sweating, flushing, intolerance to 

cold, intolerance to heat, increased hunger, increased thirst, increased urine, 

unexplained weight gain, unexplained weight loss, other


Hematologic/Lymphatic:  Denies: no symptoms, anemia, easy bleeding, easy 

bruising, adenopathy, other


Allergies:  


Coded Allergies:  


     No Known Allergies (Unverified , 5/12/20)


Subjective


5/14 labs are noted, no bleeding, meds reviewed, for egd when iso off


5/15 icu, no overnight events, alert, iv iron, c diff negative 


5/17 on tele, wbc improving, nc, tarango, no distress 


5/18 for surgery this am, have ordered for ffp and vit k, seen by surg, 

anesthesia


5/19 icu, wbc 24.1, iv abx, afebrile, vent, no acute distress 


5/20 refusing scd's, labs reviewed, no sob, hr 105 


5/21 transferred to tele, alert, room air, hgb 7.7 


5/22 is feeling better, dw him re outpatient chemo and he has agreed to come in


5/23 blood cx neg. x2, room air, low k, h/h stable 


5/24 labs reviewed, hgb 9.3, no hemolysis is seen, comfortable


5/25 labs have been noted, no bleeding, path report reviewed, less abd pain





Objective


Objective





Current Medications








 Medications


  (Trade)  Dose


 Ordered  Sig/Marisa


 Route


 PRN Reason  Start Time


 Stop Time Status Last Admin


Dose Admin


 


 Dextrose


  (Dextrose 50%)  25 ml  Q30M  PRN


 IV


 Hypoglycemia  5/20/20 18:45


 8/11/20 06:14   


 


 


 Dextrose


  (Dextrose 50%)  50 ml  Q30M  PRN


 IV


 Hypoglycemia  5/20/20 18:45


 8/11/20 06:14   


 


 


 Diphenhydramine


 HCl


  (Benadryl)  12.5 mg  Q6H  PRN


 IVP


 Itching/Pruritis  5/20/20 19:00


 6/17/20 18:59   


 


 


 Heparin Sodium


  (Porcine)


  (Heparin 5000


 units/ml)  5,000 units  EVERY 8  HOURS


 SUBQ


   5/23/20 14:00


 7/7/20 13:59  5/24/20 21:12


 


 


 Hydromorphone HCl


  (Dilaudid)  0.5 mg  Q3H  PRN


 IVP


 Pain Score 1-3  5/24/20 13:00


 6/5/20 19:00   


 


 


 Hydromorphone HCl


  (Dilaudid)  1 mg  Q3H  PRN


 IVP


 pain score 4-6  5/24/20 12:00


 6/5/20 21:00  5/24/20 15:28


 


 


 Hydromorphone HCl


  (Dilaudid)  2 mg  Q3H  PRN


 IVP


 pain score 7-10  5/24/20 13:00


 6/5/20 19:00  5/25/20 03:44


 


 


 Linezolid  300 ml @ 


 300 mls/hr  Q12HR


 IVPB


   5/20/20 21:00


 5/27/20 15:59  5/24/20 21:11


 


 


 Meropenem 1 gm/


 Sodium Chloride  55 ml @ 


 110 mls/hr  Q8H


 IVPB


   5/21/20 00:00


 5/25/20 23:59  5/25/20 08:16


 


 


 Ondansetron HCl


  (Zofran)  4 mg  Q6H  PRN


 IVP


 Nausea & Vomiting  5/20/20 19:00


 6/17/20 18:59  5/23/20 09:12


 


 


 Pantoprazole


  (Protonix)  40 mg  EVERY 12  HOURS


 IV


   5/20/20 21:00


 6/15/20 08:59  5/25/20 08:15


 


 


 Sodium Chloride  1,000 ml @ 


 50 mls/hr  Q20H


 IV


   5/24/20 14:16


 6/23/20 14:15  5/24/20 14:22


 











Last 24 Hour Vital Signs








  Date Time  Temp Pulse Resp B/P (MAP) Pulse Ox O2 Delivery O2 Flow Rate FiO2


 


5/25/20 08:00 97.5 95 20 126/82 (97) 98   


 


5/25/20 04:14 98.0       


 


5/25/20 04:00  80      


 


5/25/20 04:00 98.1 89 18 124/77 (93) 96   


 


5/25/20 00:00  82      


 


5/25/20 00:00 98.0 81 16 120/68 (85) 97   


 


5/24/20 21:00      Room Air  





      Room Air  


 


5/24/20 20:00  82      


 


5/24/20 20:00 97.5 80 16 116/74 (88) 91   


 


5/24/20 16:00  89      


 


5/24/20 16:00 98.1 91 17 123/76 (92) 98   


 


5/24/20 15:58 97.7       


 


5/24/20 12:00 97.7 92 17 118/78 (91) 98   


 


5/24/20 12:00  89      


 


5/24/20 09:00      Room Air  





      Room Air  


 


5/24/20 08:00 97.9 87 16 116/74 (88) 98   


 


5/24/20 08:00  84      


 


5/24/20 04:00  80      


 


5/24/20 04:00 98.1 81 22 127/79 (95) 98   


 


5/24/20 00:00  83      


 


5/24/20 00:00 97.9 86 22 125/82 (96) 98   


 


5/23/20 21:00      Room Air  





      Room Air  


 


5/23/20 20:00 95.7 95 18 132/75 (94) 95   


 


5/23/20 16:53 99.1       


 


5/23/20 16:00 99.1 94 19 128/83 (98) 96   


 


5/23/20 16:00  97      


 


5/23/20 12:00  92      


 


5/23/20 12:00 99.1 91 20 120/80 (93) 96   


 


5/23/20 10:17 97.9       

















Intake and Output  


 


 5/24/20 5/25/20





 18:59 06:59


 


Intake Total 1220 ml 650 ml


 


Output Total 410 ml 600 ml


 


Balance 810 ml 50 ml


 


  


 


Intake Oral 1220 ml 


 


IV Total  650 ml


 


Output Urine Total 300 ml 300 ml


 


Drainage Total 110 ml 300 ml


 


# Voids  1


 


# Bowel Movements 1 











Labs








Test


  5/23/20


06:15 5/24/20


05:45 5/25/20


04:00


 


White Blood Count


  16.6 K/UL


(4.8-10.8) 15.5 K/UL


(4.8-10.8) 11.3 K/UL


(4.8-10.8)


 


Red Blood Count


  3.14 M/UL


(4.70-6.10) 3.24 M/UL


(4.70-6.10) 3.00 M/UL


(4.70-6.10)


 


Hemoglobin


  9.0 G/DL


(14.2-18.0) 9.3 G/DL


(14.2-18.0) 8.4 G/DL


(14.2-18.0)


 


Hematocrit


  26.8 %


(42.0-52.0) 27.3 %


(42.0-52.0) 25.0 %


(42.0-52.0)


 


Mean Corpuscular Volume 86 FL (80-99)  84 FL (80-99)  84 FL (80-99) 


 


Mean Corpuscular Hemoglobin


  28.6 PG


(27.0-31.0) 28.6 PG


(27.0-31.0) 28.0 PG


(27.0-31.0)


 


Mean Corpuscular Hemoglobin


Concent 33.4 G/DL


(32.0-36.0) 33.9 G/DL


(32.0-36.0) 33.5 G/DL


(32.0-36.0)


 


Red Cell Distribution Width


  14.6 %


(11.6-14.8) 14.3 %


(11.6-14.8) 13.9 %


(11.6-14.8)


 


Platelet Count


  339 K/UL


(150-450) 344 K/UL


(150-450) 326 K/UL


(150-450)


 


Mean Platelet Volume


  4.6 FL


(6.5-10.1) 4.9 FL


(6.5-10.1) 4.3 FL


(6.5-10.1)


 


Neutrophils (%) (Auto)


  84.8 %


(45.0-75.0) 82.1 %


(45.0-75.0) 76.2 %


(45.0-75.0)


 


Lymphocytes (%) (Auto)


  6.7 %


(20.0-45.0) 9.5 %


(20.0-45.0) 12.7 %


(20.0-45.0)


 


Monocytes (%) (Auto)


  7.0 %


(1.0-10.0) 6.8 %


(1.0-10.0) 8.2 %


(1.0-10.0)


 


Eosinophils (%) (Auto)


  0.9 %


(0.0-3.0) 1.3 %


(0.0-3.0) 2.2 %


(0.0-3.0)


 


Basophils (%) (Auto)


  0.6 %


(0.0-2.0) 0.4 %


(0.0-2.0) 0.7 %


(0.0-2.0)


 


Sodium Level


  140 MMOL/L


(136-145) 140 MMOL/L


(136-145) 140 MMOL/L


(136-145)


 


Potassium Level


  3.5 MMOL/L


(3.5-5.1) 3.1 MMOL/L


(3.5-5.1) 2.9 MMOL/L


(3.5-5.1)


 


Chloride Level


  108 MMOL/L


() 108 MMOL/L


() 108 MMOL/L


()


 


Carbon Dioxide Level


  24 MMOL/L


(21-32) 26 MMOL/L


(21-32) 25 MMOL/L


(21-32)


 


Anion Gap


  8 mmol/L


(5-15) 6 mmol/L


(5-15) 7 mmol/L


(5-15)


 


Blood Urea Nitrogen 4 mg/dL (7-18)  4 mg/dL (7-18)  2 mg/dL (7-18) 


 


Creatinine


  0.5 MG/DL


(0.55-1.30) 0.8 MG/DL


(0.55-1.30) 0.6 MG/DL


(0.55-1.30)


 


Estimat Glomerular Filtration


Rate > 60 mL/min


(>60) > 60 mL/min


(>60) > 60 mL/min


(>60)


 


Glucose Level


  78 MG/DL


() 109 MG/DL


() 100 MG/DL


()


 


Calcium Level


  7.3 MG/DL


(8.5-10.1) 7.0 MG/DL


(8.5-10.1) 6.8 MG/DL


(8.5-10.1)


 


Total Bilirubin


  


  0.2 MG/DL


(0.2-1.0) 


 


 


Aspartate Amino Transf


(AST/SGOT) 


  25 U/L (15-37) 


  


 


 


Alanine Aminotransferase


(ALT/SGPT) 


  16 U/L (12-78) 


  


 


 


Alkaline Phosphatase


  


  64 U/L


() 


 


 


Total Protein


  


  5.2 G/DL


(6.4-8.2) 


 


 


Albumin


  


  1.2 G/DL


(3.4-5.0) 


 


 


Globulin  4.0 g/dL  


 


Albumin/Globulin Ratio  0.3 (1.0-2.7)  


 


Amylase Level


  


  40 U/L


() 


 


 


Lipase


  


  179 U/L


() 


 








Height (Feet):  5


Height (Inches):  7.00


Weight (Pounds):  209


Objective


PE


General Appearance:  alert, mild distress


Head:  normocephalic, atraumatic


Eyes:  bilateral eye PERRL, bilateral eye EOMI


ENT:  uvula midline, dry mucus membranes


Neck:  supple, thyroid normal, supple/symm/no masses


Respiratory:  lungs clear, no respiratory distress, no retraction


Cardiovascular:  normal peripheral pulses, no edema


Gastrointestinal:  non tender, soft, no guarding, no rebound


Musculoskeletal:  normal inspection


Neurologic:  alert, oriented x3


Psychiatric:  mood/affect normal


Skin:  no rash, warm/dry











Jersey Ross MD May 25, 2020 09:40

## 2020-05-25 NOTE — INFECTIOUS DISEASES PROG NOTE
Assessment/Plan


Assessment/Plan








Assessment:


Severe Sepsis


Enterobacter bacteremia- likely from GI source


S. epi bacteremia, contaminant


GGO on lung bases- COVID19 neg x2


    -5/21 CTA chest: STUDY POSITIVE FOR BILATERAL PULMONARY EMBOLISM. BILATERAL 

PLEURAL EFFUSIONS WITH DEPENDENT INFILTRATES/CONSOLIDATION IN THE LUNG


BASES.POSTOPERATIVE CHANGES IN THE UPPER ABDOMEN WITH ANASTOMOTIC SUTURES LEFT 

UPPER QUADRANT AND MIDLINE SKIN STAPLES. FREE FLUID AND SCATTERED SMALL FOCI OF 

FREE AIR ALSO NOTED IN LEFT UPPER QUADRANT FROM RECENT ABDOMINAL SURGERY. 

HEPATIC CYST AND GRANULOMA.


   -5/19 CXR:  Left retrocardiac consolidation and left pleural effusion.


  -5/15 SARS-COV2 PCR neg


  -5/12 Bcx 1/4 S.epi, 2/4 E. aerogenes (pan S); 5/13 Bcx Neg


            CXR: no acute disease


            SARS-COV2 PCR





Afebrile


Leukocytosis; persistent- increased post-op- now improving


   5/20 u/a wbc 10-15, nit neg, leuk +2





Severe anemia (blood loss)/GIB likely 2ry to gastric mass


Thrombocytopenia, worsening


  -5/18 SP ex lap, left colectomy, partial gastrectomy, splenectomy, distal 

pancreatectomy, omentectomy , open lysis of adhesions


          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 

invading spleen, posterior stomach, pancreatic tail, invasive, likely seemingly 

perforated.


                       --path: invasive adenocarcinoma, moderately 

differentiated





  -CT abd/p:   Centrally necrotic mass arising from the gastric fundus 

measuring 12. 6 x 11.1 x 14 cm.  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  

Gastric adenocarcinoma, lymphoma, and other neoplastic etiologies are also in 

the differential. Filling defects within pulmonary vessels at the lung bases 

with small peripheral airspace opacities concerning for pulmonary emboli and 

small pulmonary infarcts.








Post-op ileus


  -5/22 kUB:  PROBABLE POSTOPERATIVE ILEUS.


 








Mild AST elevation





Lactic acidosis, SP


   -5/12 u/a neg; ucx 30-40K mixed urogenital contaminants





Diarrhea


    -Cdiff,stool cx neg





Syncopal episode- likely due to severe dehydration and anemia


  -CT head: No acute findings in the head/brain.





Acute PE/DVT -(per CT abd/p findings)


  -sp IVC filter placement 5/13 (retrievable- given presence of bacteremia)





PABLO, SP





HTN





Plan:


  


- Continue Meropenem and PO Zyvox #6 (abx d #14/14) given worsening WBC


      -5/23 SP Micafungin #5


      -5/20 SP Cefepime #6, Flagyl #6


      - 5/15/20 SP Zosyn #4 and Vancomycin #4





-f/u cx


-Monitor CBC/CMP, temperature


-aspiration precautions


-COVID19 neg x2


-Sx,pulm, Heme/onc, GI f/u


-wound care per surgical team








Thank you for consulting [facility]. Will continue to follow along wiht 

you.





Discussed with RN.





Subjective


Allergies:  


Coded Allergies:  


     No Known Allergies (Unverified , 5/12/20)


Subjective


afebrile


at RA


leukocytosis resolving





Objective


Vital Signs





Last 24 Hour Vital Signs








  Date Time  Temp Pulse Resp B/P (MAP) Pulse Ox O2 Delivery O2 Flow Rate FiO2


 


5/25/20 16:00 98.1 97 20 135/93 (107) 99   


 


5/25/20 12:00 97.6 87 20 128/81 (97) 99   


 


5/25/20 12:00  84      


 


5/25/20 09:00      Room Air  





      Room Air  


 


5/25/20 08:00  93      


 


5/25/20 08:00 97.5 95 20 126/82 (97) 98   


 


5/25/20 04:14 98.0       


 


5/25/20 04:00  80      


 


5/25/20 04:00 98.1 89 18 124/77 (93) 96   


 


5/25/20 00:00  82      


 


5/25/20 00:00 98.0 81 16 120/68 (85) 97   


 


5/24/20 21:00      Room Air  





      Room Air  


 


5/24/20 20:00  82      


 


5/24/20 20:00 97.5 80 16 116/74 (88) 91   








Height (Feet):  5


Height (Inches):  7.00


Weight (Pounds):  209


Objective


HEAD AND NECK:  No JVD.


LUNGS:  Decreased breath sounds.


CARDIOVASCULAR:  Regular S1 and S2.  Tachycardic.


ABDOMEN: Post op dressings in place, ALLISON drain


EXTREMITIES:  No pitting edema.





Laboratory Tests








Test


  5/25/20


04:00


 


White Blood Count


  11.3 K/UL


(4.8-10.8)  H


 


Red Blood Count


  3.00 M/UL


(4.70-6.10)  L


 


Hemoglobin


  8.4 G/DL


(14.2-18.0)  L


 


Hematocrit


  25.0 %


(42.0-52.0)  L


 


Mean Corpuscular Volume 84 FL (80-99)  


 


Mean Corpuscular Hemoglobin


  28.0 PG


(27.0-31.0)


 


Mean Corpuscular Hemoglobin


Concent 33.5 G/DL


(32.0-36.0)


 


Red Cell Distribution Width


  13.9 %


(11.6-14.8)


 


Platelet Count


  326 K/UL


(150-450)


 


Mean Platelet Volume


  4.3 FL


(6.5-10.1)  L


 


Neutrophils (%) (Auto)


  76.2 %


(45.0-75.0)  H


 


Lymphocytes (%) (Auto)


  12.7 %


(20.0-45.0)  L


 


Monocytes (%) (Auto)


  8.2 %


(1.0-10.0)


 


Eosinophils (%) (Auto)


  2.2 %


(0.0-3.0)


 


Basophils (%) (Auto)


  0.7 %


(0.0-2.0)


 


Sodium Level


  140 MMOL/L


(136-145)


 


Potassium Level


  2.9 MMOL/L


(3.5-5.1)  L


 


Chloride Level


  108 MMOL/L


()  H


 


Carbon Dioxide Level


  25 MMOL/L


(21-32)


 


Anion Gap


  7 mmol/L


(5-15)


 


Blood Urea Nitrogen


  2 mg/dL (7-18)


L


 


Creatinine


  0.6 MG/DL


(0.55-1.30)


 


Estimat Glomerular Filtration


Rate > 60 mL/min


(>60)


 


Glucose Level


  100 MG/DL


()


 


Uric Acid


  2.0 MG/DL


(2.6-7.2)  L


 


Calcium Level


  6.8 MG/DL


(8.5-10.1)  L


 


Phosphorus Level


  2.2 MG/DL


(2.5-4.9)  L


 


Magnesium Level


  1.8 MG/DL


(1.8-2.4)


 


Total Bilirubin


  0.1 MG/DL


(0.2-1.0)  L


 


Direct Bilirubin


  < 0.1 MG/DL


(0.0-0.3)


 


Aspartate Amino Transf


(AST/SGOT) 30 U/L (15-37)


 


 


Alanine Aminotransferase


(ALT/SGPT) 12 U/L (12-78)


 


 


Alkaline Phosphatase


  68 U/L


()


 


Total Protein


  5.0 G/DL


(6.4-8.2)  L


 


Albumin


  1.2 G/DL


(3.4-5.0)  L











Current Medications








 Medications


  (Trade)  Dose


 Ordered  Sig/Marisa


 Route


 PRN Reason  Start Time


 Stop Time Status Last Admin


Dose Admin


 


 Acetaminophen/


 Hydrocodone Bitart


  (Norco 5/325)  1 tab  Q4H  PRN


 ORAL


 Moderate Pain (Pain Scale 4-6)  5/25/20 14:30


 6/1/20 14:29  5/25/20 14:23


 


 


 Dextrose


  (Dextrose 50%)  25 ml  Q30M  PRN


 IV


 Hypoglycemia  5/20/20 18:45


 8/11/20 06:14   


 


 


 Dextrose


  (Dextrose 50%)  50 ml  Q30M  PRN


 IV


 Hypoglycemia  5/20/20 18:45


 8/11/20 06:14   


 


 


 Diphenhydramine


 HCl


  (Benadryl)  12.5 mg  Q6H  PRN


 IVP


 Itching/Pruritis  5/20/20 19:00


 6/17/20 18:59   


 


 


 Folic Acid


  (Folate)  1 mg  DAILY


 ORAL


   5/25/20 09:45


 6/24/20 09:44  5/25/20 09:49


 


 


 Heparin Sodium


  (Porcine)


  (Heparin 5000


 units/ml)  5,000 units  EVERY 8  HOURS


 SUBQ


   5/23/20 14:00


 7/7/20 13:59  5/25/20 13:50


 


 


 Linezolid  300 ml @ 


 300 mls/hr  Q12HR


 IVPB


   5/20/20 21:00


 5/27/20 15:59  5/25/20 09:50


 


 


 Meropenem 1 gm/


 Sodium Chloride  55 ml @ 


 110 mls/hr  Q8H


 IVPB


   5/21/20 00:00


 5/25/20 23:59  5/25/20 08:16


 


 


 Ondansetron HCl


  (Zofran)  4 mg  Q6H  PRN


 IVP


 Nausea & Vomiting  5/20/20 19:00


 6/17/20 18:59  5/23/20 09:12


 


 


 Potassium


 Phosphate 20 mm/


 Sodium Chloride  281.6667


 ml @ 


 46.944 m...  ONCE  ONCE


 IV


   5/25/20 16:00


 5/25/20 21:59   


 

















Alexa Gonzalez M.D. May 25, 2020 16:13

## 2020-05-25 NOTE — GENERAL PROGRESS NOTE
Assessment/Plan


Problem List:  


(1) Syncope


ICD Codes:  R55 - Syncope and collapse


SNOMED:  990359310


Qualifiers:  


   Qualified Codes:  R55 - Syncope and collapse


(2) Gastric mass


ICD Codes:  K31.89 - Other diseases of stomach and duodenum


SNOMED:  549853720


(3) GI bleed


ICD Codes:  K92.2 - Gastrointestinal hemorrhage, unspecified


SNOMED:  97134026


Qualifiers:  


   Qualified Codes:  K92.2 - Gastrointestinal hemorrhage, unspecified


(4) Pulmonary embolism


ICD Codes:  I26.99 - Other pulmonary embolism without acute cor pulmonale


SNOMED:  83874385


Qualifiers:  


   Qualified Codes:  I26.99 - Other pulmonary embolism without acute cor 

pulmonale


Status:  unchanged


Assessment/Plan:





s/p surg:





OPERATIONS PERFORMED:


1. Exploratory laparotomy.


2. Mobilization of the splenic flexure.


3. Left colectomy with primary side-to-side anastomosis.


4. Partial gastrectomy.


5. Total splenectomy.


6. Distal pancreatectomy.


7. Omentectomy.


8. Open lysis of adhesions.





fu surg recs


fu path


fu oncology


IVF


pain control


abx


on clears per surg and will advance as tolerated


will fu





Subjective


ROS Limited/Unobtainable:  Yes


Allergies:  


Coded Allergies:  


     No Known Allergies (Unverified , 5/12/20)





Objective





Last 24 Hour Vital Signs








  Date Time  Temp Pulse Resp B/P (MAP) Pulse Ox O2 Delivery O2 Flow Rate FiO2


 


5/25/20 04:14 98.0       


 


5/25/20 04:00  80      


 


5/25/20 04:00 98.1 89 18 124/77 (93) 96   


 


5/25/20 00:00  82      


 


5/25/20 00:00 98.0 81 16 120/68 (85) 97   


 


5/24/20 21:00      Room Air  





      Room Air  


 


5/24/20 20:00  82      


 


5/24/20 20:00 97.5 80 16 116/74 (88) 91   


 


5/24/20 16:00  89      


 


5/24/20 16:00 98.1 91 17 123/76 (92) 98   


 


5/24/20 15:58 97.7       


 


5/24/20 12:00 97.7 92 17 118/78 (91) 98   


 


5/24/20 12:00  89      


 


5/24/20 09:00      Room Air  





      Room Air  


 


5/24/20 08:00 97.9 87 16 116/74 (88) 98   


 


5/24/20 08:00  84      

















Intake and Output  


 


 5/24/20 5/25/20





 19:00 07:00


 


Intake Total 1220 ml 


 


Output Total 410 ml 600 ml


 


Balance 810 ml -600 ml


 


  


 


Intake Oral 1220 ml 


 


Output Urine Total 300 ml 300 ml


 


Drainage Total 110 ml 300 ml


 


# Voids  1


 


# Bowel Movements 1 








Laboratory Tests


5/25/20 04:00: 


White Blood Count 11.3H, Red Blood Count 3.00L, Hemoglobin 8.4L, Hematocrit 

25.0L, Mean Corpuscular Volume 84, Mean Corpuscular Hemoglobin 28.0, Mean 

Corpuscular Hemoglobin Concent 33.5, Red Cell Distribution Width 13.9, Platelet 

Count 326, Mean Platelet Volume 4.3L, Neutrophils (%) (Auto) 76.2H, Lymphocytes 

(%) (Auto) 12.7L, Monocytes (%) (Auto) 8.2, Eosinophils (%) (Auto) 2.2, 

Basophils (%) (Auto) 0.7, Sodium Level 140, Potassium Level 2.9L, Chloride 

Level 108H, Carbon Dioxide Level 25, Anion Gap 7, Blood Urea Nitrogen 2L, 

Creatinine 0.6, Estimat Glomerular Filtration Rate > 60, Glucose Level 100, 

Calcium Level 6.8L


Height (Feet):  5


Height (Inches):  7.00


Weight (Pounds):  209


General Appearance:  no apparent distress


EENT:  PERRL/EOMI


Neck:  supple


Cardiovascular:  normal rate


Respiratory/Chest:  decreased breath sounds


Abdomen:  soft, other - post surgical


Extremities:  non-tender











Marc Nielsen MD May 25, 2020 07:09

## 2020-05-25 NOTE — NUR
NURSE NOTES:

Received patient in bed awake. No SOB or acute distress. IV line intact and patent. No 
bleeding on surgical site. Leaking noted on ALLISON drain insertion, Dr Fischer aware. ALLISON drain 
kept on negative pressure. HOB elevated. Bed locked in lowest position. Call light within 
reach. Will continue plan of care.

## 2020-05-25 NOTE — GENERAL PROGRESS NOTE
Assessment/Plan


Problem List:  


(1) Weak


ICD Codes:  R53.1 - Weakness


SNOMED:  62716554


(2) Malnutrition


ICD Codes:  E46 - Unspecified protein-calorie malnutrition


SNOMED:  84582814


(3) COVID-19 ruled out


ICD Codes:  Z03.818 - Encounter for observation for suspected exposure to other 

biological agents ruled out


SNOMED:  946127662, 845268909


(4) Pulmonary embolism


ICD Codes:  I26.99 - Other pulmonary embolism without acute cor pulmonale


SNOMED:  91061736


Qualifiers:  


   Qualified Codes:  I26.99 - Other pulmonary embolism without acute cor 

pulmonale


(5) GI bleed


ICD Codes:  K92.2 - Gastrointestinal hemorrhage, unspecified


SNOMED:  51790361


Qualifiers:  


   Qualified Codes:  K92.2 - Gastrointestinal hemorrhage, unspecified


(6) Gastric mass


ICD Codes:  K31.89 - Other diseases of stomach and duodenum


SNOMED:  418334086


(7) Syncope


ICD Codes:  R55 - Syncope and collapse


SNOMED:  531471847


Qualifiers:  


   Qualified Codes:  R55 - Syncope and collapse


Status:  stable, progressing


Assessment/Plan:


o2 pulm tx transfuse prn cbc bmp am dc plan





Subjective


Constitutional:  Reports: weakness


Allergies:  


Coded Allergies:  


     No Known Allergies (Unverified , 5/12/20)


All Systems:  reviewed and negative except above


Subjective


sleepy calm





Objective





Last 24 Hour Vital Signs








  Date Time  Temp Pulse Resp B/P (MAP) Pulse Ox O2 Delivery O2 Flow Rate FiO2


 


5/25/20 12:00 97.6 87 20 128/81 (97) 99   


 


5/25/20 12:00  84      


 


5/25/20 09:00      Room Air  





      Room Air  


 


5/25/20 08:00  93      


 


5/25/20 08:00 97.5 95 20 126/82 (97) 98   


 


5/25/20 04:14 98.0       


 


5/25/20 04:00  80      


 


5/25/20 04:00 98.1 89 18 124/77 (93) 96   


 


5/25/20 00:00  82      


 


5/25/20 00:00 98.0 81 16 120/68 (85) 97   


 


5/24/20 21:00      Room Air  





      Room Air  


 


5/24/20 20:00  82      


 


5/24/20 20:00 97.5 80 16 116/74 (88) 91   


 


5/24/20 16:00  89      


 


5/24/20 16:00 98.1 91 17 123/76 (92) 98   


 


5/24/20 15:58 97.7       

















Intake and Output  


 


 5/24/20 5/25/20





 19:00 07:00


 


Intake Total 1270 ml 650 ml


 


Output Total 410 ml 600 ml


 


Balance 860 ml 50 ml


 


  


 


Intake Oral 1220 ml 


 


IV Total 50 ml 650 ml


 


Output Urine Total 300 ml 300 ml


 


Drainage Total 110 ml 300 ml


 


# Voids  1


 


# Bowel Movements 1 








Laboratory Tests


5/25/20 04:00: 


White Blood Count 11.3H, Red Blood Count 3.00L, Hemoglobin 8.4L, Hematocrit 

25.0L, Mean Corpuscular Volume 84, Mean Corpuscular Hemoglobin 28.0, Mean 

Corpuscular Hemoglobin Concent 33.5, Red Cell Distribution Width 13.9, Platelet 

Count 326, Mean Platelet Volume 4.3L, Neutrophils (%) (Auto) 76.2H, Lymphocytes 

(%) (Auto) 12.7L, Monocytes (%) (Auto) 8.2, Eosinophils (%) (Auto) 2.2, 

Basophils (%) (Auto) 0.7, Sodium Level 140, Potassium Level 2.9L, Chloride 

Level 108H, Carbon Dioxide Level 25, Anion Gap 7, Blood Urea Nitrogen 2L, 

Creatinine 0.6, Estimat Glomerular Filtration Rate > 60, Glucose Level 100, 

Uric Acid 2.0L, Calcium Level 6.8L, Phosphorus Level 2.2L, Magnesium Level 1.8, 

Total Bilirubin 0.1L, Direct Bilirubin < 0.1, Aspartate Amino Transf (AST/SGOT) 

30, Alanine Aminotransferase (ALT/SGPT) 12, Alkaline Phosphatase 68, Total 

Protein 5.0L, Albumin 1.2L


Height (Feet):  5


Height (Inches):  7.00


Weight (Pounds):  209


General Appearance:  lethargic


EENT:  normal ENT inspection


Cardiovascular:  normal peripheral pulses, normal rate, regular rhythm


Respiratory/Chest:  chest wall non-tender, lungs clear, normal breath sounds


Abdomen:  normal bowel sounds, non tender, no organomegaly


Edema:  no edema noted Arm (L), no edema noted Arm (R), no edema noted Leg (L), 

no edema noted Leg (R), no edema noted Pedal (L), no edema noted Pedal (R), no 

edema noted Generalized


Skin:  normal pigmentation, warm/dry











Tom Pierce DO May 25, 2020 15:38

## 2020-05-25 NOTE — NUR
NURSE NOTES:

Notified Dr. Pierce & Dr. Fischer of patient's lab K 2.9, Ca 6.8. Awaiting for callback.

## 2020-05-26 VITALS — DIASTOLIC BLOOD PRESSURE: 82 MMHG | SYSTOLIC BLOOD PRESSURE: 119 MMHG

## 2020-05-26 VITALS — DIASTOLIC BLOOD PRESSURE: 76 MMHG | SYSTOLIC BLOOD PRESSURE: 132 MMHG

## 2020-05-26 VITALS — SYSTOLIC BLOOD PRESSURE: 122 MMHG | DIASTOLIC BLOOD PRESSURE: 76 MMHG

## 2020-05-26 VITALS — SYSTOLIC BLOOD PRESSURE: 135 MMHG | DIASTOLIC BLOOD PRESSURE: 84 MMHG

## 2020-05-26 VITALS — SYSTOLIC BLOOD PRESSURE: 136 MMHG | DIASTOLIC BLOOD PRESSURE: 81 MMHG

## 2020-05-26 VITALS — DIASTOLIC BLOOD PRESSURE: 83 MMHG | SYSTOLIC BLOOD PRESSURE: 138 MMHG

## 2020-05-26 VITALS — SYSTOLIC BLOOD PRESSURE: 136 MMHG | DIASTOLIC BLOOD PRESSURE: 86 MMHG

## 2020-05-26 LAB
% IRON SATURATION: 19 % (ref 15–50)
ADD MANUAL DIFF: NO
ALBUMIN SERPL-MCNC: 1.3 G/DL (ref 3.4–5)
ALBUMIN/GLOB SERPL: 0.3 {RATIO} (ref 1–2.7)
ALP SERPL-CCNC: 65 U/L (ref 46–116)
ALT SERPL-CCNC: 15 U/L (ref 12–78)
ANION GAP SERPL CALC-SCNC: 6 MMOL/L (ref 5–15)
AST SERPL-CCNC: 29 U/L (ref 15–37)
BASOPHILS NFR BLD AUTO: 0.4 % (ref 0–2)
BILIRUB SERPL-MCNC: 0.2 MG/DL (ref 0.2–1)
BUN SERPL-MCNC: 4 MG/DL (ref 7–18)
CALCIUM SERPL-MCNC: 7.1 MG/DL (ref 8.5–10.1)
CHLORIDE SERPL-SCNC: 105 MMOL/L (ref 98–107)
CO2 SERPL-SCNC: 27 MMOL/L (ref 21–32)
CREAT SERPL-MCNC: 0.7 MG/DL (ref 0.55–1.3)
EOSINOPHIL NFR BLD AUTO: 1.1 % (ref 0–3)
ERYTHROCYTE [DISTWIDTH] IN BLOOD BY AUTOMATED COUNT: 14 % (ref 11.6–14.8)
FERRITIN SERPL-MCNC: 378 NG/ML (ref 8–388)
GLOBULIN SER-MCNC: 3.8 G/DL
HCT VFR BLD CALC: 28.8 % (ref 42–52)
HGB BLD-MCNC: 9.6 G/DL (ref 14.2–18)
IRON SERPL-MCNC: 15 UG/DL (ref 50–175)
LYMPHOCYTES NFR BLD AUTO: 12.7 % (ref 20–45)
MCV RBC AUTO: 84 FL (ref 80–99)
MONOCYTES NFR BLD AUTO: 7.3 % (ref 1–10)
NEUTROPHILS NFR BLD AUTO: 78.6 % (ref 45–75)
PHOSPHATE SERPL-MCNC: 2.1 MG/DL (ref 2.5–4.9)
PLATELET # BLD: 325 K/UL (ref 150–450)
POTASSIUM SERPL-SCNC: 3.3 MMOL/L (ref 3.5–5.1)
RBC # BLD AUTO: 3.42 M/UL (ref 4.7–6.1)
SODIUM SERPL-SCNC: 138 MMOL/L (ref 136–145)
TIBC SERPL-MCNC: 78 UG/DL (ref 250–450)
UNSATURATED IRON BINDING: 63 UG/DL (ref 112–346)
WBC # BLD AUTO: 11.6 K/UL (ref 4.8–10.8)

## 2020-05-26 RX ADMIN — HYDROCODONE BITARTRATE AND ACETAMINOPHEN PRN TAB: 5; 325 TABLET ORAL at 08:50

## 2020-05-26 RX ADMIN — HEPARIN SODIUM SCH UNITS: 5000 INJECTION INTRAVENOUS; SUBCUTANEOUS at 06:45

## 2020-05-26 RX ADMIN — APIXABAN SCH MG: 5 TABLET, FILM COATED ORAL at 17:14

## 2020-05-26 RX ADMIN — HYDROCODONE BITARTRATE AND ACETAMINOPHEN PRN TAB: 5; 325 TABLET ORAL at 12:58

## 2020-05-26 RX ADMIN — HYDROCODONE BITARTRATE AND ACETAMINOPHEN PRN TAB: 5; 325 TABLET ORAL at 21:02

## 2020-05-26 RX ADMIN — HYDROCODONE BITARTRATE AND ACETAMINOPHEN PRN TAB: 5; 325 TABLET ORAL at 02:31

## 2020-05-26 RX ADMIN — HYDROCODONE BITARTRATE AND ACETAMINOPHEN PRN TAB: 5; 325 TABLET ORAL at 17:04

## 2020-05-26 RX ADMIN — Medication SCH MLS/HR: at 10:07

## 2020-05-26 RX ADMIN — Medication SCH MLS/HR: at 13:31

## 2020-05-26 NOTE — NUR
*-*DISCHARGE PLANNING*-*



PATIENT HAS BEEN REFERRED TO:



KEVIN [facility]: [phone]

 F: [phone]

-------------------------------------------------------------------------------

Addendum: 05/26/20 at 1551 by PENG LICEA CM

-------------------------------------------------------------------------------

*-*DISCHARGE PLANNING*-*



PATIENT HAS BEEN REFERRED TO:



KEVIN ARIAS: [phone]

 S/W SERGIO, WILL GIVE CLINICALS TO [facility] 

-------------------------------------------------------------------------------

Addendum: 05/28/20 at 1140 by PENG LICEA CM

-------------------------------------------------------------------------------

*-*DISCHARGE PLANNING*-*



PATIENT HAS BEEN REFERRED TO:



KEVIN ARIAS: [phone]

 S/W ROSAL[facility], HAS NOT FOUND PLACEMENT YET,  NOTIFIED, DISCHARGE PLANNER WILL 
CONTINUE SEARCH

## 2020-05-26 NOTE — GENERAL PROGRESS NOTE
Assessment/Plan


Problem List:  


(1) Weak


ICD Codes:  R53.1 - Weakness


SNOMED:  90750065


(2) Malnutrition


ICD Codes:  E46 - Unspecified protein-calorie malnutrition


SNOMED:  52763004


(3) COVID-19 ruled out


ICD Codes:  Z03.818 - Encounter for observation for suspected exposure to other 

biological agents ruled out


SNOMED:  777618851, 658309554


(4) Pulmonary embolism


ICD Codes:  I26.99 - Other pulmonary embolism without acute cor pulmonale


SNOMED:  72306884


Qualifiers:  


   Qualified Codes:  I26.99 - Other pulmonary embolism without acute cor 

pulmonale


(5) GI bleed


ICD Codes:  K92.2 - Gastrointestinal hemorrhage, unspecified


SNOMED:  67341146


Qualifiers:  


   Qualified Codes:  K92.2 - Gastrointestinal hemorrhage, unspecified


(6) Gastric mass


ICD Codes:  K31.89 - Other diseases of stomach and duodenum


SNOMED:  797075909


(7) Syncope


ICD Codes:  R55 - Syncope and collapse


SNOMED:  940102281


Qualifiers:  


   Qualified Codes:  R55 - Syncope and collapse


Status:  stable, progressing


Assessment/Plan:


o2 pulm tx transfuse prn cbc bmp am dc plan





Subjective


Constitutional:  Reports: weakness


Allergies:  


Coded Allergies:  


     No Known Allergies (Unverified , 5/12/20)


All Systems:  reviewed and negative except above


Subjective


sleepy calm





Objective





Last 24 Hour Vital Signs








  Date Time  Temp Pulse Resp B/P (MAP) Pulse Ox O2 Delivery O2 Flow Rate FiO2


 


5/26/20 09:00      Room Air  





      Room Air  


 


5/26/20 08:00 96.1 83 18 122/76 (91) 94   


 


5/26/20 07:54  94      


 


5/26/20 04:00 98.7 100 17 132/76 (94) 97   


 


5/26/20 04:00  84      


 


5/26/20 04:00      Room Air  





      Room Air  


 


5/26/20 03:01 98.6       


 


5/26/20 00:00  102      


 


5/26/20 00:00 98.6 98 15 136/81 (99) 97   


 


5/26/20 00:00      Room Air  





      Room Air  


 


5/25/20 21:00      Room Air  





      Room Air  


 


5/25/20 20:00 98.8 107 17 129/78 (95) 96   


 


5/25/20 20:00  103      


 


5/25/20 16:00  97      


 


5/25/20 16:00 98.1 97 20 135/93 (107) 99   

















Intake and Output  


 


 5/25/20 5/26/20





 19:00 07:00


 


Intake Total 360 ml 1020 ml


 


Output Total 730 ml 1500 ml


 


Balance -370 ml -480 ml


 


  


 


Intake Oral 360 ml 720 ml


 


IV Total  300 ml


 


Output Urine Total 300 ml 1500 ml


 


Drainage Total 430 ml 


 


# Voids  3








Laboratory Tests


5/26/20 05:09: 


White Blood Count 11.6H, Red Blood Count 3.42L, Hemoglobin 9.6L, Hematocrit 

28.8L, Mean Corpuscular Volume 84, Mean Corpuscular Hemoglobin 27.9, Mean 

Corpuscular Hemoglobin Concent 33.1, Red Cell Distribution Width 14.0, Platelet 

Count 325, Mean Platelet Volume 4.4L, Neutrophils (%) (Auto) 78.6H, Lymphocytes 

(%) (Auto) 12.7L, Monocytes (%) (Auto) 7.3, Eosinophils (%) (Auto) 1.1, 

Basophils (%) (Auto) 0.4, Sodium Level 138, Potassium Level 3.3L, Chloride 

Level 105, Carbon Dioxide Level 27, Anion Gap 6, Blood Urea Nitrogen 4L, 

Creatinine 0.7, Estimat Glomerular Filtration Rate > 60, Glucose Level 100, 

Uric Acid 1.7L, Calcium Level 7.1L, Phosphorus Level 2.1L, Magnesium Level 1.8, 

Iron Level 15L, Total Iron Binding Capacity 78L, Percent Iron Saturation 19, 

Unsaturated Iron Binding 63L, Ferritin 378, Total Bilirubin 0.2, Aspartate 

Amino Transf (AST/SGOT) 29, Alanine Aminotransferase (ALT/SGPT) 15, Alkaline 

Phosphatase 65, C-Reactive Protein, Quantitative 13.4H, Pro-B-Type Natriuretic 

Peptide 251H, Total Protein 5.1L, Albumin 1.3L, Globulin 3.8, Albumin/Globulin 

Ratio 0.3L, Vitamin B12 Level 361, Folate 4.9L, Thyroid Stimulating Hormone (TSH

) 2.992


Height (Feet):  5


Height (Inches):  7.00


Weight (Pounds):  205


General Appearance:  lethargic


EENT:  normal ENT inspection


Neck:  normal alignment


Cardiovascular:  normal rate, regular rhythm


Respiratory/Chest:  no respiratory distress, no accessory muscle use


Abdomen:  soft, hypoactive bowel sounds


Extremities:  normal inspection


Edema:  no edema noted Arm (L), no edema noted Arm (R), no edema noted Leg (L), 

no edema noted Leg (R), no edema noted Pedal (L), no edema noted Pedal (R), no 

edema noted Generalized


Neurologic:  motor weakness


Skin:  normal pigmentation, warm/dry











Tom Pierce  May 26, 2020 13:22

## 2020-05-26 NOTE — NUR
NURSE NOTES:

Pt laying in bed, a/ox 4, No apparent distress, breathing regular and unlabored on RA, Pt 
denies pain, IV site right forearm 20G, Bed alarm on, Low and locked position, Call light 
within reach, side rales up x3, will continue to monitor.

## 2020-05-26 NOTE — HEMATOLOGY/ONC PROGRESS NOTE
Assessment/Plan


Assessment/Plan


Assessment and Recs


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 

stomach, pancreas stomach and COLON PRIMARY and path staging [id number] --> had 0/27 

lymph nodes that are noted


--> CT shows    Centrally necrotic mass arising from the gastric fundus 

measuring 12. 6 x 11.1 x 14 cm. Filling defects within pulmonary vessels at the 

lung bases with small peripheral airspace opacities concerning for pulmonary 

emboli and small pulmonary infarcts.


--> resection was completed by Dr. Fischer, he is recovering well


--> pathology reviewed


--> needs a pet/ct scan outpatient


--> adjuvant chemotherapy for residual tumor (+ margin on one side)


--> dw him and he will come in outpatient


--> 5/22 abd xr: PROBABLE POSTOPERATIVE ILEUS


# Pulmonary embolism -- likely related to hypercoagulable disorder from 

malignancys/p ivc filter


--> case dw pcp, surg, and pulm


--> is not a candidate for anticoagulation given severe anemia and low platelets


--> 5/13 s/p ivc filter placement


--> 5/19 cxr: Left retrocardiac consolidation and left pleural effusion


--> no bleeding noted, will start eliquis po


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


--> have ordered for repeat prbc transfusions


--> hgb goal is >7


--> have reviewed anemia panel


--> gi to access in re to biopsy/endoscopy


--> IV IRON completed 


--> hgb trend: 8.6-->9.7-->8-->7.7 -->9->9.3


# Leukocytosis is likely reactive process from cancer


--> on abx as needed


--> smear is noted


--> meds are reviewed


--> vanc/zosyn-->cefepime/flagyl-->mekhi/micaf/linezolid


--> wbc trend: 21.6-->18-->24.1-->26.7-->20.2-->16.6 


--> covid negative x2 since admission 


# Diarrhea


--> c diff negative 


# PABLO, improving


# HTN


# Dvt ppx


--> eliquis po 





The timing of this note does not necessarily reflect the time of the patient 

was seen.





Greatly appreciate consultation.





Subjective


Constitutional:  Denies: no symptoms, chills, fever, malaise, weakness, other


Cardiovascular:  Denies: no symptoms, chest pain, edema, irregular heart rate, 

lightheadedness, palpitations, syncope, other


Gastrointestinal/Abdominal:  Denies: no symptoms, abdomen distended, abdominal 

pain, black stools, tarry stools, blood in stool, constipated, diarrhea, 

difficulty swallowing, nausea, poor appetite, poor fluid intake, rectal bleeding

, vomiting, other


Genitourinary:  Denies: no symptoms, burning, discharge, frequency, flank pain, 

hematuria, incontinence, pain, urgency, other


Neurologic/Psychiatric:  Denies: no symptoms, anxiety, depressed, emotional 

problems, headache, numbness, paresthesia, pre-existing deficit, seizure, 

tingling, tremors, weakness, other


Endocrine:  Denies: no symptoms, excessive sweating, flushing, intolerance to 

cold, intolerance to heat, increased hunger, increased thirst, increased urine, 

unexplained weight gain, unexplained weight loss, other


Hematologic/Lymphatic:  Denies: no symptoms, anemia, easy bleeding, easy 

bruising, adenopathy, other


Allergies:  


Coded Allergies:  


     No Known Allergies (Unverified , 5/12/20)


Subjective


5/14 labs are noted, no bleeding, meds reviewed, for egd when iso off


5/15 icu, no overnight events, alert, iv iron, c diff negative 


5/17 on tele, wbc improving, nc, tarango, no distress 


5/18 for surgery this am, have ordered for ffp and vit k, seen by surg, 

anesthesia


5/19 icu, wbc 24.1, iv abx, afebrile, vent, no acute distress 


5/20 refusing scd's, labs reviewed, no sob, hr 105 


5/21 transferred to tele, alert, room air, hgb 7.7 


5/22 is feeling better, dw him re outpatient chemo and he has agreed to come in


5/23 blood cx neg. x2, room air, low k, h/h stable 


5/24 labs reviewed, hgb 9.3, no hemolysis is seen, comfortable


5/25 labs have been noted, no bleeding, path report reviewed, less abd pain


5/26 dw surgery, will dc heparins sq and start on eliquis





Objective


Objective





Current Medications








 Medications


  (Trade)  Dose


 Ordered  Sig/Marisa


 Route


 PRN Reason  Start Time


 Stop Time Status Last Admin


Dose Admin


 


 Acetaminophen/


 Hydrocodone Bitart


  (Norco 5/325)  1 tab  Q4H  PRN


 ORAL


 Moderate Pain (Pain Scale 4-6)  5/25/20 14:30


 6/1/20 14:29  5/26/20 08:50


 


 


 Dextrose


  (Dextrose 50%)  25 ml  Q30M  PRN


 IV


 Hypoglycemia  5/20/20 18:45


 8/11/20 06:14   


 


 


 Dextrose


  (Dextrose 50%)  50 ml  Q30M  PRN


 IV


 Hypoglycemia  5/20/20 18:45


 8/11/20 06:14   


 


 


 Diphenhydramine


 HCl


  (Benadryl)  12.5 mg  Q6H  PRN


 IVP


 Itching/Pruritis  5/20/20 19:00


 6/17/20 18:59   


 


 


 Folic Acid


  (Folate)  1 mg  DAILY


 ORAL


   5/25/20 09:45


 6/24/20 09:44  5/26/20 08:50


 


 


 Heparin Sodium


  (Porcine)


  (Heparin 5000


 units/ml)  5,000 units  EVERY 8  HOURS


 SUBQ


   5/23/20 14:00


 7/7/20 13:59  5/26/20 06:45


 


 


 Linezolid  300 ml @ 


 300 mls/hr  Q12HR


 IVPB


   5/20/20 21:00


 5/27/20 15:59  5/26/20 08:51


 


 


 Ondansetron HCl


  (Zofran)  4 mg  Q6H  PRN


 IVP


 Nausea & Vomiting  5/20/20 19:00


 6/17/20 18:59  5/25/20 21:42


 


 


 Potassium


 Phosphate  250 ml @ 


 62.5 mls/hr  Q4H


 IVPB


   5/26/20 09:00


 5/26/20 16:59  5/26/20 10:07


 


 


 Potassium Chloride


  (K-Dur)  20 meq  TWICE A  DAY


 ORAL


   5/26/20 09:00


 8/24/20 08:59  5/26/20 08:51


 











Last 24 Hour Vital Signs








  Date Time  Temp Pulse Resp B/P (MAP) Pulse Ox O2 Delivery O2 Flow Rate FiO2


 


5/26/20 08:00 96.1 83 18 122/76 (91) 94   


 


5/26/20 04:00 98.7 100 17 132/76 (94) 97   


 


5/26/20 04:00  84      


 


5/26/20 04:00      Room Air  





      Room Air  


 


5/26/20 03:01 98.6       


 


5/26/20 00:00  102      


 


5/26/20 00:00 98.6 98 15 136/81 (99) 97   


 


5/26/20 00:00      Room Air  





      Room Air  


 


5/25/20 21:00      Room Air  





      Room Air  


 


5/25/20 20:00 98.8 107 17 129/78 (95) 96   


 


5/25/20 20:00  103      


 


5/25/20 16:00  97      


 


5/25/20 16:00 98.1 97 20 135/93 (107) 99   


 


5/25/20 12:00 97.6 87 20 128/81 (97) 99   


 


5/25/20 12:00  84      


 


5/25/20 09:00      Room Air  





      Room Air  


 


5/25/20 08:00  93      


 


5/25/20 08:00 97.5 95 20 126/82 (97) 98   


 


5/25/20 04:14 98.0       


 


5/25/20 04:00  80      


 


5/25/20 04:00 98.1 89 18 124/77 (93) 96   


 


5/25/20 00:00  82      


 


5/25/20 00:00 98.0 81 16 120/68 (85) 97   


 


5/24/20 21:00      Room Air  





      Room Air  


 


5/24/20 20:00  82      


 


5/24/20 20:00 97.5 80 16 116/74 (88) 91   


 


5/24/20 16:00  89      


 


5/24/20 16:00 98.1 91 17 123/76 (92) 98   


 


5/24/20 15:58 97.7       


 


5/24/20 12:00 97.7 92 17 118/78 (91) 98   


 


5/24/20 12:00  89      

















Intake and Output  


 


 5/25/20 5/26/20





 19:00 07:00


 


Intake Total 360 ml 1020 ml


 


Output Total 730 ml 1500 ml


 


Balance -370 ml -480 ml


 


  


 


Intake Oral 360 ml 720 ml


 


IV Total  300 ml


 


Output Urine Total 300 ml 1500 ml


 


Drainage Total 430 ml 


 


# Voids  3











Labs








Test


  5/24/20


05:45 5/25/20


04:00 5/26/20


05:09


 


White Blood Count


  15.5 K/UL


(4.8-10.8) 11.3 K/UL


(4.8-10.8) 11.6 K/UL


(4.8-10.8)


 


Red Blood Count


  3.24 M/UL


(4.70-6.10) 3.00 M/UL


(4.70-6.10) 3.42 M/UL


(4.70-6.10)


 


Hemoglobin


  9.3 G/DL


(14.2-18.0) 8.4 G/DL


(14.2-18.0) 9.6 G/DL


(14.2-18.0)


 


Hematocrit


  27.3 %


(42.0-52.0) 25.0 %


(42.0-52.0) 28.8 %


(42.0-52.0)


 


Mean Corpuscular Volume 84 FL (80-99)  84 FL (80-99)  84 FL (80-99) 


 


Mean Corpuscular Hemoglobin


  28.6 PG


(27.0-31.0) 28.0 PG


(27.0-31.0) 27.9 PG


(27.0-31.0)


 


Mean Corpuscular Hemoglobin


Concent 33.9 G/DL


(32.0-36.0) 33.5 G/DL


(32.0-36.0) 33.1 G/DL


(32.0-36.0)


 


Red Cell Distribution Width


  14.3 %


(11.6-14.8) 13.9 %


(11.6-14.8) 14.0 %


(11.6-14.8)


 


Platelet Count


  344 K/UL


(150-450) 326 K/UL


(150-450) 325 K/UL


(150-450)


 


Mean Platelet Volume


  4.9 FL


(6.5-10.1) 4.3 FL


(6.5-10.1) 4.4 FL


(6.5-10.1)


 


Neutrophils (%) (Auto)


  82.1 %


(45.0-75.0) 76.2 %


(45.0-75.0) 78.6 %


(45.0-75.0)


 


Lymphocytes (%) (Auto)


  9.5 %


(20.0-45.0) 12.7 %


(20.0-45.0) 12.7 %


(20.0-45.0)


 


Monocytes (%) (Auto)


  6.8 %


(1.0-10.0) 8.2 %


(1.0-10.0) 7.3 %


(1.0-10.0)


 


Eosinophils (%) (Auto)


  1.3 %


(0.0-3.0) 2.2 %


(0.0-3.0) 1.1 %


(0.0-3.0)


 


Basophils (%) (Auto)


  0.4 %


(0.0-2.0) 0.7 %


(0.0-2.0) 0.4 %


(0.0-2.0)


 


Sodium Level


  140 MMOL/L


(136-145) 140 MMOL/L


(136-145) 138 MMOL/L


(136-145)


 


Potassium Level


  3.1 MMOL/L


(3.5-5.1) 2.9 MMOL/L


(3.5-5.1) 3.3 MMOL/L


(3.5-5.1)


 


Chloride Level


  108 MMOL/L


() 108 MMOL/L


() 105 MMOL/L


()


 


Carbon Dioxide Level


  26 MMOL/L


(21-32) 25 MMOL/L


(21-32) 27 MMOL/L


(21-32)


 


Anion Gap


  6 mmol/L


(5-15) 7 mmol/L


(5-15) 6 mmol/L


(5-15)


 


Blood Urea Nitrogen 4 mg/dL (7-18)  2 mg/dL (7-18)  4 mg/dL (7-18) 


 


Creatinine


  0.8 MG/DL


(0.55-1.30) 0.6 MG/DL


(0.55-1.30) 0.7 MG/DL


(0.55-1.30)


 


Estimat Glomerular Filtration


Rate > 60 mL/min


(>60) > 60 mL/min


(>60) > 60 mL/min


(>60)


 


Glucose Level


  109 MG/DL


() 100 MG/DL


() 100 MG/DL


()


 


Calcium Level


  7.0 MG/DL


(8.5-10.1) 6.8 MG/DL


(8.5-10.1) 7.1 MG/DL


(8.5-10.1)


 


Total Bilirubin


  0.2 MG/DL


(0.2-1.0) 0.1 MG/DL


(0.2-1.0) 0.2 MG/DL


(0.2-1.0)


 


Aspartate Amino Transf


(AST/SGOT) 25 U/L (15-37) 


  30 U/L (15-37) 


  29 U/L (15-37) 


 


 


Alanine Aminotransferase


(ALT/SGPT) 16 U/L (12-78) 


  12 U/L (12-78) 


  15 U/L (12-78) 


 


 


Alkaline Phosphatase


  64 U/L


() 68 U/L


() 65 U/L


()


 


Total Protein


  5.2 G/DL


(6.4-8.2) 5.0 G/DL


(6.4-8.2) 5.1 G/DL


(6.4-8.2)


 


Albumin


  1.2 G/DL


(3.4-5.0) 1.2 G/DL


(3.4-5.0) 1.3 G/DL


(3.4-5.0)


 


Globulin 4.0 g/dL   3.8 g/dL 


 


Albumin/Globulin Ratio 0.3 (1.0-2.7)   0.3 (1.0-2.7) 


 


Amylase Level


  40 U/L


() 


  


 


 


Lipase


  179 U/L


() 


  


 


 


Uric Acid


  


  2.0 MG/DL


(2.6-7.2) 1.7 MG/DL


(2.6-7.2)


 


Phosphorus Level


  


  2.2 MG/DL


(2.5-4.9) 2.1 MG/DL


(2.5-4.9)


 


Magnesium Level


  


  1.8 MG/DL


(1.8-2.4) 1.8 MG/DL


(1.8-2.4)


 


Direct Bilirubin


  


  < 0.1 MG/DL


(0.0-0.3) 


 


 


Iron Level


  


  


  15 ug/dL


()


 


Total Iron Binding Capacity


  


  


  78 ug/dL


(250-450)


 


Percent Iron Saturation   19 % (15-50) 


 


Unsaturated Iron Binding


  


  


  63 ug/dL


(112-346)


 


Ferritin


  


  


  378 NG/ML


(8-388)


 


C-Reactive Protein,


Quantitative 


  


  13.4 mg/dL


(0.00-0.90)


 


Pro-B-Type Natriuretic Peptide


  


  


  251 pg/mL


(0-125)


 


Vitamin B12 Level


  


  


  361 PG/ML


(193-986)


 


Folate


  


  


  4.9 NG/ML


(8.6-58.9)


 


Thyroid Stimulating Hormone


(TSH) 


  


  2.992 uiU/mL


(0.358-3.740)








Height (Feet):  5


Height (Inches):  7.00


Weight (Pounds):  205


Objective


PE


General Appearance:  alert, mild distress


Head:  normocephalic, atraumatic


Eyes:  bilateral eye PERRL, bilateral eye EOMI


ENT:  uvula midline, dry mucus membranes


Neck:  supple, thyroid normal, supple/symm/no masses


Respiratory:  lungs clear, no respiratory distress, no retraction


Cardiovascular:  normal peripheral pulses, no edema


Gastrointestinal:  non tender, soft, no guarding, no rebound


Musculoskeletal:  normal inspection


Neurologic:  alert, oriented x3


Psychiatric:  mood/affect normal


Skin:  no rash, warm/dry











Jersey Ross MD May 26, 2020 10:28

## 2020-05-26 NOTE — NUR
*-*DISCHARGE PLANNING*-*



PATIENT HAS BEEN REFERRED TO:



[facility]

 P: [phone]

## 2020-05-26 NOTE — CARDIAC ELECTROPHYSIOLOGY PN
Assessment/Plan


Assessment/Plan


1. Hypotension due to profound anemia due to gastric mass.  


     S/P 5 units of blood transfusion. EF NL





2. Profound anemia.  Hemoglobin of 3.5.  CT of abdomen and pelvis shows central 

necrotic mass in the gastric fundus 12 x 14 x 11 cm.  


     FU  by GI and S/P surgery by Dr Fischer.   


      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 

pancreatic tail likely  perforated.


        On solid diet now





3. Bilateral  Pulmonary embolus.  S/P IVC filter.


      Off anticoagulation for gi bleed, Hb 3.5 and post op.





4. Sepsis with elevated white count. 


       Ruled out for Covid by 2 negative PCRs





DW RN





Subjective


Subjective


 Alert.  Still has abd drain. Eating in SR





Objective





Last 24 Hour Vital Signs








  Date Time  Temp Pulse Resp B/P (MAP) Pulse Ox O2 Delivery O2 Flow Rate FiO2


 


5/26/20 09:00      Room Air  





      Room Air  


 


5/26/20 08:00 96.1 83 18 122/76 (91) 94   


 


5/26/20 07:54  94      


 


5/26/20 04:00 98.7 100 17 132/76 (94) 97   


 


5/26/20 04:00  84      


 


5/26/20 04:00      Room Air  





      Room Air  


 


5/26/20 03:01 98.6       


 


5/26/20 00:00  102      


 


5/26/20 00:00 98.6 98 15 136/81 (99) 97   


 


5/26/20 00:00      Room Air  





      Room Air  


 


5/25/20 21:00      Room Air  





      Room Air  


 


5/25/20 20:00 98.8 107 17 129/78 (95) 96   


 


5/25/20 20:00  103      


 


5/25/20 16:00  97      


 


5/25/20 16:00 98.1 97 20 135/93 (107) 99   

















Intake and Output  


 


 5/25/20 5/26/20





 19:00 07:00


 


Intake Total 360 ml 1020 ml


 


Output Total 730 ml 1500 ml


 


Balance -370 ml -480 ml


 


  


 


Intake Oral 360 ml 720 ml


 


IV Total  300 ml


 


Output Urine Total 300 ml 1500 ml


 


Drainage Total 430 ml 


 


# Voids  3











Laboratory Tests








Test


  5/26/20


05:09


 


White Blood Count


  11.6 K/UL


(4.8-10.8)  H


 


Red Blood Count


  3.42 M/UL


(4.70-6.10)  L


 


Hemoglobin


  9.6 G/DL


(14.2-18.0)  L


 


Hematocrit


  28.8 %


(42.0-52.0)  L


 


Mean Corpuscular Volume 84 FL (80-99)  


 


Mean Corpuscular Hemoglobin


  27.9 PG


(27.0-31.0)


 


Mean Corpuscular Hemoglobin


Concent 33.1 G/DL


(32.0-36.0)


 


Red Cell Distribution Width


  14.0 %


(11.6-14.8)


 


Platelet Count


  325 K/UL


(150-450)


 


Mean Platelet Volume


  4.4 FL


(6.5-10.1)  L


 


Neutrophils (%) (Auto)


  78.6 %


(45.0-75.0)  H


 


Lymphocytes (%) (Auto)


  12.7 %


(20.0-45.0)  L


 


Monocytes (%) (Auto)


  7.3 %


(1.0-10.0)


 


Eosinophils (%) (Auto)


  1.1 %


(0.0-3.0)


 


Basophils (%) (Auto)


  0.4 %


(0.0-2.0)


 


Sodium Level


  138 MMOL/L


(136-145)


 


Potassium Level


  3.3 MMOL/L


(3.5-5.1)  L


 


Chloride Level


  105 MMOL/L


()


 


Carbon Dioxide Level


  27 MMOL/L


(21-32)


 


Anion Gap


  6 mmol/L


(5-15)


 


Blood Urea Nitrogen


  4 mg/dL (7-18)


L


 


Creatinine


  0.7 MG/DL


(0.55-1.30)


 


Estimat Glomerular Filtration


Rate > 60 mL/min


(>60)


 


Glucose Level


  100 MG/DL


()


 


Uric Acid


  1.7 MG/DL


(2.6-7.2)  L


 


Calcium Level


  7.1 MG/DL


(8.5-10.1)  L


 


Phosphorus Level


  2.1 MG/DL


(2.5-4.9)  L


 


Magnesium Level


  1.8 MG/DL


(1.8-2.4)


 


Iron Level


  15 ug/dL


()  L


 


Total Iron Binding Capacity


  78 ug/dL


(250-450)  L


 


Percent Iron Saturation 19 % (15-50)  


 


Unsaturated Iron Binding


  63 ug/dL


(112-346)  L


 


Ferritin


  378 NG/ML


(8-388)


 


Total Bilirubin


  0.2 MG/DL


(0.2-1.0)


 


Aspartate Amino Transf


(AST/SGOT) 29 U/L (15-37)


 


 


Alanine Aminotransferase


(ALT/SGPT) 15 U/L (12-78)


 


 


Alkaline Phosphatase


  65 U/L


()


 


C-Reactive Protein,


Quantitative 13.4 mg/dL


(0.00-0.90)  H


 


Pro-B-Type Natriuretic Peptide


  251 pg/mL


(0-125)  H


 


Total Protein


  5.1 G/DL


(6.4-8.2)  L


 


Albumin


  1.3 G/DL


(3.4-5.0)  L


 


Globulin 3.8 g/dL  


 


Albumin/Globulin Ratio


  0.3 (1.0-2.7)


L


 


Vitamin B12 Level


  361 PG/ML


(193-986)


 


Folate


  4.9 NG/ML


(8.6-58.9)  L


 


Thyroid Stimulating Hormone


(TSH) 2.992 uiU/mL


(0.358-3.740)








Objective


HEAD AND NECK:  No JVD.


LUNGS:  Decreased breath sounds.


CARDIOVASCULAR:  Tachycardia S1 and S2 .


ABDOMEN: Post op with ALLISON drain


EXTREMITIES:  No pitting edema.











Aaron Antunez MD May 26, 2020 13:12

## 2020-05-26 NOTE — NUR
NOTE



ALBERTINA received a call from pt's family member, Makenna, that he wanted to get an update on DC 
placement. Per Makenna, pt resides alone in a single family residence but some needs to be 
fixed i.e. no gas, toilet not working, etc. Makenna states the family will attempt to fix them 
but will take some time. 

-------------------------------------------------------------------------------

Addendum: 05/26/20 at 0853 by SCHUYLER ENG

-------------------------------------------------------------------------------

The family is wondering if pt needs to go to SNF


-------------------------------------------------------------------------------

Addendum: 05/26/20 at 1329 by SCHUYLER ENG

-------------------------------------------------------------------------------

ALBERTINA met w/ pt and clarified the DC placement. Pt stated he does not know why Makenna needs to be 
involved. However, pt reports he has been communicating with his nephews in regards to 
fixing problems at home. Pt reports he wants to see the list of SNFs in [facility]. SW explained that there is no guarantee he will be accepted in these areas. 
Pt did not seem to understand the explanation. ALBERTINA relayed all information to assigned CM. 

-------------------------------------------------------------------------------

Addendum: 05/26/20 at 1442 by SCHUYLER ENG

-------------------------------------------------------------------------------

ADD:

Pt reports he will directly communicate w/ his nephews if any matter arises.

## 2020-05-26 NOTE — NUR
NURSE NOTES:

Received report from Gail

-------------------------------------------------------------------------------

Addendum: 05/27/20 at 0044 by Jacob Denton RN

-------------------------------------------------------------------------------

Please disregard this note

## 2020-05-26 NOTE — GENERAL PROGRESS NOTE
Progress Note


Progress Note


doing well 


some dry heaving 


labs stable


exam improving


drain output less


pain decreased


+flatus +BM


tolerating diet.





d/c planning


if okay by physical therapy can consider d/c home


otherwise if not stable ambulatory will need placement


heme to eval for anticoagulation given pe and dvt.  okay from surgical 

standpoint


outpatient oncology f/u 


will potentially remove drain and nia prior to d/c.











Erik Fischer May 26, 2020 09:35

## 2020-05-26 NOTE — NUR
HAND-OFF: 

Report given to JOSIAH Fuentes. Pt in stable condition. Endorsed plan of care. Belongings 
transferred with the pt.

## 2020-05-26 NOTE — PULMONOLOGY PROGRESS NOTE
Subjective


ROS Limited/Unobtainable:  No


Interval Events:  s/p laparotomy 5/18/20; extubated 5/19/20


Constitutional:  Reports: no symptoms


HEENT:  Repors: no symptoms


Respiratory:  Reports: no symptoms


Cardiovascular:  Reports: no symptoms


Gastrointestinal/Abdominal:  Reports: no symptoms


Genitourinary:  Reports: no symptoms


Neurologic:  Reports: no symptoms


Allergies:  


Coded Allergies:  


     No Known Allergies (Unverified , 5/12/20)


All Systems:  reviewed and negative except above





Objective





Last 24 Hour Vital Signs








  Date Time  Temp Pulse Resp B/P (MAP) Pulse Ox O2 Delivery O2 Flow Rate FiO2


 


5/26/20 12:00 97.9 88 20 119/82 (94) 97   


 


5/26/20 11:42  92      


 


5/26/20 09:00      Room Air  





      Room Air  


 


5/26/20 08:00 96.1 83 18 122/76 (91) 94   


 


5/26/20 07:54  94      


 


5/26/20 04:00 98.7 100 17 132/76 (94) 97   


 


5/26/20 04:00  84      


 


5/26/20 04:00      Room Air  





      Room Air  


 


5/26/20 03:01 98.6       


 


5/26/20 00:00  102      


 


5/26/20 00:00 98.6 98 15 136/81 (99) 97   


 


5/26/20 00:00      Room Air  





      Room Air  


 


5/25/20 21:00      Room Air  





      Room Air  


 


5/25/20 20:00 98.8 107 17 129/78 (95) 96   


 


5/25/20 20:00  103      


 


5/25/20 16:00  97      


 


5/25/20 16:00 98.1 97 20 135/93 (107) 99   

















Intake and Output  


 


 5/25/20 5/26/20





 19:00 07:00


 


Intake Total 360 ml 1020 ml


 


Output Total 730 ml 1500 ml


 


Balance -370 ml -480 ml


 


  


 


Intake Oral 360 ml 720 ml


 


IV Total  300 ml


 


Output Urine Total 300 ml 1500 ml


 


Drainage Total 430 ml 


 


# Voids  3








General Appearance:  no acute distress


HEENT:  normocephalic


Respiratory:  chest wall non-tender, lungs clear


Cardiovascular:  normal peripheral pulses


Abdomen:  non distended


Extremities:  no cyanosis


Laboratory Tests


5/26/20 05:09: 


White Blood Count 11.6H, Red Blood Count 3.42L, Hemoglobin 9.6L, Hematocrit 

28.8L, Mean Corpuscular Volume 84, Mean Corpuscular Hemoglobin 27.9, Mean 

Corpuscular Hemoglobin Concent 33.1, Red Cell Distribution Width 14.0, Platelet 

Count 325, Mean Platelet Volume 4.4L, Neutrophils (%) (Auto) 78.6H, Lymphocytes 

(%) (Auto) 12.7L, Monocytes (%) (Auto) 7.3, Eosinophils (%) (Auto) 1.1, 

Basophils (%) (Auto) 0.4, Sodium Level 138, Potassium Level 3.3L, Chloride 

Level 105, Carbon Dioxide Level 27, Anion Gap 6, Blood Urea Nitrogen 4L, 

Creatinine 0.7, Estimat Glomerular Filtration Rate > 60, Glucose Level 100, 

Uric Acid 1.7L, Calcium Level 7.1L, Phosphorus Level 2.1L, Magnesium Level 1.8, 

Iron Level 15L, Total Iron Binding Capacity 78L, Percent Iron Saturation 19, 

Unsaturated Iron Binding 63L, Ferritin 378, Total Bilirubin 0.2, Aspartate 

Amino Transf (AST/SGOT) 29, Alanine Aminotransferase (ALT/SGPT) 15, Alkaline 

Phosphatase 65, C-Reactive Protein, Quantitative 13.4H, Pro-B-Type Natriuretic 

Peptide 251H, Total Protein 5.1L, Albumin 1.3L, Globulin 3.8, Albumin/Globulin 

Ratio 0.3L, Vitamin B12 Level 361, Folate 4.9L, Thyroid Stimulating Hormone (TSH

) 2.992





Current Medications








 Medications


  (Trade)  Dose


 Ordered  Sig/Marisa


 Route


 PRN Reason  Start Time


 Stop Time Status Last Admin


Dose Admin


 


 Acetaminophen/


 Hydrocodone Bitart


  (Norco 5/325)  1 tab  Q4H  PRN


 ORAL


 Moderate Pain (Pain Scale 4-6)  5/25/20 14:30


 6/1/20 14:29  5/26/20 12:58


 


 


 Apixaban


  (Eliquis)  5 mg  BID


 ORAL


   5/26/20 18:00


 8/24/20 17:59   


 


 


 Dextrose


  (Dextrose 50%)  25 ml  Q30M  PRN


 IV


 Hypoglycemia  5/20/20 18:45


 8/11/20 06:14   


 


 


 Dextrose


  (Dextrose 50%)  50 ml  Q30M  PRN


 IV


 Hypoglycemia  5/20/20 18:45


 8/11/20 06:14   


 


 


 Folic Acid


  (Folate)  5 mg  DAILY


 ORAL


   5/27/20 09:00


 6/24/20 09:44   


 


 


 Linezolid  300 ml @ 


 300 mls/hr  Q12HR


 IVPB


   5/20/20 21:00


 5/27/20 15:59  5/26/20 08:51


 


 


 Ondansetron HCl


  (Zofran)  4 mg  Q6H  PRN


 IVP


 Nausea & Vomiting  5/20/20 19:00


 6/17/20 18:59  5/25/20 21:42


 


 


 Potassium


 Phosphate  250 ml @ 


 62.5 mls/hr  Q4H


 IVPB


   5/26/20 09:00


 5/26/20 16:59  5/26/20 13:31


 


 


 Potassium Chloride


  (K-Dur)  20 meq  TWICE A  DAY


 ORAL


   5/26/20 09:00


 8/24/20 08:59  5/26/20 08:51


 











Assessment/Plan


Assessment/Plan


IMPRESSION:


1. Gastric mass.S/p laparotomy


2. Severe anemia. Corrected


3. Pulmonary embolism.





DISCUSSION:  





S/p IVC filter.  S/p blood transfusion, IV fluids.  


S/p ex lap, left colectomy, partial gastrectomy, splenectomy, distal 

pancreatectomy, omentectomy 


and  open lysis of adhesions 


I will follow carefully.


Extubated 5/19/20


COVID 19 pcr negative


Currently on RA


TRansfer to [facility] planning

















  ______________________________________________


  MARIANELA Downs Omar Syed MD May 26, 2020 15:34

## 2020-05-26 NOTE — NUR
NURSE NOTES:

Patient arrived on unit via hospital bed. Stable. Breathing is even and unlabored. Denies 
pain or SOB. Patient is AOx4. Patient is oriented to room, call light, and unit. Skin is 
c/d/i, optifoam in place on sacrum. Surgical dressing clean and ALISHA. ALLISON draining yellow 
liquid output. Patient instructed to use call light for assistance, verbalized 
understanding. All safety measures provided. Patient is in bed in locked and lowest position 
on a pressure relieving mattress with call light within reach. All needs met at this time. 
Will continue to monitor.

## 2020-05-26 NOTE — INFECTIOUS DISEASES PROG NOTE
Assessment/Plan


Assessment/Plan








Assessment:


Severe Sepsis


Enterobacter bacteremia- likely from GI source


S. epi bacteremia, contaminant


GGO on lung bases- COVID19 neg x2


    -5/21 CTA chest: STUDY POSITIVE FOR BILATERAL PULMONARY EMBOLISM. BILATERAL 

PLEURAL EFFUSIONS WITH DEPENDENT INFILTRATES/CONSOLIDATION IN THE LUNG


BASES.POSTOPERATIVE CHANGES IN THE UPPER ABDOMEN WITH ANASTOMOTIC SUTURES LEFT 

UPPER QUADRANT AND MIDLINE SKIN STAPLES. FREE FLUID AND SCATTERED SMALL FOCI OF 

FREE AIR ALSO NOTED IN LEFT UPPER QUADRANT FROM RECENT ABDOMINAL SURGERY. 

HEPATIC CYST AND GRANULOMA.


   -5/19 CXR:  Left retrocardiac consolidation and left pleural effusion.


  -5/15 SARS-COV2 PCR neg


  -5/12 Bcx 1/4 S.epi, 2/4 E. aerogenes (pan S); 5/13 Bcx Neg


            CXR: no acute disease


            SARS-COV2 PCR





Afebrile


Leukocytosis; persistent- increased post-op- now improving


   5/20 u/a wbc 10-15, nit neg, leuk +2


           Bcx  neg





Severe anemia (blood loss)/GIB likely 2ry to gastric mass


Thrombocytopenia, worsening


  -5/18 SP ex lap, left colectomy, partial gastrectomy, splenectomy, distal 

pancreatectomy, omentectomy , open lysis of adhesions


          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 

invading spleen, posterior stomach, pancreatic tail, invasive, likely seemingly 

perforated.


                       --path: invasive adenocarcinoma, moderately 

differentiated





  -CT abd/p:   Centrally necrotic mass arising from the gastric fundus 

measuring 12. 6 x 11.1 x 14 cm.  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  

Gastric adenocarcinoma, lymphoma, and other neoplastic etiologies are also in 

the differential. Filling defects within pulmonary vessels at the lung bases 

with small peripheral airspace opacities concerning for pulmonary emboli and 

small pulmonary infarcts.








Post-op ileus


  -5/22 kUB:  PROBABLE POSTOPERATIVE ILEUS.


 








Mild AST elevation





Lactic acidosis, SP


   -5/12 u/a neg; ucx 30-40K mixed urogenital contaminants





Diarrhea


    -Cdiff,stool cx neg





Syncopal episode- likely due to severe dehydration and anemia


  -CT head: No acute findings in the head/brain.





Acute PE/DVT -(per CT abd/p findings)


  -sp IVC filter placement 5/13 (retrievable- given presence of bacteremia)





PABLO, SP





HTN





Plan:


  


- Continue PO Zyvox #7/7


      -5/25 SP Meropenem #6


      -5/23 SP Micafungin #5


      -5/20 SP Cefepime #6, Flagyl #6


      - 5/15/20 SP Zosyn #4 and Vancomycin #4





-f/u cx


-Monitor CBC/CMP, temperature


-aspiration precautions


-COVID19 neg x2


-Sx,pulm, Heme/onc, GI f/u


-wound care per surgical team








Thank you for consulting [facility]. Will continue to follow along wiht 

you.





Discussed with RN.





Subjective


Allergies:  


Coded Allergies:  


     No Known Allergies (Unverified , 5/12/20)


Subjective


afebrile


at RA


leukocytosis resolving





Objective


Vital Signs





Last 24 Hour Vital Signs








  Date Time  Temp Pulse Resp B/P (MAP) Pulse Ox O2 Delivery O2 Flow Rate FiO2


 


5/26/20 17:59 97.9 86 20 135/84 (101) 97   


 


5/26/20 16:00 97.0 94 20 138/83 (101) 97   


 


5/26/20 15:42  87      


 


5/26/20 12:00 97.9 88 20 119/82 (94) 97   


 


5/26/20 11:42  92      


 


5/26/20 09:00      Room Air  





      Room Air  


 


5/26/20 08:00 96.1 83 18 122/76 (91) 94   


 


5/26/20 07:54  94      


 


5/26/20 04:00 98.7 100 17 132/76 (94) 97   


 


5/26/20 04:00  84      


 


5/26/20 04:00      Room Air  





      Room Air  


 


5/26/20 03:01 98.6       


 


5/26/20 00:00  102      


 


5/26/20 00:00 98.6 98 15 136/81 (99) 97   


 


5/26/20 00:00      Room Air  





      Room Air  


 


5/25/20 21:00      Room Air  





      Room Air  


 


5/25/20 20:00 98.8 107 17 129/78 (95) 96   


 


5/25/20 20:00  103      








Height (Feet):  5


Height (Inches):  7.00


Weight (Pounds):  205


Objective


HEAD AND NECK:  No JVD.


LUNGS:  Decreased breath sounds.


CARDIOVASCULAR:  Regular S1 and S2.  Tachycardic.


ABDOMEN: Post op dressings in place, ALLISON drain


EXTREMITIES:  No pitting edema.





Laboratory Tests








Test


  5/26/20


05:09


 


White Blood Count


  11.6 K/UL


(4.8-10.8)  H


 


Red Blood Count


  3.42 M/UL


(4.70-6.10)  L


 


Hemoglobin


  9.6 G/DL


(14.2-18.0)  L


 


Hematocrit


  28.8 %


(42.0-52.0)  L


 


Mean Corpuscular Volume 84 FL (80-99)  


 


Mean Corpuscular Hemoglobin


  27.9 PG


(27.0-31.0)


 


Mean Corpuscular Hemoglobin


Concent 33.1 G/DL


(32.0-36.0)


 


Red Cell Distribution Width


  14.0 %


(11.6-14.8)


 


Platelet Count


  325 K/UL


(150-450)


 


Mean Platelet Volume


  4.4 FL


(6.5-10.1)  L


 


Neutrophils (%) (Auto)


  78.6 %


(45.0-75.0)  H


 


Lymphocytes (%) (Auto)


  12.7 %


(20.0-45.0)  L


 


Monocytes (%) (Auto)


  7.3 %


(1.0-10.0)


 


Eosinophils (%) (Auto)


  1.1 %


(0.0-3.0)


 


Basophils (%) (Auto)


  0.4 %


(0.0-2.0)


 


Sodium Level


  138 MMOL/L


(136-145)


 


Potassium Level


  3.3 MMOL/L


(3.5-5.1)  L


 


Chloride Level


  105 MMOL/L


()


 


Carbon Dioxide Level


  27 MMOL/L


(21-32)


 


Anion Gap


  6 mmol/L


(5-15)


 


Blood Urea Nitrogen


  4 mg/dL (7-18)


L


 


Creatinine


  0.7 MG/DL


(0.55-1.30)


 


Estimat Glomerular Filtration


Rate > 60 mL/min


(>60)


 


Glucose Level


  100 MG/DL


()


 


Uric Acid


  1.7 MG/DL


(2.6-7.2)  L


 


Calcium Level


  7.1 MG/DL


(8.5-10.1)  L


 


Phosphorus Level


  2.1 MG/DL


(2.5-4.9)  L


 


Magnesium Level


  1.8 MG/DL


(1.8-2.4)


 


Iron Level


  15 ug/dL


()  L


 


Total Iron Binding Capacity


  78 ug/dL


(250-450)  L


 


Percent Iron Saturation 19 % (15-50)  


 


Unsaturated Iron Binding


  63 ug/dL


(112-346)  L


 


Ferritin


  378 NG/ML


(8-388)


 


Total Bilirubin


  0.2 MG/DL


(0.2-1.0)


 


Aspartate Amino Transf


(AST/SGOT) 29 U/L (15-37)


 


 


Alanine Aminotransferase


(ALT/SGPT) 15 U/L (12-78)


 


 


Alkaline Phosphatase


  65 U/L


()


 


C-Reactive Protein,


Quantitative 13.4 mg/dL


(0.00-0.90)  H


 


Pro-B-Type Natriuretic Peptide


  251 pg/mL


(0-125)  H


 


Total Protein


  5.1 G/DL


(6.4-8.2)  L


 


Albumin


  1.3 G/DL


(3.4-5.0)  L


 


Globulin 3.8 g/dL  


 


Albumin/Globulin Ratio


  0.3 (1.0-2.7)


L


 


Vitamin B12 Level


  361 PG/ML


(193-986)


 


Folate


  4.9 NG/ML


(8.6-58.9)  L


 


Thyroid Stimulating Hormone


(TSH) 2.992 uiU/mL


(0.358-3.740)











Current Medications








 Medications


  (Trade)  Dose


 Ordered  Sig/Marisa


 Route


 PRN Reason  Start Time


 Stop Time Status Last Admin


Dose Admin


 


 Acetaminophen/


 Hydrocodone Bitart


  (Norco 5/325)  1 tab  Q4H  PRN


 ORAL


 Moderate Pain (Pain Scale 4-6)  5/25/20 14:30


 6/1/20 14:29  5/26/20 17:04


 


 


 Apixaban


  (Eliquis)  5 mg  BID


 ORAL


   5/26/20 18:00


 8/24/20 17:59  5/26/20 17:14


 


 


 Dextrose


  (Dextrose 50%)  25 ml  Q30M  PRN


 IV


 Hypoglycemia  5/20/20 18:45


 8/11/20 06:14   


 


 


 Dextrose


  (Dextrose 50%)  50 ml  Q30M  PRN


 IV


 Hypoglycemia  5/20/20 18:45


 8/11/20 06:14   


 


 


 Folic Acid


  (Folate)  5 mg  DAILY


 ORAL


   5/27/20 09:00


 6/24/20 09:44   


 


 


 Linezolid  300 ml @ 


 300 mls/hr  Q12HR


 IVPB


   5/20/20 21:00


 5/27/20 15:59  5/26/20 08:51


 


 


 Ondansetron HCl


  (Zofran)  4 mg  Q6H  PRN


 IVP


 Nausea & Vomiting  5/20/20 19:00


 6/17/20 18:59  5/25/20 21:42


 


 


 Potassium Chloride


  (K-Dur)  20 meq  TWICE A  DAY


 ORAL


   5/26/20 09:00


 8/24/20 08:59  5/26/20 17:03


 

















Alexa Gonzalez M.D. May 26, 2020 18:39

## 2020-05-26 NOTE — NUR
PT EVALUATION



MD order received for PT eval and treatment. PT evaluation completed and patient with 
evident weakness,pain and dizziness with mobility due to medical condition/surgeries and 
prolonged bed rest.  Patient required max A with bed mobility and transfers to standing. 
Patient only stood briefly. Able to progress with gait with FWW taking 2 steps and then 
abruptly 

sitting down. Had 2 people available to assist patient for safety. Patient will benefit from 
skilled PT to increase strength and improve functional mobility. Patient was living alone at 
home and ambulatory without assistive device as PLOF. Patient may need short term SNF prior 
to discharge home. Patient may need FWW if discharged home, will assess progress and need 
upon discharge. Will see patient for skilled PT QD 5x/wk for length of stay in hospital. 
Thank you for this referral.

## 2020-05-26 NOTE — GENERAL PROGRESS NOTE
Assessment/Plan


Problem List:  


(1) Syncope


ICD Codes:  R55 - Syncope and collapse


SNOMED:  497435377


Qualifiers:  


   Qualified Codes:  R55 - Syncope and collapse


(2) Gastric mass


ICD Codes:  K31.89 - Other diseases of stomach and duodenum


SNOMED:  682093329


(3) GI bleed


ICD Codes:  K92.2 - Gastrointestinal hemorrhage, unspecified


SNOMED:  33298730


Qualifiers:  


   Qualified Codes:  K92.2 - Gastrointestinal hemorrhage, unspecified


(4) Pulmonary embolism


ICD Codes:  I26.99 - Other pulmonary embolism without acute cor pulmonale


SNOMED:  19069645


Qualifiers:  


   Qualified Codes:  I26.99 - Other pulmonary embolism without acute cor 

pulmonale


Status:  stable, progressing


Assessment/Plan:





s/p surg:





OPERATIONS PERFORMED:


1. Exploratory laparotomy.


2. Mobilization of the splenic flexure.


3. Left colectomy with primary side-to-side anastomosis.


4. Partial gastrectomy.


5. Total splenectomy.


6. Distal pancreatectomy.


7. Omentectomy.


8. Open lysis of adhesions.





fu surg recs


fu oncology


IVF


pain control


abx


on soft diet


replace K


will fu





Subjective


ROS Limited/Unobtainable:  Yes


Allergies:  


Coded Allergies:  


     No Known Allergies (Unverified , 5/12/20)





Objective





Last 24 Hour Vital Signs








  Date Time  Temp Pulse Resp B/P (MAP) Pulse Ox O2 Delivery O2 Flow Rate FiO2


 


5/26/20 08:00 96.1 83 18 122/76 (91) 94   


 


5/26/20 04:00 98.7 100 17 132/76 (94) 97   


 


5/26/20 04:00  84      


 


5/26/20 04:00      Room Air  





      Room Air  


 


5/26/20 03:01 98.6       


 


5/26/20 00:00  102      


 


5/26/20 00:00 98.6 98 15 136/81 (99) 97   


 


5/26/20 00:00      Room Air  





      Room Air  


 


5/25/20 21:00      Room Air  





      Room Air  


 


5/25/20 20:00 98.8 107 17 129/78 (95) 96   


 


5/25/20 20:00  103      


 


5/25/20 16:00  97      


 


5/25/20 16:00 98.1 97 20 135/93 (107) 99   


 


5/25/20 12:00 97.6 87 20 128/81 (97) 99   


 


5/25/20 12:00  84      

















Intake and Output  


 


 5/25/20 5/26/20





 19:00 07:00


 


Intake Total 360 ml 1020 ml


 


Output Total 730 ml 1500 ml


 


Balance -370 ml -480 ml


 


  


 


Intake Oral 360 ml 720 ml


 


IV Total  300 ml


 


Output Urine Total 300 ml 1500 ml


 


Drainage Total 430 ml 


 


# Voids  3








Laboratory Tests


5/26/20 05:09: 


White Blood Count 11.6H, Red Blood Count 3.42L, Hemoglobin 9.6L, Hematocrit 

28.8L, Mean Corpuscular Volume 84, Mean Corpuscular Hemoglobin 27.9, Mean 

Corpuscular Hemoglobin Concent 33.1, Red Cell Distribution Width 14.0, Platelet 

Count 325, Mean Platelet Volume 4.4L, Neutrophils (%) (Auto) 78.6H, Lymphocytes 

(%) (Auto) 12.7L, Monocytes (%) (Auto) 7.3, Eosinophils (%) (Auto) 1.1, 

Basophils (%) (Auto) 0.4, Sodium Level 138, Potassium Level 3.3L, Chloride 

Level 105, Carbon Dioxide Level 27, Anion Gap 6, Blood Urea Nitrogen 4L, 

Creatinine 0.7, Estimat Glomerular Filtration Rate > 60, Glucose Level 100, 

Uric Acid 1.7L, Calcium Level 7.1L, Phosphorus Level 2.1L, Magnesium Level 1.8, 

Iron Level 15L, Total Iron Binding Capacity 78L, Percent Iron Saturation 19, 

Unsaturated Iron Binding 63L, Ferritin 378, Total Bilirubin 0.2, Aspartate 

Amino Transf (AST/SGOT) 29, Alanine Aminotransferase (ALT/SGPT) 15, Alkaline 

Phosphatase 65, C-Reactive Protein, Quantitative 13.4H, Pro-B-Type Natriuretic 

Peptide 251H, Total Protein 5.1L, Albumin 1.3L, Globulin 3.8, Albumin/Globulin 

Ratio 0.3L, Vitamin B12 Level 361, Folate 4.9L, Thyroid Stimulating Hormone (TSH

) 2.992


Height (Feet):  5


Height (Inches):  7.00


Weight (Pounds):  205


General Appearance:  no apparent distress


EENT:  PERRL/EOMI


Neck:  supple


Cardiovascular:  normal rate


Respiratory/Chest:  decreased breath sounds


Abdomen:  soft, other - post surgical


Extremities:  non-tender











Marc Nielsen MD May 26, 2020 09:25

## 2020-05-26 NOTE — NEPHROLOGY PROGRESS NOTE
Assessment/Plan


Problem List:  


(1) Electrolyte imbalance


(2) Symptomatic anemia


Assessment:  On admission





(3) Gastric mass


(4) Pulmonary embolism


Assessment





Progressive hypokalemia most likely due to GI loss


Gastric mass status post laparotomy, Moderately differentiated adenocarcinoma 

primary COLON CANCER


Previously severe anemia which is corrected with blood transfusion


Pulmonary embolism


S/p ex lap, left colectomy, partial gastrectomy, splenectomy, distal 

pancreatectomy, omentectomy 


Severe sepsis with Enterobacter  bacteremia likely from GI source


COVID-19 negative x2


Hypertension by history


Presented on admission with hypotension due to severe anemia





Plan


Suggestions:


Potassium chloride 40 mEq on IV ordered


Stop IV hydration, patient on clear liquid diet


Continue to monitor electrolytes and chemistries


Medication list reviewed


Further recommendations based on ordered lab results





Subjective


ROS Limited/Unobtainable:  No


Constitutional:  Reports: malaise





Objective


Objective





Last 24 Hour Vital Signs








  Date Time  Temp Pulse Resp B/P (MAP) Pulse Ox O2 Delivery O2 Flow Rate FiO2


 


5/26/20 09:00      Room Air  





      Room Air  


 


5/26/20 08:00 96.1 83 18 122/76 (91) 94   


 


5/26/20 07:54  94      


 


5/26/20 04:00 98.7 100 17 132/76 (94) 97   


 


5/26/20 04:00  84      


 


5/26/20 04:00      Room Air  





      Room Air  


 


5/26/20 03:01 98.6       


 


5/26/20 00:00  102      


 


5/26/20 00:00 98.6 98 15 136/81 (99) 97   


 


5/26/20 00:00      Room Air  





      Room Air  


 


5/25/20 21:00      Room Air  





      Room Air  


 


5/25/20 20:00 98.8 107 17 129/78 (95) 96   


 


5/25/20 20:00  103      


 


5/25/20 16:00  97      


 


5/25/20 16:00 98.1 97 20 135/93 (107) 99   

















Intake and Output  


 


 5/25/20 5/26/20





 19:00 07:00


 


Intake Total 360 ml 1020 ml


 


Output Total 730 ml 1500 ml


 


Balance -370 ml -480 ml


 


  


 


Intake Oral 360 ml 720 ml


 


IV Total  300 ml


 


Output Urine Total 300 ml 1500 ml


 


Drainage Total 430 ml 


 


# Voids  3








Laboratory Tests


5/26/20 05:09: 


White Blood Count 11.6H, Red Blood Count 3.42L, Hemoglobin 9.6L, Hematocrit 

28.8L, Mean Corpuscular Volume 84, Mean Corpuscular Hemoglobin 27.9, Mean 

Corpuscular Hemoglobin Concent 33.1, Red Cell Distribution Width 14.0, Platelet 

Count 325, Mean Platelet Volume 4.4L, Neutrophils (%) (Auto) 78.6H, Lymphocytes 

(%) (Auto) 12.7L, Monocytes (%) (Auto) 7.3, Eosinophils (%) (Auto) 1.1, 

Basophils (%) (Auto) 0.4, Sodium Level 138, Potassium Level 3.3L, Chloride 

Level 105, Carbon Dioxide Level 27, Anion Gap 6, Blood Urea Nitrogen 4L, 

Creatinine 0.7, Estimat Glomerular Filtration Rate > 60, Glucose Level 100, 

Uric Acid 1.7L, Calcium Level 7.1L, Phosphorus Level 2.1L, Magnesium Level 1.8, 

Iron Level 15L, Total Iron Binding Capacity 78L, Percent Iron Saturation 19, 

Unsaturated Iron Binding 63L, Ferritin 378, Total Bilirubin 0.2, Aspartate 

Amino Transf (AST/SGOT) 29, Alanine Aminotransferase (ALT/SGPT) 15, Alkaline 

Phosphatase 65, C-Reactive Protein, Quantitative 13.4H, Pro-B-Type Natriuretic 

Peptide 251H, Total Protein 5.1L, Albumin 1.3L, Globulin 3.8, Albumin/Globulin 

Ratio 0.3L, Vitamin B12 Level 361, Folate 4.9L, Thyroid Stimulating Hormone (TSH

) 2.992


Height (Feet):  5


Height (Inches):  7.00


Weight (Pounds):  205


General Appearance:  no apparent distress


Cardiovascular:  tachycardia


Respiratory/Chest:  decreased breath sounds


Abdomen:  distended


Objective


No change











Juan Daniel Mckeon MD May 26, 2020 12:45

## 2020-05-26 NOTE — NUR
CASE MANAGEMENT:REVIEW



5/26/20

SI: POD #8. [facility]KA PULMONARY EMBOLI

S/P LAPAROTOMY,COLECTOMY,GASTRECTOMY,SPLENECTOMY,PANCREATECTOMY

OMENTECTOMY, LYSIS OF ADHESIONS

98.7   84  17  132/76  97% ON RA

WBC+11.6   H/H-9.6/28.8  K-3.3  CA-7.1  PHOS-2.1  ALB-1.3



IS: IV LINEZOLID Q12

IV K-PHOS Q4HRS X2

K-DUR PO BID

FOLATE PO QD

HEPARIN SQ Q8HRS

NORCO PO Q4HRS PRN 

**: TELEMETRY STATUS

DCP: PATIENT IS FROM HOME



PLAN:

CLEAR LIQUID DIET

PT EVAL

## 2020-05-26 NOTE — NUR
RD ASSESSMENT & RECOMMENDATIONS

SEE CARE ACTIVITY FOR COMPLETE ASSESSMENT



DAILY ESTIMATED NEEDS:

Needs based on Surgery 61kg 

25-35  kcals/kg 

8555-4466  total kcals

1-2  g protein/kg

  g total protein 

25-30  mL/kg

5387-6814  total fluid mLs



NUTRITION DIAGNOSIS:

1) Altered nutrition related lab values R/T sepsis, GIB, clinical

conditions as evidenced by critically elev wbc (27.4-> 11.6), low hgb

(3.5*-> 9.6), low K (3.3), los phos (2.1).

2) Altered GI function r/t gastric tumor as evidenced by s/p Procedure:

ex lap, left colectomy, partial gastrectomy, spleenectomy, distal

pancreatectomy, omentectomy, open lysis of adhesions, diet now advanced to

soft.





CURRENT DIET:SOFT    

 





PO DIET RECOMMENDATIONS:

SOFT as per MD 



 



ADDITIONAL RECOMMENDATIONS:

* Calibrated bedscale wt for accurate CBW 

* Monitor PO acceptance and tolerance 

* Maintain calibrated bed scale wts 

* Ensure Enlive BID for now w/ variable intake 

       (350kcal/20g prog per bottle)

## 2020-05-27 VITALS — SYSTOLIC BLOOD PRESSURE: 140 MMHG | DIASTOLIC BLOOD PRESSURE: 86 MMHG

## 2020-05-27 VITALS — SYSTOLIC BLOOD PRESSURE: 122 MMHG | DIASTOLIC BLOOD PRESSURE: 82 MMHG

## 2020-05-27 VITALS — SYSTOLIC BLOOD PRESSURE: 128 MMHG | DIASTOLIC BLOOD PRESSURE: 85 MMHG

## 2020-05-27 VITALS — SYSTOLIC BLOOD PRESSURE: 137 MMHG | DIASTOLIC BLOOD PRESSURE: 91 MMHG

## 2020-05-27 VITALS — DIASTOLIC BLOOD PRESSURE: 96 MMHG | SYSTOLIC BLOOD PRESSURE: 146 MMHG

## 2020-05-27 VITALS — SYSTOLIC BLOOD PRESSURE: 133 MMHG | DIASTOLIC BLOOD PRESSURE: 85 MMHG

## 2020-05-27 LAB
ADD MANUAL DIFF: NO
ALBUMIN SERPL-MCNC: 1.2 G/DL (ref 3.4–5)
ALBUMIN/GLOB SERPL: 0.3 {RATIO} (ref 1–2.7)
ALP SERPL-CCNC: 61 U/L (ref 46–116)
ALT SERPL-CCNC: 14 U/L (ref 12–78)
ANION GAP SERPL CALC-SCNC: 6 MMOL/L (ref 5–15)
AST SERPL-CCNC: 21 U/L (ref 15–37)
BASOPHILS NFR BLD AUTO: 0.7 % (ref 0–2)
BILIRUB SERPL-MCNC: 0.2 MG/DL (ref 0.2–1)
BUN SERPL-MCNC: 5 MG/DL (ref 7–18)
CALCIUM SERPL-MCNC: 7.3 MG/DL (ref 8.5–10.1)
CHLORIDE SERPL-SCNC: 105 MMOL/L (ref 98–107)
CO2 SERPL-SCNC: 26 MMOL/L (ref 21–32)
CREAT SERPL-MCNC: 0.6 MG/DL (ref 0.55–1.3)
EOSINOPHIL NFR BLD AUTO: 1 % (ref 0–3)
ERYTHROCYTE [DISTWIDTH] IN BLOOD BY AUTOMATED COUNT: 14.3 % (ref 11.6–14.8)
GLOBULIN SER-MCNC: 3.8 G/DL
HCT VFR BLD CALC: 27 % (ref 42–52)
HGB BLD-MCNC: 8.9 G/DL (ref 14.2–18)
LYMPHOCYTES NFR BLD AUTO: 10 % (ref 20–45)
MCV RBC AUTO: 84 FL (ref 80–99)
MONOCYTES NFR BLD AUTO: 8.6 % (ref 1–10)
NEUTROPHILS NFR BLD AUTO: 79.8 % (ref 45–75)
PHOSPHATE SERPL-MCNC: 2.2 MG/DL (ref 2.5–4.9)
PLATELET # BLD: 353 K/UL (ref 150–450)
POTASSIUM SERPL-SCNC: 3.9 MMOL/L (ref 3.5–5.1)
RBC # BLD AUTO: 3.21 M/UL (ref 4.7–6.1)
SODIUM SERPL-SCNC: 137 MMOL/L (ref 136–145)
WBC # BLD AUTO: 11.8 K/UL (ref 4.8–10.8)

## 2020-05-27 RX ADMIN — HYDROCODONE BITARTRATE AND ACETAMINOPHEN PRN TAB: 5; 325 TABLET ORAL at 07:52

## 2020-05-27 RX ADMIN — HYDROCODONE BITARTRATE AND ACETAMINOPHEN PRN TAB: 5; 325 TABLET ORAL at 16:27

## 2020-05-27 RX ADMIN — HYDROCODONE BITARTRATE AND ACETAMINOPHEN PRN TAB: 5; 325 TABLET ORAL at 01:00

## 2020-05-27 RX ADMIN — APIXABAN SCH MG: 5 TABLET, FILM COATED ORAL at 17:34

## 2020-05-27 RX ADMIN — HYDROCODONE BITARTRATE AND ACETAMINOPHEN PRN TAB: 5; 325 TABLET ORAL at 12:29

## 2020-05-27 RX ADMIN — APIXABAN SCH MG: 5 TABLET, FILM COATED ORAL at 08:51

## 2020-05-27 NOTE — NEPHROLOGY PROGRESS NOTE
Assessment/Plan


Problem List:  


(1) Electrolyte imbalance


(2) Symptomatic anemia


Assessment:  On admission





(3) Gastric mass


(4) Pulmonary embolism


Assessment





Progressive hypokalemia most likely due to GI loss


Gastric mass status post laparotomy, Moderately differentiated adenocarcinoma 

primary COLON CANCER


Previously severe anemia which is corrected with blood transfusion


Pulmonary embolism


S/p ex lap, left colectomy, partial gastrectomy, splenectomy, distal 

pancreatectomy, omentectomy 


Severe sepsis with Enterobacter  bacteremia likely from GI source


COVID-19 negative x2


Hypertension by history


Presented on admission with hypotension due to severe anemia





Plan


Stable from renal standpoint of view for discharge


Previously


Potassium chloride 40 mEq on IV ordered


Stop IV hydration, patient on clear liquid diet


Continue to monitor electrolytes and chemistries


Medication list reviewed


Further recommendations based on ordered lab results





Subjective


ROS Limited/Unobtainable:  No


Constitutional:  Reports: malaise





Objective


Objective





Last 24 Hour Vital Signs








  Date Time  Temp Pulse Resp B/P (MAP) Pulse Ox O2 Delivery O2 Flow Rate FiO2


 


5/27/20 09:00      Room Air  





      Room Air  


 


5/27/20 08:00 98.1 109 19 122/82 (95) 97   


 


5/27/20 04:00 98.0 86 18 140/86 (104) 96   


 


5/27/20 00:00 97.6 81 18 133/85 (101) 98   


 


5/26/20 21:00      Room Air  





      Room Air  


 


5/26/20 20:00 98.0 87 16 136/86 (103) 97   


 


5/26/20 17:59 97.9 86 20 135/84 (101) 97   


 


5/26/20 16:00 97.0 94 20 138/83 (101) 97   


 


5/26/20 15:42  87      


 


5/26/20 12:00 97.9 88 20 119/82 (94) 97   


 


5/26/20 11:42  92      

















Intake and Output  


 


 5/26/20 5/27/20





 19:00 07:00


 


Intake Total 300 ml 650 ml


 


Output Total 300 ml 460 ml


 


Balance 0 ml 190 ml


 


  


 


Intake Oral 300 ml 650 ml


 


Output Urine Total 300 ml 450 ml


 


Drainage Total  10 ml


 


# Voids  2








Laboratory Tests


5/27/20 04:50: 


White Blood Count 11.8H, Red Blood Count 3.21L, Hemoglobin 8.9L, Hematocrit 

27.0L, Mean Corpuscular Volume 84, Mean Corpuscular Hemoglobin 27.8, Mean 

Corpuscular Hemoglobin Concent 33.0, Red Cell Distribution Width 14.3, Platelet 

Count 353, Mean Platelet Volume 4.5L, Neutrophils (%) (Auto) 79.8H, Lymphocytes 

(%) (Auto) 10.0L, Monocytes (%) (Auto) 8.6, Eosinophils (%) (Auto) 1.0, 

Basophils (%) (Auto) 0.7, Sodium Level 137, Potassium Level 3.9, Chloride Level 

105, Carbon Dioxide Level 26, Anion Gap 6, Blood Urea Nitrogen 5L, Creatinine 

0.6, Estimat Glomerular Filtration Rate > 60, Glucose Level 90, Calcium Level 

7.3L, Phosphorus Level 2.2L, Magnesium Level 1.8, Total Bilirubin 0.2, 

Aspartate Amino Transf (AST/SGOT) 21, Alanine Aminotransferase (ALT/SGPT) 14, 

Alkaline Phosphatase 61, C-Reactive Protein, Quantitative 14.0H, Total Protein 

5.0L, Albumin 1.2L, Globulin 3.8, Albumin/Globulin Ratio 0.3L


Height (Feet):  5


Height (Inches):  7.00


Weight (Pounds):  200


General Appearance:  no apparent distress


Cardiovascular:  tachycardia


Abdomen:  distended


Objective


No change











Juan Daniel Mckeon MD May 27, 2020 11:06

## 2020-05-27 NOTE — GENERAL PROGRESS NOTE
Assessment/Plan


Problem List:  


(1) Weak


ICD Codes:  R53.1 - Weakness


SNOMED:  18767409


(2) Malnutrition


ICD Codes:  E46 - Unspecified protein-calorie malnutrition


SNOMED:  27965776


(3) COVID-19 ruled out


ICD Codes:  Z03.818 - Encounter for observation for suspected exposure to other 

biological agents ruled out


SNOMED:  314683148, 027008443


(4) Pulmonary embolism


ICD Codes:  I26.99 - Other pulmonary embolism without acute cor pulmonale


SNOMED:  69225275


Qualifiers:  


   Qualified Codes:  I26.99 - Other pulmonary embolism without acute cor 

pulmonale


(5) GI bleed


ICD Codes:  K92.2 - Gastrointestinal hemorrhage, unspecified


SNOMED:  68414907


Qualifiers:  


   Qualified Codes:  K92.2 - Gastrointestinal hemorrhage, unspecified


(6) Gastric mass


ICD Codes:  K31.89 - Other diseases of stomach and duodenum


SNOMED:  457158441


(7) Syncope


ICD Codes:  R55 - Syncope and collapse


SNOMED:  741378350


Qualifiers:  


   Qualified Codes:  R55 - Syncope and collapse


Status:  stable, progressing


Assessment/Plan:


o2 pulm tx transfuse prn cbc bmp am dc plan





Subjective


Constitutional:  Reports: weakness


Allergies:  


Coded Allergies:  


     No Known Allergies (Unverified , 5/12/20)


All Systems:  reviewed and negative except above


Subjective


sleepy calm





Objective





Last 24 Hour Vital Signs








  Date Time  Temp Pulse Resp B/P (MAP) Pulse Ox O2 Delivery O2 Flow Rate FiO2


 


5/27/20 04:00 98.0 86 18 140/86 (104) 96   


 


5/27/20 00:00 97.6 81 18 133/85 (101) 98   


 


5/26/20 21:00      Room Air  





      Room Air  


 


5/26/20 20:00 98.0 87 16 136/86 (103) 97   


 


5/26/20 17:59 97.9 86 20 135/84 (101) 97   


 


5/26/20 16:00 97.0 94 20 138/83 (101) 97   


 


5/26/20 15:42  87      


 


5/26/20 12:00 97.9 88 20 119/82 (94) 97   


 


5/26/20 11:42  92      


 


5/26/20 09:00      Room Air  





      Room Air  

















Intake and Output  


 


 5/26/20 5/27/20





 19:00 07:00


 


Intake Total 300 ml 650 ml


 


Output Total 300 ml 460 ml


 


Balance 0 ml 190 ml


 


  


 


Intake Oral 300 ml 650 ml


 


Output Urine Total 300 ml 450 ml


 


Drainage Total  10 ml


 


# Voids  2








Laboratory Tests


5/27/20 04:50: 


White Blood Count 11.8H, Red Blood Count 3.21L, Hemoglobin 8.9L, Hematocrit 

27.0L, Mean Corpuscular Volume 84, Mean Corpuscular Hemoglobin 27.8, Mean 

Corpuscular Hemoglobin Concent 33.0, Red Cell Distribution Width 14.3, Platelet 

Count 353, Mean Platelet Volume 4.5L, Neutrophils (%) (Auto) 79.8H, Lymphocytes 

(%) (Auto) 10.0L, Monocytes (%) (Auto) 8.6, Eosinophils (%) (Auto) 1.0, 

Basophils (%) (Auto) 0.7, Sodium Level 137, Potassium Level 3.9, Chloride Level 

105, Carbon Dioxide Level 26, Anion Gap 6, Blood Urea Nitrogen 5L, Creatinine 

0.6, Estimat Glomerular Filtration Rate > 60, Glucose Level 90, Calcium Level 

7.3L, Phosphorus Level 2.2L, Magnesium Level 1.8, Total Bilirubin 0.2, 

Aspartate Amino Transf (AST/SGOT) 21, Alanine Aminotransferase (ALT/SGPT) 14, 

Alkaline Phosphatase 61, C-Reactive Protein, Quantitative 14.0H, Total Protein 

5.0L, Albumin 1.2L, Globulin 3.8, Albumin/Globulin Ratio 0.3L


Height (Feet):  5


Height (Inches):  7.00


Weight (Pounds):  200


General Appearance:  lethargic


EENT:  normal ENT inspection


Neck:  normal alignment


Cardiovascular:  normal peripheral pulses, normal rate, regular rhythm


Respiratory/Chest:  chest wall non-tender, lungs clear, normal breath sounds


Abdomen:  normal bowel sounds, non tender, soft


Extremities:  normal inspection


Edema:  no edema noted Arm (L), no edema noted Arm (R), no edema noted Leg (L), 

no edema noted Leg (R), no edema noted Pedal (L), no edema noted Pedal (R), no 

edema noted Generalized


Neurologic:  motor weakness


Skin:  normal pigmentation, warm/dry











StanTom Donnelly DO May 27, 2020 08:44

## 2020-05-27 NOTE — NUR
*-* DISCHARGE PLANNING *-*



PATIENT HAS BEEN REFERRED TO"



[facility]

[email or web address]

[phone]   Work

[phone]   Work Fax

[phone]   Work Fax

## 2020-05-27 NOTE — NUR
NURSE NOTES:

Pt laying in bed, A/Ox 4, No apparent distress, Breathing regular and unlabored on RA, IV 
site right hand 20G, Pt denies pain, on low air loss mattress, bed alarm on, Low locked 
position, call light within reach. side rales up X3,

## 2020-05-27 NOTE — INFECTIOUS DISEASES PROG NOTE
Assessment/Plan


Assessment/Plan








Assessment:


Severe Sepsis


Enterobacter bacteremia- likely from GI source- SP Rx


S. epi bacteremia, contaminant


GGO on lung bases- COVID19 neg x2


    -5/21 CTA chest: STUDY POSITIVE FOR BILATERAL PULMONARY EMBOLISM. BILATERAL 

PLEURAL EFFUSIONS WITH DEPENDENT INFILTRATES/CONSOLIDATION IN THE LUNG


BASES.POSTOPERATIVE CHANGES IN THE UPPER ABDOMEN WITH ANASTOMOTIC SUTURES LEFT 

UPPER QUADRANT AND MIDLINE SKIN STAPLES. FREE FLUID AND SCATTERED SMALL FOCI OF 

FREE AIR ALSO NOTED IN LEFT UPPER QUADRANT FROM RECENT ABDOMINAL SURGERY. 

HEPATIC CYST AND GRANULOMA.


   -5/19 CXR:  Left retrocardiac consolidation and left pleural effusion.


  -5/15 SARS-COV2 PCR neg


  -5/12 Bcx 1/4 S.epi, 2/4 E. aerogenes (pan S); 5/13 Bcx Neg


            CXR: no acute disease


            SARS-COV2 PCR





Afebrile


Leukocytosis; persistent- increased post-op- now improving


   5/20 u/a wbc 10-15, nit neg, leuk +2


           Bcx  neg





Severe anemia (blood loss)/GIB likely 2ry to gastric mass


Thrombocytopenia, worsening


  -5/18 SP ex lap, left colectomy, partial gastrectomy, splenectomy, distal 

pancreatectomy, omentectomy , open lysis of adhesions


          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 

invading spleen, posterior stomach, pancreatic tail, invasive, likely seemingly 

perforated.


                       --path: invasive adenocarcinoma, moderately 

differentiated





  -CT abd/p:   Centrally necrotic mass arising from the gastric fundus 

measuring 12. 6 x 11.1 x 14 cm.  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  

Gastric adenocarcinoma, lymphoma, and other neoplastic etiologies are also in 

the differential. Filling defects within pulmonary vessels at the lung bases 

with small peripheral airspace opacities concerning for pulmonary emboli and 

small pulmonary infarcts.








Post-op ileus


  -5/22 kUB:  PROBABLE POSTOPERATIVE ILEUS.


 








Mild AST elevation





Lactic acidosis, SP


   -5/12 u/a neg; ucx 30-40K mixed urogenital contaminants





Diarrhea


    -Cdiff,stool cx neg





Syncopal episode- likely due to severe dehydration and anemia


  -CT head: No acute findings in the head/brain.





Acute PE/DVT -started on Eliquis 5/26


  -sp IVC filter placement 5/13 (retrievable- given presence of bacteremia)





PABLO, SP





HTN





Plan:


  


- Continue to monitor off abx


     --ok to dc off antibiotics





      -5/27 SP Zyvox #7


      -5/25 SP Meropenem #6


      -5/23 SP Micafungin #5


      -5/20 SP Cefepime #6, Flagyl #6


      - 5/15/20 SP Zosyn #4 and Vancomycin #4





-f/u cx


-Monitor CBC/CMP, temperature


-aspiration precautions


-COVID19 neg x2


-Sx,pulm, Heme/onc, GI f/u


-wound care per surgical team








Thank you for consulting [facility]. Will continue to follow along wiht 

you.





Discussed with RN, Dr Fischer and .





Subjective


Allergies:  


Coded Allergies:  


     No Known Allergies (Unverified , 5/12/20)


Subjective


afebrile


at RA


leukocytosis resolving


discharge planning





Objective


Vital Signs





Last 24 Hour Vital Signs








  Date Time  Temp Pulse Resp B/P (MAP) Pulse Ox O2 Delivery O2 Flow Rate FiO2


 


5/27/20 16:00 98.3 100 19 146/96 (113) 99   


 


5/27/20 12:00 98.2 104 20 128/85 (99) 96   


 


5/27/20 09:00      Room Air  





      Room Air  


 


5/27/20 08:00 98.1 109 19 122/82 (95) 97   


 


5/27/20 04:00 98.0 86 18 140/86 (104) 96   


 


5/27/20 00:00 97.6 81 18 133/85 (101) 98   


 


5/26/20 21:00      Room Air  





      Room Air  


 


5/26/20 20:00 98.0 87 16 136/86 (103) 97   


 


5/26/20 17:59 97.9 86 20 135/84 (101) 97   








Height (Feet):  5


Height (Inches):  7.00


Weight (Pounds):  200


Objective


HEAD AND NECK:  No JVD.


LUNGS:  Decreased breath sounds.


CARDIOVASCULAR:  Regular S1 and S2.  Tachycardic.


ABDOMEN: Post op dressings in place, ALLISON drain


EXTREMITIES:  No pitting edema.





Laboratory Tests








Test


  5/27/20


04:50


 


White Blood Count


  11.8 K/UL


(4.8-10.8)  H


 


Red Blood Count


  3.21 M/UL


(4.70-6.10)  L


 


Hemoglobin


  8.9 G/DL


(14.2-18.0)  L


 


Hematocrit


  27.0 %


(42.0-52.0)  L


 


Mean Corpuscular Volume 84 FL (80-99)  


 


Mean Corpuscular Hemoglobin


  27.8 PG


(27.0-31.0)


 


Mean Corpuscular Hemoglobin


Concent 33.0 G/DL


(32.0-36.0)


 


Red Cell Distribution Width


  14.3 %


(11.6-14.8)


 


Platelet Count


  353 K/UL


(150-450)


 


Mean Platelet Volume


  4.5 FL


(6.5-10.1)  L


 


Neutrophils (%) (Auto)


  79.8 %


(45.0-75.0)  H


 


Lymphocytes (%) (Auto)


  10.0 %


(20.0-45.0)  L


 


Monocytes (%) (Auto)


  8.6 %


(1.0-10.0)


 


Eosinophils (%) (Auto)


  1.0 %


(0.0-3.0)


 


Basophils (%) (Auto)


  0.7 %


(0.0-2.0)


 


Sodium Level


  137 MMOL/L


(136-145)


 


Potassium Level


  3.9 MMOL/L


(3.5-5.1)


 


Chloride Level


  105 MMOL/L


()


 


Carbon Dioxide Level


  26 MMOL/L


(21-32)


 


Anion Gap


  6 mmol/L


(5-15)


 


Blood Urea Nitrogen


  5 mg/dL (7-18)


L


 


Creatinine


  0.6 MG/DL


(0.55-1.30)


 


Estimat Glomerular Filtration


Rate > 60 mL/min


(>60)


 


Glucose Level


  90 MG/DL


()


 


Calcium Level


  7.3 MG/DL


(8.5-10.1)  L


 


Phosphorus Level


  2.2 MG/DL


(2.5-4.9)  L


 


Magnesium Level


  1.8 MG/DL


(1.8-2.4)


 


Total Bilirubin


  0.2 MG/DL


(0.2-1.0)


 


Aspartate Amino Transf


(AST/SGOT) 21 U/L (15-37)


 


 


Alanine Aminotransferase


(ALT/SGPT) 14 U/L (12-78)


 


 


Alkaline Phosphatase


  61 U/L


()


 


C-Reactive Protein,


Quantitative 14.0 mg/dL


(0.00-0.90)  H


 


Total Protein


  5.0 G/DL


(6.4-8.2)  L


 


Albumin


  1.2 G/DL


(3.4-5.0)  L


 


Globulin 3.8 g/dL  


 


Albumin/Globulin Ratio


  0.3 (1.0-2.7)


L











Current Medications








 Medications


  (Trade)  Dose


 Ordered  Sig/Marisa


 Route


 PRN Reason  Start Time


 Stop Time Status Last Admin


Dose Admin


 


 Acetaminophen/


 Hydrocodone Bitart


  (Norco 10/325)  1 tab  ONCE


 ORAL


   5/27/20 16:37


 5/27/20 18:00  5/27/20 16:58


 


 


 Acetaminophen/


 Hydrocodone Bitart


  (Norco 5/325)  1 tab  Q4H  PRN


 ORAL


 Moderate Pain (Pain Scale 4-6)  5/25/20 14:30


 6/1/20 14:29  5/27/20 16:27


 


 


 Apixaban


  (Eliquis)  5 mg  BID


 ORAL


   5/26/20 18:00


 8/24/20 17:59  5/27/20 08:51


 


 


 Dextrose


  (Dextrose 50%)  25 ml  Q30M  PRN


 IV


 Hypoglycemia  5/20/20 18:45


 8/11/20 06:14   


 


 


 Dextrose


  (Dextrose 50%)  50 ml  Q30M  PRN


 IV


 Hypoglycemia  5/20/20 18:45


 8/11/20 06:14   


 


 


 Folic Acid


  (Folate)  5 mg  DAILY


 ORAL


   5/27/20 09:00


 6/24/20 09:44  5/27/20 08:52


 


 


 Ondansetron HCl


  (Zofran)  4 mg  Q6H  PRN


 IVP


 Nausea & Vomiting  5/20/20 19:00


 6/17/20 18:59  5/25/20 21:42


 


 


 Potassium Chloride


  (K-Dur)  20 meq  TWICE A  DAY


 ORAL


   5/26/20 09:00


 8/24/20 08:59  5/27/20 08:51


 

















Alexa Gonzalez M.D. May 27, 2020 17:21

## 2020-05-27 NOTE — CARDIAC ELECTROPHYSIOLOGY PN
Assessment/Plan


Assessment/Plan


1. Hypotension due to profound anemia due to gastric mass.  


     S/P 5 units of blood transfusion. EF NL





2. Profound anemia.  Hemoglobin of 3.5.  CT of abdomen and pelvis shows central 

necrotic mass in the gastric fundus 12 x 14 x 11 cm.  


     FU  by GI and S/P surgery by Dr. Fischer.   


      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 

pancreatic tail likely  perforated.


        On solid diet now





3. Bilateral  Pulmonary embolus.  S/P IVC filter. Back on Eliquis 5 bid





4. Sepsis with elevated white count. 


       Ruled out for Covid by 2 negative PCRs





DW RN





Subjective


Subjective


 Alert.  No CP. SNIF placement pending





Objective





Last 24 Hour Vital Signs








  Date Time  Temp Pulse Resp B/P (MAP) Pulse Ox O2 Delivery O2 Flow Rate FiO2


 


5/27/20 12:00 98.2 104 20 128/85 (99) 96   


 


5/27/20 09:00      Room Air  





      Room Air  


 


5/27/20 08:00 98.1 109 19 122/82 (95) 97   


 


5/27/20 04:00 98.0 86 18 140/86 (104) 96   


 


5/27/20 00:00 97.6 81 18 133/85 (101) 98   


 


5/26/20 21:00      Room Air  





      Room Air  


 


5/26/20 20:00 98.0 87 16 136/86 (103) 97   


 


5/26/20 17:59 97.9 86 20 135/84 (101) 97   

















Intake and Output  


 


 5/26/20 5/27/20





 19:00 07:00


 


Intake Total 300 ml 650 ml


 


Output Total 300 ml 460 ml


 


Balance 0 ml 190 ml


 


  


 


Intake Oral 300 ml 650 ml


 


Output Urine Total 300 ml 450 ml


 


Drainage Total  10 ml


 


# Voids  2











Laboratory Tests








Test


  5/27/20


04:50


 


White Blood Count


  11.8 K/UL


(4.8-10.8)  H


 


Red Blood Count


  3.21 M/UL


(4.70-6.10)  L


 


Hemoglobin


  8.9 G/DL


(14.2-18.0)  L


 


Hematocrit


  27.0 %


(42.0-52.0)  L


 


Mean Corpuscular Volume 84 FL (80-99)  


 


Mean Corpuscular Hemoglobin


  27.8 PG


(27.0-31.0)


 


Mean Corpuscular Hemoglobin


Concent 33.0 G/DL


(32.0-36.0)


 


Red Cell Distribution Width


  14.3 %


(11.6-14.8)


 


Platelet Count


  353 K/UL


(150-450)


 


Mean Platelet Volume


  4.5 FL


(6.5-10.1)  L


 


Neutrophils (%) (Auto)


  79.8 %


(45.0-75.0)  H


 


Lymphocytes (%) (Auto)


  10.0 %


(20.0-45.0)  L


 


Monocytes (%) (Auto)


  8.6 %


(1.0-10.0)


 


Eosinophils (%) (Auto)


  1.0 %


(0.0-3.0)


 


Basophils (%) (Auto)


  0.7 %


(0.0-2.0)


 


Sodium Level


  137 MMOL/L


(136-145)


 


Potassium Level


  3.9 MMOL/L


(3.5-5.1)


 


Chloride Level


  105 MMOL/L


()


 


Carbon Dioxide Level


  26 MMOL/L


(21-32)


 


Anion Gap


  6 mmol/L


(5-15)


 


Blood Urea Nitrogen


  5 mg/dL (7-18)


L


 


Creatinine


  0.6 MG/DL


(0.55-1.30)


 


Estimat Glomerular Filtration


Rate > 60 mL/min


(>60)


 


Glucose Level


  90 MG/DL


()


 


Calcium Level


  7.3 MG/DL


(8.5-10.1)  L


 


Phosphorus Level


  2.2 MG/DL


(2.5-4.9)  L


 


Magnesium Level


  1.8 MG/DL


(1.8-2.4)


 


Total Bilirubin


  0.2 MG/DL


(0.2-1.0)


 


Aspartate Amino Transf


(AST/SGOT) 21 U/L (15-37)


 


 


Alanine Aminotransferase


(ALT/SGPT) 14 U/L (12-78)


 


 


Alkaline Phosphatase


  61 U/L


()


 


C-Reactive Protein,


Quantitative 14.0 mg/dL


(0.00-0.90)  H


 


Total Protein


  5.0 G/DL


(6.4-8.2)  L


 


Albumin


  1.2 G/DL


(3.4-5.0)  L


 


Globulin 3.8 g/dL  


 


Albumin/Globulin Ratio


  0.3 (1.0-2.7)


L








Objective


HEAD AND NECK:  No JVD.


LUNGS:  Decreased breath sounds.


CARDIOVASCULAR:  Tachycardia S1 and S2 .


ABDOMEN: Post op with ALLISON drain


EXTREMITIES:  No pitting edema.











Aaron Antunez MD May 27, 2020 16:19

## 2020-05-27 NOTE — GENERAL PROGRESS NOTE
Progress Note


Progress Note


drain removed


staples removed


improving


discussed with ID.  okay to d/c abx


discussed with Heme.  start eliquis 


d/c planning discussed with patient


+bm


good oral intake











Erik Fischer May 27, 2020 17:33

## 2020-05-27 NOTE — NUR
NURSE NOTES:

Patient in bed, awake, alert, able to make needs known. Respiration is even and unlabored. 
No complaint of pain or discomfort noted. Abdomen is soft and non distended. IV site noted. 
Bed in low and locked position. Provided safe environment. Call light is at bedside. Will 
continue plan of care.

## 2020-05-27 NOTE — PULMONOLOGY PROGRESS NOTE
Subjective


ROS Limited/Unobtainable:  No


Interval Events:  s/p laparotomy 5/18/20; extubated 5/19/20


Constitutional:  Reports: no symptoms


HEENT:  Repors: no symptoms


Respiratory:  Reports: no symptoms


Cardiovascular:  Reports: no symptoms


Gastrointestinal/Abdominal:  Reports: no symptoms


Genitourinary:  Reports: no symptoms


Neurologic:  Reports: no symptoms


Allergies:  


Coded Allergies:  


     No Known Allergies (Unverified , 5/12/20)


All Systems:  reviewed and negative except above





Objective





Last 24 Hour Vital Signs








  Date Time  Temp Pulse Resp B/P (MAP) Pulse Ox O2 Delivery O2 Flow Rate FiO2


 


5/27/20 09:00      Room Air  





      Room Air  


 


5/27/20 08:00 98.1 109 19 122/82 (95) 97   


 


5/27/20 04:00 98.0 86 18 140/86 (104) 96   


 


5/27/20 00:00 97.6 81 18 133/85 (101) 98   


 


5/26/20 21:00      Room Air  





      Room Air  


 


5/26/20 20:00 98.0 87 16 136/86 (103) 97   


 


5/26/20 17:59 97.9 86 20 135/84 (101) 97   


 


5/26/20 16:00 97.0 94 20 138/83 (101) 97   


 


5/26/20 15:42  87      


 


5/26/20 12:00 97.9 88 20 119/82 (94) 97   


 


5/26/20 11:42  92      

















Intake and Output  


 


 5/26/20 5/27/20





 19:00 07:00


 


Intake Total 300 ml 650 ml


 


Output Total 300 ml 460 ml


 


Balance 0 ml 190 ml


 


  


 


Intake Oral 300 ml 650 ml


 


Output Urine Total 300 ml 450 ml


 


Drainage Total  10 ml


 


# Voids  2








General Appearance:  no acute distress


HEENT:  normocephalic


Respiratory:  chest wall non-tender, lungs clear


Cardiovascular:  normal peripheral pulses


Abdomen:  non distended


Extremities:  no cyanosis


Laboratory Tests


5/27/20 04:50: 


White Blood Count 11.8H, Red Blood Count 3.21L, Hemoglobin 8.9L, Hematocrit 

27.0L, Mean Corpuscular Volume 84, Mean Corpuscular Hemoglobin 27.8, Mean 

Corpuscular Hemoglobin Concent 33.0, Red Cell Distribution Width 14.3, Platelet 

Count 353, Mean Platelet Volume 4.5L, Neutrophils (%) (Auto) 79.8H, Lymphocytes 

(%) (Auto) 10.0L, Monocytes (%) (Auto) 8.6, Eosinophils (%) (Auto) 1.0, 

Basophils (%) (Auto) 0.7, Sodium Level 137, Potassium Level 3.9, Chloride Level 

105, Carbon Dioxide Level 26, Anion Gap 6, Blood Urea Nitrogen 5L, Creatinine 

0.6, Estimat Glomerular Filtration Rate > 60, Glucose Level 90, Calcium Level 

7.3L, Phosphorus Level 2.2L, Magnesium Level 1.8, Total Bilirubin 0.2, 

Aspartate Amino Transf (AST/SGOT) 21, Alanine Aminotransferase (ALT/SGPT) 14, 

Alkaline Phosphatase 61, C-Reactive Protein, Quantitative 14.0H, Total Protein 

5.0L, Albumin 1.2L, Globulin 3.8, Albumin/Globulin Ratio 0.3L





Current Medications








 Medications


  (Trade)  Dose


 Ordered  Sig/Marisa


 Route


 PRN Reason  Start Time


 Stop Time Status Last Admin


Dose Admin


 


 Acetaminophen/


 Hydrocodone Bitart


  (Norco 5/325)  1 tab  Q4H  PRN


 ORAL


 Moderate Pain (Pain Scale 4-6)  5/25/20 14:30


 6/1/20 14:29  5/27/20 07:52


 


 


 Apixaban


  (Eliquis)  5 mg  BID


 ORAL


   5/26/20 18:00


 8/24/20 17:59  5/27/20 08:51


 


 


 Dextrose


  (Dextrose 50%)  25 ml  Q30M  PRN


 IV


 Hypoglycemia  5/20/20 18:45


 8/11/20 06:14   


 


 


 Dextrose


  (Dextrose 50%)  50 ml  Q30M  PRN


 IV


 Hypoglycemia  5/20/20 18:45


 8/11/20 06:14   


 


 


 Folic Acid


  (Folate)  5 mg  DAILY


 ORAL


   5/27/20 09:00


 6/24/20 09:44  5/27/20 08:52


 


 


 Linezolid  300 ml @ 


 300 mls/hr  Q12HR


 IVPB


   5/20/20 21:00


 5/27/20 15:59  5/27/20 08:51


 


 


 Ondansetron HCl


  (Zofran)  4 mg  Q6H  PRN


 IVP


 Nausea & Vomiting  5/20/20 19:00


 6/17/20 18:59  5/25/20 21:42


 


 


 Potassium


 Phosphate 20 mm/


 Sodium Chloride  281.6667


 ml @ 


 46.944 m...  ONCE


 IV


   5/27/20 10:00


 5/27/20 16:01  5/27/20 10:41


 


 


 Potassium Chloride


  (K-Dur)  20 meq  TWICE A  DAY


 ORAL


   5/26/20 09:00


 8/24/20 08:59  5/27/20 08:51


 











Assessment/Plan


Assessment/Plan


IMPRESSION:


1. Gastric mass.S/p laparotomy


2. Severe anemia. Corrected


3. Pulmonary embolism.





DISCUSSION:  





S/p IVC filter.  S/p blood transfusion, IV fluids.  


S/p ex lap, left colectomy, partial gastrectomy, splenectomy, distal 

pancreatectomy, omentectomy 


and  open lysis of adhesions 


I will follow carefully.


Extubated 5/19/20


COVID 19 pcr negative


Currently on RA


TRansfer to [facility] planning

















  ______________________________________________


  MARIANELA Downs Omar Syed MD May 27, 2020 11:16

## 2020-05-27 NOTE — GENERAL PROGRESS NOTE
Assessment/Plan


Problem List:  


(1) Syncope


ICD Codes:  R55 - Syncope and collapse


SNOMED:  808530935


Qualifiers:  


   Qualified Codes:  R55 - Syncope and collapse


(2) Gastric mass


ICD Codes:  K31.89 - Other diseases of stomach and duodenum


SNOMED:  874710787


(3) GI bleed


ICD Codes:  K92.2 - Gastrointestinal hemorrhage, unspecified


SNOMED:  72607734


Qualifiers:  


   Qualified Codes:  K92.2 - Gastrointestinal hemorrhage, unspecified


(4) Pulmonary embolism


ICD Codes:  I26.99 - Other pulmonary embolism without acute cor pulmonale


SNOMED:  25751645


Qualifiers:  


   Qualified Codes:  I26.99 - Other pulmonary embolism without acute cor 

pulmonale


Status:  stable, progressing


Assessment/Plan:





s/p surg:





OPERATIONS PERFORMED:


1. Exploratory laparotomy.


2. Mobilization of the splenic flexure.


3. Left colectomy with primary side-to-side anastomosis.


4. Partial gastrectomy.


5. Total splenectomy.


6. Distal pancreatectomy.


7. Omentectomy.


8. Open lysis of adhesions.





fu surg recs


fu oncology


pain control


abx


on soft diet


pending placement


will fu





Subjective


ROS Limited/Unobtainable:  No


Allergies:  


Coded Allergies:  


     No Known Allergies (Unverified , 5/12/20)





Objective





Last 24 Hour Vital Signs








  Date Time  Temp Pulse Resp B/P (MAP) Pulse Ox O2 Delivery O2 Flow Rate FiO2


 


5/27/20 08:00 98.1 109 19 122/82 (95) 97   


 


5/27/20 04:00 98.0 86 18 140/86 (104) 96   


 


5/27/20 00:00 97.6 81 18 133/85 (101) 98   


 


5/26/20 21:00      Room Air  





      Room Air  


 


5/26/20 20:00 98.0 87 16 136/86 (103) 97   


 


5/26/20 17:59 97.9 86 20 135/84 (101) 97   


 


5/26/20 16:00 97.0 94 20 138/83 (101) 97   


 


5/26/20 15:42  87      


 


5/26/20 12:00 97.9 88 20 119/82 (94) 97   


 


5/26/20 11:42  92      

















Intake and Output  


 


 5/26/20 5/27/20





 19:00 07:00


 


Intake Total 300 ml 650 ml


 


Output Total 300 ml 460 ml


 


Balance 0 ml 190 ml


 


  


 


Intake Oral 300 ml 650 ml


 


Output Urine Total 300 ml 450 ml


 


Drainage Total  10 ml


 


# Voids  2








Laboratory Tests


5/27/20 04:50: 


White Blood Count 11.8H, Red Blood Count 3.21L, Hemoglobin 8.9L, Hematocrit 

27.0L, Mean Corpuscular Volume 84, Mean Corpuscular Hemoglobin 27.8, Mean 

Corpuscular Hemoglobin Concent 33.0, Red Cell Distribution Width 14.3, Platelet 

Count 353, Mean Platelet Volume 4.5L, Neutrophils (%) (Auto) 79.8H, Lymphocytes 

(%) (Auto) 10.0L, Monocytes (%) (Auto) 8.6, Eosinophils (%) (Auto) 1.0, 

Basophils (%) (Auto) 0.7, Sodium Level 137, Potassium Level 3.9, Chloride Level 

105, Carbon Dioxide Level 26, Anion Gap 6, Blood Urea Nitrogen 5L, Creatinine 

0.6, Estimat Glomerular Filtration Rate > 60, Glucose Level 90, Calcium Level 

7.3L, Phosphorus Level 2.2L, Magnesium Level 1.8, Total Bilirubin 0.2, 

Aspartate Amino Transf (AST/SGOT) 21, Alanine Aminotransferase (ALT/SGPT) 14, 

Alkaline Phosphatase 61, C-Reactive Protein, Quantitative 14.0H, Total Protein 

5.0L, Albumin 1.2L, Globulin 3.8, Albumin/Globulin Ratio 0.3L


Height (Feet):  5


Height (Inches):  7.00


Weight (Pounds):  200


General Appearance:  no apparent distress


EENT:  normal ENT inspection


Neck:  supple


Cardiovascular:  normal rate


Respiratory/Chest:  decreased breath sounds


Abdomen:  soft, hypoactive bowel sounds


Extremities:  non-tender











Marc Nielsen MD May 27, 2020 09:23

## 2020-05-27 NOTE — NUR
*-* DISCHARGE PLANNING *-*



PATIENT HAS BEEN ACCEPTED TO"



[facility]

[email or web address]

[phone]   Work

[phone]   Work Fax

[phone]   Work Fax



CM TO OBTAIN DC ORDER

## 2020-05-27 NOTE — NUR
NURSE NOTES:

Patient is in bed awake and able to verbalize needs. Stable. C/o pain and is asking for pain 
medication, will administer pain medication as ordered. Patient instructed to use call light 
for assistance, verbalized understanding. Patient is in bed in locked and lowest position 
with call light within reach. All safety measures provided. Will continue to monitor.

## 2020-05-28 VITALS — DIASTOLIC BLOOD PRESSURE: 84 MMHG | SYSTOLIC BLOOD PRESSURE: 141 MMHG

## 2020-05-28 VITALS — DIASTOLIC BLOOD PRESSURE: 81 MMHG | SYSTOLIC BLOOD PRESSURE: 120 MMHG

## 2020-05-28 VITALS — DIASTOLIC BLOOD PRESSURE: 84 MMHG | SYSTOLIC BLOOD PRESSURE: 147 MMHG

## 2020-05-28 VITALS — SYSTOLIC BLOOD PRESSURE: 133 MMHG | DIASTOLIC BLOOD PRESSURE: 94 MMHG

## 2020-05-28 VITALS — SYSTOLIC BLOOD PRESSURE: 138 MMHG | DIASTOLIC BLOOD PRESSURE: 94 MMHG

## 2020-05-28 VITALS — SYSTOLIC BLOOD PRESSURE: 138 MMHG | DIASTOLIC BLOOD PRESSURE: 81 MMHG

## 2020-05-28 LAB
ADD MANUAL DIFF: NO
ANION GAP SERPL CALC-SCNC: 7 MMOL/L (ref 5–15)
APPEARANCE UR: CLEAR
APTT PPP: YELLOW S
BASOPHILS NFR BLD AUTO: 0.8 % (ref 0–2)
BUN SERPL-MCNC: 4 MG/DL (ref 7–18)
CALCIUM SERPL-MCNC: 7.4 MG/DL (ref 8.5–10.1)
CHLORIDE SERPL-SCNC: 102 MMOL/L (ref 98–107)
CO2 SERPL-SCNC: 26 MMOL/L (ref 21–32)
CREAT SERPL-MCNC: 0.7 MG/DL (ref 0.55–1.3)
EOSINOPHIL NFR BLD AUTO: 0.9 % (ref 0–3)
ERYTHROCYTE [DISTWIDTH] IN BLOOD BY AUTOMATED COUNT: 14.3 % (ref 11.6–14.8)
GLUCOSE UR STRIP-MCNC: NEGATIVE MG/DL
HCT VFR BLD CALC: 29.3 % (ref 42–52)
HGB BLD-MCNC: 9.8 G/DL (ref 14.2–18)
KETONES UR QL STRIP: NEGATIVE
LEUKOCYTE ESTERASE UR QL STRIP: NEGATIVE
LYMPHOCYTES NFR BLD AUTO: 9.6 % (ref 20–45)
MCV RBC AUTO: 84 FL (ref 80–99)
MONOCYTES NFR BLD AUTO: 8.6 % (ref 1–10)
NEUTROPHILS NFR BLD AUTO: 80.2 % (ref 45–75)
NITRITE UR QL STRIP: NEGATIVE
PH UR STRIP: 7 [PH] (ref 4.5–8)
PLATELET # BLD: 363 K/UL (ref 150–450)
POTASSIUM SERPL-SCNC: 4.3 MMOL/L (ref 3.5–5.1)
PROT UR QL STRIP: NEGATIVE
RBC # BLD AUTO: 3.5 M/UL (ref 4.7–6.1)
SODIUM SERPL-SCNC: 134 MMOL/L (ref 136–145)
SP GR UR STRIP: 1.01 (ref 1–1.03)
UROBILINOGEN UR-MCNC: 8 MG/DL (ref 0–1)
WBC # BLD AUTO: 14 K/UL (ref 4.8–10.8)

## 2020-05-28 RX ADMIN — HYDROCODONE BITARTRATE AND ACETAMINOPHEN PRN TAB: 5; 325 TABLET ORAL at 08:59

## 2020-05-28 RX ADMIN — APIXABAN SCH MG: 5 TABLET, FILM COATED ORAL at 09:00

## 2020-05-28 RX ADMIN — HYDROCODONE BITARTRATE AND ACETAMINOPHEN PRN TAB: 5; 325 TABLET ORAL at 16:15

## 2020-05-28 RX ADMIN — APIXABAN SCH MG: 5 TABLET, FILM COATED ORAL at 17:16

## 2020-05-28 NOTE — NEPHROLOGY PROGRESS NOTE
Assessment/Plan


Problem List:  


(1) Electrolyte imbalance


(2) Symptomatic anemia


Assessment:  On admission





(3) Gastric mass


(4) Pulmonary embolism


Assessment





Progressive hypokalemia most likely due to GI loss


Gastric mass status post laparotomy, Moderately differentiated adenocarcinoma 

primary COLON CANCER


Previously severe anemia which is corrected with blood transfusion


Pulmonary embolism


S/p ex lap, left colectomy, partial gastrectomy, splenectomy, distal 

pancreatectomy, omentectomy 


Severe sepsis with Enterobacter  bacteremia likely from GI source


COVID-19 negative x2


Hypertension by history


Presented on admission with hypotension due to severe anemia





Plan


Stable from renal standpoint of view for discharge





Previously:


Potassium chloride 40 mEq on IV ordered


Stop IV hydration, patient on clear liquid diet


Continue to monitor electrolytes and chemistries


Medication list reviewed


Further recommendations based on ordered lab results





Subjective


ROS Limited/Unobtainable:  No


Constitutional:  Reports: malaise, weakness, other - Periodically complains of 

pain





Objective


Objective





Last 24 Hour Vital Signs








  Date Time  Temp Pulse Resp B/P (MAP) Pulse Ox O2 Delivery O2 Flow Rate FiO2


 


5/28/20 12:00 97.9 105 21 120/81 (94) 96   


 


5/28/20 09:00      Room Air  





      Room Air  


 


5/28/20 08:00 98.1 97 19 138/81 (100) 97   


 


5/28/20 04:00 97.9 96 19 147/84 (105) 98   


 


5/28/20 00:00 98.1 94 19 141/84 (103) 97   


 


5/27/20 21:00      Room Air  





      Room Air  


 


5/27/20 20:00 97.8 92 19 137/91 (106) 96   


 


5/27/20 16:00 98.3 100 19 146/96 (113) 99   

















Intake and Output  


 


 5/27/20 5/28/20





 19:00 07:00


 


Intake Total 480 ml 1165 ml


 


Output Total  710 ml


 


Balance 480 ml 455 ml


 


  


 


Intake Oral 480 ml 480 ml


 


IV Total  200 ml


 


Blood Product  125 ml


 


Other  360 ml


 


Output Urine Total  450 ml


 


Stool Total  0 ml


 


Gastric Drainage Total  50 ml


 


Drainage Total  10 ml


 


Estimated Blood Loss  200 ml


 


# Voids  2


 


# Bowel Movements  1








Laboratory Tests


5/28/20 04:50: 


White Blood Count 14.0H, Red Blood Count 3.50L, Hemoglobin 9.8L, Hematocrit 

29.3L, Mean Corpuscular Volume 84, Mean Corpuscular Hemoglobin 27.9, Mean 

Corpuscular Hemoglobin Concent 33.3, Red Cell Distribution Width 14.3, Platelet 

Count 363, Mean Platelet Volume 4.5L, Neutrophils (%) (Auto) 80.2H, Lymphocytes 

(%) (Auto) 9.6L, Monocytes (%) (Auto) 8.6, Eosinophils (%) (Auto) 0.9, 

Basophils (%) (Auto) 0.8, Sodium Level 134L, Potassium Level 4.3, Chloride 

Level 102, Carbon Dioxide Level 26, Anion Gap 7, Blood Urea Nitrogen 4L, 

Creatinine 0.7, Estimat Glomerular Filtration Rate > 60, Glucose Level 86, 

Calcium Level 7.4L


Height (Feet):  5


Height (Inches):  7.00


Weight (Pounds):  92


General Appearance:  mild distress


Respiratory/Chest:  decreased breath sounds


Abdomen:  distended


Objective


No change











Juan Daniel Mckeon MD May 28, 2020 13:15

## 2020-05-28 NOTE — GENERAL PROGRESS NOTE
Assessment/Plan


Problem List:  


(1) Syncope


ICD Codes:  R55 - Syncope and collapse


SNOMED:  437830083


Qualifiers:  


   Qualified Codes:  R55 - Syncope and collapse


(2) Gastric mass


ICD Codes:  K31.89 - Other diseases of stomach and duodenum


SNOMED:  245826438


(3) GI bleed


ICD Codes:  K92.2 - Gastrointestinal hemorrhage, unspecified


SNOMED:  15212786


Qualifiers:  


   Qualified Codes:  K92.2 - Gastrointestinal hemorrhage, unspecified


(4) Pulmonary embolism


ICD Codes:  I26.99 - Other pulmonary embolism without acute cor pulmonale


SNOMED:  91927502


Qualifiers:  


   Qualified Codes:  I26.99 - Other pulmonary embolism without acute cor 

pulmonale


Status:  stable, progressing


Assessment/Plan:





s/p surg:





OPERATIONS PERFORMED:


1. Exploratory laparotomy.


2. Mobilization of the splenic flexure.


3. Left colectomy with primary side-to-side anastomosis.


4. Partial gastrectomy.


5. Total splenectomy.


6. Distal pancreatectomy.


7. Omentectomy.


8. Open lysis of adhesions.





fu surg recs


fu oncology


pain control


abx


on soft diet


pending placement


will fu





Subjective


ROS Limited/Unobtainable:  No


Allergies:  


Coded Allergies:  


     No Known Allergies (Unverified , 5/12/20)





Objective





Last 24 Hour Vital Signs








  Date Time  Temp Pulse Resp B/P (MAP) Pulse Ox O2 Delivery O2 Flow Rate FiO2


 


5/28/20 04:00 97.9 96 19 147/84 (105) 98   


 


5/28/20 00:00 98.1 94 19 141/84 (103) 97   


 


5/27/20 21:00      Room Air  





      Room Air  


 


5/27/20 20:00 97.8 92 19 137/91 (106) 96   


 


5/27/20 16:00 98.3 100 19 146/96 (113) 99   


 


5/27/20 12:00 98.2 104 20 128/85 (99) 96   

















Intake and Output  


 


 5/27/20 5/28/20





 19:00 07:00


 


Intake Total 480 ml 1165 ml


 


Output Total  710 ml


 


Balance 480 ml 455 ml


 


  


 


Intake Oral 480 ml 480 ml


 


IV Total  200 ml


 


Blood Product  125 ml


 


Other  360 ml


 


Output Urine Total  450 ml


 


Stool Total  0 ml


 


Gastric Drainage Total  50 ml


 


Drainage Total  10 ml


 


Estimated Blood Loss  200 ml


 


# Voids  2


 


# Bowel Movements  1








Laboratory Tests


5/28/20 04:50: 


White Blood Count 14.0H, Red Blood Count 3.50L, Hemoglobin 9.8L, Hematocrit 

29.3L, Mean Corpuscular Volume 84, Mean Corpuscular Hemoglobin 27.9, Mean 

Corpuscular Hemoglobin Concent 33.3, Red Cell Distribution Width 14.3, Platelet 

Count 363, Mean Platelet Volume 4.5L, Neutrophils (%) (Auto) 80.2H, Lymphocytes 

(%) (Auto) 9.6L, Monocytes (%) (Auto) 8.6, Eosinophils (%) (Auto) 0.9, 

Basophils (%) (Auto) 0.8, Sodium Level 134L, Potassium Level 4.3, Chloride 

Level 102, Carbon Dioxide Level 26, Anion Gap 7, Blood Urea Nitrogen 4L, 

Creatinine 0.7, Estimat Glomerular Filtration Rate > 60, Glucose Level 86, 

Calcium Level 7.4L


Height (Feet):  5


Height (Inches):  7.00


Weight (Pounds):  92


General Appearance:  no apparent distress


EENT:  normal ENT inspection


Neck:  supple


Cardiovascular:  normal rate


Respiratory/Chest:  decreased breath sounds


Abdomen:  non tender, soft


Extremities:  non-tender











Marc Nielsen MD May 28, 2020 09:46

## 2020-05-28 NOTE — NUR
NURSE NOTES:

Received report from JOSIAH Naidu. Pt is awake, lying semi-roberson's; comfortably resting. No 
signs of acute distress noted. Pt denies any pain at this time. AOx4; able to make needs 
known. Checked IV site; patent and flushed. No erythema, bleeding, or infiltration noted. 
Upper extremities edema and lower extremities pitting edema. Bed at lowest position. Brakes 
on. Siderails up x3. Call light within reach. Will continue to monitor.

## 2020-05-28 NOTE — GENERAL PROGRESS NOTE
Assessment/Plan


Problem List:  


(1) Weak


ICD Codes:  R53.1 - Weakness


SNOMED:  03651796


(2) Malnutrition


ICD Codes:  E46 - Unspecified protein-calorie malnutrition


SNOMED:  84161396


(3) COVID-19 ruled out


ICD Codes:  Z03.818 - Encounter for observation for suspected exposure to other 

biological agents ruled out


SNOMED:  577894429, 988910565


(4) Pulmonary embolism


ICD Codes:  I26.99 - Other pulmonary embolism without acute cor pulmonale


SNOMED:  02315517


Qualifiers:  


   Qualified Codes:  I26.99 - Other pulmonary embolism without acute cor 

pulmonale


(5) GI bleed


ICD Codes:  K92.2 - Gastrointestinal hemorrhage, unspecified


SNOMED:  33524293


Qualifiers:  


   Qualified Codes:  K92.2 - Gastrointestinal hemorrhage, unspecified


(6) Gastric mass


ICD Codes:  K31.89 - Other diseases of stomach and duodenum


SNOMED:  683774492


(7) Syncope


ICD Codes:  R55 - Syncope and collapse


SNOMED:  718044795


Qualifiers:  


   Qualified Codes:  R55 - Syncope and collapse


Status:  stable, progressing


Assessment/Plan:


o2 pulm tx transfuse prn cbc bmp am dc plan





Subjective


Constitutional:  Reports: weakness


Allergies:  


Coded Allergies:  


     No Known Allergies (Unverified , 5/12/20)


All Systems:  reviewed and negative except above


Subjective


sleepy calm





Objective





Last 24 Hour Vital Signs








  Date Time  Temp Pulse Resp B/P (MAP) Pulse Ox O2 Delivery O2 Flow Rate FiO2


 


5/28/20 09:00      Room Air  





      Room Air  


 


5/28/20 08:00 98.1 97 19 138/81 (100) 97   


 


5/28/20 04:00 97.9 96 19 147/84 (105) 98   


 


5/28/20 00:00 98.1 94 19 141/84 (103) 97   


 


5/27/20 21:00      Room Air  





      Room Air  


 


5/27/20 20:00 97.8 92 19 137/91 (106) 96   


 


5/27/20 16:00 98.3 100 19 146/96 (113) 99   

















Intake and Output  


 


 5/27/20 5/28/20





 19:00 07:00


 


Intake Total 480 ml 1165 ml


 


Output Total  710 ml


 


Balance 480 ml 455 ml


 


  


 


Intake Oral 480 ml 480 ml


 


IV Total  200 ml


 


Blood Product  125 ml


 


Other  360 ml


 


Output Urine Total  450 ml


 


Stool Total  0 ml


 


Gastric Drainage Total  50 ml


 


Drainage Total  10 ml


 


Estimated Blood Loss  200 ml


 


# Voids  2


 


# Bowel Movements  1








Laboratory Tests


5/28/20 04:50: 


White Blood Count 14.0H, Red Blood Count 3.50L, Hemoglobin 9.8L, Hematocrit 

29.3L, Mean Corpuscular Volume 84, Mean Corpuscular Hemoglobin 27.9, Mean 

Corpuscular Hemoglobin Concent 33.3, Red Cell Distribution Width 14.3, Platelet 

Count 363, Mean Platelet Volume 4.5L, Neutrophils (%) (Auto) 80.2H, Lymphocytes 

(%) (Auto) 9.6L, Monocytes (%) (Auto) 8.6, Eosinophils (%) (Auto) 0.9, 

Basophils (%) (Auto) 0.8, Sodium Level 134L, Potassium Level 4.3, Chloride 

Level 102, Carbon Dioxide Level 26, Anion Gap 7, Blood Urea Nitrogen 4L, 

Creatinine 0.7, Estimat Glomerular Filtration Rate > 60, Glucose Level 86, 

Calcium Level 7.4L


Height (Feet):  5


Height (Inches):  7.00


Weight (Pounds):  92


General Appearance:  lethargic


EENT:  normal ENT inspection


Neck:  normal alignment


Cardiovascular:  normal peripheral pulses, normal rate, regular rhythm


Respiratory/Chest:  chest wall non-tender, lungs clear, normal breath sounds


Abdomen:  normal bowel sounds, non tender, soft


Extremities:  normal inspection


Edema:  no edema noted Arm (L), no edema noted Arm (R), no edema noted Leg (L), 

no edema noted Leg (R), no edema noted Pedal (L), no edema noted Pedal (R), no 

edema noted Generalized


Neurologic:  motor weakness


Skin:  normal pigmentation, warm/dry











Tom Pierce DO May 28, 2020 12:25

## 2020-05-28 NOTE — CARDIAC ELECTROPHYSIOLOGY PN
Assessment/Plan


Assessment/Plan


1. Hypotension due to profound anemia due to colon mass


     S/P 5 units of blood transfusion. EF NL





2. Profound anemia.  Hemoglobin of 3.5.  CT of abdomen and pelvis shows central 

necrotic mass in the gastric fundus 12 x 14 x 11 cm.  


     FU  by GI and S/P surgery by Dr. Fischer.   


      Large colonic tumor invading spleen, posterior stomach, pancreatic tail 

likely  perforated.


        On solid diet 





3. Bilateral  Pulmonary embolus.  S/P IVC filter. Back on Eliquis 5 bid





4. Sepsis with elevated white count. 


       Ruled out for Covid by 2 negative PCRs





DW RN





Subjective


Subjective


 Alert.  No CP or SOB. SNIF pending





Objective





Last 24 Hour Vital Signs








  Date Time  Temp Pulse Resp B/P (MAP) Pulse Ox O2 Delivery O2 Flow Rate FiO2


 


5/28/20 12:00 97.9 105 21 120/81 (94) 96   


 


5/28/20 09:00      Room Air  





      Room Air  


 


5/28/20 08:00 98.1 97 19 138/81 (100) 97   


 


5/28/20 04:00 97.9 96 19 147/84 (105) 98   


 


5/28/20 00:00 98.1 94 19 141/84 (103) 97   


 


5/27/20 21:00      Room Air  





      Room Air  


 


5/27/20 20:00 97.8 92 19 137/91 (106) 96   


 


5/27/20 16:00 98.3 100 19 146/96 (113) 99   

















Intake and Output  


 


 5/27/20 5/28/20





 19:00 07:00


 


Intake Total 480 ml 1165 ml


 


Output Total  710 ml


 


Balance 480 ml 455 ml


 


  


 


Intake Oral 480 ml 480 ml


 


IV Total  200 ml


 


Blood Product  125 ml


 


Other  360 ml


 


Output Urine Total  450 ml


 


Stool Total  0 ml


 


Gastric Drainage Total  50 ml


 


Drainage Total  10 ml


 


Estimated Blood Loss  200 ml


 


# Voids  2


 


# Bowel Movements  1











Laboratory Tests








Test


  5/28/20


04:50


 


White Blood Count


  14.0 K/UL


(4.8-10.8)  H


 


Red Blood Count


  3.50 M/UL


(4.70-6.10)  L


 


Hemoglobin


  9.8 G/DL


(14.2-18.0)  L


 


Hematocrit


  29.3 %


(42.0-52.0)  L


 


Mean Corpuscular Volume 84 FL (80-99)  


 


Mean Corpuscular Hemoglobin


  27.9 PG


(27.0-31.0)


 


Mean Corpuscular Hemoglobin


Concent 33.3 G/DL


(32.0-36.0)


 


Red Cell Distribution Width


  14.3 %


(11.6-14.8)


 


Platelet Count


  363 K/UL


(150-450)


 


Mean Platelet Volume


  4.5 FL


(6.5-10.1)  L


 


Neutrophils (%) (Auto)


  80.2 %


(45.0-75.0)  H


 


Lymphocytes (%) (Auto)


  9.6 %


(20.0-45.0)  L


 


Monocytes (%) (Auto)


  8.6 %


(1.0-10.0)


 


Eosinophils (%) (Auto)


  0.9 %


(0.0-3.0)


 


Basophils (%) (Auto)


  0.8 %


(0.0-2.0)


 


Sodium Level


  134 MMOL/L


(136-145)  L


 


Potassium Level


  4.3 MMOL/L


(3.5-5.1)


 


Chloride Level


  102 MMOL/L


()


 


Carbon Dioxide Level


  26 MMOL/L


(21-32)


 


Anion Gap


  7 mmol/L


(5-15)


 


Blood Urea Nitrogen


  4 mg/dL (7-18)


L


 


Creatinine


  0.7 MG/DL


(0.55-1.30)


 


Estimat Glomerular Filtration


Rate > 60 mL/min


(>60)


 


Glucose Level


  86 MG/DL


()


 


Calcium Level


  7.4 MG/DL


(8.5-10.1)  L








Objective


HEAD AND NECK:  No JVD.


LUNGS:  Decreased breath sounds.


CARDIOVASCULAR:  Tachycardia S1 and S2 .


ABDOMEN: Post op with ALLISON drain


EXTREMITIES:  No pitting edema.











Aaron Antunez MD May 28, 2020 15:29

## 2020-05-28 NOTE — NUR
NURSE NOTES:

Patient experiencing lower back discomfort 5-6/10, medicated with Norco 5 mg, as ordered. 
Offered cold compress to back, patient refused. Repositioned from side to side, however, 
patient prefers to lay on back. Instructed patient on the importance of changing position 
every 2 hours for pressure ulcer prevention, verbalized understanding. P200 mattress in 
place. Optifoam to sacral and bilateral heels, +2-3 pitting edema noted to BLE, ankles, skin 
tight. Encouraged CDB and ankle rotation, demonstrates correctly. Will continue to monitor.

## 2020-05-28 NOTE — PULMONOLOGY PROGRESS NOTE
Subjective


ROS Limited/Unobtainable:  No


Interval Events:  s/p laparotomy 5/18/20; extubated 5/19/20


Constitutional:  Reports: no symptoms


HEENT:  Repors: no symptoms


Respiratory:  Reports: no symptoms


Cardiovascular:  Reports: no symptoms


Gastrointestinal/Abdominal:  Reports: no symptoms


Genitourinary:  Reports: no symptoms


Neurologic:  Reports: no symptoms


Allergies:  


Coded Allergies:  


     No Known Allergies (Unverified , 5/12/20)


All Systems:  reviewed and negative except above





Objective





Last 24 Hour Vital Signs








  Date Time  Temp Pulse Resp B/P (MAP) Pulse Ox O2 Delivery O2 Flow Rate FiO2


 


5/28/20 09:00      Room Air  





      Room Air  


 


5/28/20 08:00 98.1 97 19 138/81 (100) 97   


 


5/28/20 04:00 97.9 96 19 147/84 (105) 98   


 


5/28/20 00:00 98.1 94 19 141/84 (103) 97   


 


5/27/20 21:00      Room Air  





      Room Air  


 


5/27/20 20:00 97.8 92 19 137/91 (106) 96   


 


5/27/20 16:00 98.3 100 19 146/96 (113) 99   

















Intake and Output  


 


 5/27/20 5/28/20





 19:00 07:00


 


Intake Total 480 ml 1165 ml


 


Output Total  710 ml


 


Balance 480 ml 455 ml


 


  


 


Intake Oral 480 ml 480 ml


 


IV Total  200 ml


 


Blood Product  125 ml


 


Other  360 ml


 


Output Urine Total  450 ml


 


Stool Total  0 ml


 


Gastric Drainage Total  50 ml


 


Drainage Total  10 ml


 


Estimated Blood Loss  200 ml


 


# Voids  2


 


# Bowel Movements  1








General Appearance:  no acute distress


HEENT:  normocephalic


Respiratory:  chest wall non-tender, lungs clear


Cardiovascular:  normal peripheral pulses


Abdomen:  non distended


Extremities:  no cyanosis


Laboratory Tests


5/28/20 04:50: 


White Blood Count 14.0H, Red Blood Count 3.50L, Hemoglobin 9.8L, Hematocrit 

29.3L, Mean Corpuscular Volume 84, Mean Corpuscular Hemoglobin 27.9, Mean 

Corpuscular Hemoglobin Concent 33.3, Red Cell Distribution Width 14.3, Platelet 

Count 363, Mean Platelet Volume 4.5L, Neutrophils (%) (Auto) 80.2H, Lymphocytes 

(%) (Auto) 9.6L, Monocytes (%) (Auto) 8.6, Eosinophils (%) (Auto) 0.9, 

Basophils (%) (Auto) 0.8, Sodium Level 134L, Potassium Level 4.3, Chloride 

Level 102, Carbon Dioxide Level 26, Anion Gap 7, Blood Urea Nitrogen 4L, 

Creatinine 0.7, Estimat Glomerular Filtration Rate > 60, Glucose Level 86, 

Calcium Level 7.4L





Current Medications








 Medications


  (Trade)  Dose


 Ordered  Sig/Marisa


 Route


 PRN Reason  Start Time


 Stop Time Status Last Admin


Dose Admin


 


 Acetaminophen/


 Hydrocodone Bitart


  (Norco 5/325)  1 tab  Q4H  PRN


 ORAL


 Moderate Pain (Pain Scale 4-6)  5/25/20 14:30


 6/1/20 14:29  5/28/20 08:59


 


 


 Apixaban


  (Eliquis)  5 mg  BID


 ORAL


   5/26/20 18:00


 8/24/20 17:59  5/28/20 09:00


 


 


 Dextrose


  (Dextrose 50%)  25 ml  Q30M  PRN


 IV


 Hypoglycemia  5/20/20 18:45


 8/11/20 06:14   


 


 


 Dextrose


  (Dextrose 50%)  50 ml  Q30M  PRN


 IV


 Hypoglycemia  5/20/20 18:45


 8/11/20 06:14   


 


 


 Folic Acid


  (Folate)  5 mg  DAILY


 ORAL


   5/27/20 09:00


 6/24/20 09:44  5/28/20 08:58


 


 


 Ondansetron HCl


  (Zofran)  4 mg  Q6H  PRN


 IVP


 Nausea & Vomiting  5/20/20 19:00


 6/17/20 18:59  5/25/20 21:42


 


 


 Potassium Chloride


  (K-Dur)  20 meq  TWICE A  DAY


 ORAL


   5/26/20 09:00


 8/24/20 08:59  5/28/20 09:00


 











Assessment/Plan


Assessment/Plan


IMPRESSION:


1. Gastric mass.S/p laparotomy


2. Severe anemia. Corrected


3. Pulmonary embolism.





DISCUSSION:  





S/p IVC filter.  S/p blood transfusion, IV fluids.  


S/p ex lap, left colectomy, partial gastrectomy, splenectomy, distal 

pancreatectomy, omentectomy 


and  open lysis of adhesions 


I will follow carefully.


Extubated 5/19/20


COVID 19 pcr negative


Currently on RA


DC home

















  ______________________________________________


  MARIANELA Downs Omar Syed MD May 28, 2020 12:24

## 2020-05-28 NOTE — NUR
NURSE NOTES:

Report received from Stefany RN, rounds made. Patient resting in semi-fowlers position in bed. 
AOx4, calm. No distress on RA, respirations even/unlabored. RH heplock intact. Appetite 
fair, no NV. Abdomen ALISHA, surgical site almost completely healed, ALLISON site gauze in place. 
Air pressure mattress in place. Will assess skin, provide skin care and reposition. Call 
light in reach, bed in lowest position, will continue to monitor.

## 2020-05-28 NOTE — INFECTIOUS DISEASES PROG NOTE
Assessment/Plan


Assessment/Plan








Assessment:


Severe Sepsis


Enterobacter bacteremia- likely from GI source- SP Rx


S. epi bacteremia, contaminant


GGO on lung bases- COVID19 neg x2


    -5/21 CTA chest: STUDY POSITIVE FOR BILATERAL PULMONARY EMBOLISM. BILATERAL 

PLEURAL EFFUSIONS WITH DEPENDENT INFILTRATES/CONSOLIDATION IN THE LUNG


BASES.POSTOPERATIVE CHANGES IN THE UPPER ABDOMEN WITH ANASTOMOTIC SUTURES LEFT 

UPPER QUADRANT AND MIDLINE SKIN STAPLES. FREE FLUID AND SCATTERED SMALL FOCI OF 

FREE AIR ALSO NOTED IN LEFT UPPER QUADRANT FROM RECENT ABDOMINAL SURGERY. 

HEPATIC CYST AND GRANULOMA.


   -5/19 CXR:  Left retrocardiac consolidation and left pleural effusion.


  -5/15 SARS-COV2 PCR neg


  -5/12 Bcx 1/4 S.epi, 2/4 E. aerogenes (pan S); 5/13 Bcx Neg


            CXR: no acute disease


            SARS-COV2 PCR





Afebrile


Leukocytosis; much improved, mild increased today 5/28 5/20 u/a wbc 10-15, nit neg, leuk +2


           Bcx  neg





Severe anemia (blood loss)/GIB likely 2ry to gastric mass


Thrombocytopenia, worsening


  -5/18 SP ex lap, left colectomy, partial gastrectomy, splenectomy, distal 

pancreatectomy, omentectomy , open lysis of adhesions


          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 

invading spleen, posterior stomach, pancreatic tail, invasive, likely seemingly 

perforated.


                       --path: invasive adenocarcinoma, moderately 

differentiated





  -CT abd/p:   Centrally necrotic mass arising from the gastric fundus 

measuring 12. 6 x 11.1 x 14 cm.  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  

Gastric adenocarcinoma, lymphoma, and other neoplastic etiologies are also in 

the differential. Filling defects within pulmonary vessels at the lung bases 

with small peripheral airspace opacities concerning for pulmonary emboli and 

small pulmonary infarcts.








Post-op ileus


  -5/22 kUB:  PROBABLE POSTOPERATIVE ILEUS.


 








Mild AST elevation





Lactic acidosis, SP


   -5/12 u/a neg; ucx 30-40K mixed urogenital contaminants





Diarrhea


    -Cdiff,stool cx neg





Syncopal episode- likely due to severe dehydration and anemia


  -CT head: No acute findings in the head/brain.





Acute PE/DVT -started on Eliquis 5/26


  -sp IVC filter placement 5/13 (retrievable- given presence of bacteremia)





PABLO, SP





HTN





Plan:


  


- Continue to monitor off abx unless febrile, increasing wbc and/or HD unstable


      -5/27 SP Zyvox #7


      -5/25 SP Meropenem #6


      -5/23 SP Micafungin #5


      -5/20 SP Cefepime #6, Flagyl #6


      - 5/15/20 SP Zosyn #4 and Vancomycin #4





-f/u cx


-Monitor CBC/CMP, temperature


-aspiration precautions


-COVID19 neg x2


-Sx,pulm, Heme/onc, GI f/u


-wound care per surgical team


-u/a w/ reflex, CXR am








Thank you for consulting [facility]. Will continue to follow along wiht 

you.





Discussed with RN, Dr Fischer and .





Subjective


Allergies:  


Coded Allergies:  


     No Known Allergies (Unverified , 5/12/20)


Subjective


afebrile


at RA


leukocytosis mildly increased


sutures and ALLISON drain removed yesterday





Objective


Vital Signs





Last 24 Hour Vital Signs








  Date Time  Temp Pulse Resp B/P (MAP) Pulse Ox O2 Delivery O2 Flow Rate FiO2


 


5/28/20 16:02 98.2 118 21 138/94 (109) 96   


 


5/28/20 12:00 97.9 105 21 120/81 (94) 96   


 


5/28/20 09:00      Room Air  





      Room Air  


 


5/28/20 08:00 98.1 97 19 138/81 (100) 97   


 


5/28/20 04:00 97.9 96 19 147/84 (105) 98   


 


5/28/20 00:00 98.1 94 19 141/84 (103) 97   


 


5/27/20 21:00      Room Air  





      Room Air  


 


5/27/20 20:00 97.8 92 19 137/91 (106) 96   








Height (Feet):  5


Height (Inches):  7.00


Weight (Pounds):  92


Objective


HEAD AND NECK:  No JVD.


LUNGS:  Decreased breath sounds.


CARDIOVASCULAR:  Regular S1 and S2.  Tachycardic.


ABDOMEN: Post op dressings in place, ALLISON drain


EXTREMITIES:  No pitting edema.





Laboratory Tests








Test


  5/28/20


04:50


 


White Blood Count


  14.0 K/UL


(4.8-10.8)  H


 


Red Blood Count


  3.50 M/UL


(4.70-6.10)  L


 


Hemoglobin


  9.8 G/DL


(14.2-18.0)  L


 


Hematocrit


  29.3 %


(42.0-52.0)  L


 


Mean Corpuscular Volume 84 FL (80-99)  


 


Mean Corpuscular Hemoglobin


  27.9 PG


(27.0-31.0)


 


Mean Corpuscular Hemoglobin


Concent 33.3 G/DL


(32.0-36.0)


 


Red Cell Distribution Width


  14.3 %


(11.6-14.8)


 


Platelet Count


  363 K/UL


(150-450)


 


Mean Platelet Volume


  4.5 FL


(6.5-10.1)  L


 


Neutrophils (%) (Auto)


  80.2 %


(45.0-75.0)  H


 


Lymphocytes (%) (Auto)


  9.6 %


(20.0-45.0)  L


 


Monocytes (%) (Auto)


  8.6 %


(1.0-10.0)


 


Eosinophils (%) (Auto)


  0.9 %


(0.0-3.0)


 


Basophils (%) (Auto)


  0.8 %


(0.0-2.0)


 


Sodium Level


  134 MMOL/L


(136-145)  L


 


Potassium Level


  4.3 MMOL/L


(3.5-5.1)


 


Chloride Level


  102 MMOL/L


()


 


Carbon Dioxide Level


  26 MMOL/L


(21-32)


 


Anion Gap


  7 mmol/L


(5-15)


 


Blood Urea Nitrogen


  4 mg/dL (7-18)


L


 


Creatinine


  0.7 MG/DL


(0.55-1.30)


 


Estimat Glomerular Filtration


Rate > 60 mL/min


(>60)


 


Glucose Level


  86 MG/DL


()


 


Calcium Level


  7.4 MG/DL


(8.5-10.1)  L











Current Medications








 Medications


  (Trade)  Dose


 Ordered  Sig/Marisa


 Route


 PRN Reason  Start Time


 Stop Time Status Last Admin


Dose Admin


 


 Acetaminophen/


 Hydrocodone Bitart


  (Norco 5/325)  1 tab  Q4H  PRN


 ORAL


 Moderate Pain (Pain Scale 4-6)  5/25/20 14:30


 6/1/20 14:29  5/28/20 16:15


 


 


 Apixaban


  (Eliquis)  5 mg  BID


 ORAL


   5/26/20 18:00


 8/24/20 17:59  5/28/20 09:00


 


 


 Dextrose


  (Dextrose 50%)  25 ml  Q30M  PRN


 IV


 Hypoglycemia  5/20/20 18:45


 8/11/20 06:14   


 


 


 Dextrose


  (Dextrose 50%)  50 ml  Q30M  PRN


 IV


 Hypoglycemia  5/20/20 18:45


 8/11/20 06:14   


 


 


 Folic Acid


  (Folate)  5 mg  DAILY


 ORAL


   5/27/20 09:00


 6/24/20 09:44  5/28/20 08:58


 


 


 Ondansetron HCl


  (Zofran)  4 mg  Q6H  PRN


 IVP


 Nausea & Vomiting  5/20/20 19:00


 6/17/20 18:59  5/25/20 21:42


 


 


 Potassium Chloride


  (K-Dur)  20 meq  TWICE A  DAY


 ORAL


   5/26/20 09:00


 8/24/20 08:59  5/28/20 09:00


 

















Alexa Gonzalez M.D. May 28, 2020 16:54

## 2020-05-29 VITALS — DIASTOLIC BLOOD PRESSURE: 81 MMHG | SYSTOLIC BLOOD PRESSURE: 121 MMHG

## 2020-05-29 VITALS — SYSTOLIC BLOOD PRESSURE: 135 MMHG | DIASTOLIC BLOOD PRESSURE: 90 MMHG

## 2020-05-29 VITALS — DIASTOLIC BLOOD PRESSURE: 86 MMHG | SYSTOLIC BLOOD PRESSURE: 143 MMHG

## 2020-05-29 VITALS — SYSTOLIC BLOOD PRESSURE: 137 MMHG | DIASTOLIC BLOOD PRESSURE: 82 MMHG

## 2020-05-29 VITALS — SYSTOLIC BLOOD PRESSURE: 140 MMHG | DIASTOLIC BLOOD PRESSURE: 82 MMHG

## 2020-05-29 LAB
ADD MANUAL DIFF: NO
ALBUMIN SERPL-MCNC: 1.3 G/DL (ref 3.4–5)
ALBUMIN/GLOB SERPL: 0.3 {RATIO} (ref 1–2.7)
ALP SERPL-CCNC: 73 U/L (ref 46–116)
ALT SERPL-CCNC: 13 U/L (ref 12–78)
ANION GAP SERPL CALC-SCNC: 7 MMOL/L (ref 5–15)
AST SERPL-CCNC: 25 U/L (ref 15–37)
BASOPHILS NFR BLD AUTO: 0.5 % (ref 0–2)
BILIRUB SERPL-MCNC: 0.1 MG/DL (ref 0.2–1)
BUN SERPL-MCNC: 6 MG/DL (ref 7–18)
CALCIUM SERPL-MCNC: 7.4 MG/DL (ref 8.5–10.1)
CHLORIDE SERPL-SCNC: 103 MMOL/L (ref 98–107)
CO2 SERPL-SCNC: 25 MMOL/L (ref 21–32)
CREAT SERPL-MCNC: 0.6 MG/DL (ref 0.55–1.3)
EOSINOPHIL NFR BLD AUTO: 0.5 % (ref 0–3)
ERYTHROCYTE [DISTWIDTH] IN BLOOD BY AUTOMATED COUNT: 14.6 % (ref 11.6–14.8)
GLOBULIN SER-MCNC: 4.3 G/DL
HCT VFR BLD CALC: 28.6 % (ref 42–52)
HGB BLD-MCNC: 9.8 G/DL (ref 14.2–18)
LYMPHOCYTES NFR BLD AUTO: 9 % (ref 20–45)
MCV RBC AUTO: 83 FL (ref 80–99)
MONOCYTES NFR BLD AUTO: 7.7 % (ref 1–10)
NEUTROPHILS NFR BLD AUTO: 82.3 % (ref 45–75)
PHOSPHATE SERPL-MCNC: 2.8 MG/DL (ref 2.5–4.9)
PLATELET # BLD: 371 K/UL (ref 150–450)
POTASSIUM SERPL-SCNC: 4.4 MMOL/L (ref 3.5–5.1)
RBC # BLD AUTO: 3.46 M/UL (ref 4.7–6.1)
SODIUM SERPL-SCNC: 134 MMOL/L (ref 136–145)
WBC # BLD AUTO: 16.1 K/UL (ref 4.8–10.8)

## 2020-05-29 RX ADMIN — APIXABAN SCH MG: 5 TABLET, FILM COATED ORAL at 17:44

## 2020-05-29 RX ADMIN — HYDROCODONE BITARTRATE AND ACETAMINOPHEN PRN TAB: 5; 325 TABLET ORAL at 06:50

## 2020-05-29 RX ADMIN — APIXABAN SCH MG: 5 TABLET, FILM COATED ORAL at 10:08

## 2020-05-29 RX ADMIN — HYDROCODONE BITARTRATE AND ACETAMINOPHEN PRN TAB: 5; 325 TABLET ORAL at 17:45

## 2020-05-29 NOTE — NUR
*-*DISCHARGE PLANNING*-*



PATIENT HAS BEEN REFERRED TO:



[facility]

 P: [phone]

## 2020-05-29 NOTE — NUR
NURSE NOTES:

Report received from Shea RN, rounds made. Patient resting in semi-fowlers position in 
bed. AOx4, calm. No distress on RA, respirations even/unlabored. Lower back pain 5/10, 
advised patient on repositioning every 2 hours with assistance, for pressure ulcer 
prevention, patient states he prefers lay on back. RH heplock intact. Appetite good, no NV. 
Abdomen ALISHA, surgical site almost completely healed. P200 mattress in place. Will assess 
skin, provide skin care and reposition. Call light in reach, bed in lowest position, will 
continue to monitor.

## 2020-05-29 NOTE — NUR
*-*DISCHARGE PLANNED*-*



PATIENT HAS BEEN ACCEPTED AND WILL BE DISCHARGED TO



[facility]

 P: [phone]  FOR NURSE TO NURSE REPORT

ROOM# 17.A half-way

[facility] SET FOR 6:30PM S/W CLEM 

S/W PATIENTS KATARZYNA BARBER AND NAWAF BURKS, WHO IS IN AGREEMENT WITH DISCHARGE 
PLAN.

## 2020-05-29 NOTE — HEMATOLOGY/ONC PROGRESS NOTE
Assessment/Plan


Assessment/Plan


Assessment and Recs


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 

stomach, pancreas stomach and COLON PRIMARY and path staging [id number] --> had 0/27 

lymph nodes that are noted


--> CT shows    Centrally necrotic mass arising from the gastric fundus 

measuring 12. 6 x 11.1 x 14 cm. Filling defects within pulmonary vessels at the 

lung bases with small peripheral airspace opacities concerning for pulmonary 

emboli and small pulmonary infarcts.


--> resection was completed by Dr. Fischer, he is recovering well


--> pathology reviewed


--> needs a pet/ct scan outpatient


--> adjuvant chemotherapy for residual tumor (+ margin on one side)


--> dw him and he will come in outpatient


--> 5/22 abd xr: PROBABLE POSTOPERATIVE ILEUS


# Pulmonary embolism -- likely related to hypercoagulable disorder from 

malignancys/p ivc filter


--> case dw pcp, surg, and pulm


--> is not a candidate for anticoagulation given severe anemia and low platelets


--> 5/13 s/p ivc filter placement


--> 5/19 cxr: Left retrocardiac consolidation and left pleural effusion


--> no bleeding noted, continue eliquis po


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


--> have ordered for repeat prbc transfusions


--> hgb goal is >7


--> have reviewed anemia panel


--> gi to access in re to biopsy/endoscopy


--> IV IRON completed 


--> hgb trend: 8.6-->9.7-->8-->7.7 -->9->9.3->9.8


# Leukocytosis is likely reactive process from cancer


--> on abx as needed


--> smear is noted


--> meds are reviewed


--> vanc/zosyn-->cefepime/flagyl-->mekhi/micaf/linezolid


--> wbc trend: 21.6-->18-->24.1-->26.7-->20.2-->16.6 


--> covid negative x2 since admission 


# Diarrhea


--> c diff negative 


# PABLO, improving


# HTN


# Dvt ppx


--> eliquis po 





The timing of this note does not necessarily reflect the time of the patient 

was seen.





Greatly appreciate consultation.





Subjective


Constitutional:  Denies: no symptoms, chills, fever, malaise, weakness, other


HEENT:  Denies: no symptoms, eye pain, blurred vision, tearing, double vision, 

ear pain, ear discharge, nose pain, nose congestion, throat pain, throat 

swelling, mouth pain, mouth swelling, other


Cardiovascular:  Denies: no symptoms, chest pain, edema, irregular heart rate, 

lightheadedness, palpitations, syncope, other


Gastrointestinal/Abdominal:  Denies: no symptoms, abdomen distended, abdominal 

pain, black stools, tarry stools, blood in stool, constipated, diarrhea, 

difficulty swallowing, nausea, poor appetite, poor fluid intake, rectal bleeding

, vomiting, other


Genitourinary:  Denies: no symptoms, burning, discharge, frequency, flank pain, 

hematuria, incontinence, pain, urgency, other


Neurologic/Psychiatric:  Denies: no symptoms, anxiety, depressed, emotional 

problems, headache, numbness, paresthesia, pre-existing deficit, seizure, 

tingling, tremors, weakness, other


Endocrine:  Denies: no symptoms, excessive sweating, flushing, intolerance to 

cold, intolerance to heat, increased hunger, increased thirst, increased urine, 

unexplained weight gain, unexplained weight loss, other


Hematologic/Lymphatic:  Denies: no symptoms, anemia, easy bleeding, easy 

bruising, adenopathy, other


Allergies:  


Coded Allergies:  


     No Known Allergies (Unverified , 5/12/20)


Subjective


5/14 labs are noted, no bleeding, meds reviewed, for egd when iso off


5/15 icu, no overnight events, alert, iv iron, c diff negative 


5/17 on tele, wbc improving, nc, tarango, no distress 


5/18 for surgery this am, have ordered for ffp and vit k, seen by surg, 

anesthesia


5/19 icu, wbc 24.1, iv abx, afebrile, vent, no acute distress 


5/20 refusing scd's, labs reviewed, no sob, hr 105 


5/21 transferred to tele, alert, room air, hgb 7.7 


5/22 is feeling better, dw him re outpatient chemo and he has agreed to come in


5/23 blood cx neg. x2, room air, low k, h/h stable 


5/24 labs reviewed, hgb 9.3, no hemolysis is seen, comfortable


5/25 labs have been noted, no bleeding, path report reviewed, less abd pain


5/26 dw surgery, will dc heparins sq and start on eliquis


5/29 no events, no bleeding on eliquis, dw pcp





Objective


Objective





Current Medications








 Medications


  (Trade)  Dose


 Ordered  Sig/Marisa


 Route


 PRN Reason  Start Time


 Stop Time Status Last Admin


Dose Admin


 


 Acetaminophen/


 Hydrocodone Bitart


  (Norco 5/325)  1 tab  Q4H  PRN


 ORAL


 Moderate Pain (Pain Scale 4-6)  5/25/20 14:30


 6/1/20 14:29  5/29/20 06:50


 


 


 Apixaban


  (Eliquis)  5 mg  BID


 ORAL


   5/26/20 18:00


 8/24/20 17:59  5/29/20 10:08


 


 


 Dextrose


  (Dextrose 50%)  25 ml  Q30M  PRN


 IV


 Hypoglycemia  5/20/20 18:45


 8/11/20 06:14   


 


 


 Dextrose


  (Dextrose 50%)  50 ml  Q30M  PRN


 IV


 Hypoglycemia  5/20/20 18:45


 8/11/20 06:14   


 


 


 Folic Acid


  (Folate)  5 mg  DAILY


 ORAL


   5/27/20 09:00


 6/24/20 09:44  5/29/20 10:09


 


 


 Magnesium Sulfate  100 ml @ 


 100 mls/hr  Q1H


 IVPB


   5/29/20 10:30


 5/29/20 14:29  5/29/20 11:25


 


 


 Ondansetron HCl


  (Zofran)  4 mg  Q6H  PRN


 IVP


 Nausea & Vomiting  5/20/20 19:00


 6/17/20 18:59  5/25/20 21:42


 


 


 Potassium Chloride


  (K-Dur)  20 meq  TWICE A  DAY


 ORAL


   5/26/20 09:00


 8/24/20 08:59  5/29/20 10:09


 











Last 24 Hour Vital Signs








  Date Time  Temp Pulse Resp B/P (MAP) Pulse Ox O2 Delivery O2 Flow Rate FiO2


 


5/29/20 09:00      Room Air  





      Room Air  


 


5/29/20 08:00 98.4 80 20 137/82 (100) 97   


 


5/29/20 04:00 98.9 96 18 143/86 (105) 97   


 


5/29/20 00:00 98.9 96 18 140/82 (101) 98   


 


5/28/20 21:00      Room Air  





      Room Air  


 


5/28/20 20:00 99.0 103 18 133/94 (107) 97   


 


5/28/20 16:02 98.2 118 21 138/94 (109) 96   


 


5/28/20 12:00 97.9 105 21 120/81 (94) 96   


 


5/28/20 09:00      Room Air  





      Room Air  


 


5/28/20 08:00 98.1 97 19 138/81 (100) 97   


 


5/28/20 04:00 97.9 96 19 147/84 (105) 98   


 


5/28/20 00:00 98.1 94 19 141/84 (103) 97   


 


5/27/20 21:00      Room Air  





      Room Air  


 


5/27/20 20:00 97.8 92 19 137/91 (106) 96   


 


5/27/20 16:00 98.3 100 19 146/96 (113) 99   


 


5/27/20 12:00 98.2 104 20 128/85 (99) 96   

















Intake and Output  


 


 5/28/20 5/29/20





 19:00 07:00


 


Intake Total 400 ml 


 


Output Total  800 ml


 


Balance 400 ml -800 ml


 


  


 


Intake Oral 400 ml 


 


Output Urine Total  800 ml


 


# Voids 3 3


 


# Bowel Movements  2











Labs








Test


  5/27/20


04:50 5/28/20


04:50 5/28/20


17:25 5/29/20


06:50


 


White Blood Count


  11.8 K/UL


(4.8-10.8) 14.0 K/UL


(4.8-10.8) 


  16.1 K/UL


(4.8-10.8)


 


Red Blood Count


  3.21 M/UL


(4.70-6.10) 3.50 M/UL


(4.70-6.10) 


  3.46 M/UL


(4.70-6.10)


 


Hemoglobin


  8.9 G/DL


(14.2-18.0) 9.8 G/DL


(14.2-18.0) 


  9.8 G/DL


(14.2-18.0)


 


Hematocrit


  27.0 %


(42.0-52.0) 29.3 %


(42.0-52.0) 


  28.6 %


(42.0-52.0)


 


Mean Corpuscular Volume 84 FL (80-99)  84 FL (80-99)   83 FL (80-99) 


 


Mean Corpuscular Hemoglobin


  27.8 PG


(27.0-31.0) 27.9 PG


(27.0-31.0) 


  28.3 PG


(27.0-31.0)


 


Mean Corpuscular Hemoglobin


Concent 33.0 G/DL


(32.0-36.0) 33.3 G/DL


(32.0-36.0) 


  34.2 G/DL


(32.0-36.0)


 


Red Cell Distribution Width


  14.3 %


(11.6-14.8) 14.3 %


(11.6-14.8) 


  14.6 %


(11.6-14.8)


 


Platelet Count


  353 K/UL


(150-450) 363 K/UL


(150-450) 


  371 K/UL


(150-450)


 


Mean Platelet Volume


  4.5 FL


(6.5-10.1) 4.5 FL


(6.5-10.1) 


  4.3 FL


(6.5-10.1)


 


Neutrophils (%) (Auto)


  79.8 %


(45.0-75.0) 80.2 %


(45.0-75.0) 


  82.3 %


(45.0-75.0)


 


Lymphocytes (%) (Auto)


  10.0 %


(20.0-45.0) 9.6 %


(20.0-45.0) 


  9.0 %


(20.0-45.0)


 


Monocytes (%) (Auto)


  8.6 %


(1.0-10.0) 8.6 %


(1.0-10.0) 


  7.7 %


(1.0-10.0)


 


Eosinophils (%) (Auto)


  1.0 %


(0.0-3.0) 0.9 %


(0.0-3.0) 


  0.5 %


(0.0-3.0)


 


Basophils (%) (Auto)


  0.7 %


(0.0-2.0) 0.8 %


(0.0-2.0) 


  0.5 %


(0.0-2.0)


 


Sodium Level


  137 MMOL/L


(136-145) 134 MMOL/L


(136-145) 


  134 MMOL/L


(136-145)


 


Potassium Level


  3.9 MMOL/L


(3.5-5.1) 4.3 MMOL/L


(3.5-5.1) 


  4.4 MMOL/L


(3.5-5.1)


 


Chloride Level


  105 MMOL/L


() 102 MMOL/L


() 


  103 MMOL/L


()


 


Carbon Dioxide Level


  26 MMOL/L


(21-32) 26 MMOL/L


(21-32) 


  25 MMOL/L


(21-32)


 


Anion Gap


  6 mmol/L


(5-15) 7 mmol/L


(5-15) 


  7 mmol/L


(5-15)


 


Blood Urea Nitrogen 5 mg/dL (7-18)  4 mg/dL (7-18)   6 mg/dL (7-18) 


 


Creatinine


  0.6 MG/DL


(0.55-1.30) 0.7 MG/DL


(0.55-1.30) 


  0.6 MG/DL


(0.55-1.30)


 


Estimat Glomerular Filtration


Rate > 60 mL/min


(>60) > 60 mL/min


(>60) 


  > 60 mL/min


(>60)


 


Glucose Level


  90 MG/DL


() 86 MG/DL


() 


  96 MG/DL


()


 


Calcium Level


  7.3 MG/DL


(8.5-10.1) 7.4 MG/DL


(8.5-10.1) 


  7.4 MG/DL


(8.5-10.1)


 


Phosphorus Level


  2.2 MG/DL


(2.5-4.9) 


  


  2.8 MG/DL


(2.5-4.9)


 


Magnesium Level


  1.8 MG/DL


(1.8-2.4) 


  


  1.7 MG/DL


(1.8-2.4)


 


Total Bilirubin


  0.2 MG/DL


(0.2-1.0) 


  


  0.1 MG/DL


(0.2-1.0)


 


Aspartate Amino Transf


(AST/SGOT) 21 U/L (15-37) 


  


  


  25 U/L (15-37) 


 


 


Alanine Aminotransferase


(ALT/SGPT) 14 U/L (12-78) 


  


  


  13 U/L (12-78) 


 


 


Alkaline Phosphatase


  61 U/L


() 


  


  73 U/L


()


 


C-Reactive Protein,


Quantitative 14.0 mg/dL


(0.00-0.90) 


  


  14.0 mg/dL


(0.00-0.90)


 


Total Protein


  5.0 G/DL


(6.4-8.2) 


  


  5.6 G/DL


(6.4-8.2)


 


Albumin


  1.2 G/DL


(3.4-5.0) 


  


  1.3 G/DL


(3.4-5.0)


 


Globulin 3.8 g/dL    4.3 g/dL 


 


Albumin/Globulin Ratio 0.3 (1.0-2.7)    0.3 (1.0-2.7) 


 


Urine Color   Yellow  


 


Urine Appearance   Clear  


 


Urine pH   7 (4.5-8.0)  


 


Urine Specific Gravity


  


  


  1.010


(1.005-1.035) 


 


 


Urine Protein


  


  


  Negative


(NEGATIVE) 


 


 


Urine Glucose (UA)


  


  


  Negative


(NEGATIVE) 


 


 


Urine Ketones


  


  


  Negative


(NEGATIVE) 


 


 


Urine Blood


  


  


  Negative


(NEGATIVE) 


 


 


Urine Nitrite


  


  


  Negative


(NEGATIVE) 


 


 


Urine Bilirubin


  


  


  Negative


(NEGATIVE) 


 


 


Urine Urobilinogen


  


  


  8 MG/DL


(0.0-1.0) 


 


 


Urine Leukocyte Esterase


  


  


  Negative


(NEGATIVE) 


 


 


Pro-B-Type Natriuretic Peptide


  


  


  


  388 pg/mL


(0-125)








Height (Feet):  5


Height (Inches):  7.00


Weight (Pounds):  198


Objective


PE


General Appearance:  alert, mild distress


Head:  normocephalic, atraumatic


Eyes:  bilateral eye PERRL, bilateral eye EOMI


ENT:  uvula midline, dry mucus membranes


Neck:  supple, thyroid normal, supple/symm/no masses


Respiratory:  lungs clear, no respiratory distress, no retraction


Cardiovascular:  normal peripheral pulses, no edema


Gastrointestinal:  non tender, soft, no guarding, no rebound


Musculoskeletal:  normal inspection


Neurologic:  alert, oriented x3


Psychiatric:  mood/affect normal


Skin:  no rash, warm/dry











Jersey Ross MD May 29, 2020 12:00

## 2020-05-29 NOTE — NUR
HAND-OFF: 

Report given to JOSIAH Naidu. Pt is awake and in stable condition. Plan of care endorsed.

## 2020-05-29 NOTE — INFECTIOUS DISEASES PROG NOTE
Assessment/Plan


Assessment/Plan








Assessment:


Severe Sepsis


Enterobacter bacteremia- likely from GI source- SP Rx


S. epi bacteremia, contaminant


GGO on lung bases- COVID19 neg x2


    -5/21 CTA chest: STUDY POSITIVE FOR BILATERAL PULMONARY EMBOLISM. BILATERAL 

PLEURAL EFFUSIONS WITH DEPENDENT INFILTRATES/CONSOLIDATION IN THE LUNG


BASES.POSTOPERATIVE CHANGES IN THE UPPER ABDOMEN WITH ANASTOMOTIC SUTURES LEFT 

UPPER QUADRANT AND MIDLINE SKIN STAPLES. FREE FLUID AND SCATTERED SMALL FOCI OF 

FREE AIR ALSO NOTED IN LEFT UPPER QUADRANT FROM RECENT ABDOMINAL SURGERY. 

HEPATIC CYST AND GRANULOMA.


   -5/19 CXR:  Left retrocardiac consolidation and left pleural effusion.


  -5/15 SARS-COV2 PCR neg


  -5/12 Bcx 1/4 S.epi, 2/4 E. aerogenes (pan S); 5/13 Bcx Neg


            CXR: no acute disease


            SARS-COV2 PCR





Afebrile


Leukocytosis; much improved, now slowly increasing- probable aspiration 

pneumonitis given RLL (had completed 15 days of antibiotics)


    -5/29 CXR:  Hazy right infrahilar opacity, could reflect infiltrate or 

could also represent some posteriorly layering pleural fluid given findings of 

right pleural effusion recent chest CT Interim tube removal as described


    -5/28 u/a neg


   5/20 u/a wbc 10-15, nit neg, leuk +2


           Bcx  neg





Severe anemia (blood loss)/GIB likely 2ry to gastric mass


Thrombocytopenia, worsening


  -5/18 SP ex lap, left colectomy, partial gastrectomy, splenectomy, distal 

pancreatectomy, omentectomy , open lysis of adhesions


          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 

invading spleen, posterior stomach, pancreatic tail, invasive, likely seemingly 

perforated.


                       --path: invasive adenocarcinoma, moderately 

differentiated





  -CT abd/p:   Centrally necrotic mass arising from the gastric fundus 

measuring 12. 6 x 11.1 x 14 cm.  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  

Gastric adenocarcinoma, lymphoma, and other neoplastic etiologies are also in 

the differential. Filling defects within pulmonary vessels at the lung bases 

with small peripheral airspace opacities concerning for pulmonary emboli and 

small pulmonary infarcts.








Post-op ileus


  -5/22 kUB:  PROBABLE POSTOPERATIVE ILEUS.


 








Mild AST elevation





Lactic acidosis, SP


   -5/12 u/a neg; ucx 30-40K mixed urogenital contaminants





Diarrhea


    -Cdiff,stool cx neg





Syncopal episode- likely due to severe dehydration and anemia


  -CT head: No acute findings in the head/brain.





Acute PE/DVT -started on Eliquis 5/26


  -sp IVC filter placement 5/13 (retrievable- given presence of bacteremia)





PABLO, SP





HTN





Plan:


  


- Continue to monitor off abx  as patient just completed a total of 15 days of 

antibiotics, is afebrile, HD stable, abdominal exam benign, at , surgical 

incisions healing well. Suspect wbc could be reactive and CXR possibly showing 

aspiration pneumonitis. 


     -Recommend repeating CBC in 3 days at the rehab facility





      -5/27 SP Zyvox #7


      -5/25 SP Meropenem #6


      -5/23 SP Micafungin #5


      -5/20 SP Cefepime #6, Flagyl #6


      - 5/15/20 SP Zosyn #4 and Vancomycin #4





-f/u cx


-Monitor CBC/CMP, temperature


-aspiration precautions


-COVID19 neg x2


-Sx,pulm, Heme/onc, GI f/u


-wound care per surgical team











Thank you for consulting [facility]. Will continue to follow along wiht 

you.





Discussed with RN, Dr Fischer..





Subjective


Allergies:  


Coded Allergies:  


     No Known Allergies (Unverified , 5/12/20)


Subjective


afebrile


at RA


leukocytosis increased





Objective


Vital Signs





Last 24 Hour Vital Signs








  Date Time  Temp Pulse Resp B/P (MAP) Pulse Ox O2 Delivery O2 Flow Rate FiO2


 


5/29/20 12:00 98.1 97 21 121/81 (94) 97   


 


5/29/20 09:00      Room Air  





      Room Air  


 


5/29/20 08:00 98.4 80 20 137/82 (100) 97   


 


5/29/20 04:00 98.9 96 18 143/86 (105) 97   


 


5/29/20 00:00 98.9 96 18 140/82 (101) 98   


 


5/28/20 21:00      Room Air  





      Room Air  


 


5/28/20 20:00 99.0 103 18 133/94 (107) 97   








Height (Feet):  5


Height (Inches):  7.00


Weight (Pounds):  198


Objective


HEAD AND NECK:  No JVD.


LUNGS:  Decreased breath sounds.


CARDIOVASCULAR:  Regular S1 and S2.  


ABDOMEN: surgical incisions healing, no signs of infection


EXTREMITIES:  No pitting edema.





Laboratory Tests








Test


  5/28/20


17:25 5/29/20


06:50


 


Urine Color Yellow   


 


Urine Appearance Clear   


 


Urine pH 7 (4.5-8.0)   


 


Urine Specific Gravity


  1.010


(1.005-1.035) 


 


 


Urine Protein


  Negative


(NEGATIVE) 


 


 


Urine Glucose (UA)


  Negative


(NEGATIVE) 


 


 


Urine Ketones


  Negative


(NEGATIVE) 


 


 


Urine Blood


  Negative


(NEGATIVE) 


 


 


Urine Nitrite


  Negative


(NEGATIVE) 


 


 


Urine Bilirubin


  Negative


(NEGATIVE) 


 


 


Urine Urobilinogen


  8 MG/DL


(0.0-1.0)  H 


 


 


Urine Leukocyte Esterase


  Negative


(NEGATIVE) 


 


 


White Blood Count


  


  16.1 K/UL


(4.8-10.8)  H


 


Red Blood Count


  


  3.46 M/UL


(4.70-6.10)  L


 


Hemoglobin


  


  9.8 G/DL


(14.2-18.0)  L


 


Hematocrit


  


  28.6 %


(42.0-52.0)  L


 


Mean Corpuscular Volume  83 FL (80-99)  


 


Mean Corpuscular Hemoglobin


  


  28.3 PG


(27.0-31.0)


 


Mean Corpuscular Hemoglobin


Concent 


  34.2 G/DL


(32.0-36.0)


 


Red Cell Distribution Width


  


  14.6 %


(11.6-14.8)


 


Platelet Count


  


  371 K/UL


(150-450)


 


Mean Platelet Volume


  


  4.3 FL


(6.5-10.1)  L


 


Neutrophils (%) (Auto)


  


  82.3 %


(45.0-75.0)  H


 


Lymphocytes (%) (Auto)


  


  9.0 %


(20.0-45.0)  L


 


Monocytes (%) (Auto)


  


  7.7 %


(1.0-10.0)


 


Eosinophils (%) (Auto)


  


  0.5 %


(0.0-3.0)


 


Basophils (%) (Auto)


  


  0.5 %


(0.0-2.0)


 


Sodium Level


  


  134 MMOL/L


(136-145)  L


 


Potassium Level


  


  4.4 MMOL/L


(3.5-5.1)


 


Chloride Level


  


  103 MMOL/L


()


 


Carbon Dioxide Level


  


  25 MMOL/L


(21-32)


 


Anion Gap


  


  7 mmol/L


(5-15)


 


Blood Urea Nitrogen


  


  6 mg/dL (7-18)


L


 


Creatinine


  


  0.6 MG/DL


(0.55-1.30)


 


Estimat Glomerular Filtration


Rate 


  > 60 mL/min


(>60)


 


Glucose Level


  


  96 MG/DL


()


 


Calcium Level


  


  7.4 MG/DL


(8.5-10.1)  L


 


Phosphorus Level


  


  2.8 MG/DL


(2.5-4.9)


 


Magnesium Level


  


  1.7 MG/DL


(1.8-2.4)  L


 


Total Bilirubin


  


  0.1 MG/DL


(0.2-1.0)  L


 


Aspartate Amino Transf


(AST/SGOT) 


  25 U/L (15-37)


 


 


Alanine Aminotransferase


(ALT/SGPT) 


  13 U/L (12-78)


 


 


Alkaline Phosphatase


  


  73 U/L


()


 


C-Reactive Protein,


Quantitative 


  14.0 mg/dL


(0.00-0.90)  H


 


Pro-B-Type Natriuretic Peptide


  


  388 pg/mL


(0-125)  H


 


Total Protein


  


  5.6 G/DL


(6.4-8.2)  L


 


Albumin


  


  1.3 G/DL


(3.4-5.0)  L


 


Globulin  4.3 g/dL  


 


Albumin/Globulin Ratio


  


  0.3 (1.0-2.7)


L











Current Medications








 Medications


  (Trade)  Dose


 Ordered  Sig/Marisa


 Route


 PRN Reason  Start Time


 Stop Time Status Last Admin


Dose Admin


 


 Acetaminophen/


 Hydrocodone Bitart


  (Norco 5/325)  1 tab  Q4H  PRN


 ORAL


 Moderate Pain (Pain Scale 4-6)  5/25/20 14:30


 6/1/20 14:29  5/29/20 06:50


 


 


 Apixaban


  (Eliquis)  5 mg  BID


 ORAL


   5/26/20 18:00


 8/24/20 17:59  5/29/20 10:08


 


 


 Dextrose


  (Dextrose 50%)  25 ml  Q30M  PRN


 IV


 Hypoglycemia  5/20/20 18:45


 8/11/20 06:14   


 


 


 Dextrose


  (Dextrose 50%)  50 ml  Q30M  PRN


 IV


 Hypoglycemia  5/20/20 18:45


 8/11/20 06:14   


 


 


 Folic Acid


  (Folate)  5 mg  DAILY


 ORAL


   5/27/20 09:00


 6/24/20 09:44  5/29/20 10:09


 


 


 Ondansetron HCl


  (Zofran)  4 mg  Q6H  PRN


 IVP


 Nausea & Vomiting  5/20/20 19:00


 6/17/20 18:59  5/25/20 21:42


 


 


 Potassium Chloride


  (K-Dur)  20 meq  TWICE A  DAY


 ORAL


   5/26/20 09:00


 8/24/20 08:59  5/29/20 10:09


 

















Lisa,Alexa M.D. May 29, 2020 17:39

## 2020-05-29 NOTE — NEPHROLOGY PROGRESS NOTE
Assessment/Plan


Problem List:  


(1) Electrolyte imbalance


(2) Symptomatic anemia


Assessment:  On admission





(3) Gastric mass


(4) Pulmonary embolism


Assessment





Progressive hypokalemia most likely due to GI loss


Gastric mass status post laparotomy, Moderately differentiated adenocarcinoma 

primary COLON CANCER


Previously severe anemia which is corrected with blood transfusion


Pulmonary embolism


S/p ex lap, left colectomy, partial gastrectomy, splenectomy, distal 

pancreatectomy, omentectomy 


Severe sepsis with Enterobacter  bacteremia likely from GI source


COVID-19 negative x2


Hypertension by history


Presented on admission with hypotension due to severe anemia





Plan


Stable from renal standpoint of view for discharge


Magnesium IV ordered today





Previously:


Potassium chloride 40 mEq on IV ordered


Stop IV hydration, 


Continue to monitor electrolytes and chemistries


Medication list reviewed


Further recommendations based on ordered lab results





Subjective


ROS Limited/Unobtainable:  No


Constitutional:  Reports: malaise, weakness





Objective


Objective





Last 24 Hour Vital Signs








  Date Time  Temp Pulse Resp B/P (MAP) Pulse Ox O2 Delivery O2 Flow Rate FiO2


 


5/29/20 04:00 98.9 96 18 143/86 (105) 97   


 


5/29/20 00:00 98.9 96 18 140/82 (101) 98   


 


5/28/20 21:00      Room Air  





      Room Air  


 


5/28/20 20:00 99.0 103 18 133/94 (107) 97   


 


5/28/20 16:02 98.2 118 21 138/94 (109) 96   


 


5/28/20 12:00 97.9 105 21 120/81 (94) 96   

















Intake and Output  


 


 5/28/20 5/29/20





 18:59 06:59


 


Intake Total 400 ml 


 


Output Total  800 ml


 


Balance 400 ml -800 ml


 


  


 


Intake Oral 400 ml 


 


Output Urine Total  800 ml


 


# Voids 3 3


 


# Bowel Movements  2








Laboratory Tests


5/28/20 17:25: 


Urine Color Yellow, Urine Appearance Clear, Urine pH 7, Urine Specific Gravity 

1.010, Urine Protein Negative, Urine Glucose (UA) Negative, Urine Ketones 

Negative, Urine Blood Negative, Urine Nitrite Negative, Urine Bilirubin Negative

, Urine Urobilinogen 8H, Urine Leukocyte Esterase Negative


5/29/20 06:50: 


White Blood Count 16.1H, Red Blood Count 3.46L, Hemoglobin 9.8L, Hematocrit 

28.6L, Mean Corpuscular Volume 83, Mean Corpuscular Hemoglobin 28.3, Mean 

Corpuscular Hemoglobin Concent 34.2, Red Cell Distribution Width 14.6, Platelet 

Count 371, Mean Platelet Volume 4.3L, Neutrophils (%) (Auto) 82.3H, Lymphocytes 

(%) (Auto) 9.0L, Monocytes (%) (Auto) 7.7, Eosinophils (%) (Auto) 0.5, 

Basophils (%) (Auto) 0.5, Sodium Level 134L, Potassium Level 4.4, Chloride 

Level 103, Carbon Dioxide Level 25, Anion Gap 7, Blood Urea Nitrogen 6L, 

Creatinine 0.6, Estimat Glomerular Filtration Rate > 60, Glucose Level 96, 

Calcium Level 7.4L, Phosphorus Level 2.8, Magnesium Level 1.7L, Total Bilirubin 

0.1L, Aspartate Amino Transf (AST/SGOT) 25, Alanine Aminotransferase (ALT/SGPT) 

13, Alkaline Phosphatase 73, C-Reactive Protein, Quantitative 14.0H, Pro-B-Type 

Natriuretic Peptide 388H, Total Protein 5.6L, Albumin 1.3L, Globulin 4.3, 

Albumin/Globulin Ratio 0.3L


Height (Feet):  5


Height (Inches):  7.00


Weight (Pounds):  198


General Appearance:  no apparent distress


Cardiovascular:  tachycardia


Respiratory/Chest:  decreased breath sounds


Abdomen:  distended


Objective


No change











Juan Daniel Mckeon MD May 29, 2020 10:10

## 2020-05-29 NOTE — GENERAL PROGRESS NOTE
Assessment/Plan


Problem List:  


(1) Weak


ICD Codes:  R53.1 - Weakness


SNOMED:  82683317


(2) Malnutrition


ICD Codes:  E46 - Unspecified protein-calorie malnutrition


SNOMED:  34150642


(3) COVID-19 ruled out


ICD Codes:  Z03.818 - Encounter for observation for suspected exposure to other 

biological agents ruled out


SNOMED:  670974608, [id number]


(4) Pulmonary embolism


ICD Codes:  I26.99 - Other pulmonary embolism without acute cor pulmonale


SNOMED:  39035052


Qualifiers:  


   Qualified Codes:  I26.99 - Other pulmonary embolism without acute cor 

pulmonale


(5) GI bleed


ICD Codes:  K92.2 - Gastrointestinal hemorrhage, unspecified


SNOMED:  83469777


Qualifiers:  


   Qualified Codes:  K92.2 - Gastrointestinal hemorrhage, unspecified


(6) Gastric mass


ICD Codes:  K31.89 - Other diseases of stomach and duodenum


SNOMED:  810876849


(7) Syncope


ICD Codes:  R55 - Syncope and collapse


SNOMED:  013169333


Qualifiers:  


   Qualified Codes:  R55 - Syncope and collapse


Status:  stable, progressing


Assessment/Plan:


o2 pulm tx transfuse prn cbc bmp am dc plan





Subjective


Constitutional:  Reports: weakness


Allergies:  


Coded Allergies:  


     No Known Allergies (Unverified , 5/12/20)


All Systems:  reviewed and negative except above


Subjective


sleepy calm





Objective





Last 24 Hour Vital Signs








  Date Time  Temp Pulse Resp B/P (MAP) Pulse Ox O2 Delivery O2 Flow Rate FiO2


 


5/29/20 04:00 98.9 96 18 143/86 (105) 97   


 


5/29/20 00:00 98.9 96 18 140/82 (101) 98   


 


5/28/20 21:00      Room Air  





      Room Air  


 


5/28/20 20:00 99.0 103 18 133/94 (107) 97   


 


5/28/20 16:02 98.2 118 21 138/94 (109) 96   


 


5/28/20 12:00 97.9 105 21 120/81 (94) 96   

















Intake and Output  


 


 5/28/20 5/29/20





 19:00 07:00


 


Intake Total 400 ml 


 


Output Total  800 ml


 


Balance 400 ml -800 ml


 


  


 


Intake Oral 400 ml 


 


Output Urine Total  800 ml


 


# Voids 3 3


 


# Bowel Movements  2








Laboratory Tests


5/28/20 17:25: 


Urine Color Yellow, Urine Appearance Clear, Urine pH 7, Urine Specific Gravity 

1.010, Urine Protein Negative, Urine Glucose (UA) Negative, Urine Ketones 

Negative, Urine Blood Negative, Urine Nitrite Negative, Urine Bilirubin Negative

, Urine Urobilinogen 8H, Urine Leukocyte Esterase Negative


5/29/20 06:50: 


White Blood Count 16.1H, Red Blood Count 3.46L, Hemoglobin 9.8L, Hematocrit 

28.6L, Mean Corpuscular Volume 83, Mean Corpuscular Hemoglobin 28.3, Mean 

Corpuscular Hemoglobin Concent 34.2, Red Cell Distribution Width 14.6, Platelet 

Count 371, Mean Platelet Volume 4.3L, Neutrophils (%) (Auto) 82.3H, Lymphocytes 

(%) (Auto) 9.0L, Monocytes (%) (Auto) 7.7, Eosinophils (%) (Auto) 0.5, 

Basophils (%) (Auto) 0.5, Sodium Level 134L, Potassium Level 4.4, Chloride 

Level 103, Carbon Dioxide Level 25, Anion Gap 7, Blood Urea Nitrogen 6L, 

Creatinine 0.6, Estimat Glomerular Filtration Rate > 60, Glucose Level 96, 

Calcium Level 7.4L, Phosphorus Level 2.8, Magnesium Level 1.7L, Total Bilirubin 

0.1L, Aspartate Amino Transf (AST/SGOT) 25, Alanine Aminotransferase (ALT/SGPT) 

13, Alkaline Phosphatase 73, C-Reactive Protein, Quantitative 14.0H, Pro-B-Type 

Natriuretic Peptide 388H, Total Protein 5.6L, Albumin 1.3L, Globulin 4.3, 

Albumin/Globulin Ratio 0.3L


Height (Feet):  5


Height (Inches):  7.00


Weight (Pounds):  198


General Appearance:  lethargic


EENT:  normal ENT inspection


Neck:  normal alignment


Cardiovascular:  normal peripheral pulses, normal rate, regular rhythm


Respiratory/Chest:  chest wall non-tender, lungs clear, normal breath sounds


Abdomen:  soft, hypoactive bowel sounds


Extremities:  normal inspection


Edema:  no edema noted Arm (L), no edema noted Arm (R), no edema noted Leg (L), 

no edema noted Leg (R), no edema noted Pedal (L), no edema noted Pedal (R), no 

edema noted Generalized


Skin:  normal pigmentation, warm/dry











Tom Pierce ChiWei DO May 29, 2020 09:25

## 2020-05-29 NOTE — DIAGNOSTIC IMAGING REPORT
Indication: Cough

 

Technique: One view of the chest

 

Comparison: 5/19/2020

 

Findings: Previously demonstrated endotracheal and nasogastric tubes are no longer

evident. Previously demonstrated upper abdominal skin staples are no longer evident.

Large left pleural effusion appears slightly increased from the prior study. Hazy

right infrahilar infiltrate which is new or increased since prior exam.

 

Impression: Enlarging arch left pleural effusion

 

Hazy right infrahilar opacity, could reflect infiltrate or could also represent some

posteriorly layering pleural fluid given findings of right pleural effusion recent

chest CT

 

Interim tube removal as described

## 2020-05-29 NOTE — NUR
CASE MANAGEMENT:REVIEW



5/29/20



SI: 5/29 ENLARGING LARGE LEFT PLEURAL EFFUSION 

S/P LENKA PULMONARY EMBOLI    

 PROBABLE POSTOPERATIVE ILEUS

S/P LAPAROTOMY,COLECTOMY,GASTRECTOMY,SPLENECTOMY,PANCREATECTOMY

OMENTECTOMY, LYSIS OF ADHESIONS

+ DVT LLE 5/18

S/P IVC FILTER 5/13

COLON CA

98.9   96   18     143/86   97% ON RA

WBC+16.1  H/H-9.8/28.6  NA+ 134   BUN 6  CA+ 7.4   MG 1.7   [id number] ALBUMIN 1.3



IS: ELIQUIS PO BID

K-DUR PO BID

FOLATE PO QD

NORCO PO Q4HRS PRN 

CHEST X-RAY-  Enlarging arch left pleural effusion





\**: 3E MED SURG UNIT 

DCP: PATIENT IS FROM HOME



PLAN:

AM LABS 

ENCOURAGE AMBULATION 

X-RAY THIS AM

## 2020-05-29 NOTE — PULMONOLOGY PROGRESS NOTE
Subjective


ROS Limited/Unobtainable:  No


Interval Events:  s/p laparotomy 5/18/20; extubated 5/19/20


Constitutional:  Reports: no symptoms


HEENT:  Repors: no symptoms


Respiratory:  Reports: no symptoms


Cardiovascular:  Reports: no symptoms


Gastrointestinal/Abdominal:  Reports: no symptoms


Genitourinary:  Reports: no symptoms


Neurologic:  Reports: no symptoms


Allergies:  


Coded Allergies:  


     No Known Allergies (Unverified , 5/12/20)


All Systems:  reviewed and negative except above





Objective





Last 24 Hour Vital Signs








  Date Time  Temp Pulse Resp B/P (MAP) Pulse Ox O2 Delivery O2 Flow Rate FiO2


 


5/29/20 08:00 98.4 80 20 137/82 (100) 97   


 


5/29/20 04:00 98.9 96 18 143/86 (105) 97   


 


5/29/20 00:00 98.9 96 18 140/82 (101) 98   


 


5/28/20 21:00      Room Air  





      Room Air  


 


5/28/20 20:00 99.0 103 18 133/94 (107) 97   


 


5/28/20 16:02 98.2 118 21 138/94 (109) 96   


 


5/28/20 12:00 97.9 105 21 120/81 (94) 96   

















Intake and Output  


 


 5/28/20 5/29/20





 19:00 07:00


 


Intake Total 400 ml 


 


Output Total  800 ml


 


Balance 400 ml -800 ml


 


  


 


Intake Oral 400 ml 


 


Output Urine Total  800 ml


 


# Voids 3 3


 


# Bowel Movements  2








General Appearance:  no acute distress


HEENT:  normocephalic


Respiratory:  chest wall non-tender, lungs clear


Cardiovascular:  normal peripheral pulses


Abdomen:  non distended


Extremities:  no cyanosis


Laboratory Tests


5/28/20 17:25: 


Urine Color Yellow, Urine Appearance Clear, Urine pH 7, Urine Specific Gravity 

1.010, Urine Protein Negative, Urine Glucose (UA) Negative, Urine Ketones 

Negative, Urine Blood Negative, Urine Nitrite Negative, Urine Bilirubin Negative

, Urine Urobilinogen 8H, Urine Leukocyte Esterase Negative


5/29/20 06:50: 


White Blood Count 16.1H, Red Blood Count 3.46L, Hemoglobin 9.8L, Hematocrit 

28.6L, Mean Corpuscular Volume 83, Mean Corpuscular Hemoglobin 28.3, Mean 

Corpuscular Hemoglobin Concent 34.2, Red Cell Distribution Width 14.6, Platelet 

Count 371, Mean Platelet Volume 4.3L, Neutrophils (%) (Auto) 82.3H, Lymphocytes 

(%) (Auto) 9.0L, Monocytes (%) (Auto) 7.7, Eosinophils (%) (Auto) 0.5, 

Basophils (%) (Auto) 0.5, Sodium Level 134L, Potassium Level 4.4, Chloride 

Level 103, Carbon Dioxide Level 25, Anion Gap 7, Blood Urea Nitrogen 6L, 

Creatinine 0.6, Estimat Glomerular Filtration Rate > 60, Glucose Level 96, 

Calcium Level 7.4L, Phosphorus Level 2.8, Magnesium Level 1.7L, Total Bilirubin 

0.1L, Aspartate Amino Transf (AST/SGOT) 25, Alanine Aminotransferase (ALT/SGPT) 

13, Alkaline Phosphatase 73, C-Reactive Protein, Quantitative 14.0H, Pro-B-Type 

Natriuretic Peptide 388H, Total Protein 5.6L, Albumin 1.3L, Globulin 4.3, 

Albumin/Globulin Ratio 0.3L





Current Medications








 Medications


  (Trade)  Dose


 Ordered  Sig/Marisa


 Route


 PRN Reason  Start Time


 Stop Time Status Last Admin


Dose Admin


 


 Acetaminophen/


 Hydrocodone Bitart


  (Norco 5/325)  1 tab  Q4H  PRN


 ORAL


 Moderate Pain (Pain Scale 4-6)  5/25/20 14:30


 6/1/20 14:29  5/29/20 06:50


 


 


 Apixaban


  (Eliquis)  5 mg  BID


 ORAL


   5/26/20 18:00


 8/24/20 17:59  5/29/20 10:08


 


 


 Dextrose


  (Dextrose 50%)  25 ml  Q30M  PRN


 IV


 Hypoglycemia  5/20/20 18:45


 8/11/20 06:14   


 


 


 Dextrose


  (Dextrose 50%)  50 ml  Q30M  PRN


 IV


 Hypoglycemia  5/20/20 18:45


 8/11/20 06:14   


 


 


 Folic Acid


  (Folate)  5 mg  DAILY


 ORAL


   5/27/20 09:00


 6/24/20 09:44  5/29/20 10:09


 


 


 Magnesium Sulfate  100 ml @ 


 100 mls/hr  Q1H


 IVPB


   5/29/20 10:30


 5/29/20 14:29   


 


 


 Ondansetron HCl


  (Zofran)  4 mg  Q6H  PRN


 IVP


 Nausea & Vomiting  5/20/20 19:00


 6/17/20 18:59  5/25/20 21:42


 


 


 Potassium Chloride


  (K-Dur)  20 meq  TWICE A  DAY


 ORAL


   5/26/20 09:00


 8/24/20 08:59  5/29/20 10:09


 











Assessment/Plan


Assessment/Plan


IMPRESSION:


1. Gastric mass.S/p laparotomy


2. Severe anemia. Corrected


3. Pulmonary embolism.





DISCUSSION:  





S/p IVC filter.  S/p blood transfusion, IV fluids.  


S/p ex lap, left colectomy, partial gastrectomy, splenectomy, distal 

pancreatectomy, omentectomy 


and  open lysis of adhesions 


I will follow carefully.


Extubated 5/19/20


COVID 19 pcr negative


Currently on RA


DC home

















  ______________________________________________


  MARIANELA Downs Omar Syed MD May 29, 2020 10:43

## 2020-05-29 NOTE — CARDIAC ELECTROPHYSIOLOGY PN
Assessment/Plan


Assessment/Plan


1. Hypotension due to profound anemia due to colon mass


     S/P 5 units of blood transfusion. EF NL





2. Profound anemia.  Hemoglobin of 3.5.  CT of abdomen and pelvis shows central 

necrotic mass in the gastric fundus 12 x 14 x 11 cm.  


     FU  by GI and S/P surgery by Dr. Fischer.   


      Large colonic tumor invading spleen, posterior stomach, pancreatic tail 

likely  perforated.


        On solid diet 





3. Bilateral  Pulmonary embolus.  S/P IVC filter. On Eliquis 5 bid





4. Sepsis with elevated white count.  Ruled out for Anamariaid  





QUINCY RN





Subjective


Subjective


 Alert.  No CP or SOB. SNIF still pending





Objective





Last 24 Hour Vital Signs








  Date Time  Temp Pulse Resp B/P (MAP) Pulse Ox O2 Delivery O2 Flow Rate FiO2


 


5/29/20 12:00 98.1 97 21 121/81 (94) 97   


 


5/29/20 09:00      Room Air  





      Room Air  


 


5/29/20 08:00 98.4 80 20 137/82 (100) 97   


 


5/29/20 04:00 98.9 96 18 143/86 (105) 97   


 


5/29/20 00:00 98.9 96 18 140/82 (101) 98   


 


5/28/20 21:00      Room Air  





      Room Air  


 


5/28/20 20:00 99.0 103 18 133/94 (107) 97   


 


5/28/20 16:02 98.2 118 21 138/94 (109) 96   

















Intake and Output  


 


 5/28/20 5/29/20





 19:00 07:00


 


Intake Total 400 ml 


 


Output Total  800 ml


 


Balance 400 ml -800 ml


 


  


 


Intake Oral 400 ml 


 


Output Urine Total  800 ml


 


# Voids 3 3


 


# Bowel Movements  2











Laboratory Tests








Test


  5/28/20


17:25 5/29/20


06:50


 


Urine Color Yellow   


 


Urine Appearance Clear   


 


Urine pH 7 (4.5-8.0)   


 


Urine Specific Gravity


  1.010


(1.005-1.035) 


 


 


Urine Protein


  Negative


(NEGATIVE) 


 


 


Urine Glucose (UA)


  Negative


(NEGATIVE) 


 


 


Urine Ketones


  Negative


(NEGATIVE) 


 


 


Urine Blood


  Negative


(NEGATIVE) 


 


 


Urine Nitrite


  Negative


(NEGATIVE) 


 


 


Urine Bilirubin


  Negative


(NEGATIVE) 


 


 


Urine Urobilinogen


  8 MG/DL


(0.0-1.0)  H 


 


 


Urine Leukocyte Esterase


  Negative


(NEGATIVE) 


 


 


White Blood Count


  


  16.1 K/UL


(4.8-10.8)  H


 


Red Blood Count


  


  3.46 M/UL


(4.70-6.10)  L


 


Hemoglobin


  


  9.8 G/DL


(14.2-18.0)  L


 


Hematocrit


  


  28.6 %


(42.0-52.0)  L


 


Mean Corpuscular Volume  83 FL (80-99)  


 


Mean Corpuscular Hemoglobin


  


  28.3 PG


(27.0-31.0)


 


Mean Corpuscular Hemoglobin


Concent 


  34.2 G/DL


(32.0-36.0)


 


Red Cell Distribution Width


  


  14.6 %


(11.6-14.8)


 


Platelet Count


  


  371 K/UL


(150-450)


 


Mean Platelet Volume


  


  4.3 FL


(6.5-10.1)  L


 


Neutrophils (%) (Auto)


  


  82.3 %


(45.0-75.0)  H


 


Lymphocytes (%) (Auto)


  


  9.0 %


(20.0-45.0)  L


 


Monocytes (%) (Auto)


  


  7.7 %


(1.0-10.0)


 


Eosinophils (%) (Auto)


  


  0.5 %


(0.0-3.0)


 


Basophils (%) (Auto)


  


  0.5 %


(0.0-2.0)


 


Sodium Level


  


  134 MMOL/L


(136-145)  L


 


Potassium Level


  


  4.4 MMOL/L


(3.5-5.1)


 


Chloride Level


  


  103 MMOL/L


()


 


Carbon Dioxide Level


  


  25 MMOL/L


(21-32)


 


Anion Gap


  


  7 mmol/L


(5-15)


 


Blood Urea Nitrogen


  


  6 mg/dL (7-18)


L


 


Creatinine


  


  0.6 MG/DL


(0.55-1.30)


 


Estimat Glomerular Filtration


Rate 


  > 60 mL/min


(>60)


 


Glucose Level


  


  96 MG/DL


()


 


Calcium Level


  


  7.4 MG/DL


(8.5-10.1)  L


 


Phosphorus Level


  


  2.8 MG/DL


(2.5-4.9)


 


Magnesium Level


  


  1.7 MG/DL


(1.8-2.4)  L


 


Total Bilirubin


  


  0.1 MG/DL


(0.2-1.0)  L


 


Aspartate Amino Transf


(AST/SGOT) 


  25 U/L (15-37)


 


 


Alanine Aminotransferase


(ALT/SGPT) 


  13 U/L (12-78)


 


 


Alkaline Phosphatase


  


  73 U/L


()


 


C-Reactive Protein,


Quantitative 


  14.0 mg/dL


(0.00-0.90)  H


 


Pro-B-Type Natriuretic Peptide


  


  388 pg/mL


(0-125)  H


 


Total Protein


  


  5.6 G/DL


(6.4-8.2)  L


 


Albumin


  


  1.3 G/DL


(3.4-5.0)  L


 


Globulin  4.3 g/dL  


 


Albumin/Globulin Ratio


  


  0.3 (1.0-2.7)


L








Objective


HEAD AND NECK:  No JVD.


LUNGS:  Decreased breath sounds.


CARDIOVASCULAR:  Tachycardia S1 and S2 .


ABDOMEN: Post op with ALLISON drain


EXTREMITIES:  No pitting edema.











Aaron Antunez MD May 29, 2020 12:57

## 2020-05-29 NOTE — GENERAL PROGRESS NOTE
Assessment/Plan


Problem List:  


(1) Syncope


ICD Codes:  R55 - Syncope and collapse


SNOMED:  751933546


Qualifiers:  


   Qualified Codes:  R55 - Syncope and collapse


(2) Gastric mass


ICD Codes:  K31.89 - Other diseases of stomach and duodenum


SNOMED:  482792162


(3) GI bleed


ICD Codes:  K92.2 - Gastrointestinal hemorrhage, unspecified


SNOMED:  30935105


Qualifiers:  


   Qualified Codes:  K92.2 - Gastrointestinal hemorrhage, unspecified


(4) Pulmonary embolism


ICD Codes:  I26.99 - Other pulmonary embolism without acute cor pulmonale


SNOMED:  00768050


Qualifiers:  


   Qualified Codes:  I26.99 - Other pulmonary embolism without acute cor 

pulmonale


Status:  stable, progressing


Assessment/Plan:





s/p surg:





OPERATIONS PERFORMED:


1. Exploratory laparotomy.


2. Mobilization of the splenic flexure.


3. Left colectomy with primary side-to-side anastomosis.


4. Partial gastrectomy.


5. Total splenectomy.


6. Distal pancreatectomy.


7. Omentectomy.


8. Open lysis of adhesions.





fu surg recs


fu oncology


pain control


abx


on soft diet


pending placement


will fu





Subjective


ROS Limited/Unobtainable:  Yes


Allergies:  


Coded Allergies:  


     No Known Allergies (Unverified , 5/12/20)





Objective





Last 24 Hour Vital Signs








  Date Time  Temp Pulse Resp B/P (MAP) Pulse Ox O2 Delivery O2 Flow Rate FiO2


 


5/29/20 04:00 98.9 96 18 143/86 (105) 97   


 


5/29/20 00:00 98.9 96 18 140/82 (101) 98   


 


5/28/20 21:00      Room Air  





      Room Air  


 


5/28/20 20:00 99.0 103 18 133/94 (107) 97   


 


5/28/20 16:02 98.2 118 21 138/94 (109) 96   


 


5/28/20 12:00 97.9 105 21 120/81 (94) 96   


 


5/28/20 09:00      Room Air  





      Room Air  

















Intake and Output  


 


 5/28/20 5/29/20





 18:59 06:59


 


Intake Total 400 ml 


 


Output Total  800 ml


 


Balance 400 ml -800 ml


 


  


 


Intake Oral 400 ml 


 


Output Urine Total  800 ml


 


# Voids 3 3


 


# Bowel Movements  2








Laboratory Tests


5/28/20 17:25: 


Urine Color Yellow, Urine Appearance Clear, Urine pH 7, Urine Specific Gravity 

1.010, Urine Protein Negative, Urine Glucose (UA) Negative, Urine Ketones 

Negative, Urine Blood Negative, Urine Nitrite Negative, Urine Bilirubin Negative

, Urine Urobilinogen 8H, Urine Leukocyte Esterase Negative


5/29/20 06:50: 


White Blood Count 16.1H, Red Blood Count 3.46L, Hemoglobin 9.8L, Hematocrit 

28.6L, Mean Corpuscular Volume 83, Mean Corpuscular Hemoglobin 28.3, Mean 

Corpuscular Hemoglobin Concent 34.2, Red Cell Distribution Width 14.6, Platelet 

Count 371, Mean Platelet Volume 4.3L, Neutrophils (%) (Auto) 82.3H, Lymphocytes 

(%) (Auto) 9.0L, Monocytes (%) (Auto) 7.7, Eosinophils (%) (Auto) 0.5, 

Basophils (%) (Auto) 0.5, Sodium Level 134L, Potassium Level 4.4, Chloride 

Level 103, Carbon Dioxide Level 25, Anion Gap 7, Blood Urea Nitrogen 6L, 

Creatinine 0.6, Estimat Glomerular Filtration Rate > 60, Glucose Level 96, 

Calcium Level 7.4L, Phosphorus Level 2.8, Magnesium Level 1.7L, Total Bilirubin 

0.1L, Aspartate Amino Transf (AST/SGOT) 25, Alanine Aminotransferase (ALT/SGPT) 

13, Alkaline Phosphatase 73, C-Reactive Protein, Quantitative 14.0H, Pro-B-Type 

Natriuretic Peptide 388H, Total Protein 5.6L, Albumin 1.3L, Globulin 4.3, 

Albumin/Globulin Ratio 0.3L


Height (Feet):  5


Height (Inches):  7.00


Weight (Pounds):  198


General Appearance:  no apparent distress


EENT:  normal ENT inspection


Neck:  supple


Cardiovascular:  normal rate


Respiratory/Chest:  decreased breath sounds


Abdomen:  normal bowel sounds, non tender, soft


Extremities:  non-tender











Marc Nielsen MD May 29, 2020 08:47

## 2020-05-29 NOTE — NUR
NURSE NOTES:

Report called to Julita RAHMAN, at [facility]. Patient to be transferred via 
[facility] ambulance at this time. Patient aware of discharge order and plans for transfer.

## 2020-06-01 NOTE — DISCHARGE SUMMARY
Discharge Summary


Discharge Summary


_


DATE OF ADMISSION: 05/12/2020


DATE OF DISCHARGE: 05/29/2020





CONSULTANTS: 


Dr. Erik Mcekon





BRIEF HOSPITAL COURSE:


Patient is a 70-year-old male, who presented to [facility] due to multiple 

episodes of diarrhea per day.  Patient passed out, unwitnessed, and was found 

down.





Upon evaluation at the ED, blood work showed WBC elevated to 27.  Hemoglobin 

3.5 and hematocrit 13.  Electrolytes were normal.  Lactic acid was elevated to 

7.  Troponin negative.  Head CT did not show any acute findings.  CT of the 

abdomen and pelvis showed a centrally necrotic mass arising from the gastric 

fundus measuring 12.6 x 11.1 x 14 cm.  Filling defects within pulmonary vessels 

at the lung bases concerning for pulmonary emboli and small pulmonary infarct.  

General surgeon was consulted.  Patient was started on IV fluids and blood 

transfusion.  He was given antibiotics and Protonix.  He was given heparin for 

pulmonary embolism.  He was then admitted to ICU.





Patient denied any knowledge of gastric mass.  Patient will eventually need 

biopsy.  Patient was placed on n.p.o.  He was continued on IV fluids and PPI.  

Trend hemoglobin every 6 hours.  Patient had syncopal episode, likely due to 

severe dehydration and anemia.  Anemia could be due to GI bleed likely 

secondary to gastric mass.  He was started empirically on antibiotics.  Patient 

had pulmonary embolism, likely related to hypercoagulable disorder from 

malignancy.  Per heme oncologist specialist, patient is not a candidate for 

anticoagulation given severe anemia.  An IVC filter was placed on May 13, 2020.





Vancomycin was added due to bacteremia.  He received multiple blood 

transfusion.  He was also given vitamin K.  He was eventually started on IV 

iron.  Patient will eventually need surgical intervention/ potential EGD,  

however need to r/o COVID.  Diet was advanced.





Blood culture showed growth of Enterobacter, likely from GI source.  IV 

vancomycin was discontinued.  Zosyn was changed to cefepime and Flagyl.





Patient continued to have black tarry stools.  He was cleared for surgery at 

moderate risk.  Echocardiogram showed normal ejection fraction.  SARS-CoV-2 PCR 

was negative x2. 





On 05/18/2020, patient underwent exploratory laparotomy with left colectomy, 

partial gastrectomy, splenectomy, distal pancreatectomy, and omentectomy.  He 

received 2 units of packed RBCs intraoperatively.  Postoperatively, patient was 

transferred to ICU for close monitoring.  He was extubated on 5/19/2020.  

Micafungin was added to his regimen.





Postoperatively, leukocytosis was worsening.  Antibiotic was switched to 

meropenem and p.o. Zyvox.  He was continued with micafungin for intra-abdominal 

coverage.





Venous duplex scan done on 05/18/2020 was positive for acute DVT in the left 

lower extremity.  CTA performed on 05/21/2020 was positive for bilateral 

pulmonary embolism.  Bilateral pleural effusion with dependent infiltrates/

consolidation in the lung base.  He was given heparin subcut.





KUB on 0 522 showed probable postoperative ileus.  On May 23, 2020, he was 

eventually started on clear liquid diet.





Diet was eventually advanced.  Patient was having flatus and BM.  He was given 

potassium replacement.





Pathology report showed moderately differentiated adenocarcinoma primary colon 

cancer in the spleen, stomach, and pancreas.  Path staging pT4No.





Leukocytosis was improving.  Patient was continued on Zyvox.





Drain was removed.  Staples removed.  Heparin was discontinued.  Patient was 

started on Eliquis.  Patient completed antibiotic treatment.





He was eventually discharged to a SNF.








FINAL DIAGNOSES: 


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

, pancreas and colon


Intra-abdominal tumor/mass, large gastric mass status post exploratory 

laparotomy with left colectomy, partial gastrectomy, total splenectomy, distal 

pancreatectomy and omentectomy on 05/18/2020.


Acute bilateral pulmonary embolism and left leg DVT status post IVC filter 

placement


Severe sepsis


Enterobacter bacteremia likely from GI source


Probable aspiration pneumonitis


Severe anemia likely secondary to gastric mass


Syncopal episode likely due to severe dehydration and anemia


Multiple blood transfusion


Thrombocytopenia


Acute kidney injury


Hypertension


Postop ileus


Mild AST elevation


Lactic acidosis


Malnutrition





DISPOSITION: Patient was discharged to a SNF.





DISCHARGE MEDICATIONS: Refer to Discharge Medication List.








I have been assigned to complete a discharge summary on this account, I was not 

involved with the patient's management.--RAKESH Atkinson Jacqueline Robles NP Jun 1, 2020 02:01

## 2020-10-21 ENCOUNTER — HOSPITAL ENCOUNTER (EMERGENCY)
Dept: HOSPITAL 72 - EMR | Age: 70
Discharge: HOME | End: 2020-10-21
Payer: MEDICARE

## 2020-10-21 VITALS — DIASTOLIC BLOOD PRESSURE: 98 MMHG | SYSTOLIC BLOOD PRESSURE: 168 MMHG

## 2020-10-21 VITALS — DIASTOLIC BLOOD PRESSURE: 92 MMHG | SYSTOLIC BLOOD PRESSURE: 150 MMHG

## 2020-10-21 VITALS — HEIGHT: 73 IN | BODY MASS INDEX: 23.46 KG/M2 | WEIGHT: 177 LBS

## 2020-10-21 DIAGNOSIS — Z90.81: ICD-10-CM

## 2020-10-21 DIAGNOSIS — R00.0: Primary | ICD-10-CM

## 2020-10-21 DIAGNOSIS — Z86.718: ICD-10-CM

## 2020-10-21 DIAGNOSIS — I10: ICD-10-CM

## 2020-10-21 DIAGNOSIS — Z86.711: ICD-10-CM

## 2020-10-21 DIAGNOSIS — Z90.49: ICD-10-CM

## 2020-10-21 DIAGNOSIS — Z90.410: ICD-10-CM

## 2020-10-21 DIAGNOSIS — Z85.038: ICD-10-CM

## 2020-10-21 LAB
ADD MANUAL DIFF: YES
ALBUMIN SERPL-MCNC: 3.8 G/DL (ref 3.4–5)
ALBUMIN/GLOB SERPL: 1 {RATIO} (ref 1–2.7)
ALP SERPL-CCNC: 225 U/L (ref 46–116)
ALT SERPL-CCNC: 29 U/L (ref 12–78)
ANION GAP SERPL CALC-SCNC: 10 MMOL/L (ref 5–15)
AST SERPL-CCNC: 31 U/L (ref 15–37)
BILIRUB SERPL-MCNC: 0.2 MG/DL (ref 0.2–1)
BUN SERPL-MCNC: 9 MG/DL (ref 7–18)
CALCIUM SERPL-MCNC: 8.7 MG/DL (ref 8.5–10.1)
CHLORIDE SERPL-SCNC: 102 MMOL/L (ref 98–107)
CO2 SERPL-SCNC: 23 MMOL/L (ref 21–32)
CREAT SERPL-MCNC: 1 MG/DL (ref 0.55–1.3)
ERYTHROCYTE [DISTWIDTH] IN BLOOD BY AUTOMATED COUNT: 16.9 % (ref 11.6–14.8)
GLOBULIN SER-MCNC: 3.9 G/DL
HCT VFR BLD CALC: 47.8 % (ref 42–52)
HGB BLD-MCNC: 15.4 G/DL (ref 14.2–18)
MCV RBC AUTO: 92 FL (ref 80–99)
PLATELET # BLD: 330 K/UL (ref 150–450)
POTASSIUM SERPL-SCNC: 3.7 MMOL/L (ref 3.5–5.1)
RBC # BLD AUTO: 5.18 M/UL (ref 4.7–6.1)
SODIUM SERPL-SCNC: 135 MMOL/L (ref 136–145)
WBC # BLD AUTO: 26.8 K/UL (ref 4.8–10.8)

## 2020-10-21 PROCEDURE — 36415 COLL VENOUS BLD VENIPUNCTURE: CPT

## 2020-10-21 PROCEDURE — 85007 BL SMEAR W/DIFF WBC COUNT: CPT

## 2020-10-21 PROCEDURE — 80053 COMPREHEN METABOLIC PANEL: CPT

## 2020-10-21 PROCEDURE — 99283 EMERGENCY DEPT VISIT LOW MDM: CPT

## 2020-10-21 PROCEDURE — 93005 ELECTROCARDIOGRAM TRACING: CPT

## 2020-10-21 PROCEDURE — 85025 COMPLETE CBC W/AUTO DIFF WBC: CPT

## 2020-10-21 PROCEDURE — 84484 ASSAY OF TROPONIN QUANT: CPT

## 2020-10-21 NOTE — NUR
ED Nurse Note:



PT FORGOT TO OBTAIN URINE SAMPLE, PT DENIES PAIN, HAS NO OTHER COMPLAINTS; 
STATES HE WAS JUST HERE AND NEEDS A PRESCRIPTION

## 2020-10-21 NOTE — EMERGENCY ROOM REPORT
History of Present Illness


General


Chief Complaint:  Hypertension


Source:  Patient





Present Illness


HPI


Disclaimer: Please note that this report is being documented using DRAGON 

technology. This can lead to erroneous entry secondary to incorrect 

interpretation by the dictating instrument.





HPI: 70-year-old male currently being treated for colon cancer on chemotherapy 

presents for evaluation of tachycardia and elevated blood pressures at his PMD 

office earlier today.  Unknown what his readings were however he was sent in for

evaluation.  Patient denies difficulty breathing, chest pain, cough, fever, 

chills, lightheadedness, palpitations, nausea, vomiting, diarrhea or other 

changes in his health.  He was recently admitted to this hospital for syncopal 

episode and discharged last week.  Carotid Dopplers, cardiac evaluation, echo 

were all within normal limits.  Patient is feeling well has no complaints at 

this time.


 


PMH: Colon cancer, PE/DVT





PSH: Ex lap, left colectomy, partial gastrectomy, splenectomy, pancreatectomy, 

Mary, IVC filter


 


Allergies: Reviewed


 


Social Hx: Reviewed


Allergies:  


Coded Allergies:  


     No Known Allergies (Unverified , 5/12/20)





COVID-19 Screening


Contact w/high risk pt:  No


Recent Travel to affected area:  No


Experienced COVID-19 symptoms?:  No


COVID-19 symptoms experienced:  Shortness of Breath, Flu-Like Symptoms


COVID-19 Testing performed PTA:  No


COVID-19 Testing Source:  [facility]





Nursing Documentation-PMH


Past Medical History:  No History, Except For


Hx Cardiac Problems:  Yes - DVT


Hx Hypertension:  Yes


Hx Cancer:  No


Hx Gastrointestinal Problems:  Yes - colon CA with Surgery


Hx Neurological Problems:  Yes


Hx Dizziness:  Yes





Review of Systems


All Other Systems:  negative except mentioned in HPI





Physical Exam





Vital Signs








  Date Time  Temp Pulse Resp B/P (MAP) Pulse Ox O2 Delivery O2 Flow Rate FiO2


 


10/21/20 09:40 97.5 110 20 168/98 (121) 98 Room Air  





 





General: Awake and alert, no acute distress


HEENT: NC/AT. EOMI. 


Cardiovascular: Mild tachycardia.  S1 and S2 normal.  No murmur appreciated


Resp: Normal work of breathing. No cough, wheezing or crackles appreciated


Abdomen: Abdomen is soft, nondistended.  Nontender


Skin: Surgical scars are clean dry and intact


MSK: Normal tone and bulk. Moving all extremities.  No obvious deformity.


Neuro: Awake and alert.  Mentating appropriately.





Medical Decision Making


ER Course


Is a well-appearing 70-year-old male presenting for evaluation of tachycardia 

and hypertension noticed that his PMD office.  He is well-appearing arrives 

slightly tachycardic And hypertensive with systolics in the 160s.  He is in no 

acute distress.  EKG nonischemic.  Labs have returned within normal limits 

including negative troponin.  His blood pressures have improved without 

medications.  Tachycardia resolved.  I discussed with his oncologist who sent 

patient over, Dr. Story.  The patient does not want medical admission given 

he was recently in the hospital had full cardiac work-up.  He is scheduled to 

see his PMD Dr. Lara tomorrow afternoon.  I will give him a dose of amlodipine 

and Prilosec prescription which he can discuss with his PMD tomorrow.  

Instructed to return to the ED if he has new or worsening symptoms.  He agrees.





Laboratory Tests








Test


 10/21/20


10:00


 


White Blood Count


 26.8 K/UL


(4.8-10.8)  *H


 


Red Blood Count


 5.18 M/UL


(4.70-6.10)


 


Hemoglobin


 15.4 G/DL


(14.2-18.0)


 


Hematocrit


 47.8 %


(42.0-52.0)


 


Mean Corpuscular Volume 92 FL (80-99)  


 


Mean Corpuscular Hemoglobin


 29.6 PG


(27.0-31.0)


 


Mean Corpuscular Hemoglobin


Concent 32.1 G/DL


(32.0-36.0)


 


Red Cell Distribution Width


 16.9 %


(11.6-14.8)  H


 


Platelet Count


 330 K/UL


(150-450)


 


Mean Platelet Volume


 7.6 FL


(6.5-10.1)


 


Neutrophils (%) (Auto)


 % (45.0-75.0)





 


Lymphocytes (%) (Auto)


 % (20.0-45.0)





 


Monocytes (%) (Auto)  % (1.0-10.0)  


 


Eosinophils (%) (Auto)  % (0.0-3.0)  


 


Basophils (%) (Auto)  % (0.0-2.0)  


 


Differential Total Cells


Counted 100  





 


Neutrophils % (Manual) 54 % (45-75)  


 


Lymphocytes % (Manual) 16 % (20-45)  L


 


Monocytes % (Manual) 10 % (1-10)  


 


Eosinophils % (Manual) 3 % (0-3)  


 


Basophils % (Manual) 0 % (0-2)  


 


Metamyelocytes % 1 % (0-0)  H


 


Myelocytes % 1 % (0-0)  H


 


Band Neutrophils 15 % (0-8)  H


 


Nucleated Red Blood Cells 3 /100 WBC  


 


Platelet Estimate Adequate  


 


Platelet Morphology Normal  


 


Anisocytosis 1+  


 


Sodium Level


 135 MMOL/L


(136-145)  L


 


Potassium Level


 3.7 MMOL/L


(3.5-5.1)


 


Chloride Level


 102 MMOL/L


()


 


Carbon Dioxide Level


 23 MMOL/L


(21-32)


 


Anion Gap


 10 mmol/L


(5-15)


 


Blood Urea Nitrogen


 9 mg/dL (7-18)





 


Creatinine


 1.0 MG/DL


(0.55-1.30)


 


Estimated Glomerular


Filtration Rate > 60 mL/min


(>60)


 


Glucose Level


 109 MG/DL


()  H


 


Calcium Level


 8.7 MG/DL


(8.5-10.1)


 


Total Bilirubin


 0.2 MG/DL


(0.2-1.0)


 


Aspartate Amino Transferase


(AST) 31 U/L (15-37)





 


Alanine Aminotransferase (ALT)


 29 U/L (12-78)





 


Alkaline Phosphatase


 225 U/L


()  H


 


Troponin I


 0.000 ng/mL


(0.000-0.056)


 


Total Protein


 7.7 G/DL


(6.4-8.2)


 


Albumin


 3.8 G/DL


(3.4-5.0)


 


Globulin 3.9 g/dL  


 


Albumin/Globulin Ratio 1.0 (1.0-2.7)  








EKG Diagnostic Results


Troponin ordered:  Yes


When was troponin ordered?:  Oct 21, 2020


EKG Time:  09:52


Rate:  normal


Rhythm:  NSR


ST Segments:  no acute changes


Other Impression


Sinus rhythm, normal axis, normal intervals, no ST segment changes.





Rhythm Strip Diag. Results


Rhythm Strip Time:  09:52


EP Interpretation:  yes


Rate:  100


Rhythm:  NSR, no PVC's, no ectopy





Last Vital Signs








  Date Time  Temp Pulse Resp B/P (MAP) Pulse Ox O2 Delivery O2 Flow Rate FiO2


 


10/21/20 09:48  110 20   Room Air  


 


10/21/20 09:48 97.5   168/98 98   








Disposition:  HOME, SELF-CARE


Condition:  Stable


Scripts


Amlodipine Besylate* (AMLODIPINE BESYLATE*) 5 Mg Tablet


5 MG ORAL DAILY for Hypertension for 30 Days, #30 TAB


   Prov: Mitesh Villa MD         10/21/20











Mitesh Villa MD              Oct 21, 2020 09:58

## 2020-10-21 NOTE — NUR
ED Nurse Note:



PT AMBULATED TO ED STATING THAT  SENT HIM HERE FOR HTN. PT IN 
RESTROOM OBTAINING URINE. 168/98 BP AT TRIAGE.

## 2022-04-11 NOTE — NUR
HAND-OFF: 

Report given to JOSIAH Field. Plan of care endorsed. Letter created and emailed as requested.